# Patient Record
Sex: FEMALE | Race: WHITE | NOT HISPANIC OR LATINO | Employment: UNEMPLOYED | URBAN - NONMETROPOLITAN AREA
[De-identification: names, ages, dates, MRNs, and addresses within clinical notes are randomized per-mention and may not be internally consistent; named-entity substitution may affect disease eponyms.]

---

## 2017-04-11 ENCOUNTER — OFFICE VISIT (OUTPATIENT)
Dept: ONCOLOGY | Facility: CLINIC | Age: 62
End: 2017-04-11

## 2017-04-11 VITALS
RESPIRATION RATE: 16 BRPM | WEIGHT: 213 LBS | DIASTOLIC BLOOD PRESSURE: 79 MMHG | SYSTOLIC BLOOD PRESSURE: 181 MMHG | HEART RATE: 70 BPM | TEMPERATURE: 98.1 F

## 2017-04-11 DIAGNOSIS — C50.912 MALIGNANT NEOPLASM OF LEFT FEMALE BREAST, UNSPECIFIED SITE OF BREAST: Primary | ICD-10-CM

## 2017-04-11 PROCEDURE — 99213 OFFICE O/P EST LOW 20 MIN: CPT | Performed by: INTERNAL MEDICINE

## 2017-04-11 RX ORDER — TAMOXIFEN CITRATE 20 MG/1
20 TABLET ORAL DAILY
Qty: 90 TABLET | Refills: 1 | Status: SHIPPED | OUTPATIENT
Start: 2017-04-11 | End: 2017-11-15 | Stop reason: SDUPTHER

## 2017-04-11 NOTE — PROGRESS NOTES
PROBLEM LIST:  1. Left breast cancer:   a) Left mastectomy 07/22/2008.   b) T3N1M0, Stage IIIA.   c) HER-2 positive, weakly estrogen receptor positive, and progesterone  receptor negative.   d) Adjuvant chemotherapy with Taxotere, carboplatin, and Herceptin.   e) Adjuvant tamoxifen started on 12/12/2008.   f) Postmastectomy radiation done in [place].   2. Abnormal CT scan of the chest 01/14/2016 with negative CT on follow-up  3. Diabetes.    Subjective     HISTORY OF PRESENT ILLNESS:   Chief complaint: Here about breast cancer management.  Mrs. Horton has not had any bone pain, headache, cough, wheezing or unexplained weight loss to suggest recurrent breast cancer.  She continues on her tamoxifen without difficulty.  Doesn't have any bone pain or any leg pain or swelling to suggest DVT or any vaginal bleeding or spotting.  She is maintaining her usual activities.  She has been started on metformin since her last visit.    Past Medical History, Past Surgical History, Social History, Family History have been reviewed and are without significant changes except as mentioned.    Review of Systems   A comprehensive 14 point review of systems was performed and was negative except as mentioned.    Medications:  The current medication list was reviewed in the EMR    ALLERGIES:  Allergies not on file    Objective      BP (!) 181/79  Pulse 70  Temp 98.1 °F (36.7 °C) (Temporal Artery )   Resp 16  Wt 213 lb (96.6 kg)     Performance Status: ECOG 0    General: well appearing, in no acute distress  HEENT: sclera anicteric, oropharynx clear  Lymphatics: no cervical, supraclavicular, or axillary adenopathy  Cardiovascular: regular rate and rhythm, no murmurs  Lungs: clear to auscultation bilaterally  Abdomen: soft, nontender, nondistended.  No palpable organomegaly  Extremeties: no lower extremity edema  Breasts: Mastectomy incision is healed well with no nodules or other evidence recurrence in right breast has no mass or  discharge  Skin: no rashes, lesions, bruising, or petechiae    RECENT LABS:   WBC   Date Value Ref Range Status   04/12/2016 10.14 3.50 - 10.80 K/mcL Final     Hemoglobin   Date Value Ref Range Status   04/12/2016 13.5 11.5 - 15.5 g/dL Final     Hematocrit   Date Value Ref Range Status   04/12/2016 40.1 34.5 - 44.0 % Final     MCV   Date Value Ref Range Status   04/12/2016 90.7 80.0 - 99.0 fL Final     RDW   Date Value Ref Range Status   12/16/2015 12.8 11.5 - 14.5 % Final     Platelets   Date Value Ref Range Status   04/12/2016 210 150 - 450 K/mcL Final     Neutrophils Absolute   Date Value Ref Range Status   04/12/2016 6.58 1.50 - 8.30 K/mcL Final     Lymphocytes Absolute   Date Value Ref Range Status   04/12/2016 2.59 0.60 - 4.80 K/mcL Final     Monocytes Absolute   Date Value Ref Range Status   04/12/2016 0.70 0.00 - 1.00 K/mcL Final     Eosinophils Absolute   Date Value Ref Range Status   04/12/2016 0.21 0.10 - 0.30 K/mcL Final     Basophils Absolute   Date Value Ref Range Status   04/12/2016 0.04 0.00 - 0.20 K/mcL Final       Glucose   Date Value Ref Range Status   04/12/2016 128 (H) 70 - 100 mg/dL Final     Sodium   Date Value Ref Range Status   04/12/2016 135 132 - 146 mmol/L Final     Potassium   Date Value Ref Range Status   04/12/2016 3.7 3.5 - 5.5 mmol/L Final     CO2   Date Value Ref Range Status   04/12/2016 30 20 - 31 mmol/L Final     Chloride   Date Value Ref Range Status   04/12/2016 100 99 - 109 mmol/L Final     Anion Gap   Date Value Ref Range Status   04/12/2016 5 3 - 11 mmol/L Final     Creatinine   Date Value Ref Range Status   10/18/2016 1.0 0.6 - 1.3 mg/dL Final     BUN   Date Value Ref Range Status   10/18/2016 14 7 - 20 mg/dL Final     Calcium   Date Value Ref Range Status   04/12/2016 9.5 8.7 - 10.4 mg/dL Final     Alkaline Phosphatase   Date Value Ref Range Status   04/12/2016 149 (H) 25 - 100 Units/L Final     Total Protein   Date Value Ref Range Status   04/12/2016 8.0 5.7 - 8.2 g/dL  Final     ALT (SGPT)   Date Value Ref Range Status   04/12/2016 96 (H) 7 - 40 Units/L Final     AST (SGOT)   Date Value Ref Range Status   04/12/2016 67 (H) 0 - 33 Units/L Final     Total Bilirubin   Date Value Ref Range Status   04/12/2016 0.5 0.3 - 1.2 mg/dL Final     Albumin   Date Value Ref Range Status   04/12/2016 4.2 3.2 - 4.8 g/dL Final       No results found for: LDH, URICACID    No results found for: MSPIKE, KAPPALAMB, IGLFLC, FREEKAPPAL          Assessment/Plan   Impression: 1.  Breast cancer.  She's doing well on extended adjuvant tamoxifen after her other treatments as outlined above.  Plan: 1.  We will continue her adjuvant tamoxifen 20 mg daily and see her back in 6 months.            Jac Rader MD  [facility]    04/11/17           CC:

## 2017-05-31 DIAGNOSIS — C50.912 MALIGNANT NEOPLASM OF LEFT FEMALE BREAST, UNSPECIFIED SITE OF BREAST: Primary | ICD-10-CM

## 2017-08-13 ENCOUNTER — HOSPITAL ENCOUNTER (EMERGENCY)
Facility: HOSPITAL | Age: 62
Discharge: HOME | End: 2017-08-13
Attending: EMERGENCY MEDICINE | Admitting: EMERGENCY MEDICINE

## 2017-08-13 ENCOUNTER — APPOINTMENT (OUTPATIENT)
Dept: GENERAL RADIOLOGY | Facility: HOSPITAL | Age: 62
End: 2017-08-13

## 2017-08-13 ENCOUNTER — APPOINTMENT (OUTPATIENT)
Dept: CT IMAGING | Facility: HOSPITAL | Age: 62
End: 2017-08-13

## 2017-08-13 VITALS
OXYGEN SATURATION: 96 % | TEMPERATURE: 98.7 F | DIASTOLIC BLOOD PRESSURE: 89 MMHG | RESPIRATION RATE: 18 BRPM | BODY MASS INDEX: 38.09 KG/M2 | WEIGHT: 207 LBS | HEART RATE: 56 BPM | SYSTOLIC BLOOD PRESSURE: 164 MMHG | HEIGHT: 62 IN

## 2017-08-13 DIAGNOSIS — R59.0 MEDIASTINAL LYMPHADENOPATHY: ICD-10-CM

## 2017-08-13 DIAGNOSIS — J20.9 ACUTE BRONCHITIS, UNSPECIFIED ORGANISM: Primary | ICD-10-CM

## 2017-08-13 LAB
ALBUMIN SERPL-MCNC: 4.5 G/DL (ref 3.5–5)
ALBUMIN/GLOB SERPL: 1.2 G/DL (ref 1–2)
ALP SERPL-CCNC: 117 U/L (ref 38–126)
ALT SERPL W P-5'-P-CCNC: 84 U/L (ref 13–69)
ANION GAP SERPL CALCULATED.3IONS-SCNC: 16.1 MMOL/L
AST SERPL-CCNC: 62 U/L (ref 15–46)
BASOPHILS # BLD AUTO: 0.03 10*3/MM3 (ref 0–0.2)
BASOPHILS NFR BLD AUTO: 0.4 % (ref 0–2.5)
BILIRUB SERPL-MCNC: 0.6 MG/DL (ref 0.2–1.3)
BUN BLD-MCNC: 11 MG/DL (ref 7–20)
BUN/CREAT SERPL: 12.2 (ref 7.1–23.5)
CALCIUM SPEC-SCNC: 10.8 MG/DL (ref 8.4–10.2)
CHLORIDE SERPL-SCNC: 101 MMOL/L (ref 98–107)
CO2 SERPL-SCNC: 27 MMOL/L (ref 26–30)
CREAT BLD-MCNC: 0.9 MG/DL (ref 0.6–1.3)
D-DIMER, QUANTITATIVE (MAD,POW, STR): 528 NG/ML (FEU) (ref 0–500)
DEPRECATED RDW RBC AUTO: 38.9 FL (ref 37–54)
EOSINOPHIL # BLD AUTO: 0.27 10*3/MM3 (ref 0–0.7)
EOSINOPHIL NFR BLD AUTO: 3.5 % (ref 0–7)
ERYTHROCYTE [DISTWIDTH] IN BLOOD BY AUTOMATED COUNT: 12.1 % (ref 11.5–14.5)
GFR SERPL CREATININE-BSD FRML MDRD: 64 ML/MIN/1.73
GLOBULIN UR ELPH-MCNC: 3.8 GM/DL
GLUCOSE BLD-MCNC: 135 MG/DL (ref 74–98)
HCT VFR BLD AUTO: 38.9 % (ref 37–47)
HGB BLD-MCNC: 13.7 G/DL (ref 12–16)
IMM GRANULOCYTES # BLD: 0.04 10*3/MM3 (ref 0–0.06)
IMM GRANULOCYTES NFR BLD: 0.5 % (ref 0–0.6)
LYMPHOCYTES # BLD AUTO: 3.1 10*3/MM3 (ref 0.6–3.4)
LYMPHOCYTES NFR BLD AUTO: 39.9 % (ref 10–50)
MCH RBC QN AUTO: 31.1 PG (ref 27–31)
MCHC RBC AUTO-ENTMCNC: 35.2 G/DL (ref 30–37)
MCV RBC AUTO: 88.4 FL (ref 81–99)
MONOCYTES # BLD AUTO: 0.47 10*3/MM3 (ref 0–0.9)
MONOCYTES NFR BLD AUTO: 6.1 % (ref 0–12)
NEUTROPHILS # BLD AUTO: 3.85 10*3/MM3 (ref 2–6.9)
NEUTROPHILS NFR BLD AUTO: 49.6 % (ref 37–80)
NRBC BLD MANUAL-RTO: 0 /100 WBC (ref 0–0)
PLATELET # BLD AUTO: 184 10*3/MM3 (ref 130–400)
PMV BLD AUTO: 10.1 FL (ref 6–12)
POTASSIUM BLD-SCNC: 3.1 MMOL/L (ref 3.5–5.1)
PROT SERPL-MCNC: 8.3 G/DL (ref 6.3–8.2)
RBC # BLD AUTO: 4.4 10*6/MM3 (ref 4.2–5.4)
SODIUM BLD-SCNC: 141 MMOL/L (ref 137–145)
TROPONIN I SERPL-MCNC: 0 NG/ML (ref 0–0.05)
TROPONIN I SERPL-MCNC: <0.012 NG/ML (ref 0–0.03)
TROPONIN I SERPL-MCNC: <0.012 NG/ML (ref 0–0.03)
WBC NRBC COR # BLD: 7.76 10*3/MM3 (ref 4.8–10.8)
WHOLE BLOOD HOLD SPECIMEN: NORMAL
WHOLE BLOOD HOLD SPECIMEN: NORMAL

## 2017-08-13 PROCEDURE — 71275 CT ANGIOGRAPHY CHEST: CPT

## 2017-08-13 PROCEDURE — 85379 FIBRIN DEGRADATION QUANT: CPT | Performed by: EMERGENCY MEDICINE

## 2017-08-13 PROCEDURE — 93005 ELECTROCARDIOGRAM TRACING: CPT | Performed by: EMERGENCY MEDICINE

## 2017-08-13 PROCEDURE — 96361 HYDRATE IV INFUSION ADD-ON: CPT

## 2017-08-13 PROCEDURE — 71020 HC CHEST PA AND LATERAL: CPT

## 2017-08-13 PROCEDURE — 96376 TX/PRO/DX INJ SAME DRUG ADON: CPT

## 2017-08-13 PROCEDURE — 85025 COMPLETE CBC W/AUTO DIFF WBC: CPT | Performed by: EMERGENCY MEDICINE

## 2017-08-13 PROCEDURE — 96375 TX/PRO/DX INJ NEW DRUG ADDON: CPT

## 2017-08-13 PROCEDURE — 96374 THER/PROPH/DIAG INJ IV PUSH: CPT

## 2017-08-13 PROCEDURE — 0 IOPAMIDOL 61 % SOLUTION: Performed by: EMERGENCY MEDICINE

## 2017-08-13 PROCEDURE — 25010000002 HYDROMORPHONE PER 4 MG: Performed by: EMERGENCY MEDICINE

## 2017-08-13 PROCEDURE — 25010000002 ONDANSETRON PER 1 MG: Performed by: EMERGENCY MEDICINE

## 2017-08-13 PROCEDURE — 80053 COMPREHEN METABOLIC PANEL: CPT | Performed by: EMERGENCY MEDICINE

## 2017-08-13 PROCEDURE — 84484 ASSAY OF TROPONIN QUANT: CPT | Performed by: EMERGENCY MEDICINE

## 2017-08-13 PROCEDURE — 99284 EMERGENCY DEPT VISIT MOD MDM: CPT

## 2017-08-13 RX ORDER — METOPROLOL TARTRATE 5 MG/5ML
5 INJECTION INTRAVENOUS ONCE
Status: COMPLETED | OUTPATIENT
Start: 2017-08-13 | End: 2017-08-13

## 2017-08-13 RX ORDER — SODIUM CHLORIDE 0.9 % (FLUSH) 0.9 %
10 SYRINGE (ML) INJECTION AS NEEDED
Status: DISCONTINUED | OUTPATIENT
Start: 2017-08-13 | End: 2017-08-13 | Stop reason: HOSPADM

## 2017-08-13 RX ORDER — POTASSIUM CHLORIDE 750 MG/1
40 CAPSULE, EXTENDED RELEASE ORAL ONCE
Status: COMPLETED | OUTPATIENT
Start: 2017-08-13 | End: 2017-08-13

## 2017-08-13 RX ORDER — LEVOFLOXACIN 500 MG/1
500 TABLET, FILM COATED ORAL DAILY
Qty: 7 TABLET | Refills: 0 | Status: SHIPPED | OUTPATIENT
Start: 2017-08-13 | End: 2017-08-20

## 2017-08-13 RX ORDER — FAMOTIDINE 10 MG/ML
20 INJECTION, SOLUTION INTRAVENOUS ONCE
Status: COMPLETED | OUTPATIENT
Start: 2017-08-13 | End: 2017-08-13

## 2017-08-13 RX ORDER — ASPIRIN 325 MG
325 TABLET ORAL ONCE
Status: COMPLETED | OUTPATIENT
Start: 2017-08-13 | End: 2017-08-13

## 2017-08-13 RX ORDER — ONDANSETRON 2 MG/ML
4 INJECTION INTRAMUSCULAR; INTRAVENOUS ONCE
Status: COMPLETED | OUTPATIENT
Start: 2017-08-13 | End: 2017-08-13

## 2017-08-13 RX ORDER — SODIUM CHLORIDE 9 MG/ML
125 INJECTION, SOLUTION INTRAVENOUS CONTINUOUS
Status: DISCONTINUED | OUTPATIENT
Start: 2017-08-13 | End: 2017-08-13 | Stop reason: HOSPADM

## 2017-08-13 RX ORDER — LEVOFLOXACIN 750 MG/1
750 TABLET ORAL ONCE
Status: COMPLETED | OUTPATIENT
Start: 2017-08-13 | End: 2017-08-13

## 2017-08-13 RX ADMIN — ASPIRIN 325 MG ORAL TABLET 325 MG: 325 PILL ORAL at 02:19

## 2017-08-13 RX ADMIN — LEVOFLOXACIN 750 MG: 750 TABLET, FILM COATED ORAL at 06:33

## 2017-08-13 RX ADMIN — HYDROMORPHONE HYDROCHLORIDE 0.5 MG: 1 INJECTION, SOLUTION INTRAMUSCULAR; INTRAVENOUS; SUBCUTANEOUS at 02:20

## 2017-08-13 RX ADMIN — FAMOTIDINE 20 MG: 10 INJECTION, SOLUTION INTRAVENOUS at 02:20

## 2017-08-13 RX ADMIN — ONDANSETRON 4 MG: 2 INJECTION INTRAMUSCULAR; INTRAVENOUS at 02:20

## 2017-08-13 RX ADMIN — METOPROLOL TARTRATE 5 MG: 5 INJECTION INTRAVENOUS at 02:20

## 2017-08-13 RX ADMIN — SODIUM CHLORIDE 125 ML/HR: 9 INJECTION, SOLUTION INTRAVENOUS at 02:19

## 2017-08-13 RX ADMIN — POTASSIUM CHLORIDE 40 MEQ: 750 CAPSULE, EXTENDED RELEASE ORAL at 03:32

## 2017-08-13 RX ADMIN — ONDANSETRON 4 MG: 2 INJECTION, SOLUTION INTRAMUSCULAR; INTRAVENOUS at 03:41

## 2017-08-13 RX ADMIN — IOPAMIDOL 100 ML: 612 INJECTION, SOLUTION INTRAVENOUS at 03:29

## 2017-08-13 NOTE — ED PROVIDER NOTES
Subjective   History of Present Illness  TRIAGE CHIEF COMPLAINT:   Chief Complaint   Patient presents with   • Chest Pain         HPI: Sanjana Horton   is a 61 y.o. female   who presents to the emergency department complaining of Chest pain.  Patient with history of hypertension, GERD, dyslipidemia, diabetes, breast cancer status post left mastectomy.  She describes onset of left-sided chest pain at 1 AM while she was sleeping.  Pain as sharp and burning, currently 5 out of 10 in intensity.  She describes associated nausea and shortness of breath.  Pain is worsened by taking a deep breath.  Denies any other exacerbating or relieving factors.  Has not taken anything prior to arrival.  Denies any prior history of cardiac disorders.  Positive family history of heart disease with her father having heart attacks in his 60s.  Review of systems positive for mild nonproductive cough over the past 2 weeks which her doctor attributed to seasonal allergies.            Review of Systems   All other systems reviewed and are negative.      Past Medical History:   Diagnosis Date   • Breast cancer 2008   • Cancer    • Head congestion     4/12/2016: For past 2 days with occasional cough, non productive.   • Hot flashes     Tolerating medication well, does have occasional hot flashes but denies night sweats.       Allergies   Allergen Reactions   • Codeine        Past Surgical History:   Procedure Laterality Date   • MASTECTOMY Left 07/22/2008       History reviewed. No pertinent family history.    Social History     Social History   • Marital status:      Spouse name: N/A   • Number of children: N/A   • Years of education: N/A     Social History Main Topics   • Smoking status: Former Smoker     Years: 40.00     Quit date: 01/2016   • Smokeless tobacco: None      Comment: Smoked less than 1 PPD for past 40 years.   • Alcohol use No   • Drug use: None   • Sexual activity: Not Asked     Other Topics Concern   • None     Social  History Narrative   • None           Objective   Physical Exam    CONSTITUTIONAL: Awake, oriented, appears obese, uncomfortable, but non-toxic   HENT: Atraumatic, normocephalic, oral mucosa pink and moist, airway patent. Nares patent without drainage. External ears normal.   EYES: Conjunctiva clear, EOMI, PERRL   NECK: Trachea midline, non-tender, supple   CARDIOVASCULAR: Normal heart rate, Normal rhythm, No murmurs, rubs, gallops   PULMONARY/CHEST: Clear to auscultation, no rhonchi, wheezes, or rales. Symmetrical breath sounds. Non-tender.  Left mastectomy.  ABDOMINAL: Non-distended, soft, non-tender - no rebound or guarding. BS normal.   NEUROLOGIC: Non-focal, moving all four extremities, no gross sensory or motor deficits.   EXTREMITIES: No clubbing, cyanosis, or edema   SKIN: Warm, Dry, No erythema, No rash     XR Chest 2 View    (Results Pending)   CT Chest Pulmonary Embolism With Contrast    (Results Pending)           EKG:  NSR, rate 71, no new/acute ST changes or T-wave inversions when compared with prior.         Procedures         ED Course  ED Course          ED COURSE / MEDICAL DECISION MAKING:   Nursing notes reviewed.    Patient with atypical L chest discomfort with a pleuritic quality. Labs unremarkable. CT scan with L sided mediastinal and hilar adenopathy with ground glass opacities. No fever, leukocytosis, Left shift, or productive cough to support a diagnosis of pneumonia.  It is possible that some of the ground glass opacities are residual from prior radiation from her left-sided breast cancer and that perhaps she has bronchitis with some reactive lymphadenopathy however I feel this requires further workup and I advised the patient to follow-up with her oncologist for further evaluation.  I provided her with a copy of her CT report with a few handwritten notes on it to bring with her to that appointment this week.  In the meanwhile we will initiate oral antibiotics.  Patient encouraged to stay  hydrated and to return to the ER if worse in any way.     DECISION TO DISCHARGE/ADMIT: see ED care timeline       Electronically signed by: Stefano Valdez MD, 8/13/2017 6:32 AM              MDM    Final diagnoses:   Acute bronchitis, unspecified organism   Mediastinal lymphadenopathy            Stefano Valdez MD  08/13/17 [id number]

## 2017-08-16 ENCOUNTER — APPOINTMENT (OUTPATIENT)
Dept: LAB | Facility: HOSPITAL | Age: 62
End: 2017-08-16

## 2017-08-16 ENCOUNTER — OFFICE VISIT (OUTPATIENT)
Dept: ONCOLOGY | Facility: CLINIC | Age: 62
End: 2017-08-16

## 2017-08-16 VITALS
SYSTOLIC BLOOD PRESSURE: 159 MMHG | HEIGHT: 62 IN | HEART RATE: 69 BPM | DIASTOLIC BLOOD PRESSURE: 78 MMHG | TEMPERATURE: 97.5 F | BODY MASS INDEX: 37.73 KG/M2 | RESPIRATION RATE: 18 BRPM | WEIGHT: 205 LBS

## 2017-08-16 DIAGNOSIS — C50.912 MALIGNANT NEOPLASM OF LEFT FEMALE BREAST, UNSPECIFIED SITE OF BREAST: Primary | ICD-10-CM

## 2017-08-16 DIAGNOSIS — R93.89 ABNORMAL CT SCAN, CHEST: ICD-10-CM

## 2017-08-16 LAB
ALBUMIN SERPL-MCNC: 4.4 G/DL (ref 3.2–4.8)
ALBUMIN/GLOB SERPL: 1.3 G/DL (ref 1.5–2.5)
ALP SERPL-CCNC: 150 U/L (ref 25–100)
ALT SERPL W P-5'-P-CCNC: 65 U/L (ref 7–40)
ANION GAP SERPL CALCULATED.3IONS-SCNC: 3 MMOL/L (ref 3–11)
AST SERPL-CCNC: 52 U/L (ref 0–33)
BILIRUB SERPL-MCNC: 0.8 MG/DL (ref 0.3–1.2)
BUN BLD-MCNC: 12 MG/DL (ref 9–23)
BUN/CREAT SERPL: 12 (ref 7–25)
CALCIUM SPEC-SCNC: 10.4 MG/DL (ref 8.7–10.4)
CHLORIDE SERPL-SCNC: 102 MMOL/L (ref 99–109)
CO2 SERPL-SCNC: 33 MMOL/L (ref 20–31)
CREAT BLD-MCNC: 1 MG/DL (ref 0.6–1.3)
CRP SERPL-MCNC: 1.01 MG/DL (ref 0–1)
ERYTHROCYTE [SEDIMENTATION RATE] IN BLOOD: 43 MM/HR (ref 0–30)
GFR SERPL CREATININE-BSD FRML MDRD: 57 ML/MIN/1.73
GLOBULIN UR ELPH-MCNC: 3.4 GM/DL
GLUCOSE BLD-MCNC: 118 MG/DL (ref 70–100)
LDH SERPL-CCNC: 216 U/L (ref 120–246)
POTASSIUM BLD-SCNC: 3.5 MMOL/L (ref 3.5–5.5)
PROT SERPL-MCNC: 7.8 G/DL (ref 5.7–8.2)
SODIUM BLD-SCNC: 138 MMOL/L (ref 132–146)

## 2017-08-16 PROCEDURE — 83615 LACTATE (LD) (LDH) ENZYME: CPT | Performed by: INTERNAL MEDICINE

## 2017-08-16 PROCEDURE — 86300 IMMUNOASSAY TUMOR CA 15-3: CPT | Performed by: INTERNAL MEDICINE

## 2017-08-16 PROCEDURE — 85652 RBC SED RATE AUTOMATED: CPT | Performed by: INTERNAL MEDICINE

## 2017-08-16 PROCEDURE — 99215 OFFICE O/P EST HI 40 MIN: CPT | Performed by: INTERNAL MEDICINE

## 2017-08-16 PROCEDURE — 80053 COMPREHEN METABOLIC PANEL: CPT | Performed by: INTERNAL MEDICINE

## 2017-08-16 PROCEDURE — 86140 C-REACTIVE PROTEIN: CPT | Performed by: INTERNAL MEDICINE

## 2017-08-16 PROCEDURE — 36415 COLL VENOUS BLD VENIPUNCTURE: CPT | Performed by: INTERNAL MEDICINE

## 2017-08-16 RX ORDER — ERGOCALCIFEROL 1.25 MG/1
50000 CAPSULE ORAL
Refills: 0 | COMMUNITY
Start: 2017-07-29 | End: 2019-07-08 | Stop reason: SDUPTHER

## 2017-08-16 NOTE — PROGRESS NOTES
"      PROBLEM LIST:  1.  New mediastinal and hilar lymphadenopathy.  Differential diagnosis includes recurrent breast cancer, occult lung cancer, lymphoma, or benign diseases.  2.  Left breast cancer  A.  Left mastectomy 7/22/2008  B.  T3 N1 M0 Stage IIIA  C.  HER-2 positive, \"weakly\" ER positive, DE negative  D.  Adjuvant chemotherapy with Herceptin, Taxotere, and carboplatin  E.  Postmastectomy radiation  F.  Adjuvant tamoxifen started on 12/12/2008 and continued as extended adjuvant  3.  Diabetes    Subjective     HISTORY OF PRESENT ILLNESS:   Chief complaint here about dyspnea and abnormal x-rays.  Mrs. Horton is seen on an interim visit after an ER visit for dyspnea and atypical chest pain.  Evaluation there showed abnormal findings on chest x-ray and on CT angiogram she had lymphadenopathy in the mediastinum and hilar regions along with some woman a infiltrates that are not nodular.  She hasn't noticed any fever, chills, sweats or any cough, wheezing or pleuritic chest pain and she hasn't noticed any unexplained weight loss or other new symptoms.  She hasn't changed any medicines recently.    Social history is remarkable that she did smoke cigarettes in the past.  She is accompanied today by her  and she lives at home.    Past Medical History, Past Surgical History, Social History, Family History have been reviewed and are without significant changes except as mentioned.    Review of Systems   A comprehensive 14 point review of systems was performed and was negative except as mentioned.    Medications:  The current medication list was reviewed in the EMR    ALLERGIES:  Allergies not on file    Objective      /78 Comment: RUE  Pulse 69  Temp 97.5 °F (36.4 °C) (Temporal Artery )   Resp 18  Ht 62\" (157.5 cm)  Wt 205 lb (93 kg)  BMI 37.49 kg/m2     Performance Status: ECOG 0    General: well appearing, in no acute distress  HEENT: sclera anicteric, oropharynx clear  Lymphatics: no cervical, " supraclavicular, or axillary adenopathy  Cardiovascular: regular rate and rhythm, no murmurs  Lungs: clear to auscultation bilaterally  Abdomen: soft, nontender, nondistended.  No palpable organomegaly  Breasts: The left mastectomy incision has radiation changes but no evidence of recurrent breast cancer in the right breast is without any mass or discharge.  Extremeties: no lower extremity edema  Skin: no rashes, lesions, bruising, or petechiae    RECENT LABS:   WBC   Date Value Ref Range Status   08/13/2017 7.76 4.80 - 10.80 10*3/mm3 Final     Hemoglobin   Date Value Ref Range Status   08/13/2017 13.7 12.0 - 16.0 g/dL Final     Hematocrit   Date Value Ref Range Status   08/13/2017 38.9 37.0 - 47.0 % Final     MCV   Date Value Ref Range Status   08/13/2017 88.4 81.0 - 99.0 fL Final     RDW   Date Value Ref Range Status   08/13/2017 12.1 11.5 - 14.5 % Final     MPV   Date Value Ref Range Status   08/13/2017 10.1 6.0 - 12.0 fL Final     Platelets   Date Value Ref Range Status   08/13/2017 184 130 - 400 10*3/mm3 Final     Immature Grans %   Date Value Ref Range Status   08/13/2017 0.5 0.0 - 0.6 % Final     Neutrophils, Absolute   Date Value Ref Range Status   08/13/2017 3.85 2.00 - 6.90 10*3/mm3 Final     Lymphocytes, Absolute   Date Value Ref Range Status   08/13/2017 3.10 0.60 - 3.40 10*3/mm3 Final     Monocytes, Absolute   Date Value Ref Range Status   08/13/2017 0.47 0.00 - 0.90 10*3/mm3 Final     Eosinophils, Absolute   Date Value Ref Range Status   08/13/2017 0.27 0.00 - 0.70 10*3/mm3 Final     Basophils, Absolute   Date Value Ref Range Status   08/13/2017 0.03 0.00 - 0.20 10*3/mm3 Final     Immature Grans, Absolute   Date Value Ref Range Status   08/13/2017 0.04 0.00 - 0.06 10*3/mm3 Final     nRBC   Date Value Ref Range Status   08/13/2017 0.0 0.0 - 0.0 /100 WBC Final       Glucose   Date Value Ref Range Status   08/13/2017 135 (H) 74 - 98 mg/dL Final     Sodium   Date Value Ref Range Status   08/13/2017 141 137  - 145 mmol/L Final     Potassium   Date Value Ref Range Status   08/13/2017 3.1 (L) 3.5 - 5.1 mmol/L Final     CO2   Date Value Ref Range Status   08/13/2017 27.0 26.0 - 30.0 mmol/L Final     Chloride   Date Value Ref Range Status   08/13/2017 101 98 - 107 mmol/L Final     Anion Gap   Date Value Ref Range Status   08/13/2017 16.1 mmol/L Final     Creatinine   Date Value Ref Range Status   08/13/2017 0.90 0.60 - 1.30 mg/dL Final     BUN   Date Value Ref Range Status   08/13/2017 11 7 - 20 mg/dL Final     BUN/Creatinine Ratio   Date Value Ref Range Status   08/13/2017 12.2 7.1 - 23.5 Final     Calcium   Date Value Ref Range Status   08/13/2017 10.8 (H) 8.4 - 10.2 mg/dL Final     eGFR Non  Amer   Date Value Ref Range Status   08/13/2017 64 >60 mL/min/1.73 Final     Alkaline Phosphatase   Date Value Ref Range Status   08/13/2017 117 38 - 126 U/L Final     Total Protein   Date Value Ref Range Status   08/13/2017 8.3 (H) 6.3 - 8.2 g/dL Final     ALT (SGPT)   Date Value Ref Range Status   08/13/2017 84 (H) 13 - 69 U/L Final     AST (SGOT)   Date Value Ref Range Status   08/13/2017 62 (H) 15 - 46 U/L Final     Total Bilirubin   Date Value Ref Range Status   08/13/2017 0.6 0.2 - 1.3 mg/dL Final     Albumin   Date Value Ref Range Status   08/13/2017 4.50 3.50 - 5.00 g/dL Final     Globulin   Date Value Ref Range Status   08/13/2017 3.8 gm/dL Final     A/G Ratio   Date Value Ref Range Status   08/13/2017 1.2 1.0 - 2.0 g/dL Final       No results found for: LDH, URICACID    No results found for: MSPIKE, KAPPALAMB, IGLFLC, FREEKAPPAL      Her CT chest and chest x-ray images and reports were reviewed.  She has audible enlarged lymph nodes including mediastinal and left hilar especially.  Some of these are calcified.    Assessment/Plan   Impression: 1.  Enlarged lymph nodes.  This would be an unusual pattern of recurrence for this type of breast cancer, which usually recurs early or not at all.  While this could represent  breast cancer, I think other possibilities are at least equally likely including lung cancer which would be especially typical for small cell lung cancer, lymphoma, with other causes like another occult malignancy or a second breast cancer or benign causes such as sarcoidosis or infection possible but felt to be less likely.    Plan: 1.  I will order labs today including a CMP, noting that her calcium was mildly elevated, along with a CA 27.29, sedimentation rate and C-reactive protein.  2.  I'll arrange pulmonary consultation since I think bronchoscopy and EBUS would be the most appropriate diagnostic procedure.  Mediastinal endoscopy or CT-guided biopsy with the alternatives.  3.  We discussed PET scanning but I'll defer this until we have some additional testing.  4.  I'll see her back as soon as these results are available.  I discussed all this in detail with Ms. Horton and her  including differential diagnosis and plans for evaluation.  They had several appropriate questions and I tried to answer these fully.    Visit time was 32 minutes minutes, 18 minutes spent in counseling including discussion of the imaging results, differential diagnosis, and plan for evaluation.    Jac Rader MD  [facility]    08/16/17           CC:

## 2017-08-17 ENCOUNTER — PREP FOR SURGERY (OUTPATIENT)
Dept: OTHER | Facility: HOSPITAL | Age: 62
End: 2017-08-17

## 2017-08-17 ENCOUNTER — OFFICE VISIT (OUTPATIENT)
Dept: PULMONOLOGY | Facility: CLINIC | Age: 62
End: 2017-08-17

## 2017-08-17 VITALS
DIASTOLIC BLOOD PRESSURE: 78 MMHG | WEIGHT: 201 LBS | BODY MASS INDEX: 36.99 KG/M2 | HEART RATE: 91 BPM | SYSTOLIC BLOOD PRESSURE: 130 MMHG | RESPIRATION RATE: 19 BRPM | OXYGEN SATURATION: 94 % | HEIGHT: 62 IN

## 2017-08-17 DIAGNOSIS — R91.8 MASS OF LEFT LUNG: Primary | ICD-10-CM

## 2017-08-17 DIAGNOSIS — E66.01 MORBID OBESITY, UNSPECIFIED OBESITY TYPE (HCC): ICD-10-CM

## 2017-08-17 DIAGNOSIS — R91.8 MASS OF LEFT LUNG: ICD-10-CM

## 2017-08-17 DIAGNOSIS — R59.0 MEDIASTINAL LYMPHADENOPATHY: ICD-10-CM

## 2017-08-17 DIAGNOSIS — J43.9 PULMONARY EMPHYSEMA, UNSPECIFIED EMPHYSEMA TYPE (HCC): Primary | ICD-10-CM

## 2017-08-17 DIAGNOSIS — Z87.891 PERSONAL HISTORY OF TOBACCO USE, PRESENTING HAZARDS TO HEALTH: ICD-10-CM

## 2017-08-17 LAB — CANCER AG27-29 SERPL-ACNC: 47.2 U/ML (ref 0–38.6)

## 2017-08-17 PROCEDURE — 99245 OFF/OP CONSLTJ NEW/EST HI 55: CPT | Performed by: INTERNAL MEDICINE

## 2017-08-17 RX ORDER — SODIUM CHLORIDE 9 MG/ML
42 INJECTION, SOLUTION INTRAVENOUS CONTINUOUS
Status: CANCELLED | OUTPATIENT
Start: 2017-08-17

## 2017-08-17 NOTE — PROGRESS NOTES
"CONSULT NOTE    Chief Complaint   Patient presents with   • Consult     Abnormal CT    • Shortness of Breath   • Cough       Subjective   Sanjana Horton is a 62 y.o. female.     History of Present Illness   62-year-old female with past medical history significant for breast cancer who came for consultation after she was found to have abnormal CT of the chest.    The patient says that about 3 days ago she woke up with cough and chest pain and came to the emergency room where she underwent a CT of the chest with contrast.  Although there was no pulmonary embolism, she was found to have multiple mediastinal lymphadenopathy and was sent to follow-up with Dr. Rader who has been following her status post breast cancer.  She underwent surgery and received chemotherapy and radiation treatment.    The patient says that her symptoms started one month ago and was mostly consistent with cough.  The patient was treated with antibiotics and her cough improved.     She also mentioned weight loss of about 10 pounds over the past 1 month.  The patient actually mentions night sweats, that started 2 nights ago.  She denies any hemoptysis.    She occasionally snores but denies any daytime sleepiness or tiredness.  She also denies any significant shortness of breath although occasionally does feel \"tired with exertion\".    The patient used to smoke three quarters of a pack per day for 40 years, having quit 2 years ago.  There is no known family history of lung cancer.    The following portions of the patient's history were reviewed and updated as appropriate: allergies, current medications, past family history, past medical history, past social history and past surgical history.    Review of Systems   Constitutional: Positive for chills, fatigue and fever.   HENT: Negative for congestion, postnasal drip, rhinorrhea, sinus pressure, sneezing and sore throat.    Respiratory: Positive for cough, chest tightness, shortness of breath " "and wheezing.    Cardiovascular: Positive for chest pain. Negative for palpitations and leg swelling.   Psychiatric/Behavioral: Negative for sleep disturbance.   All other systems reviewed and are negative.      Past Medical History:   Diagnosis Date   • Breast cancer 2008   • Cancer    • Head congestion     4/12/2016: For past 2 days with occasional cough, non productive.   • Hot flashes     Tolerating medication well, does have occasional hot flashes but denies night sweats.       Social History   Substance Use Topics   • Smoking status: Former Smoker     Years: 40.00     Quit date: 01/2016   • Smokeless tobacco: Former User      Comment: Smoked less than 1 PPD for past 40 years.   • Alcohol use No         Objective   Visit Vitals   • /78   • Pulse 91   • Resp 19   • Ht 62\" (157.5 cm)   • Wt 201 lb (91.2 kg)   • SpO2 94%   • BMI 36.76 kg/m2       Physical Exam   Constitutional: She is oriented to person, place, and time. She appears well-developed and well-nourished.   HENT:   Head: Atraumatic.   Crowded Oropharynx.   Dentures noted.   Eyes: EOM are normal. Pupils are equal, round, and reactive to light.   Neck: No JVD present. No tracheal deviation present. No thyromegaly present.   Increased adipose tissue.    Cardiovascular: Normal rate and regular rhythm.    Pulmonary/Chest: Effort normal and breath sounds normal. No respiratory distress. She has no wheezes.   Musculoskeletal: Normal range of motion. She exhibits no edema.   Neurological: She is alert and oriented to person, place, and time.   Skin: Skin is warm and dry.   Psychiatric: She has a normal mood and affect. Her behavior is normal.   Vitals reviewed.        Assessment/Plan   Sanjana was seen today for consult, shortness of breath and cough.    Diagnoses and all orders for this visit:    Pulmonary emphysema, unspecified emphysema type    Mediastinal lymphadenopathy    Personal history of tobacco use, presenting hazards to health    Morbid " obesity, unspecified obesity type    Mass of left lung         Return in about 3 weeks (around 9/7/2017) for Recheck, Overbook.    DISCUSSION(if any):  I have reviewed the patient's imaging studies and shared them with her.  The patient's CT scan confirms mediastinal lymphadenopathy and I do believe there is a left hilar lung mass as well.  The patient has multiple lymph nodes which can be accessed.     I have also reviewed her oncologist's office note that mentions enlarged lymph nodes and discussion with the patient about PET scan.      I reviewed her last EKG that showed NSR.    Laboratory workup was also reviewed which showed CO2 level of 33. Last Hb was 13.7. Platelets were 184.    ===========================  ===========================    The risks of EBUS and bronchoscopy including but not limited to damage to the overlying structures, pneumothorax, bleeding, worsening of respiratory failure, requirement for chest tube placement among others were explained.    Patient understood the risks and benefits and agreed to proceed with the procedure    The alternatives were also discussed with the patient.    While we are awaiting the EBUS and Bronchoscopy, a PET scan can be considered.    I told the patient that the differential diagnosis includes lung cancer along with lymphoma.  I highly doubt that this represents metastatic breast cancer.    I also shared the fact that some of the lymph nodes appear to be at least partially calcified.    The patient's CT scan also shows evidence of emphysema although it seems to be mild and scattered.  There are however, no symptoms to suggest active COPD at this time.          Dictated utilizing Dragon dictation.    This document was electronically signed by Holly Granados MD August 17, 2017  11:01 AM

## 2017-08-18 PROBLEM — R59.0 MEDIASTINAL LYMPHADENOPATHY: Status: ACTIVE | Noted: 2017-08-18

## 2017-08-18 PROBLEM — R91.8 MASS OF LEFT LUNG: Status: ACTIVE | Noted: 2017-08-18

## 2017-08-22 RX ORDER — ASPIRIN 81 MG/1
81 TABLET ORAL DAILY
COMMUNITY

## 2017-08-31 ENCOUNTER — ANESTHESIA EVENT (OUTPATIENT)
Dept: PERIOP | Facility: HOSPITAL | Age: 62
End: 2017-08-31

## 2017-08-31 ENCOUNTER — ANESTHESIA (OUTPATIENT)
Dept: PERIOP | Facility: HOSPITAL | Age: 62
End: 2017-08-31

## 2017-08-31 ENCOUNTER — HOSPITAL ENCOUNTER (OUTPATIENT)
Facility: HOSPITAL | Age: 62
Setting detail: HOSPITAL OUTPATIENT SURGERY
Discharge: HOME | End: 2017-08-31
Attending: INTERNAL MEDICINE | Admitting: INTERNAL MEDICINE

## 2017-08-31 ENCOUNTER — APPOINTMENT (OUTPATIENT)
Dept: GENERAL RADIOLOGY | Facility: HOSPITAL | Age: 62
End: 2017-08-31

## 2017-08-31 VITALS
SYSTOLIC BLOOD PRESSURE: 143 MMHG | DIASTOLIC BLOOD PRESSURE: 73 MMHG | TEMPERATURE: 97.2 F | BODY MASS INDEX: 38.09 KG/M2 | HEIGHT: 62 IN | OXYGEN SATURATION: 92 % | RESPIRATION RATE: 18 BRPM | HEART RATE: 78 BPM | WEIGHT: 207 LBS

## 2017-08-31 DIAGNOSIS — R59.0 MEDIASTINAL LYMPHADENOPATHY: ICD-10-CM

## 2017-08-31 DIAGNOSIS — R91.8 MASS OF LEFT LUNG: ICD-10-CM

## 2017-08-31 LAB
DEPRECATED RDW RBC AUTO: 38 FL (ref 37–54)
ERYTHROCYTE [DISTWIDTH] IN BLOOD BY AUTOMATED COUNT: 11.9 % (ref 11.5–14.5)
GLUCOSE BLDC GLUCOMTR-MCNC: 105 MG/DL (ref 70–130)
HCT VFR BLD AUTO: 38.4 % (ref 37–47)
HGB BLD-MCNC: 13.1 G/DL (ref 12–16)
INR PPP: 1.14 (ref 0.9–1.1)
MCH RBC QN AUTO: 30.3 PG (ref 27–31)
MCHC RBC AUTO-ENTMCNC: 34.1 G/DL (ref 30–37)
MCV RBC AUTO: 88.9 FL (ref 81–99)
PLATELET # BLD AUTO: 211 10*3/MM3 (ref 130–400)
PMV BLD AUTO: 9.5 FL (ref 6–12)
PROTHROMBIN TIME: 12.5 SECONDS (ref 9.3–12.1)
RBC # BLD AUTO: 4.32 10*6/MM3 (ref 4.2–5.4)
WBC NRBC COR # BLD: 7.69 10*3/MM3 (ref 4.8–10.8)

## 2017-08-31 PROCEDURE — 85610 PROTHROMBIN TIME: CPT | Performed by: INTERNAL MEDICINE

## 2017-08-31 PROCEDURE — 25010000002 ONDANSETRON PER 1 MG

## 2017-08-31 PROCEDURE — 25010000002 SUCCINYLCHOLINE PER 20 MG: Performed by: NURSE ANESTHETIST, CERTIFIED REGISTERED

## 2017-08-31 PROCEDURE — 31625 BRONCHOSCOPY W/BIOPSY(S): CPT | Performed by: INTERNAL MEDICINE

## 2017-08-31 PROCEDURE — 31624 DX BRONCHOSCOPE/LAVAGE: CPT | Performed by: INTERNAL MEDICINE

## 2017-08-31 PROCEDURE — 85027 COMPLETE CBC AUTOMATED: CPT | Performed by: INTERNAL MEDICINE

## 2017-08-31 PROCEDURE — 87102 FUNGUS ISOLATION CULTURE: CPT | Performed by: INTERNAL MEDICINE

## 2017-08-31 PROCEDURE — 25010000002 DEXAMETHASONE PER 1 MG: Performed by: NURSE ANESTHETIST, CERTIFIED REGISTERED

## 2017-08-31 PROCEDURE — 25010000002 FENTANYL CITRATE (PF) 100 MCG/2ML SOLUTION: Performed by: NURSE ANESTHETIST, CERTIFIED REGISTERED

## 2017-08-31 PROCEDURE — 25010000002 PROPOFOL 200 MG/20ML EMULSION: Performed by: NURSE ANESTHETIST, CERTIFIED REGISTERED

## 2017-08-31 PROCEDURE — 25010000002 ONDANSETRON PER 1 MG: Performed by: NURSE ANESTHETIST, CERTIFIED REGISTERED

## 2017-08-31 PROCEDURE — 82962 GLUCOSE BLOOD TEST: CPT

## 2017-08-31 PROCEDURE — 31653 BRONCH EBUS SAMPLNG 3/> NODE: CPT | Performed by: INTERNAL MEDICINE

## 2017-08-31 PROCEDURE — 31623 DX BRONCHOSCOPE/BRUSH: CPT | Performed by: INTERNAL MEDICINE

## 2017-08-31 PROCEDURE — 71010 HC CHEST PA OR AP: CPT

## 2017-08-31 PROCEDURE — 87070 CULTURE OTHR SPECIMN AEROBIC: CPT | Performed by: INTERNAL MEDICINE

## 2017-08-31 PROCEDURE — C1726 CATH, BAL DIL, NON-VASCULAR: HCPCS | Performed by: INTERNAL MEDICINE

## 2017-08-31 PROCEDURE — 87205 SMEAR GRAM STAIN: CPT | Performed by: INTERNAL MEDICINE

## 2017-08-31 PROCEDURE — 87106 FUNGI IDENTIFICATION YEAST: CPT | Performed by: INTERNAL MEDICINE

## 2017-08-31 RX ORDER — ONDANSETRON 2 MG/ML
INJECTION INTRAMUSCULAR; INTRAVENOUS AS NEEDED
Status: DISCONTINUED | OUTPATIENT
Start: 2017-08-31 | End: 2017-08-31 | Stop reason: SURG

## 2017-08-31 RX ORDER — PROPOFOL 10 MG/ML
INJECTION, EMULSION INTRAVENOUS AS NEEDED
Status: DISCONTINUED | OUTPATIENT
Start: 2017-08-31 | End: 2017-08-31 | Stop reason: SURG

## 2017-08-31 RX ORDER — ONDANSETRON 2 MG/ML
INJECTION INTRAMUSCULAR; INTRAVENOUS
Status: COMPLETED
Start: 2017-08-31 | End: 2017-08-31

## 2017-08-31 RX ORDER — LIDOCAINE HYDROCHLORIDE 20 MG/ML
INJECTION, SOLUTION INFILTRATION; PERINEURAL
Status: DISCONTINUED
Start: 2017-08-31 | End: 2017-08-31 | Stop reason: HOSPADM

## 2017-08-31 RX ORDER — ONDANSETRON 2 MG/ML
4 INJECTION INTRAMUSCULAR; INTRAVENOUS ONCE
Status: CANCELLED | OUTPATIENT
Start: 2017-08-31

## 2017-08-31 RX ORDER — SUCCINYLCHOLINE CHLORIDE 20 MG/ML
INJECTION INTRAMUSCULAR; INTRAVENOUS AS NEEDED
Status: DISCONTINUED | OUTPATIENT
Start: 2017-08-31 | End: 2017-08-31 | Stop reason: SURG

## 2017-08-31 RX ORDER — SODIUM CHLORIDE 9 MG/ML
42 INJECTION, SOLUTION INTRAVENOUS CONTINUOUS
Status: DISCONTINUED | OUTPATIENT
Start: 2017-08-31 | End: 2017-08-31 | Stop reason: HOSPADM

## 2017-08-31 RX ORDER — ONDANSETRON 2 MG/ML
4 INJECTION INTRAMUSCULAR; INTRAVENOUS ONCE
Status: CANCELLED | OUTPATIENT
Start: 2017-08-31 | End: 2017-08-31

## 2017-08-31 RX ORDER — FENTANYL CITRATE 50 UG/ML
INJECTION, SOLUTION INTRAMUSCULAR; INTRAVENOUS AS NEEDED
Status: DISCONTINUED | OUTPATIENT
Start: 2017-08-31 | End: 2017-08-31 | Stop reason: SURG

## 2017-08-31 RX ORDER — LIDOCAINE HYDROCHLORIDE 40 MG/ML
SOLUTION TOPICAL
Status: DISCONTINUED
Start: 2017-08-31 | End: 2017-08-31 | Stop reason: WASHOUT

## 2017-08-31 RX ORDER — DEXAMETHASONE SODIUM PHOSPHATE 4 MG/ML
INJECTION, SOLUTION INTRA-ARTICULAR; INTRALESIONAL; INTRAMUSCULAR; INTRAVENOUS; SOFT TISSUE AS NEEDED
Status: DISCONTINUED | OUTPATIENT
Start: 2017-08-31 | End: 2017-08-31 | Stop reason: SURG

## 2017-08-31 RX ADMIN — SUCCINYLCHOLINE CHLORIDE 100 MG: 20 INJECTION, SOLUTION INTRAMUSCULAR; INTRAVENOUS at 12:38

## 2017-08-31 RX ADMIN — ONDANSETRON 4 MG: 2 INJECTION INTRAMUSCULAR; INTRAVENOUS at 15:44

## 2017-08-31 RX ADMIN — DEXAMETHASONE SODIUM PHOSPHATE 4 MG: 4 INJECTION, SOLUTION INTRAMUSCULAR; INTRAVENOUS at 12:38

## 2017-08-31 RX ADMIN — SODIUM CHLORIDE: 9 INJECTION, SOLUTION INTRAVENOUS at 13:21

## 2017-08-31 RX ADMIN — ONDANSETRON 4 MG: 2 INJECTION INTRAMUSCULAR; INTRAVENOUS at 12:38

## 2017-08-31 RX ADMIN — FENTANYL CITRATE 100 MCG: 50 INJECTION, SOLUTION INTRAMUSCULAR; INTRAVENOUS at 12:38

## 2017-08-31 RX ADMIN — LIDOCAINE HYDROCHLORIDE 60 MG: 20 INJECTION, SOLUTION INTRAVENOUS at 12:38

## 2017-08-31 RX ADMIN — SODIUM CHLORIDE 42 ML/HR: 9 INJECTION, SOLUTION INTRAVENOUS at 11:00

## 2017-08-31 RX ADMIN — PROPOFOL 200 MG: 10 INJECTION, EMULSION INTRAVENOUS at 12:38

## 2017-09-04 LAB
BACTERIA SPEC RESP CULT: NO GROWTH
GRAM STN SPEC: NORMAL

## 2017-09-06 ENCOUNTER — TELEPHONE (OUTPATIENT)
Dept: ONCOLOGY | Facility: CLINIC | Age: 62
End: 2017-09-06

## 2017-09-06 ENCOUNTER — OFFICE VISIT (OUTPATIENT)
Dept: PULMONOLOGY | Facility: CLINIC | Age: 62
End: 2017-09-06

## 2017-09-06 VITALS
SYSTOLIC BLOOD PRESSURE: 160 MMHG | WEIGHT: 207 LBS | DIASTOLIC BLOOD PRESSURE: 82 MMHG | HEIGHT: 62 IN | OXYGEN SATURATION: 95 % | RESPIRATION RATE: 16 BRPM | BODY MASS INDEX: 38.09 KG/M2 | HEART RATE: 67 BPM

## 2017-09-06 DIAGNOSIS — R59.0 MEDIASTINAL LYMPHADENOPATHY: ICD-10-CM

## 2017-09-06 DIAGNOSIS — J43.9 PULMONARY EMPHYSEMA, UNSPECIFIED EMPHYSEMA TYPE (HCC): ICD-10-CM

## 2017-09-06 DIAGNOSIS — G47.19 EXCESSIVE DAYTIME SLEEPINESS: ICD-10-CM

## 2017-09-06 DIAGNOSIS — R06.83 SNORING: ICD-10-CM

## 2017-09-06 DIAGNOSIS — G47.33 OBSTRUCTIVE SLEEP APNEA: ICD-10-CM

## 2017-09-06 DIAGNOSIS — C34.92 SMALL CELL LUNG CANCER, LEFT (HCC): Primary | ICD-10-CM

## 2017-09-06 DIAGNOSIS — E66.01 MORBID OBESITY, UNSPECIFIED OBESITY TYPE (HCC): ICD-10-CM

## 2017-09-06 PROCEDURE — 99214 OFFICE O/P EST MOD 30 MIN: CPT | Performed by: INTERNAL MEDICINE

## 2017-09-06 RX ORDER — ALBUTEROL SULFATE 90 UG/1
2 AEROSOL, METERED RESPIRATORY (INHALATION) EVERY 4 HOURS PRN
Qty: 1 INHALER | Refills: 5 | Status: SHIPPED | OUTPATIENT
Start: 2017-09-06 | End: 2018-09-05 | Stop reason: SDUPTHER

## 2017-09-06 NOTE — PROGRESS NOTES
"Chief Complaint   Patient presents with   • Follow-up     Mediastinal lymphadenopathy         Subjective   Sanjana Horton is a 62 y.o. female.     History of Present Illness   Patient comes in today to discuss the results of recent biopsy.  She is complaining of daytime sleepiness, snoring and tiredness.    She denies any significant shortness of breath although occasionally does feel some tightness when she walks long distance.  She denies any cough or phlegm production.    The results of biopsy were discussed with the patient and her  in great detail.    The following portions of the patient's history were reviewed and updated as appropriate: allergies, current medications, past family history, past medical history, past social history and past surgical history.    Review of Systems   Constitutional: Positive for fatigue.   Respiratory: Negative for cough and wheezing.    Cardiovascular: Negative for chest pain.       Objective   Visit Vitals   • /82   • Pulse 67   • Resp 16   • Ht 62\" (157.5 cm)   • Wt 207 lb (93.9 kg)   • SpO2 95%   • BMI 37.86 kg/m2       Physical Exam   Constitutional: She is oriented to person, place, and time. She appears well-developed and well-nourished.   HENT:   Head: Atraumatic.   Crowded oropharynx.    Eyes: EOM are normal.   Neck: Neck supple.   Increased adipose tissue.    Cardiovascular: Normal rate and regular rhythm.    Pulmonary/Chest: Effort normal. No respiratory distress. She has no wheezes. She has no rales.   Musculoskeletal: She exhibits no edema.   Neurological: She is alert and oriented to person, place, and time.   Skin: Skin is warm and dry.   Psychiatric: She has a normal mood and affect.   Vitals reviewed.      Assessment/Plan   Sanjana was seen today for follow-up.    Diagnoses and all orders for this visit:    Small cell lung cancer, left    Pulmonary emphysema, unspecified emphysema type    Morbid obesity, unspecified obesity type    Mediastinal " lymphadenopathy    Snoring    Obstructive sleep apnea  -     Home Sleep Study; Future    Excessive daytime sleepiness  -     Home Sleep Study; Future    Other orders  -     albuterol (VENTOLIN HFA) 108 (90 Base) MCG/ACT inhaler; Inhale 2 puffs Every 4 (Four) Hours As Needed for Wheezing or Shortness of Air.         Return in about 20 weeks (around 1/24/2018) for Recheck.    DISCUSSION (if any):  I spent 25-30 minutes detailing the findings on the bronchoscopy.  Her pathology is consistent with small cell lung cancer.  I've discussed the case with Dr. Rader over the phone who has been kind enough to reschedule the patient's follow-up appointment for next week.    I also provided them with reading materials about lung cancer and obstructive sleep apnea.    She clearly has symptoms suggestive of obstructive sleep apnea.  I have decided to order a home sleep study.    She is agreeable to using CPAP/AutoPap etc., if necessary.    Patient was advised to use albuterol based rescue inhaler for when necessary purposes    Patient was also advised to keep a log of the use of rescue inhaler.      Dictated utilizing Dragon dictation.    This document was electronically signed by Holly Granados MD September 6, 2017  11:03 AM

## 2017-09-07 LAB
LAB AP CASE REPORT: NORMAL
LAB AP CASE REPORT: NORMAL
LAB AP CLINICAL INFORMATION: NORMAL
Lab: NORMAL
Lab: NORMAL
PATH REPORT.FINAL DX SPEC: NORMAL

## 2017-09-08 LAB — YEAST SPEC QL CULT: NORMAL

## 2017-09-12 ENCOUNTER — OFFICE VISIT (OUTPATIENT)
Dept: ONCOLOGY | Facility: CLINIC | Age: 62
End: 2017-09-12

## 2017-09-12 VITALS
HEART RATE: 73 BPM | DIASTOLIC BLOOD PRESSURE: 77 MMHG | BODY MASS INDEX: 36.4 KG/M2 | RESPIRATION RATE: 19 BRPM | WEIGHT: 199 LBS | TEMPERATURE: 97.6 F | SYSTOLIC BLOOD PRESSURE: 169 MMHG

## 2017-09-12 DIAGNOSIS — C34.92 MALIGNANT NEOPLASM OF LEFT LUNG, UNSPECIFIED PART OF LUNG (HCC): Primary | ICD-10-CM

## 2017-09-12 DIAGNOSIS — C50.912 MALIGNANT NEOPLASM OF LEFT FEMALE BREAST, UNSPECIFIED SITE OF BREAST: ICD-10-CM

## 2017-09-12 PROBLEM — C34.12 PRIMARY CANCER OF LEFT UPPER LOBE OF LUNG (HCC): Status: ACTIVE | Noted: 2017-09-06

## 2017-09-12 PROCEDURE — 99215 OFFICE O/P EST HI 40 MIN: CPT | Performed by: INTERNAL MEDICINE

## 2017-09-12 RX ORDER — SODIUM CHLORIDE 9 MG/ML
250 INJECTION, SOLUTION INTRAVENOUS ONCE
Status: CANCELLED | OUTPATIENT
Start: 2017-09-13

## 2017-09-12 RX ORDER — SODIUM CHLORIDE 9 MG/ML
250 INJECTION, SOLUTION INTRAVENOUS ONCE
Status: CANCELLED | OUTPATIENT
Start: 2017-09-15

## 2017-09-12 RX ORDER — PALONOSETRON 0.05 MG/ML
0.25 INJECTION, SOLUTION INTRAVENOUS ONCE
Status: CANCELLED | OUTPATIENT
Start: 2017-09-13

## 2017-09-12 RX ORDER — SODIUM CHLORIDE 9 MG/ML
250 INJECTION, SOLUTION INTRAVENOUS ONCE
Status: CANCELLED | OUTPATIENT
Start: 2017-09-14

## 2017-09-13 ENCOUNTER — INFUSION (OUTPATIENT)
Dept: ONCOLOGY | Facility: HOSPITAL | Age: 62
End: 2017-09-13

## 2017-09-13 ENCOUNTER — TELEPHONE (OUTPATIENT)
Dept: ONCOLOGY | Facility: CLINIC | Age: 62
End: 2017-09-13

## 2017-09-13 VITALS
HEART RATE: 81 BPM | BODY MASS INDEX: 36.58 KG/M2 | DIASTOLIC BLOOD PRESSURE: 78 MMHG | SYSTOLIC BLOOD PRESSURE: 183 MMHG | TEMPERATURE: 98.2 F | WEIGHT: 200 LBS | RESPIRATION RATE: 16 BRPM

## 2017-09-13 DIAGNOSIS — C34.92 MALIGNANT NEOPLASM OF LEFT LUNG, UNSPECIFIED PART OF LUNG (HCC): Primary | ICD-10-CM

## 2017-09-13 DIAGNOSIS — C34.12 PRIMARY CANCER OF LEFT UPPER LOBE OF LUNG (HCC): ICD-10-CM

## 2017-09-13 LAB
ALBUMIN SERPL-MCNC: 4.1 G/DL (ref 3.5–5)
ALBUMIN/GLOB SERPL: 1.2 G/DL (ref 1–2)
ALP SERPL-CCNC: 109 U/L (ref 38–126)
ALT SERPL W P-5'-P-CCNC: 58 U/L (ref 13–69)
ANION GAP SERPL CALCULATED.3IONS-SCNC: 14 MMOL/L
AST SERPL-CCNC: 43 U/L (ref 15–46)
BASOPHILS # BLD AUTO: 0.02 10*3/MM3 (ref 0–0.2)
BASOPHILS NFR BLD AUTO: 0.3 % (ref 0–2.5)
BILIRUB SERPL-MCNC: 0.5 MG/DL (ref 0.2–1.3)
BUN BLD-MCNC: 13 MG/DL (ref 7–20)
BUN/CREAT SERPL: 14.4 (ref 7.1–23.5)
CALCIUM SPEC-SCNC: 10.1 MG/DL (ref 8.4–10.2)
CHLORIDE SERPL-SCNC: 104 MMOL/L (ref 98–107)
CO2 SERPL-SCNC: 25 MMOL/L (ref 26–30)
CREAT BLD-MCNC: 0.9 MG/DL (ref 0.6–1.3)
DEPRECATED RDW RBC AUTO: 39.8 FL (ref 37–54)
EOSINOPHIL # BLD AUTO: 0.17 10*3/MM3 (ref 0–0.7)
EOSINOPHIL NFR BLD AUTO: 2.8 % (ref 0–7)
ERYTHROCYTE [DISTWIDTH] IN BLOOD BY AUTOMATED COUNT: 12.3 % (ref 11.5–14.5)
GFR SERPL CREATININE-BSD FRML MDRD: 64 ML/MIN/1.73
GLOBULIN UR ELPH-MCNC: 3.5 GM/DL
GLUCOSE BLD-MCNC: 198 MG/DL (ref 74–98)
HCT VFR BLD AUTO: 36.6 % (ref 37–47)
HGB BLD-MCNC: 12.5 G/DL (ref 12–16)
IMM GRANULOCYTES # BLD: 0.02 10*3/MM3 (ref 0–0.06)
IMM GRANULOCYTES NFR BLD: 0.3 % (ref 0–0.6)
LYMPHOCYTES # BLD AUTO: 2.19 10*3/MM3 (ref 0.6–3.4)
LYMPHOCYTES NFR BLD AUTO: 35.8 % (ref 10–50)
MCH RBC QN AUTO: 30.3 PG (ref 27–31)
MCHC RBC AUTO-ENTMCNC: 34.2 G/DL (ref 30–37)
MCV RBC AUTO: 88.8 FL (ref 81–99)
MONOCYTES # BLD AUTO: 0.39 10*3/MM3 (ref 0–0.9)
MONOCYTES NFR BLD AUTO: 6.4 % (ref 0–12)
NEUTROPHILS # BLD AUTO: 3.32 10*3/MM3 (ref 2–6.9)
NEUTROPHILS NFR BLD AUTO: 54.4 % (ref 37–80)
NRBC BLD MANUAL-RTO: 0 /100 WBC (ref 0–0)
PLATELET # BLD AUTO: 179 10*3/MM3 (ref 130–400)
PMV BLD AUTO: 10.4 FL (ref 6–12)
POTASSIUM BLD-SCNC: 3 MMOL/L (ref 3.5–5.1)
PROT SERPL-MCNC: 7.6 G/DL (ref 6.3–8.2)
RBC # BLD AUTO: 4.12 10*6/MM3 (ref 4.2–5.4)
SODIUM BLD-SCNC: 140 MMOL/L (ref 137–145)
WBC NRBC COR # BLD: 6.11 10*3/MM3 (ref 4.8–10.8)

## 2017-09-13 PROCEDURE — 25010000002 CARBOPLATIN PER 50 MG: Performed by: INTERNAL MEDICINE

## 2017-09-13 PROCEDURE — 25010000002 FOSAPREPITANT PER 1 MG: Performed by: INTERNAL MEDICINE

## 2017-09-13 PROCEDURE — 96413 CHEMO IV INFUSION 1 HR: CPT

## 2017-09-13 PROCEDURE — 25010000002 DEXAMETHASONE PER 1 MG: Performed by: INTERNAL MEDICINE

## 2017-09-13 PROCEDURE — 96375 TX/PRO/DX INJ NEW DRUG ADDON: CPT

## 2017-09-13 PROCEDURE — 85025 COMPLETE CBC W/AUTO DIFF WBC: CPT

## 2017-09-13 PROCEDURE — 25010000002 ETOPOSIDE 1 GM/50ML SOLUTION 50 ML VIAL: Performed by: INTERNAL MEDICINE

## 2017-09-13 PROCEDURE — 36415 COLL VENOUS BLD VENIPUNCTURE: CPT

## 2017-09-13 PROCEDURE — 96365 THER/PROPH/DIAG IV INF INIT: CPT

## 2017-09-13 PROCEDURE — 96374 THER/PROPH/DIAG INJ IV PUSH: CPT

## 2017-09-13 PROCEDURE — 80053 COMPREHEN METABOLIC PANEL: CPT

## 2017-09-13 PROCEDURE — 96367 TX/PROPH/DG ADDL SEQ IV INF: CPT

## 2017-09-13 PROCEDURE — 25010000002 PALONOSETRON PER 25 MCG: Performed by: INTERNAL MEDICINE

## 2017-09-13 PROCEDURE — 96417 CHEMO IV INFUS EACH ADDL SEQ: CPT

## 2017-09-13 RX ORDER — POTASSIUM CHLORIDE 1500 MG/1
20 TABLET, FILM COATED, EXTENDED RELEASE ORAL DAILY
Qty: 30 TABLET | Refills: 1 | Status: SHIPPED | OUTPATIENT
Start: 2017-09-13 | End: 2017-09-13

## 2017-09-13 RX ORDER — PALONOSETRON 0.05 MG/ML
0.25 INJECTION, SOLUTION INTRAVENOUS ONCE
Status: COMPLETED | OUTPATIENT
Start: 2017-09-13 | End: 2017-09-13

## 2017-09-13 RX ORDER — SODIUM CHLORIDE 9 MG/ML
250 INJECTION, SOLUTION INTRAVENOUS ONCE
Status: DISCONTINUED | OUTPATIENT
Start: 2017-09-13 | End: 2017-09-13 | Stop reason: HOSPADM

## 2017-09-13 RX ORDER — POTASSIUM CHLORIDE 1500 MG/1
20 TABLET, FILM COATED, EXTENDED RELEASE ORAL DAILY
Qty: 30 TABLET | Refills: 1 | Status: SHIPPED | OUTPATIENT
Start: 2017-09-13 | End: 2017-12-05

## 2017-09-13 RX ADMIN — PALONOSETRON HYDROCHLORIDE 0.25 MG: 0.25 INJECTION INTRAVENOUS at 09:22

## 2017-09-13 RX ADMIN — SODIUM CHLORIDE 150 MG: 9 INJECTION, SOLUTION INTRAVENOUS at 09:41

## 2017-09-13 RX ADMIN — ETOPOSIDE 190 MG: 20 INJECTION, SOLUTION, CONCENTRATE INTRAVENOUS at 10:01

## 2017-09-13 RX ADMIN — CARBOPLATIN 600 MG: 10 INJECTION, SOLUTION INTRAVENOUS at 11:04

## 2017-09-13 RX ADMIN — DEXAMETHASONE SODIUM PHOSPHATE 12 MG: 4 INJECTION, SOLUTION INTRA-ARTICULAR; INTRALESIONAL; INTRAMUSCULAR; INTRAVENOUS; SOFT TISSUE at 09:20

## 2017-09-13 NOTE — TELEPHONE ENCOUNTER
Per AQUILES Lewis, sent Potassium to pharmacy.  Called JESSICA Brown at [facility].  She will let patient know.

## 2017-09-13 NOTE — TELEPHONE ENCOUNTER
----- Message from AQUILES Mckay sent at 9/13/2017 10:37 AM EDT -----  Regarding: RE: ABDIEL--Pot of 3.0  Contact: [phone]  Start her on potassium 20 meq po daily   Thanks  ----- Message -----     From: Nena Chow MA     Sent: 9/13/2017  10:00 AM       To: AQUILES Mckay  Subject: FW: ABDIEL--Pot of 3.0                             ----- Message -----     From: Sandra Cavazos RN     Sent: 9/13/2017   9:13 AM       To: [facility]  Subject: ABDIEL--Pot of 3.0                             Just an FYI , pt has a potassium of 3.0 today. Thanks.  Stephanie

## 2017-09-14 ENCOUNTER — TELEPHONE (OUTPATIENT)
Dept: ONCOLOGY | Facility: CLINIC | Age: 62
End: 2017-09-14

## 2017-09-14 ENCOUNTER — INFUSION (OUTPATIENT)
Dept: ONCOLOGY | Facility: HOSPITAL | Age: 62
End: 2017-09-14

## 2017-09-14 VITALS
WEIGHT: 201 LBS | TEMPERATURE: 98.2 F | DIASTOLIC BLOOD PRESSURE: 66 MMHG | HEART RATE: 80 BPM | BODY MASS INDEX: 36.76 KG/M2 | RESPIRATION RATE: 16 BRPM | SYSTOLIC BLOOD PRESSURE: 157 MMHG

## 2017-09-14 DIAGNOSIS — C34.12 PRIMARY CANCER OF LEFT UPPER LOBE OF LUNG (HCC): Primary | ICD-10-CM

## 2017-09-14 RX ORDER — ONDANSETRON 4 MG/1
4 TABLET, FILM COATED ORAL EVERY 8 HOURS PRN
Qty: 30 TABLET | Refills: 3 | Status: SHIPPED | OUTPATIENT
Start: 2017-09-14 | End: 2018-03-27 | Stop reason: HOSPADM

## 2017-09-14 RX ORDER — SODIUM CHLORIDE 9 MG/ML
250 INJECTION, SOLUTION INTRAVENOUS ONCE
Status: DISCONTINUED | OUTPATIENT
Start: 2017-09-14 | End: 2017-09-14 | Stop reason: HOSPADM

## 2017-09-14 RX ADMIN — DEXAMETHASONE SODIUM PHOSPHATE 12 MG: 4 INJECTION, SOLUTION INTRA-ARTICULAR; INTRALESIONAL; INTRAMUSCULAR; INTRAVENOUS; SOFT TISSUE at 10:20

## 2017-09-14 RX ADMIN — ETOPOSIDE 190 MG: 20 INJECTION, SOLUTION, CONCENTRATE INTRAVENOUS at 10:38

## 2017-09-14 NOTE — TELEPHONE ENCOUNTER
----- Message from Antwan Barnes RN sent at 9/14/2017 10:15 AM EDT -----  Regarding: ABDIEL- script/question  Contact: [phone]  Pt needs script for Zofran. Would like to know when is a good time to get flu vaccine.

## 2017-09-14 NOTE — TELEPHONE ENCOUNTER
Zofran sent to pharmacy.  Per AQUILES Lewis, patient can get flu shot at anytime.  Message given Ngoc in [facility].

## 2017-09-15 ENCOUNTER — INFUSION (OUTPATIENT)
Dept: ONCOLOGY | Facility: HOSPITAL | Age: 62
End: 2017-09-15

## 2017-09-15 VITALS
RESPIRATION RATE: 18 BRPM | WEIGHT: 204 LBS | TEMPERATURE: 98.2 F | DIASTOLIC BLOOD PRESSURE: 64 MMHG | SYSTOLIC BLOOD PRESSURE: 146 MMHG | BODY MASS INDEX: 37.31 KG/M2 | HEART RATE: 67 BPM

## 2017-09-15 DIAGNOSIS — C34.12 PRIMARY CANCER OF LEFT UPPER LOBE OF LUNG (HCC): Primary | ICD-10-CM

## 2017-09-15 PROCEDURE — 96413 CHEMO IV INFUSION 1 HR: CPT

## 2017-09-15 PROCEDURE — 96375 TX/PRO/DX INJ NEW DRUG ADDON: CPT

## 2017-09-15 PROCEDURE — 25010000002 ETOPOSIDE 1 GM/50ML SOLUTION 50 ML VIAL: Performed by: INTERNAL MEDICINE

## 2017-09-15 PROCEDURE — 25010000002 DEXAMETHASONE PER 1 MG: Performed by: INTERNAL MEDICINE

## 2017-09-15 PROCEDURE — 96374 THER/PROPH/DIAG INJ IV PUSH: CPT

## 2017-09-15 RX ORDER — SODIUM CHLORIDE 9 MG/ML
250 INJECTION, SOLUTION INTRAVENOUS ONCE
Status: DISCONTINUED | OUTPATIENT
Start: 2017-09-15 | End: 2017-09-15 | Stop reason: HOSPADM

## 2017-09-15 RX ADMIN — DEXAMETHASONE SODIUM PHOSPHATE 12 MG: 4 INJECTION, SOLUTION INTRA-ARTICULAR; INTRALESIONAL; INTRAMUSCULAR; INTRAVENOUS; SOFT TISSUE at 09:15

## 2017-09-15 RX ADMIN — ETOPOSIDE 190 MG: 20 INJECTION, SOLUTION, CONCENTRATE INTRAVENOUS at 09:30

## 2017-09-19 ENCOUNTER — DOCUMENTATION (OUTPATIENT)
Dept: NUTRITION | Facility: HOSPITAL | Age: 62
End: 2017-09-19

## 2017-09-19 NOTE — PROGRESS NOTES
"Oncology Nutrition Screening    Patient Name:  Sanjana Horton  YOB: 1955  MRN: [id number]  Date:  09/19/17  Physician:  Jac Rader MD    Type of Cancer Treatment:   Chemotherapy: herceptin, Taxotere, Carboplatin    Patient Active Problem List   Diagnosis   • Breast cancer, left   • Mediastinal lymphadenopathy   • Mass of left lung   • Primary cancer of left upper lobe of lung       Current Outpatient Prescriptions   Medication Sig Dispense Refill   • albuterol (VENTOLIN HFA) 108 (90 Base) MCG/ACT inhaler Inhale 2 puffs Every 4 (Four) Hours As Needed for Wheezing or Shortness of Air. 1 inhaler 5   • amLODIPine (NORVASC) 5 MG tablet Take 5 mg by mouth Daily.  0   • aspirin 81 MG EC tablet Take 81 mg by mouth Daily.     • hydrochlorothiazide (MICROZIDE) 12.5 MG capsule Take 12.5 mg by mouth Every Morning.  0   • losartan-hydrochlorothiazide (HYZAAR) 100-12.5 MG per tablet Take 1 tablet by mouth Daily.  0   • metFORMIN (GLUCOPHAGE) 500 MG tablet Take 500 mg by mouth 2 (Two) Times a Day With Meals.  0   • ondansetron (ZOFRAN) 4 MG tablet Take 1 tablet by mouth Every 8 (Eight) Hours As Needed for Nausea or Vomiting. 30 tablet 3   • pantoprazole (PROTONIX) 40 MG EC tablet Take 40 mg by mouth Daily.  0   • potassium chloride ER (K-TAB) 20 MEQ tablet controlled-release ER tablet Take 1 tablet by mouth Daily. 30 tablet 1   • tamoxifen (NOLVADEX) 20 MG chemo tablet Take 1 tablet by mouth Daily. 90 tablet 1   • vitamin D (ERGOCALCIFEROL) 44782 units capsule capsule Take 50,000 Units by mouth Every 7 (Seven) Days.  0     No current facility-administered medications for this visit.        Glycemic Risk:   NIDDM    Weight:   Height: 62\"  Weight: 204 lbs.    BMI: 37.3  Weight Change: gained 5 lbs over 1 week    Oral Food Intake:  Special Diet Restrictions: Diabetic Diet    Hydration Status:   How many 8 ounce glass of water of fluid do you drink per day?  4    Enteral Feeding:   N/A    Nutrition Symptoms: "   Altered Taste Perception  Pt. States she can not taste most foods unless they are spicy.  Activity:   Not my normal self, but able to be up and about with fairly normal activities     reports that she quit smoking about 20 months ago. Her smoking use included Cigarettes. She has a 40.00 pack-year smoking history. She has never used smokeless tobacco. She reports that she does not drink alcohol or use illicit drugs.    Evaluation of Nutritional Risk:   Patient identified to be at nutritional risk and/or for nutritional consultation; will follow patient through course of treatment.   Nutrition Impact Symptoms    RD phoned pt. To complete oncology screening tool.  Pt. to Receive handouts from the [employer] [place] and [employer], altered taste nutrition recs. and Dietetics regarding: Quick and Easy Snacks, Oncology: Nutrition Tips for Well-Being and Oncology: Cooking Tips by mail. RD encouraged drinking adequate amounts of water daily 8 glasses or 64 ounces. RD also discussed the importance of 5-6 small meals daily and trying to snack throughout the day. Pt. Has program contact information and has been encouraged to call with questions.        Electronically signed by:  Elham Avelar RD  11:48 AM

## 2017-09-20 ENCOUNTER — HOSPITAL ENCOUNTER (OUTPATIENT)
Dept: SLEEP MEDICINE | Facility: HOSPITAL | Age: 62
Setting detail: THERAPIES SERIES
End: 2017-09-20
Attending: INTERNAL MEDICINE

## 2017-09-20 DIAGNOSIS — G47.33 OBSTRUCTIVE SLEEP APNEA: ICD-10-CM

## 2017-09-20 DIAGNOSIS — G47.19 EXCESSIVE DAYTIME SLEEPINESS: ICD-10-CM

## 2017-09-20 PROCEDURE — 95806 SLEEP STUDY UNATT&RESP EFFT: CPT | Performed by: INTERNAL MEDICINE

## 2017-09-20 PROCEDURE — 95806 SLEEP STUDY UNATT&RESP EFFT: CPT

## 2017-09-21 DIAGNOSIS — G47.33 OSA (OBSTRUCTIVE SLEEP APNEA): Primary | ICD-10-CM

## 2017-09-27 ENCOUNTER — TELEPHONE (OUTPATIENT)
Dept: ONCOLOGY | Facility: CLINIC | Age: 62
End: 2017-09-27

## 2017-09-27 RX ORDER — DIPHENHYDRAMINE, LIDOCAINE, NYSTATIN
KIT ORAL
Qty: 240 ML | Refills: 3 | Status: SHIPPED | OUTPATIENT
Start: 2017-09-27 | End: 2018-01-22

## 2017-09-27 NOTE — TELEPHONE ENCOUNTER
[facility] in [place] does not mix MMW.  [facility] in [place] will mix.  Patient notified.

## 2017-09-27 NOTE — TELEPHONE ENCOUNTER
----- Message from Kandice Anguiano sent at 9/27/2017  1:11 PM EDT -----  Regarding: ABDIEL - RX   Contact: [phone]  PATIENTS MOUTH IS REALLY SORE AND HAS BLISTERS.     [facility] , [place], [place]

## 2017-09-28 ENCOUNTER — HOSPITAL ENCOUNTER (OUTPATIENT)
Dept: NUCLEAR MEDICINE | Facility: HOSPITAL | Age: 62
Discharge: HOME | End: 2017-09-28

## 2017-09-28 ENCOUNTER — HOSPITAL ENCOUNTER (OUTPATIENT)
Dept: MRI IMAGING | Facility: HOSPITAL | Age: 62
Discharge: HOME | End: 2017-09-28
Admitting: INTERNAL MEDICINE

## 2017-09-28 DIAGNOSIS — C34.92 MALIGNANT NEOPLASM OF LEFT LUNG, UNSPECIFIED PART OF LUNG (HCC): ICD-10-CM

## 2017-09-28 PROCEDURE — A9577 INJ MULTIHANCE: HCPCS | Performed by: INTERNAL MEDICINE

## 2017-09-28 PROCEDURE — 0 TECHNETIUM MEDRONATE KIT: Performed by: INTERNAL MEDICINE

## 2017-09-28 PROCEDURE — 25510000001 GADOBENATE DIMEGLUMINE 529 MG/ML SOLUTION: Performed by: INTERNAL MEDICINE

## 2017-09-28 PROCEDURE — 78306 BONE IMAGING WHOLE BODY: CPT

## 2017-09-28 PROCEDURE — A9503 TC99M MEDRONATE: HCPCS | Performed by: INTERNAL MEDICINE

## 2017-09-28 PROCEDURE — 70553 MRI BRAIN STEM W/O & W/DYE: CPT

## 2017-09-28 RX ORDER — TC 99M MEDRONATE 20 MG/10ML
26.7 INJECTION, POWDER, LYOPHILIZED, FOR SOLUTION INTRAVENOUS
Status: COMPLETED | OUTPATIENT
Start: 2017-09-28 | End: 2017-09-28

## 2017-09-28 RX ADMIN — GADOBENATE DIMEGLUMINE 3 ML: 529 INJECTION, SOLUTION INTRAVENOUS at 10:45

## 2017-09-28 RX ADMIN — GADOBENATE DIMEGLUMINE 15 ML: 529 INJECTION, SOLUTION INTRAVENOUS at 10:45

## 2017-09-28 RX ADMIN — TC 99M MEDRONATE 26.7 MILLICURIE: 20 INJECTION, POWDER, LYOPHILIZED, FOR SOLUTION INTRAVENOUS at 09:50

## 2017-09-29 LAB
BACTERIA SPEC AEROBE CULT: NORMAL
FUNGUS SPEC CULT: NORMAL
FUNGUS SPEC CULT: NORMAL
FUNGUS SPEC FUNGUS STN: NORMAL

## 2017-10-03 ENCOUNTER — INFUSION (OUTPATIENT)
Dept: ONCOLOGY | Facility: HOSPITAL | Age: 62
End: 2017-10-03

## 2017-10-03 ENCOUNTER — OFFICE VISIT (OUTPATIENT)
Dept: ONCOLOGY | Facility: CLINIC | Age: 62
End: 2017-10-03

## 2017-10-03 ENCOUNTER — HOSPITAL ENCOUNTER (OUTPATIENT)
Dept: GENERAL RADIOLOGY | Facility: HOSPITAL | Age: 62
Discharge: HOME | End: 2017-10-03
Admitting: INTERNAL MEDICINE

## 2017-10-03 VITALS
HEART RATE: 74 BPM | SYSTOLIC BLOOD PRESSURE: 159 MMHG | BODY MASS INDEX: 35.85 KG/M2 | DIASTOLIC BLOOD PRESSURE: 72 MMHG | WEIGHT: 196 LBS | TEMPERATURE: 97.1 F | RESPIRATION RATE: 16 BRPM

## 2017-10-03 DIAGNOSIS — C34.12 PRIMARY CANCER OF LEFT UPPER LOBE OF LUNG (HCC): Primary | ICD-10-CM

## 2017-10-03 DIAGNOSIS — R59.0 MEDIASTINAL LYMPHADENOPATHY: ICD-10-CM

## 2017-10-03 DIAGNOSIS — C34.92 MALIGNANT NEOPLASM OF LEFT LUNG, UNSPECIFIED PART OF LUNG (HCC): ICD-10-CM

## 2017-10-03 LAB
ALBUMIN SERPL-MCNC: 4.1 G/DL (ref 3.5–5)
ALBUMIN/GLOB SERPL: 1.1 G/DL (ref 1–2)
ALP SERPL-CCNC: 133 U/L (ref 38–126)
ALT SERPL W P-5'-P-CCNC: 47 U/L (ref 13–69)
ANION GAP SERPL CALCULATED.3IONS-SCNC: 16.2 MMOL/L
AST SERPL-CCNC: 36 U/L (ref 15–46)
BASOPHILS # BLD AUTO: 0.03 10*3/MM3 (ref 0–0.2)
BASOPHILS NFR BLD AUTO: 0.4 % (ref 0–2.5)
BILIRUB SERPL-MCNC: 0.5 MG/DL (ref 0.2–1.3)
BUN BLD-MCNC: 16 MG/DL (ref 7–20)
BUN/CREAT SERPL: 16 (ref 7.1–23.5)
CALCIUM SPEC-SCNC: 10.7 MG/DL (ref 8.4–10.2)
CHLORIDE SERPL-SCNC: 102 MMOL/L (ref 98–107)
CO2 SERPL-SCNC: 28 MMOL/L (ref 26–30)
CREAT BLD-MCNC: 1 MG/DL (ref 0.6–1.3)
DEPRECATED RDW RBC AUTO: 40.3 FL (ref 37–54)
EOSINOPHIL # BLD AUTO: 0.03 10*3/MM3 (ref 0–0.7)
EOSINOPHIL NFR BLD AUTO: 0.4 % (ref 0–7)
ERYTHROCYTE [DISTWIDTH] IN BLOOD BY AUTOMATED COUNT: 13 % (ref 11.5–14.5)
GFR SERPL CREATININE-BSD FRML MDRD: 57 ML/MIN/1.73
GLOBULIN UR ELPH-MCNC: 3.8 GM/DL
GLUCOSE BLD-MCNC: 122 MG/DL (ref 74–98)
HCT VFR BLD AUTO: 31.3 % (ref 37–47)
HGB BLD-MCNC: 10.5 G/DL (ref 12–16)
IMM GRANULOCYTES # BLD: 0.36 10*3/MM3 (ref 0–0.06)
IMM GRANULOCYTES NFR BLD: 5 % (ref 0–0.6)
LYMPHOCYTES # BLD AUTO: 2.04 10*3/MM3 (ref 0.6–3.4)
LYMPHOCYTES NFR BLD AUTO: 28.2 % (ref 10–50)
MCH RBC QN AUTO: 30.1 PG (ref 27–31)
MCHC RBC AUTO-ENTMCNC: 33.5 G/DL (ref 30–37)
MCV RBC AUTO: 89.7 FL (ref 81–99)
MONOCYTES # BLD AUTO: 0.97 10*3/MM3 (ref 0–0.9)
MONOCYTES NFR BLD AUTO: 13.4 % (ref 0–12)
NEUTROPHILS # BLD AUTO: 3.8 10*3/MM3 (ref 2–6.9)
NEUTROPHILS NFR BLD AUTO: 52.6 % (ref 37–80)
NRBC BLD MANUAL-RTO: 0 /100 WBC (ref 0–0)
PLATELET # BLD AUTO: 357 10*3/MM3 (ref 130–400)
PMV BLD AUTO: 9.1 FL (ref 6–12)
POTASSIUM BLD-SCNC: 4.2 MMOL/L (ref 3.5–5.1)
PROT SERPL-MCNC: 7.9 G/DL (ref 6.3–8.2)
RBC # BLD AUTO: 3.49 10*6/MM3 (ref 4.2–5.4)
SODIUM BLD-SCNC: 142 MMOL/L (ref 137–145)
WBC NRBC COR # BLD: 7.23 10*3/MM3 (ref 4.8–10.8)

## 2017-10-03 PROCEDURE — 96374 THER/PROPH/DIAG INJ IV PUSH: CPT

## 2017-10-03 PROCEDURE — 96413 CHEMO IV INFUSION 1 HR: CPT

## 2017-10-03 PROCEDURE — 36415 COLL VENOUS BLD VENIPUNCTURE: CPT

## 2017-10-03 PROCEDURE — 25010000002 FOSAPREPITANT PER 1 MG: Performed by: NURSE PRACTITIONER

## 2017-10-03 PROCEDURE — 96367 TX/PROPH/DG ADDL SEQ IV INF: CPT

## 2017-10-03 PROCEDURE — 80053 COMPREHEN METABOLIC PANEL: CPT

## 2017-10-03 PROCEDURE — 96365 THER/PROPH/DIAG IV INF INIT: CPT

## 2017-10-03 PROCEDURE — 25010000002 PALONOSETRON PER 25 MCG: Performed by: NURSE PRACTITIONER

## 2017-10-03 PROCEDURE — 96375 TX/PRO/DX INJ NEW DRUG ADDON: CPT

## 2017-10-03 PROCEDURE — 25010000002 DEXAMETHASONE PER 1 MG: Performed by: NURSE PRACTITIONER

## 2017-10-03 PROCEDURE — 25010000002 ETOPOSIDE 1 GM/50ML SOLUTION 50 ML VIAL: Performed by: NURSE PRACTITIONER

## 2017-10-03 PROCEDURE — 25010000002 CARBOPLATIN PER 50 MG: Performed by: NURSE PRACTITIONER

## 2017-10-03 PROCEDURE — 99213 OFFICE O/P EST LOW 20 MIN: CPT | Performed by: NURSE PRACTITIONER

## 2017-10-03 PROCEDURE — 85025 COMPLETE CBC W/AUTO DIFF WBC: CPT

## 2017-10-03 PROCEDURE — 71020 HC CHEST PA AND LATERAL: CPT

## 2017-10-03 PROCEDURE — 96417 CHEMO IV INFUS EACH ADDL SEQ: CPT

## 2017-10-03 RX ORDER — SODIUM CHLORIDE 9 MG/ML
250 INJECTION, SOLUTION INTRAVENOUS ONCE
Status: CANCELLED | OUTPATIENT
Start: 2017-10-05

## 2017-10-03 RX ORDER — SODIUM CHLORIDE 9 MG/ML
250 INJECTION, SOLUTION INTRAVENOUS ONCE
Status: CANCELLED | OUTPATIENT
Start: 2017-10-06

## 2017-10-03 RX ORDER — PALONOSETRON 0.05 MG/ML
0.25 INJECTION, SOLUTION INTRAVENOUS ONCE
Status: CANCELLED | OUTPATIENT
Start: 2017-10-04

## 2017-10-03 RX ORDER — PALONOSETRON 0.05 MG/ML
0.25 INJECTION, SOLUTION INTRAVENOUS ONCE
Status: COMPLETED | OUTPATIENT
Start: 2017-10-03 | End: 2017-10-03

## 2017-10-03 RX ORDER — SODIUM CHLORIDE 9 MG/ML
250 INJECTION, SOLUTION INTRAVENOUS ONCE
Status: DISCONTINUED | OUTPATIENT
Start: 2017-10-03 | End: 2017-10-03 | Stop reason: HOSPADM

## 2017-10-03 RX ORDER — SODIUM CHLORIDE 9 MG/ML
250 INJECTION, SOLUTION INTRAVENOUS ONCE
Status: CANCELLED | OUTPATIENT
Start: 2017-10-04

## 2017-10-03 RX ADMIN — DEXAMETHASONE SODIUM PHOSPHATE 12 MG: 4 INJECTION, SOLUTION INTRA-ARTICULAR; INTRALESIONAL; INTRAMUSCULAR; INTRAVENOUS; SOFT TISSUE at 09:53

## 2017-10-03 RX ADMIN — ETOPOSIDE 190 MG: 20 INJECTION, SOLUTION, CONCENTRATE INTRAVENOUS at 10:29

## 2017-10-03 RX ADMIN — SODIUM CHLORIDE 150 MG: 9 INJECTION, SOLUTION INTRAVENOUS at 10:14

## 2017-10-03 RX ADMIN — PALONOSETRON HYDROCHLORIDE 0.25 MG: 0.25 INJECTION INTRAVENOUS at 09:54

## 2017-10-03 RX ADMIN — CARBOPLATIN 550 MG: 10 INJECTION, SOLUTION INTRAVENOUS at 11:32

## 2017-10-04 ENCOUNTER — INFUSION (OUTPATIENT)
Dept: ONCOLOGY | Facility: HOSPITAL | Age: 62
End: 2017-10-04

## 2017-10-04 VITALS — WEIGHT: 195 LBS | BODY MASS INDEX: 35.67 KG/M2

## 2017-10-04 DIAGNOSIS — C34.12 PRIMARY CANCER OF LEFT UPPER LOBE OF LUNG (HCC): Primary | ICD-10-CM

## 2017-10-04 PROCEDURE — 25010000002 DEXAMETHASONE PER 1 MG: Performed by: NURSE PRACTITIONER

## 2017-10-04 PROCEDURE — 96375 TX/PRO/DX INJ NEW DRUG ADDON: CPT

## 2017-10-04 PROCEDURE — 96413 CHEMO IV INFUSION 1 HR: CPT

## 2017-10-04 PROCEDURE — 25010000002 ETOPOSIDE 1 GM/50ML SOLUTION 50 ML VIAL: Performed by: NURSE PRACTITIONER

## 2017-10-04 RX ORDER — SODIUM CHLORIDE 9 MG/ML
250 INJECTION, SOLUTION INTRAVENOUS ONCE
Status: DISCONTINUED | OUTPATIENT
Start: 2017-10-04 | End: 2017-10-04 | Stop reason: HOSPADM

## 2017-10-04 RX ADMIN — ETOPOSIDE 190 MG: 20 INJECTION, SOLUTION, CONCENTRATE INTRAVENOUS at 13:38

## 2017-10-04 RX ADMIN — DEXAMETHASONE SODIUM PHOSPHATE 12 MG: 4 INJECTION, SOLUTION INTRA-ARTICULAR; INTRALESIONAL; INTRAMUSCULAR; INTRAVENOUS; SOFT TISSUE at 13:20

## 2017-10-05 ENCOUNTER — INFUSION (OUTPATIENT)
Dept: ONCOLOGY | Facility: HOSPITAL | Age: 62
End: 2017-10-05

## 2017-10-05 VITALS
WEIGHT: 194.8 LBS | BODY MASS INDEX: 35.63 KG/M2 | TEMPERATURE: 98.7 F | HEART RATE: 63 BPM | DIASTOLIC BLOOD PRESSURE: 57 MMHG | SYSTOLIC BLOOD PRESSURE: 117 MMHG

## 2017-10-05 DIAGNOSIS — C34.12 PRIMARY CANCER OF LEFT UPPER LOBE OF LUNG (HCC): Primary | ICD-10-CM

## 2017-10-05 PROCEDURE — 96413 CHEMO IV INFUSION 1 HR: CPT

## 2017-10-05 PROCEDURE — 25010000002 DEXAMETHASONE PER 1 MG: Performed by: NURSE PRACTITIONER

## 2017-10-05 PROCEDURE — 96374 THER/PROPH/DIAG INJ IV PUSH: CPT

## 2017-10-05 PROCEDURE — 96375 TX/PRO/DX INJ NEW DRUG ADDON: CPT

## 2017-10-05 PROCEDURE — 25010000002 ETOPOSIDE 1 GM/50ML SOLUTION 50 ML VIAL: Performed by: NURSE PRACTITIONER

## 2017-10-05 RX ORDER — SODIUM CHLORIDE 9 MG/ML
250 INJECTION, SOLUTION INTRAVENOUS ONCE
Status: DISCONTINUED | OUTPATIENT
Start: 2017-10-05 | End: 2017-10-05 | Stop reason: HOSPADM

## 2017-10-05 RX ADMIN — ETOPOSIDE 190 MG: 20 INJECTION, SOLUTION, CONCENTRATE INTRAVENOUS at 13:50

## 2017-10-05 RX ADMIN — DEXAMETHASONE SODIUM PHOSPHATE 12 MG: 4 INJECTION, SOLUTION INTRA-ARTICULAR; INTRALESIONAL; INTRAMUSCULAR; INTRAVENOUS; SOFT TISSUE at 13:35

## 2017-10-09 ENCOUNTER — TELEPHONE (OUTPATIENT)
Dept: NUTRITION | Facility: HOSPITAL | Age: 62
End: 2017-10-09

## 2017-10-09 NOTE — PROGRESS NOTES
"Nutrition Services    Patient Name:  Sanjana Horton  YOB: 1955  MRN: [id number]  Admit Date:  (Not on file)    Follow-Up phone call with patient. She reports she is doing well with no weight loss. Pt stated she eats 2 meals a day (lunch and supper) with snacks in between. Pt wakes up too late to eat breakfast. Pt reported she has been drinking ~6 bottles of water a day, which is an improvement from her last reported 4 glasses/day. She did mention her tastes buds are \"gone\" a few days after treatment but then the taste improves. She is using Magic Mouthwash to help with the taste perceptions. Offered pt baking soda and water rinse option as well. Pt was pleased with the suggestion.    Pt denies having nutritional questions or concerns at this time. Encouraged pt to call if questions arise. RD to follow-up with pt as needed. Consult GRETTA PRN.       Electronically signed by:  Nguyen Jacobsen RD  10/09/17 9:58 AM   "

## 2017-10-10 DIAGNOSIS — R91.8 MASS OF LEFT LUNG: Primary | ICD-10-CM

## 2017-10-17 ENCOUNTER — TRANSCRIBE ORDERS (OUTPATIENT)
Dept: MAMMOGRAPHY | Facility: HOSPITAL | Age: 62
End: 2017-10-17

## 2017-10-17 DIAGNOSIS — Z12.39 BREAST CANCER SCREENING: Primary | ICD-10-CM

## 2017-10-24 ENCOUNTER — HOSPITAL ENCOUNTER (OUTPATIENT)
Dept: GENERAL RADIOLOGY | Facility: HOSPITAL | Age: 62
Discharge: HOME | End: 2017-10-24
Admitting: NURSE PRACTITIONER

## 2017-10-24 ENCOUNTER — OFFICE VISIT (OUTPATIENT)
Dept: ONCOLOGY | Facility: CLINIC | Age: 62
End: 2017-10-24

## 2017-10-24 ENCOUNTER — INFUSION (OUTPATIENT)
Dept: ONCOLOGY | Facility: HOSPITAL | Age: 62
End: 2017-10-24

## 2017-10-24 VITALS
BODY MASS INDEX: 36.03 KG/M2 | SYSTOLIC BLOOD PRESSURE: 178 MMHG | RESPIRATION RATE: 14 BRPM | HEART RATE: 74 BPM | WEIGHT: 197 LBS | TEMPERATURE: 97.8 F | DIASTOLIC BLOOD PRESSURE: 80 MMHG

## 2017-10-24 DIAGNOSIS — C34.12 PRIMARY CANCER OF LEFT UPPER LOBE OF LUNG (HCC): Primary | ICD-10-CM

## 2017-10-24 DIAGNOSIS — R59.0 MEDIASTINAL LYMPHADENOPATHY: ICD-10-CM

## 2017-10-24 LAB
ALBUMIN SERPL-MCNC: 4.2 G/DL (ref 3.5–5)
ALBUMIN/GLOB SERPL: 1.1 G/DL (ref 1–2)
ALP SERPL-CCNC: 146 U/L (ref 38–126)
ALT SERPL W P-5'-P-CCNC: 49 U/L (ref 13–69)
ANION GAP SERPL CALCULATED.3IONS-SCNC: 15.4 MMOL/L
AST SERPL-CCNC: 37 U/L (ref 15–46)
BASOPHILS # BLD AUTO: 0.04 10*3/MM3 (ref 0–0.2)
BASOPHILS NFR BLD AUTO: 0.5 % (ref 0–2.5)
BILIRUB SERPL-MCNC: 0.4 MG/DL (ref 0.2–1.3)
BUN BLD-MCNC: 11 MG/DL (ref 7–20)
BUN/CREAT SERPL: 12.2 (ref 7.1–23.5)
CALCIUM SPEC-SCNC: 10.5 MG/DL (ref 8.4–10.2)
CHLORIDE SERPL-SCNC: 108 MMOL/L (ref 98–107)
CO2 SERPL-SCNC: 26 MMOL/L (ref 26–30)
CREAT BLD-MCNC: 0.9 MG/DL (ref 0.6–1.3)
DEPRECATED RDW RBC AUTO: 48.9 FL (ref 37–54)
EOSINOPHIL # BLD AUTO: 0.05 10*3/MM3 (ref 0–0.7)
EOSINOPHIL NFR BLD AUTO: 0.6 % (ref 0–7)
ERYTHROCYTE [DISTWIDTH] IN BLOOD BY AUTOMATED COUNT: 15.7 % (ref 11.5–14.5)
GFR SERPL CREATININE-BSD FRML MDRD: 64 ML/MIN/1.73
GLOBULIN UR ELPH-MCNC: 3.7 GM/DL
GLUCOSE BLD-MCNC: 120 MG/DL (ref 74–98)
HCT VFR BLD AUTO: 29.9 % (ref 37–47)
HGB BLD-MCNC: 9.6 G/DL (ref 12–16)
IMM GRANULOCYTES # BLD: 0.36 10*3/MM3 (ref 0–0.06)
IMM GRANULOCYTES NFR BLD: 4.7 % (ref 0–0.6)
LYMPHOCYTES # BLD AUTO: 2.02 10*3/MM3 (ref 0.6–3.4)
LYMPHOCYTES NFR BLD AUTO: 26.1 % (ref 10–50)
MCH RBC QN AUTO: 29.8 PG (ref 27–31)
MCHC RBC AUTO-ENTMCNC: 32.1 G/DL (ref 30–37)
MCV RBC AUTO: 92.9 FL (ref 81–99)
MONOCYTES # BLD AUTO: 0.88 10*3/MM3 (ref 0–0.9)
MONOCYTES NFR BLD AUTO: 11.4 % (ref 0–12)
NEUTROPHILS # BLD AUTO: 4.38 10*3/MM3 (ref 2–6.9)
NEUTROPHILS NFR BLD AUTO: 56.7 % (ref 37–80)
NRBC BLD MANUAL-RTO: 0.3 /100 WBC (ref 0–0)
PLATELET # BLD AUTO: 268 10*3/MM3 (ref 130–400)
PMV BLD AUTO: 9.7 FL (ref 6–12)
POTASSIUM BLD-SCNC: 4.4 MMOL/L (ref 3.5–5.1)
PROT SERPL-MCNC: 7.9 G/DL (ref 6.3–8.2)
RBC # BLD AUTO: 3.22 10*6/MM3 (ref 4.2–5.4)
SODIUM BLD-SCNC: 145 MMOL/L (ref 137–145)
WBC NRBC COR # BLD: 7.73 10*3/MM3 (ref 4.8–10.8)

## 2017-10-24 PROCEDURE — 25010000002 CARBOPLATIN PER 50 MG: Performed by: NURSE PRACTITIONER

## 2017-10-24 PROCEDURE — 25010000002 PALONOSETRON PER 25 MCG: Performed by: NURSE PRACTITIONER

## 2017-10-24 PROCEDURE — 96417 CHEMO IV INFUS EACH ADDL SEQ: CPT

## 2017-10-24 PROCEDURE — 96367 TX/PROPH/DG ADDL SEQ IV INF: CPT

## 2017-10-24 PROCEDURE — 96376 TX/PRO/DX INJ SAME DRUG ADON: CPT

## 2017-10-24 PROCEDURE — 25010000002 ETOPOSIDE 1 GM/50ML SOLUTION 25 ML VIAL: Performed by: NURSE PRACTITIONER

## 2017-10-24 PROCEDURE — 85025 COMPLETE CBC W/AUTO DIFF WBC: CPT

## 2017-10-24 PROCEDURE — 96413 CHEMO IV INFUSION 1 HR: CPT

## 2017-10-24 PROCEDURE — 99214 OFFICE O/P EST MOD 30 MIN: CPT | Performed by: NURSE PRACTITIONER

## 2017-10-24 PROCEDURE — 25010000002 DEXAMETHASONE PER 1 MG: Performed by: NURSE PRACTITIONER

## 2017-10-24 PROCEDURE — 80053 COMPREHEN METABOLIC PANEL: CPT

## 2017-10-24 PROCEDURE — 25010000002 FOSAPREPITANT PER 1 MG: Performed by: NURSE PRACTITIONER

## 2017-10-24 PROCEDURE — 36415 COLL VENOUS BLD VENIPUNCTURE: CPT

## 2017-10-24 PROCEDURE — 96375 TX/PRO/DX INJ NEW DRUG ADDON: CPT

## 2017-10-24 PROCEDURE — 71020 HC CHEST PA AND LATERAL: CPT

## 2017-10-24 RX ORDER — SODIUM CHLORIDE 9 MG/ML
250 INJECTION, SOLUTION INTRAVENOUS ONCE
Status: DISCONTINUED | OUTPATIENT
Start: 2017-10-24 | End: 2017-10-24 | Stop reason: HOSPADM

## 2017-10-24 RX ORDER — PALONOSETRON 0.05 MG/ML
0.25 INJECTION, SOLUTION INTRAVENOUS ONCE
Status: CANCELLED | OUTPATIENT
Start: 2017-10-25

## 2017-10-24 RX ORDER — SODIUM CHLORIDE 9 MG/ML
250 INJECTION, SOLUTION INTRAVENOUS ONCE
Status: CANCELLED | OUTPATIENT
Start: 2017-10-27

## 2017-10-24 RX ORDER — SODIUM CHLORIDE 9 MG/ML
250 INJECTION, SOLUTION INTRAVENOUS ONCE
Status: CANCELLED | OUTPATIENT
Start: 2017-10-26

## 2017-10-24 RX ORDER — PALONOSETRON 0.05 MG/ML
0.25 INJECTION, SOLUTION INTRAVENOUS ONCE
Status: COMPLETED | OUTPATIENT
Start: 2017-10-24 | End: 2017-10-24

## 2017-10-24 RX ORDER — SODIUM CHLORIDE 9 MG/ML
250 INJECTION, SOLUTION INTRAVENOUS ONCE
Status: CANCELLED | OUTPATIENT
Start: 2017-10-25

## 2017-10-24 RX ADMIN — DEXAMETHASONE SODIUM PHOSPHATE 12 MG: 4 INJECTION, SOLUTION INTRA-ARTICULAR; INTRALESIONAL; INTRAMUSCULAR; INTRAVENOUS; SOFT TISSUE at 11:12

## 2017-10-24 RX ADMIN — PALONOSETRON HYDROCHLORIDE 0.25 MG: 0.25 INJECTION INTRAVENOUS at 11:12

## 2017-10-24 RX ADMIN — CARBOPLATIN 590 MG: 10 INJECTION, SOLUTION INTRAVENOUS at 12:53

## 2017-10-24 RX ADMIN — ETOPOSIDE 190 MG: 20 INJECTION, SOLUTION INTRAVENOUS at 11:50

## 2017-10-24 RX ADMIN — SODIUM CHLORIDE 150 MG: 9 INJECTION, SOLUTION INTRAVENOUS at 11:30

## 2017-10-24 NOTE — PROGRESS NOTES
PROBLEM LIST:  1.  Small cell lung cancer from left upper lobe. Limited stage.   A. Began Etoposide and Carboplatin 9/13/2017.   2.  History of left breast cancer.  A.  Left mastectomy 7/22/2008 for stage IIIA, T3 N1 M0 breast cancer  B.  ER weakly positive, IN negative and HER-2 positive  C.  Adjuvant chemotherapy with Herceptin, Taxotere and carboplatin  D.  Postmastectomy radiation done at [facility]  E.  Adjuvant tamoxifen started 12/12/2008 and continued as extended adjuvant therapy  3.  Diabetes         Chief complaint: Here about treatment of lung cancer.    Subjective     HISTORY OF PRESENT ILLNESS:   Mrs. Horton is here for follow up evaluation of small cell lung cancer management. She tolerated cycle 2 of etoposide/carboplatin well. She has mild weakness and fatigue.  She denies any fevers, chills or infections. She feels her cough and dyspnea are improving on chemotherapy.  She denies any headaches or diplopia, bone pain, or other new symptoms.     Past Medical History, Past Surgical History, Social History, Family History have been reviewed and are without significant changes except as mentioned.    Review of Systems   A comprehensive 14 point review of systems was performed and was negative except as mentioned.    Medications:  The current medication list was reviewed in the EMR    ALLERGIES:    Allergies   Allergen Reactions   • Codeine Irritability       Objective      /80  Pulse 74  Temp 97.8 °F (36.6 °C) (Temporal Artery )   Resp 14  Wt 197 lb (89.4 kg)  BMI 36.03 kg/m2         General: well appearing, in no acute distress   HEENT: sclera anicteric, oropharynx clear  Lymphatics: no cervical, supraclavicular, or axillary adenopathy  Cardiovascular: regular rate and rhythm, no murmurs  Lungs: clear to auscultation bilaterally  Abdomen: soft, nontender, nondistended.  No palpable masses or organomegaly  Extremeties: no lower extremity edema, cords or calf  tenderness  Skin: no rashes, lesions, bruising, or petechiae    RECENT LABS:  Hematology WBC   Date Value Ref Range Status   10/03/2017 7.23 4.80 - 10.80 10*3/mm3 Final     Hemoglobin   Date Value Ref Range Status   10/03/2017 10.5 (L) 12.0 - 16.0 g/dL Final     Hematocrit   Date Value Ref Range Status   10/03/2017 31.3 (L) 37.0 - 47.0 % Final     MCV   Date Value Ref Range Status   10/03/2017 89.7 81.0 - 99.0 fL Final     RDW   Date Value Ref Range Status   10/03/2017 13.0 11.5 - 14.5 % Final     MPV   Date Value Ref Range Status   10/03/2017 9.1 6.0 - 12.0 fL Final     Platelets   Date Value Ref Range Status   10/03/2017 357 130 - 400 10*3/mm3 Final     Immature Grans %   Date Value Ref Range Status   10/03/2017 5.0 (H) 0.0 - 0.6 % Final     Neutrophils, Absolute   Date Value Ref Range Status   10/03/2017 3.80 2.00 - 6.90 10*3/mm3 Final     Lymphocytes, Absolute   Date Value Ref Range Status   10/03/2017 2.04 0.60 - 3.40 10*3/mm3 Final     Monocytes, Absolute   Date Value Ref Range Status   10/03/2017 0.97 (H) 0.00 - 0.90 10*3/mm3 Final     Eosinophils, Absolute   Date Value Ref Range Status   10/03/2017 0.03 0.00 - 0.70 10*3/mm3 Final     Basophils, Absolute   Date Value Ref Range Status   10/03/2017 0.03 0.00 - 0.20 10*3/mm3 Final     Immature Grans, Absolute   Date Value Ref Range Status   10/03/2017 0.36 (H) 0.00 - 0.06 10*3/mm3 Final     nRBC   Date Value Ref Range Status   10/03/2017 0.0 0.0 - 0.0 /100 WBC Final       Glucose   Date Value Ref Range Status   10/03/2017 122 (H) 74 - 98 mg/dL Final     Sodium   Date Value Ref Range Status   10/03/2017 142 137 - 145 mmol/L Final     Potassium   Date Value Ref Range Status   10/03/2017 4.2 3.5 - 5.1 mmol/L Final     CO2   Date Value Ref Range Status   10/03/2017 28.0 26.0 - 30.0 mmol/L Final     Chloride   Date Value Ref Range Status   10/03/2017 102 98 - 107 mmol/L Final     Anion Gap   Date Value Ref Range Status   10/03/2017 16.2 mmol/L Final     Creatinine    Date Value Ref Range Status   10/03/2017 1.00 0.60 - 1.30 mg/dL Final     BUN   Date Value Ref Range Status   10/03/2017 16 7 - 20 mg/dL Final     BUN/Creatinine Ratio   Date Value Ref Range Status   10/03/2017 16.0 7.1 - 23.5 Final     Calcium   Date Value Ref Range Status   10/03/2017 10.7 (H) 8.4 - 10.2 mg/dL Final     eGFR Non  Amer   Date Value Ref Range Status   10/03/2017 57 (L) >60 mL/min/1.73 Final     Alkaline Phosphatase   Date Value Ref Range Status   10/03/2017 133 (H) 38 - 126 U/L Final     Total Protein   Date Value Ref Range Status   10/03/2017 7.9 6.3 - 8.2 g/dL Final     ALT (SGPT)   Date Value Ref Range Status   10/03/2017 47 13 - 69 U/L Final     AST (SGOT)   Date Value Ref Range Status   10/03/2017 36 15 - 46 U/L Final     Total Bilirubin   Date Value Ref Range Status   10/03/2017 0.5 0.2 - 1.3 mg/dL Final     Albumin   Date Value Ref Range Status   10/03/2017 4.10 3.50 - 5.00 g/dL Final     Globulin   Date Value Ref Range Status   10/03/2017 3.8 gm/dL Final     A/G Ratio   Date Value Ref Range Status   10/03/2017 1.1 1.0 - 2.0 g/dL Final       LDH   Date Value Ref Range Status   08/16/2017 216 120 - 246 U/L Final          Assessment/Plan   Impression:   1.  Small cell lung cancer.  She appears to have chest limited disease noting that virtually all of the liver was included on the CT angiogram. She has no evidence of metastatic disease per the MRI of the brain or the bone scan. We will repeat chest xray today to see response to cycle 2 of etoposide/carboplatin. She is scheduled to see Dr. Sanchez to discuss radiation treatments 10/26/2017.   2.  History of breast cancer.  She is still on adjuvant tamoxifen after adjuvant chemotherapy and postmastectomy radiation.  Her previous radiation may prevent us from giving any radiation for her lung cancer although this will need to be considered by the radiation oncologists.      Plan:   1. We will check a chest x-ray today.   2.  Etoposide/carboplatin cycle 3 today.   3. CBC and CMP today. We will make adjustments as needed to her treatment plan.   4. CT of chest prior to return to evaluate effectiveness of treatment.   5. She'll return in 3 weeks to repeat her chemotherapy.  We can adjust doses depending on her tolerance and response.               AQUILES Gant  [facility]    10/24/2017          CC:

## 2017-10-25 ENCOUNTER — INFUSION (OUTPATIENT)
Dept: ONCOLOGY | Facility: HOSPITAL | Age: 62
End: 2017-10-25

## 2017-10-25 ENCOUNTER — TELEPHONE (OUTPATIENT)
Dept: ONCOLOGY | Facility: CLINIC | Age: 62
End: 2017-10-25

## 2017-10-25 VITALS
WEIGHT: 200 LBS | TEMPERATURE: 99 F | HEART RATE: 87 BPM | DIASTOLIC BLOOD PRESSURE: 74 MMHG | BODY MASS INDEX: 36.58 KG/M2 | RESPIRATION RATE: 16 BRPM | SYSTOLIC BLOOD PRESSURE: 175 MMHG

## 2017-10-25 DIAGNOSIS — C34.12 PRIMARY CANCER OF LEFT UPPER LOBE OF LUNG (HCC): Primary | ICD-10-CM

## 2017-10-25 RX ORDER — SODIUM CHLORIDE 9 MG/ML
250 INJECTION, SOLUTION INTRAVENOUS ONCE
Status: DISCONTINUED | OUTPATIENT
Start: 2017-10-25 | End: 2017-10-25 | Stop reason: HOSPADM

## 2017-10-25 RX ADMIN — ETOPOSIDE 190 MG: 20 INJECTION, SOLUTION INTRAVENOUS at 13:47

## 2017-10-25 RX ADMIN — DEXAMETHASONE SODIUM PHOSPHATE 12 MG: 4 INJECTION, SOLUTION INTRA-ARTICULAR; INTRALESIONAL; INTRAMUSCULAR; INTRAVENOUS; SOFT TISSUE at 13:31

## 2017-10-25 NOTE — TELEPHONE ENCOUNTER
Per Stephanie, they are having a difficulty with IV access.  Patient agreeable to port or PICC.  Per Dr. Rader, patient will likely only receive a few more cycles of chemotherapy.  Ok to place PICC.  Stephanie notified.  She will discuss with patient tomorrow when she comes in for treatment.  Gumaro aware.  He will get her scheduled for placement in [place].

## 2017-10-25 NOTE — TELEPHONE ENCOUNTER
----- Message from Sandra Cavazos RN sent at 10/25/2017  1:22 PM EDT -----  Regarding: ABDIEL--pt needs port placement  Contact: [phone]  Pt needs port placement. Thanks.

## 2017-10-26 ENCOUNTER — INFUSION (OUTPATIENT)
Dept: ONCOLOGY | Facility: HOSPITAL | Age: 62
End: 2017-10-26

## 2017-10-26 ENCOUNTER — OFFICE VISIT (OUTPATIENT)
Dept: RADIATION ONCOLOGY | Facility: HOSPITAL | Age: 62
End: 2017-10-26

## 2017-10-26 ENCOUNTER — HOSPITAL ENCOUNTER (OUTPATIENT)
Dept: RADIATION ONCOLOGY | Facility: HOSPITAL | Age: 62
Setting detail: RADIATION/ONCOLOGY SERIES
Discharge: HOME | End: 2017-10-26

## 2017-10-26 VITALS
WEIGHT: 203 LBS | BODY MASS INDEX: 37.13 KG/M2 | DIASTOLIC BLOOD PRESSURE: 78 MMHG | HEART RATE: 69 BPM | SYSTOLIC BLOOD PRESSURE: 188 MMHG | TEMPERATURE: 98.2 F | RESPIRATION RATE: 16 BRPM

## 2017-10-26 VITALS
RESPIRATION RATE: 16 BRPM | HEIGHT: 62 IN | BODY MASS INDEX: 37.37 KG/M2 | WEIGHT: 203.1 LBS | HEART RATE: 65 BPM | TEMPERATURE: 98.1 F | SYSTOLIC BLOOD PRESSURE: 147 MMHG | DIASTOLIC BLOOD PRESSURE: 68 MMHG

## 2017-10-26 DIAGNOSIS — C34.82 MALIGNANT NEOPLASM OF OVERLAPPING SITES OF LEFT LUNG (HCC): Primary | ICD-10-CM

## 2017-10-26 DIAGNOSIS — C34.12 PRIMARY CANCER OF LEFT UPPER LOBE OF LUNG (HCC): Primary | ICD-10-CM

## 2017-10-26 PROCEDURE — G0463 HOSPITAL OUTPT CLINIC VISIT: HCPCS

## 2017-10-26 RX ORDER — SODIUM CHLORIDE 9 MG/ML
250 INJECTION, SOLUTION INTRAVENOUS ONCE
Status: DISCONTINUED | OUTPATIENT
Start: 2017-10-26 | End: 2017-10-26 | Stop reason: HOSPADM

## 2017-10-26 RX ADMIN — ETOPOSIDE 190 MG: 20 INJECTION, SOLUTION INTRAVENOUS at 14:08

## 2017-10-26 RX ADMIN — DEXAMETHASONE SODIUM PHOSPHATE 12 MG: 4 INJECTION, SOLUTION INTRA-ARTICULAR; INTRALESIONAL; INTRAMUSCULAR; INTRAVENOUS; SOFT TISSUE at 13:53

## 2017-10-31 ENCOUNTER — HOSPITAL ENCOUNTER (OUTPATIENT)
Dept: RADIATION ONCOLOGY | Facility: HOSPITAL | Age: 62
Discharge: HOME | End: 2017-10-31

## 2017-10-31 PROCEDURE — 77280 THER RAD SIMULAJ FIELD SMPL: CPT | Performed by: RADIOLOGY

## 2017-10-31 PROCEDURE — 77470 SPECIAL RADIATION TREATMENT: CPT | Performed by: RADIOLOGY

## 2017-10-31 PROCEDURE — 77290 THER RAD SIMULAJ FIELD CPLX: CPT | Performed by: RADIOLOGY

## 2017-11-07 ENCOUNTER — HOSPITAL ENCOUNTER (OUTPATIENT)
Dept: CT IMAGING | Facility: HOSPITAL | Age: 62
Discharge: HOME | End: 2017-11-07
Admitting: NURSE PRACTITIONER

## 2017-11-07 DIAGNOSIS — C34.12 PRIMARY CANCER OF LEFT UPPER LOBE OF LUNG (HCC): ICD-10-CM

## 2017-11-07 DIAGNOSIS — R59.0 MEDIASTINAL LYMPHADENOPATHY: ICD-10-CM

## 2017-11-07 PROCEDURE — 77295 3-D RADIOTHERAPY PLAN: CPT | Performed by: RADIOLOGY

## 2017-11-07 PROCEDURE — 71260 CT THORAX DX C+: CPT

## 2017-11-07 PROCEDURE — 77300 RADIATION THERAPY DOSE PLAN: CPT | Performed by: RADIOLOGY

## 2017-11-07 PROCEDURE — 0 IOPAMIDOL 61 % SOLUTION: Performed by: INTERNAL MEDICINE

## 2017-11-07 PROCEDURE — 77334 RADIATION TREATMENT AID(S): CPT | Performed by: RADIOLOGY

## 2017-11-07 RX ADMIN — IOPAMIDOL 100 ML: 612 INJECTION, SOLUTION INTRAVENOUS at 11:15

## 2017-11-08 ENCOUNTER — HOSPITAL ENCOUNTER (OUTPATIENT)
Dept: INFUSION THERAPY | Facility: HOSPITAL | Age: 62
Setting detail: INFUSION SERIES
Discharge: HOME | End: 2017-11-08

## 2017-11-08 ENCOUNTER — HOSPITAL ENCOUNTER (OUTPATIENT)
Dept: RADIATION ONCOLOGY | Facility: HOSPITAL | Age: 62
Setting detail: RADIATION/ONCOLOGY SERIES
Discharge: HOME | End: 2017-11-08

## 2017-11-08 ENCOUNTER — APPOINTMENT (OUTPATIENT)
Dept: INFUSION THERAPY | Facility: HOSPITAL | Age: 62
End: 2017-11-08

## 2017-11-08 ENCOUNTER — HOSPITAL ENCOUNTER (OUTPATIENT)
Dept: MAMMOGRAPHY | Facility: HOSPITAL | Age: 62
Discharge: HOME | End: 2017-11-08
Admitting: INTERNAL MEDICINE

## 2017-11-08 ENCOUNTER — HOSPITAL ENCOUNTER (OUTPATIENT)
Dept: RADIATION ONCOLOGY | Facility: HOSPITAL | Age: 62
Discharge: HOME | End: 2017-11-08

## 2017-11-08 VITALS
OXYGEN SATURATION: 92 % | HEART RATE: 80 BPM | TEMPERATURE: 98.2 F | DIASTOLIC BLOOD PRESSURE: 71 MMHG | RESPIRATION RATE: 18 BRPM | SYSTOLIC BLOOD PRESSURE: 139 MMHG

## 2017-11-08 DIAGNOSIS — C34.12 PRIMARY CANCER OF LEFT UPPER LOBE OF LUNG (HCC): ICD-10-CM

## 2017-11-08 DIAGNOSIS — Z12.39 BREAST CANCER SCREENING: ICD-10-CM

## 2017-11-08 PROCEDURE — 77280 THER RAD SIMULAJ FIELD SMPL: CPT | Performed by: RADIOLOGY

## 2017-11-08 PROCEDURE — C1894 INTRO/SHEATH, NON-LASER: HCPCS

## 2017-11-08 PROCEDURE — G0202 SCR MAMMO BI INCL CAD: HCPCS

## 2017-11-08 PROCEDURE — C1751 CATH, INF, PER/CENT/MIDLINE: HCPCS

## 2017-11-08 RX ORDER — SODIUM CHLORIDE 0.9 % (FLUSH) 0.9 %
10 SYRINGE (ML) INJECTION AS NEEDED
Status: DISCONTINUED | OUTPATIENT
Start: 2017-11-08 | End: 2017-11-10 | Stop reason: HOSPADM

## 2017-11-08 NOTE — PROGRESS NOTES
PICC PLACEMENT - Placed by Lucila Hagen RN [facility] - confirmed with 3cg technology.  RUE single lumen PICC with 41cm total and 0cm exposed.

## 2017-11-08 NOTE — PATIENT INSTRUCTIONS
PICC Insertion, Care After  Refer to this sheet in the next few weeks. These instructions provide you with information on caring for yourself after your procedure. Your health care provider may also give you more specific instructions. Your treatment has been planned according to current medical practices, but problems sometimes occur. Call your health care provider if you have any problems or questions after your procedure.  WHAT TO EXPECT AFTER THE PROCEDURE  After your procedure, it is typical to have the following:  · Mild discomfort at the insertion site. This should not last more than a day.  HOME CARE INSTRUCTIONS  · Rest at home for the remainder of the day after the procedure.  · You may bend your arm and move it freely. If your PICC is near or at the bend of your elbow, avoid activity with repeated motion at the elbow.  · Avoid lifting heavy objects as instructed by your health care provider.  · Avoid using a crutch with the arm on the same side as your PICC. You may need to use a walker.  Bandage Care  · Keep your PICC bandage (dressing) clean and dry to prevent infection.  · Ask your health care provider when you may shower. To keep the dressing dry, cover the PICC with plastic wrap and tape before showering. If the dressing does become wet, replace it right after the shower.  · Do not soak in the bath, swim, or use hot tubs when you have a PICC.  · Change the PICC dressing as instructed by your health care provider.  · Change your PICC dressing if it becomes loose or wet.  General PICC Care  · Check the PICC insertion site daily for leakage, redness, swelling, or pain.  · Flush the PICC as directed by your health care provider. Let your health care provider know right away if the PICC is difficult to flush or does not flush. Do not use force to flush the PICC.  · Do not use a syringe that is less than 10 mL to flush the PICC.  · Never pull or tug on the PICC.  · Avoid blood pressure checks on the arm  "with the PICC.  · Keep your PICC identification card with you at all times.  · Do not take the PICC out yourself. Only a trained health care professional should remove the PICC.  SEEK MEDICAL CARE IF:  · You have pain in your arm, ear, face, or teeth.  · You have fever or chills.  · You have drainage from the PICC insertion site.  · You have redness or palpate a \"cord\" around the PICC insertion site.  · You cannot flush the catheter.  SEEK IMMEDIATE MEDICAL CARE IF:  · You have swelling in the arm in which the PICC is inserted.     This information is not intended to replace advice given to you by your health care provider. Make sure you discuss any questions you have with your health care provider.     Document Released: 10/08/2014 Document Revised: 12/23/2014 Document Reviewed: 10/08/2014  Elsevier Interactive Patient Education ©2017 [employer]    "

## 2017-11-10 ENCOUNTER — HOSPITAL ENCOUNTER (OUTPATIENT)
Dept: RADIATION ONCOLOGY | Facility: HOSPITAL | Age: 62
Discharge: HOME | End: 2017-11-10

## 2017-11-10 PROCEDURE — 77417 THER RADIOLOGY PORT IMAGE(S): CPT | Performed by: RADIOLOGY

## 2017-11-10 PROCEDURE — 77280 THER RAD SIMULAJ FIELD SMPL: CPT | Performed by: RADIOLOGY

## 2017-11-13 ENCOUNTER — HOSPITAL ENCOUNTER (OUTPATIENT)
Dept: RADIATION ONCOLOGY | Facility: HOSPITAL | Age: 62
Discharge: HOME | End: 2017-11-13

## 2017-11-13 PROCEDURE — 77412 RADIATION TX DELIVERY LVL 3: CPT | Performed by: RADIOLOGY

## 2017-11-13 PROCEDURE — 77336 RADIATION PHYSICS CONSULT: CPT | Performed by: RADIOLOGY

## 2017-11-14 ENCOUNTER — DOCUMENTATION (OUTPATIENT)
Dept: NUTRITION | Facility: HOSPITAL | Age: 62
End: 2017-11-14

## 2017-11-14 ENCOUNTER — OFFICE VISIT (OUTPATIENT)
Dept: ONCOLOGY | Facility: CLINIC | Age: 62
End: 2017-11-14

## 2017-11-14 ENCOUNTER — HOSPITAL ENCOUNTER (OUTPATIENT)
Dept: RADIATION ONCOLOGY | Facility: HOSPITAL | Age: 62
Discharge: HOME | End: 2017-11-14

## 2017-11-14 ENCOUNTER — INFUSION (OUTPATIENT)
Dept: ONCOLOGY | Facility: HOSPITAL | Age: 62
End: 2017-11-14

## 2017-11-14 VITALS
WEIGHT: 200 LBS | TEMPERATURE: 97.9 F | DIASTOLIC BLOOD PRESSURE: 83 MMHG | BODY MASS INDEX: 36.58 KG/M2 | SYSTOLIC BLOOD PRESSURE: 210 MMHG | RESPIRATION RATE: 15 BRPM | HEART RATE: 80 BPM

## 2017-11-14 VITALS — BODY MASS INDEX: 36.73 KG/M2 | WEIGHT: 200.8 LBS

## 2017-11-14 DIAGNOSIS — C34.82 MALIGNANT NEOPLASM OF OVERLAPPING SITES OF LEFT LUNG (HCC): Primary | ICD-10-CM

## 2017-11-14 DIAGNOSIS — C34.12 PRIMARY CANCER OF LEFT UPPER LOBE OF LUNG (HCC): Primary | ICD-10-CM

## 2017-11-14 DIAGNOSIS — C50.912 MALIGNANT NEOPLASM OF LEFT BREAST IN FEMALE, ESTROGEN RECEPTOR POSITIVE, UNSPECIFIED SITE OF BREAST (HCC): ICD-10-CM

## 2017-11-14 DIAGNOSIS — C34.12 PRIMARY CANCER OF LEFT UPPER LOBE OF LUNG (HCC): ICD-10-CM

## 2017-11-14 DIAGNOSIS — Z17.0 MALIGNANT NEOPLASM OF LEFT BREAST IN FEMALE, ESTROGEN RECEPTOR POSITIVE, UNSPECIFIED SITE OF BREAST (HCC): ICD-10-CM

## 2017-11-14 LAB
ALBUMIN SERPL-MCNC: 3.8 G/DL (ref 3.5–5)
ALBUMIN/GLOB SERPL: 1 G/DL (ref 1–2)
ALP SERPL-CCNC: 134 U/L (ref 38–126)
ALT SERPL W P-5'-P-CCNC: 48 U/L (ref 13–69)
ANION GAP SERPL CALCULATED.3IONS-SCNC: 12.8 MMOL/L
AST SERPL-CCNC: 33 U/L (ref 15–46)
BASOPHILS # BLD AUTO: 0.02 10*3/MM3 (ref 0–0.2)
BASOPHILS NFR BLD AUTO: 0.4 % (ref 0–2.5)
BILIRUB SERPL-MCNC: 0.4 MG/DL (ref 0.2–1.3)
BUN BLD-MCNC: 9 MG/DL (ref 7–20)
BUN/CREAT SERPL: 11.3 (ref 7.1–23.5)
CALCIUM SPEC-SCNC: 10.1 MG/DL (ref 8.4–10.2)
CHLORIDE SERPL-SCNC: 106 MMOL/L (ref 98–107)
CO2 SERPL-SCNC: 29 MMOL/L (ref 26–30)
CREAT BLD-MCNC: 0.8 MG/DL (ref 0.6–1.3)
DEPRECATED RDW RBC AUTO: 55.2 FL (ref 37–54)
EOSINOPHIL # BLD AUTO: 0.08 10*3/MM3 (ref 0–0.7)
EOSINOPHIL NFR BLD AUTO: 1.4 % (ref 0–7)
ERYTHROCYTE [DISTWIDTH] IN BLOOD BY AUTOMATED COUNT: 17 % (ref 11.5–14.5)
GFR SERPL CREATININE-BSD FRML MDRD: 73 ML/MIN/1.73
GLOBULIN UR ELPH-MCNC: 3.8 GM/DL
GLUCOSE BLD-MCNC: 109 MG/DL (ref 74–98)
HCT VFR BLD AUTO: 26.7 % (ref 37–47)
HGB BLD-MCNC: 8.6 G/DL (ref 12–16)
IMM GRANULOCYTES # BLD: 0.13 10*3/MM3 (ref 0–0.06)
IMM GRANULOCYTES NFR BLD: 2.3 % (ref 0–0.6)
LYMPHOCYTES # BLD AUTO: 1.61 10*3/MM3 (ref 0.6–3.4)
LYMPHOCYTES NFR BLD AUTO: 28.4 % (ref 10–50)
MCH RBC QN AUTO: 30.4 PG (ref 27–31)
MCHC RBC AUTO-ENTMCNC: 32.2 G/DL (ref 30–37)
MCV RBC AUTO: 94.3 FL (ref 81–99)
MONOCYTES # BLD AUTO: 0.57 10*3/MM3 (ref 0–0.9)
MONOCYTES NFR BLD AUTO: 10.1 % (ref 0–12)
NEUTROPHILS # BLD AUTO: 3.26 10*3/MM3 (ref 2–6.9)
NEUTROPHILS NFR BLD AUTO: 57.4 % (ref 37–80)
NRBC BLD MANUAL-RTO: 0.4 /100 WBC (ref 0–0)
PLATELET # BLD AUTO: 212 10*3/MM3 (ref 130–400)
PMV BLD AUTO: 9 FL (ref 6–12)
POTASSIUM BLD-SCNC: 3.8 MMOL/L (ref 3.5–5.1)
PROT SERPL-MCNC: 7.6 G/DL (ref 6.3–8.2)
RBC # BLD AUTO: 2.83 10*6/MM3 (ref 4.2–5.4)
SODIUM BLD-SCNC: 144 MMOL/L (ref 137–145)
WBC NRBC COR # BLD: 5.67 10*3/MM3 (ref 4.8–10.8)

## 2017-11-14 PROCEDURE — 77412 RADIATION TX DELIVERY LVL 3: CPT | Performed by: RADIOLOGY

## 2017-11-14 PROCEDURE — 96367 TX/PROPH/DG ADDL SEQ IV INF: CPT

## 2017-11-14 PROCEDURE — 25010000002 PALONOSETRON PER 25 MCG: Performed by: INTERNAL MEDICINE

## 2017-11-14 PROCEDURE — 96417 CHEMO IV INFUS EACH ADDL SEQ: CPT

## 2017-11-14 PROCEDURE — 96365 THER/PROPH/DIAG IV INF INIT: CPT

## 2017-11-14 PROCEDURE — 96375 TX/PRO/DX INJ NEW DRUG ADDON: CPT

## 2017-11-14 PROCEDURE — 80053 COMPREHEN METABOLIC PANEL: CPT

## 2017-11-14 PROCEDURE — 36415 COLL VENOUS BLD VENIPUNCTURE: CPT

## 2017-11-14 PROCEDURE — 25010000002: Performed by: INTERNAL MEDICINE

## 2017-11-14 PROCEDURE — 96374 THER/PROPH/DIAG INJ IV PUSH: CPT

## 2017-11-14 PROCEDURE — 25010000002 CARBOPLATIN PER 50 MG: Performed by: INTERNAL MEDICINE

## 2017-11-14 PROCEDURE — 99214 OFFICE O/P EST MOD 30 MIN: CPT | Performed by: INTERNAL MEDICINE

## 2017-11-14 PROCEDURE — 85025 COMPLETE CBC W/AUTO DIFF WBC: CPT

## 2017-11-14 PROCEDURE — 25010000002 DEXAMETHASONE PER 1 MG: Performed by: INTERNAL MEDICINE

## 2017-11-14 PROCEDURE — 25010000002 FOSAPREPITANT PER 1 MG: Performed by: INTERNAL MEDICINE

## 2017-11-14 PROCEDURE — 96413 CHEMO IV INFUSION 1 HR: CPT

## 2017-11-14 RX ORDER — SODIUM CHLORIDE 9 MG/ML
250 INJECTION, SOLUTION INTRAVENOUS ONCE
Status: CANCELLED | OUTPATIENT
Start: 2017-11-15

## 2017-11-14 RX ORDER — SODIUM CHLORIDE 9 MG/ML
250 INJECTION, SOLUTION INTRAVENOUS ONCE
Status: CANCELLED | OUTPATIENT
Start: 2017-11-16

## 2017-11-14 RX ORDER — SODIUM CHLORIDE 9 MG/ML
250 INJECTION, SOLUTION INTRAVENOUS ONCE
Status: CANCELLED | OUTPATIENT
Start: 2017-11-14

## 2017-11-14 RX ORDER — SODIUM CHLORIDE 9 MG/ML
250 INJECTION, SOLUTION INTRAVENOUS ONCE
Status: DISCONTINUED | OUTPATIENT
Start: 2017-11-14 | End: 2017-11-14 | Stop reason: HOSPADM

## 2017-11-14 RX ORDER — PALONOSETRON 0.05 MG/ML
0.25 INJECTION, SOLUTION INTRAVENOUS ONCE
Status: COMPLETED | OUTPATIENT
Start: 2017-11-14 | End: 2017-11-14

## 2017-11-14 RX ORDER — PALONOSETRON 0.05 MG/ML
0.25 INJECTION, SOLUTION INTRAVENOUS ONCE
Status: CANCELLED | OUTPATIENT
Start: 2017-11-14

## 2017-11-14 RX ADMIN — SODIUM CHLORIDE 150 MG: 9 INJECTION, SOLUTION INTRAVENOUS at 10:34

## 2017-11-14 RX ADMIN — CARBOPLATIN 660 MG: 10 INJECTION, SOLUTION INTRAVENOUS at 12:41

## 2017-11-14 RX ADMIN — DEXAMETHASONE SODIUM PHOSPHATE 12 MG: 4 INJECTION, SOLUTION INTRA-ARTICULAR; INTRALESIONAL; INTRAMUSCULAR; INTRAVENOUS; SOFT TISSUE at 10:22

## 2017-11-14 RX ADMIN — PALONOSETRON HYDROCHLORIDE 0.25 MG: 0.25 INJECTION INTRAVENOUS at 10:20

## 2017-11-14 RX ADMIN — ETOPOSIDE 190 MG: 20 INJECTION, SOLUTION, CONCENTRATE INTRAVENOUS at 10:57

## 2017-11-14 NOTE — PROGRESS NOTES
PROBLEM LIST:  1.  Small cell lung cancer from left upper lobe. Limited stage.   A. responding to Etoposide and Carboplatin that was started 9/13/2017.   2.  History of left breast cancer.  A.  Left mastectomy 7/22/2008 for stage IIIA, T3 N1 M0 breast cancer  B.  ER weakly positive, ME negative and HER-2 positive  C.  Adjuvant chemotherapy with Herceptin, Taxotere and carboplatin  D.  Postmastectomy radiation done at [facility]  E.  Adjuvant tamoxifen started 12/12/2008 and continued as extended adjuvant therapy  3.  Diabetes    Subjective     HISTORY OF PRESENT ILLNESS:   Chief complaint: Here about lung cancer management.  Mrs. Horton has continued fatigue with her chemotherapy but no vomiting or severe nausea and no fevers, chills or sweats.  Her dyspnea remains improved.  No cough, wheezing or hemoptysis.  She did see Dr. Sanchez and is starting radiation that will be given concurrently with her chemotherapy.    Past Medical History, Past Surgical History, Social History, Family History have been reviewed and are without significant changes except as mentioned.    Review of Systems   A comprehensive 14 point review of systems was performed and was negative except as mentioned.    Medications:  The current medication list was reviewed in the EMR    ALLERGIES:  Allergies not on file    Objective      BP (!) 210/83  Pulse 80  Temp 97.9 °F (36.6 °C) (Temporal Artery )   Resp 15  Wt 200 lb (90.7 kg)  BMI 36.58 kg/m2     Performance Status: ECOG 1    General: well appearing, in no acute distress  HEENT: sclera anicteric, oropharynx clear  Lymphatics: no cervical, supraclavicular, or axillary adenopathy  Cardiovascular: regular rate and rhythm, no murmurs  Lungs: clear to auscultation bilaterally  Abdomen: soft, nontender, nondistended.  No palpable organomegaly  Extremeties: no lower extremity edema  Skin: no rashes, lesions, bruising, or petechiae    RECENT LABS:   WBC   Date Value Ref Range  Status   10/24/2017 7.73 4.80 - 10.80 10*3/mm3 Final     Hemoglobin   Date Value Ref Range Status   10/24/2017 9.6 (L) 12.0 - 16.0 g/dL Final     Hematocrit   Date Value Ref Range Status   10/24/2017 29.9 (L) 37.0 - 47.0 % Final     MCV   Date Value Ref Range Status   10/24/2017 92.9 81.0 - 99.0 fL Final     RDW   Date Value Ref Range Status   10/24/2017 15.7 (H) 11.5 - 14.5 % Final     MPV   Date Value Ref Range Status   10/24/2017 9.7 6.0 - 12.0 fL Final     Platelets   Date Value Ref Range Status   10/24/2017 268 130 - 400 10*3/mm3 Final     Immature Grans %   Date Value Ref Range Status   10/24/2017 4.7 (H) 0.0 - 0.6 % Final     Neutrophils, Absolute   Date Value Ref Range Status   10/24/2017 4.38 2.00 - 6.90 10*3/mm3 Final     Lymphocytes, Absolute   Date Value Ref Range Status   10/24/2017 2.02 0.60 - 3.40 10*3/mm3 Final     Monocytes, Absolute   Date Value Ref Range Status   10/24/2017 0.88 0.00 - 0.90 10*3/mm3 Final     Eosinophils, Absolute   Date Value Ref Range Status   10/24/2017 0.05 0.00 - 0.70 10*3/mm3 Final     Basophils, Absolute   Date Value Ref Range Status   10/24/2017 0.04 0.00 - 0.20 10*3/mm3 Final     Immature Grans, Absolute   Date Value Ref Range Status   10/24/2017 0.36 (H) 0.00 - 0.06 10*3/mm3 Final     nRBC   Date Value Ref Range Status   10/24/2017 0.3 (H) 0.0 - 0.0 /100 WBC Final       Glucose   Date Value Ref Range Status   10/24/2017 120 (H) 74 - 98 mg/dL Final     Sodium   Date Value Ref Range Status   10/24/2017 145 137 - 145 mmol/L Final     Potassium   Date Value Ref Range Status   10/24/2017 4.4 3.5 - 5.1 mmol/L Final     CO2   Date Value Ref Range Status   10/24/2017 26.0 26.0 - 30.0 mmol/L Final     Chloride   Date Value Ref Range Status   10/24/2017 108 (H) 98 - 107 mmol/L Final     Anion Gap   Date Value Ref Range Status   10/24/2017 15.4 mmol/L Final     Creatinine   Date Value Ref Range Status   10/24/2017 0.90 0.60 - 1.30 mg/dL Final     BUN   Date Value Ref Range Status    10/24/2017 11 7 - 20 mg/dL Final     BUN/Creatinine Ratio   Date Value Ref Range Status   10/24/2017 12.2 7.1 - 23.5 Final     Calcium   Date Value Ref Range Status   10/24/2017 10.5 (H) 8.4 - 10.2 mg/dL Final     eGFR Non  Amer   Date Value Ref Range Status   10/24/2017 64 >60 mL/min/1.73 Final     Alkaline Phosphatase   Date Value Ref Range Status   10/24/2017 146 (H) 38 - 126 U/L Final     Total Protein   Date Value Ref Range Status   10/24/2017 7.9 6.3 - 8.2 g/dL Final     ALT (SGPT)   Date Value Ref Range Status   10/24/2017 49 13 - 69 U/L Final     AST (SGOT)   Date Value Ref Range Status   10/24/2017 37 15 - 46 U/L Final     Total Bilirubin   Date Value Ref Range Status   10/24/2017 0.4 0.2 - 1.3 mg/dL Final     Albumin   Date Value Ref Range Status   10/24/2017 4.20 3.50 - 5.00 g/dL Final     Globulin   Date Value Ref Range Status   10/24/2017 3.7 gm/dL Final     A/G Ratio   Date Value Ref Range Status   10/24/2017 1.1 1.0 - 2.0 g/dL Final       LDH   Date Value Ref Range Status   08/16/2017 216 120 - 246 U/L Final       No results found for: MSPIKE, KAPPALAMB, IGLFLC, FREEKAPPAL      Mammogram on the right was read as benign, BI-RADS 2  CT shows marked resolution of the mass in the left hilar and mediastinal area.  She does have some filtration changes in the right apex noted.    Assessment/Plan   Impression: 1.  Small cell lung cancer.  She is responding to chemotherapy and her symptoms are improved.  2.  Left breast cancer.  She's on extended adjuvant tamoxifen with plans to treat her for 10 years given her relatively high stage.  3.  Nodular lung infiltrate of uncertain significance.  This may be inflammatory and will be followed without additional treatment at present.    Plan: 1.  We will repeat her etoposide and carboplatin now.  This will be cycle #3.  We will plan to complete at least 4 cycles, and we will decide about the exact number depending on her response after cycle 4, tolerance, and  I'll discuss this with Dr. Sanchez.  2.  I'll see her back in 3 weeks.    Jac Rader MD  [facility] Hematology and Oncology    11/14/17           CC:

## 2017-11-14 NOTE — PROGRESS NOTES
ONC Nutrition    Diagnosis:  Small lung cancer; history of breast cancer  Treatment:  Chemotherapy (etoposide and carboplatin) and radiation    Patient is completing her 4th cycle of chemotherapy and has completed 2/30 radiation treatments.     Weight 200.8 lbs / patient reports an 18 lbs weight loss since mid August (8% weight change reflecting increased nutritional risk)    Nutritional Issues:  Taste changes, dry mouth, mouth irritation, altered appetite with weight loss past 3 months, fatigue    Consulted with patient and her  regarding nutritional issues noted.  Provided tips for stimulating appetite; suggestions for foods that patient may find more appealing; tips for managing nausea and altered tastes that she is experiencing with chemotherapy; encouragement to use the baking soda, salt and water mouth rinses.  Patient does not like the flavor of nutritional supplements such as Ensure or Boost but is willing to try instant breakfast; samples provided with recipes and tips for flavor enhancement.

## 2017-11-15 ENCOUNTER — HOSPITAL ENCOUNTER (OUTPATIENT)
Dept: RADIATION ONCOLOGY | Facility: HOSPITAL | Age: 62
Discharge: HOME | End: 2017-11-15

## 2017-11-15 ENCOUNTER — INFUSION (OUTPATIENT)
Dept: ONCOLOGY | Facility: HOSPITAL | Age: 62
End: 2017-11-15

## 2017-11-15 ENCOUNTER — TELEPHONE (OUTPATIENT)
Dept: ONCOLOGY | Facility: CLINIC | Age: 62
End: 2017-11-15

## 2017-11-15 VITALS
SYSTOLIC BLOOD PRESSURE: 199 MMHG | WEIGHT: 201 LBS | TEMPERATURE: 97.5 F | DIASTOLIC BLOOD PRESSURE: 66 MMHG | RESPIRATION RATE: 14 BRPM | HEART RATE: 104 BPM | BODY MASS INDEX: 36.76 KG/M2

## 2017-11-15 DIAGNOSIS — C34.12 PRIMARY CANCER OF LEFT UPPER LOBE OF LUNG (HCC): Primary | ICD-10-CM

## 2017-11-15 PROCEDURE — 77412 RADIATION TX DELIVERY LVL 3: CPT | Performed by: RADIOLOGY

## 2017-11-15 RX ORDER — SODIUM CHLORIDE 9 MG/ML
250 INJECTION, SOLUTION INTRAVENOUS ONCE
Status: DISCONTINUED | OUTPATIENT
Start: 2017-11-15 | End: 2017-11-15 | Stop reason: HOSPADM

## 2017-11-15 RX ORDER — TAMOXIFEN CITRATE 20 MG/1
20 TABLET ORAL DAILY
Qty: 90 TABLET | Refills: 1 | Status: SHIPPED | OUTPATIENT
Start: 2017-11-15 | End: 2018-03-27 | Stop reason: SDUPTHER

## 2017-11-15 RX ADMIN — DEXAMETHASONE SODIUM PHOSPHATE 12 MG: 4 INJECTION, SOLUTION INTRA-ARTICULAR; INTRALESIONAL; INTRAMUSCULAR; INTRAVENOUS; SOFT TISSUE at 13:29

## 2017-11-15 RX ADMIN — ETOPOSIDE 190 MG: 20 INJECTION, SOLUTION, CONCENTRATE INTRAVENOUS at 13:46

## 2017-11-15 NOTE — TELEPHONE ENCOUNTER
"Called patient to ask her if she had been taking her Tamoxifen, she stated \"I ran out yesterday and I need a refill\" I informed her that I would call her refill in.  "

## 2017-11-16 ENCOUNTER — INFUSION (OUTPATIENT)
Dept: ONCOLOGY | Facility: HOSPITAL | Age: 62
End: 2017-11-16

## 2017-11-16 ENCOUNTER — HOSPITAL ENCOUNTER (OUTPATIENT)
Dept: RADIATION ONCOLOGY | Facility: HOSPITAL | Age: 62
Discharge: HOME | End: 2017-11-16

## 2017-11-16 VITALS
SYSTOLIC BLOOD PRESSURE: 176 MMHG | TEMPERATURE: 97.1 F | RESPIRATION RATE: 16 BRPM | WEIGHT: 200 LBS | BODY MASS INDEX: 36.58 KG/M2 | HEART RATE: 68 BPM | DIASTOLIC BLOOD PRESSURE: 80 MMHG

## 2017-11-16 DIAGNOSIS — C34.12 PRIMARY CANCER OF LEFT UPPER LOBE OF LUNG (HCC): Primary | ICD-10-CM

## 2017-11-16 PROCEDURE — 77412 RADIATION TX DELIVERY LVL 3: CPT | Performed by: RADIOLOGY

## 2017-11-16 PROCEDURE — 96375 TX/PRO/DX INJ NEW DRUG ADDON: CPT

## 2017-11-16 PROCEDURE — 25010000002 DEXAMETHASONE PER 1 MG: Performed by: INTERNAL MEDICINE

## 2017-11-16 PROCEDURE — 25010000002: Performed by: INTERNAL MEDICINE

## 2017-11-16 PROCEDURE — 96413 CHEMO IV INFUSION 1 HR: CPT

## 2017-11-16 RX ORDER — SODIUM CHLORIDE 9 MG/ML
250 INJECTION, SOLUTION INTRAVENOUS ONCE
Status: DISCONTINUED | OUTPATIENT
Start: 2017-11-16 | End: 2017-11-16 | Stop reason: HOSPADM

## 2017-11-16 RX ADMIN — DEXAMETHASONE SODIUM PHOSPHATE 12 MG: 4 INJECTION, SOLUTION INTRA-ARTICULAR; INTRALESIONAL; INTRAMUSCULAR; INTRAVENOUS; SOFT TISSUE at 12:11

## 2017-11-16 RX ADMIN — ETOPOSIDE 190 MG: 20 INJECTION, SOLUTION, CONCENTRATE INTRAVENOUS at 12:25

## 2017-11-17 ENCOUNTER — HOSPITAL ENCOUNTER (OUTPATIENT)
Dept: RADIATION ONCOLOGY | Facility: HOSPITAL | Age: 62
Discharge: HOME | End: 2017-11-17

## 2017-11-17 LAB
LAB AP CASE REPORT: NORMAL
LAB AP CLINICAL INFORMATION: NORMAL
LAB AP DIAGNOSIS COMMENT: NORMAL
Lab: NORMAL

## 2017-11-17 PROCEDURE — 77417 THER RADIOLOGY PORT IMAGE(S): CPT | Performed by: RADIOLOGY

## 2017-11-17 PROCEDURE — 77412 RADIATION TX DELIVERY LVL 3: CPT | Performed by: RADIOLOGY

## 2017-11-20 ENCOUNTER — HOSPITAL ENCOUNTER (OUTPATIENT)
Dept: RADIATION ONCOLOGY | Facility: HOSPITAL | Age: 62
Discharge: HOME | End: 2017-11-20

## 2017-11-20 ENCOUNTER — INFUSION (OUTPATIENT)
Dept: ONCOLOGY | Facility: HOSPITAL | Age: 62
End: 2017-11-20

## 2017-11-20 VITALS
HEART RATE: 96 BPM | SYSTOLIC BLOOD PRESSURE: 208 MMHG | RESPIRATION RATE: 16 BRPM | DIASTOLIC BLOOD PRESSURE: 86 MMHG | TEMPERATURE: 97.9 F

## 2017-11-20 DIAGNOSIS — Z17.0 MALIGNANT NEOPLASM OF LEFT BREAST IN FEMALE, ESTROGEN RECEPTOR POSITIVE, UNSPECIFIED SITE OF BREAST (HCC): ICD-10-CM

## 2017-11-20 DIAGNOSIS — C50.912 MALIGNANT NEOPLASM OF LEFT BREAST IN FEMALE, ESTROGEN RECEPTOR POSITIVE, UNSPECIFIED SITE OF BREAST (HCC): ICD-10-CM

## 2017-11-20 PROCEDURE — 77336 RADIATION PHYSICS CONSULT: CPT | Performed by: RADIOLOGY

## 2017-11-20 PROCEDURE — 77412 RADIATION TX DELIVERY LVL 3: CPT | Performed by: RADIOLOGY

## 2017-11-20 PROCEDURE — G0463 HOSPITAL OUTPT CLINIC VISIT: HCPCS

## 2017-11-20 PROCEDURE — 96523 IRRIG DRUG DELIVERY DEVICE: CPT

## 2017-11-21 ENCOUNTER — HOSPITAL ENCOUNTER (OUTPATIENT)
Dept: RADIATION ONCOLOGY | Facility: HOSPITAL | Age: 62
Discharge: HOME | End: 2017-11-21

## 2017-11-21 ENCOUNTER — DOCUMENTATION (OUTPATIENT)
Dept: NUTRITION | Facility: HOSPITAL | Age: 62
End: 2017-11-21

## 2017-11-21 VITALS — WEIGHT: 193.5 LBS | BODY MASS INDEX: 35.39 KG/M2

## 2017-11-21 PROCEDURE — 77412 RADIATION TX DELIVERY LVL 3: CPT | Performed by: RADIOLOGY

## 2017-11-21 PROCEDURE — 77417 THER RADIOLOGY PORT IMAGE(S): CPT | Performed by: RADIOLOGY

## 2017-11-21 NOTE — PROGRESS NOTES
ONC Nutrition    Weight 193.5 lbs / 8 lbs weight loss from last week /   accuracy of weight today is questioned    Patient has completed 7/30 radiation treatments and completed 4 cycles chemotherapy     Patient states that she is not having any issues this week with eating; has incorporated many of the suggestions discussed last week with improvement in appetite, oral food intake and mouth health.  She denies any problems with swallowing today.   confirms patient's improvement in management of symptoms.  Will continue to follow.

## 2017-11-22 ENCOUNTER — HOSPITAL ENCOUNTER (OUTPATIENT)
Dept: RADIATION ONCOLOGY | Facility: HOSPITAL | Age: 62
Discharge: HOME | End: 2017-11-22

## 2017-11-22 PROCEDURE — 77412 RADIATION TX DELIVERY LVL 3: CPT | Performed by: RADIOLOGY

## 2017-11-24 ENCOUNTER — HOSPITAL ENCOUNTER (OUTPATIENT)
Dept: RADIATION ONCOLOGY | Facility: HOSPITAL | Age: 62
Discharge: HOME | End: 2017-11-24

## 2017-11-24 PROCEDURE — 77412 RADIATION TX DELIVERY LVL 3: CPT | Performed by: RADIOLOGY

## 2017-11-27 ENCOUNTER — HOSPITAL ENCOUNTER (OUTPATIENT)
Dept: RADIATION ONCOLOGY | Facility: HOSPITAL | Age: 62
Discharge: HOME | End: 2017-11-27

## 2017-11-27 PROCEDURE — 77412 RADIATION TX DELIVERY LVL 3: CPT | Performed by: RADIOLOGY

## 2017-11-28 ENCOUNTER — HOSPITAL ENCOUNTER (OUTPATIENT)
Dept: RADIATION ONCOLOGY | Facility: HOSPITAL | Age: 62
Discharge: HOME | End: 2017-11-28

## 2017-11-28 ENCOUNTER — INFUSION (OUTPATIENT)
Dept: ONCOLOGY | Facility: HOSPITAL | Age: 62
End: 2017-11-28

## 2017-11-28 VITALS
HEART RATE: 110 BPM | DIASTOLIC BLOOD PRESSURE: 63 MMHG | SYSTOLIC BLOOD PRESSURE: 142 MMHG | TEMPERATURE: 98 F | RESPIRATION RATE: 16 BRPM

## 2017-11-28 VITALS — WEIGHT: 196.1 LBS | BODY MASS INDEX: 35.87 KG/M2

## 2017-11-28 DIAGNOSIS — Z17.0 MALIGNANT NEOPLASM OF LEFT BREAST IN FEMALE, ESTROGEN RECEPTOR POSITIVE, UNSPECIFIED SITE OF BREAST (HCC): ICD-10-CM

## 2017-11-28 DIAGNOSIS — C50.912 MALIGNANT NEOPLASM OF LEFT BREAST IN FEMALE, ESTROGEN RECEPTOR POSITIVE, UNSPECIFIED SITE OF BREAST (HCC): ICD-10-CM

## 2017-11-28 PROCEDURE — 77412 RADIATION TX DELIVERY LVL 3: CPT | Performed by: RADIOLOGY

## 2017-11-28 PROCEDURE — 77336 RADIATION PHYSICS CONSULT: CPT | Performed by: RADIOLOGY

## 2017-11-28 PROCEDURE — G0463 HOSPITAL OUTPT CLINIC VISIT: HCPCS

## 2017-11-28 PROCEDURE — 96523 IRRIG DRUG DELIVERY DEVICE: CPT

## 2017-11-28 NOTE — PROGRESS NOTES
ONC Nutrition    Weight 196.1 lbs / 2 lbs weight gain over the past week    Patient has completed 11/30 radiation treatments and completed 4/4 cycles chemotherapy.    Patient reports continuation of good appetite and eating well.  She denies any nutrition impact symptoms associated with treatment, other than fatigue.  Encouraged patient to maintain high protein intake, adequate hydration and to be as physically active as possible to combat fatigue symptoms.    Will continue to follow.

## 2017-11-29 ENCOUNTER — HOSPITAL ENCOUNTER (OUTPATIENT)
Dept: RADIATION ONCOLOGY | Facility: HOSPITAL | Age: 62
Discharge: HOME | End: 2017-11-29

## 2017-11-29 PROCEDURE — 77417 THER RADIOLOGY PORT IMAGE(S): CPT | Performed by: RADIOLOGY

## 2017-11-29 PROCEDURE — 77412 RADIATION TX DELIVERY LVL 3: CPT | Performed by: RADIOLOGY

## 2017-11-30 ENCOUNTER — HOSPITAL ENCOUNTER (OUTPATIENT)
Dept: RADIATION ONCOLOGY | Facility: HOSPITAL | Age: 62
Discharge: HOME | End: 2017-11-30

## 2017-11-30 PROCEDURE — 77412 RADIATION TX DELIVERY LVL 3: CPT | Performed by: RADIOLOGY

## 2017-12-01 ENCOUNTER — HOSPITAL ENCOUNTER (OUTPATIENT)
Dept: RADIATION ONCOLOGY | Facility: HOSPITAL | Age: 62
Discharge: HOME | End: 2017-12-01

## 2017-12-01 ENCOUNTER — HOSPITAL ENCOUNTER (OUTPATIENT)
Dept: RADIATION ONCOLOGY | Facility: HOSPITAL | Age: 62
Setting detail: RADIATION/ONCOLOGY SERIES
Discharge: HOME | End: 2017-12-01

## 2017-12-01 PROCEDURE — 77412 RADIATION TX DELIVERY LVL 3: CPT | Performed by: RADIOLOGY

## 2017-12-04 ENCOUNTER — HOSPITAL ENCOUNTER (OUTPATIENT)
Dept: RADIATION ONCOLOGY | Facility: HOSPITAL | Age: 62
Discharge: HOME | End: 2017-12-04

## 2017-12-04 PROCEDURE — 77412 RADIATION TX DELIVERY LVL 3: CPT | Performed by: RADIOLOGY

## 2017-12-05 ENCOUNTER — HOSPITAL ENCOUNTER (OUTPATIENT)
Dept: RADIATION ONCOLOGY | Facility: HOSPITAL | Age: 62
Discharge: HOME | End: 2017-12-05

## 2017-12-05 ENCOUNTER — OFFICE VISIT (OUTPATIENT)
Dept: ONCOLOGY | Facility: CLINIC | Age: 62
End: 2017-12-05

## 2017-12-05 ENCOUNTER — INFUSION (OUTPATIENT)
Dept: ONCOLOGY | Facility: HOSPITAL | Age: 62
End: 2017-12-05

## 2017-12-05 VITALS
TEMPERATURE: 97.3 F | BODY MASS INDEX: 36.03 KG/M2 | RESPIRATION RATE: 22 BRPM | WEIGHT: 197 LBS | DIASTOLIC BLOOD PRESSURE: 72 MMHG | SYSTOLIC BLOOD PRESSURE: 167 MMHG | HEART RATE: 108 BPM

## 2017-12-05 VITALS — WEIGHT: 198.8 LBS | BODY MASS INDEX: 36.36 KG/M2

## 2017-12-05 DIAGNOSIS — C77.1 SECONDARY MALIGNANT NEOPLASM OF MEDIASTINAL LYMPH NODE (HCC): ICD-10-CM

## 2017-12-05 DIAGNOSIS — C34.82 MALIGNANT NEOPLASM OF OVERLAPPING SITES OF LEFT LUNG (HCC): Primary | ICD-10-CM

## 2017-12-05 DIAGNOSIS — Z17.0 MALIGNANT NEOPLASM OF LEFT BREAST IN FEMALE, ESTROGEN RECEPTOR POSITIVE, UNSPECIFIED SITE OF BREAST (HCC): ICD-10-CM

## 2017-12-05 DIAGNOSIS — C50.912 MALIGNANT NEOPLASM OF LEFT BREAST IN FEMALE, ESTROGEN RECEPTOR POSITIVE, UNSPECIFIED SITE OF BREAST (HCC): ICD-10-CM

## 2017-12-05 PROBLEM — C34.12 PRIMARY CANCER OF LEFT UPPER LOBE OF LUNG (HCC): Status: RESOLVED | Noted: 2017-09-06 | Resolved: 2017-12-05

## 2017-12-05 PROBLEM — D49.89: Status: ACTIVE | Noted: 2017-08-18

## 2017-12-05 LAB
ALBUMIN SERPL-MCNC: 4.2 G/DL (ref 3.5–5)
ALBUMIN/GLOB SERPL: 1.1 G/DL (ref 1–2)
ALP SERPL-CCNC: 132 U/L (ref 38–126)
ALT SERPL W P-5'-P-CCNC: 43 U/L (ref 13–69)
ANION GAP SERPL CALCULATED.3IONS-SCNC: 16 MMOL/L
AST SERPL-CCNC: 32 U/L (ref 15–46)
BASOPHILS # BLD AUTO: 0.01 10*3/MM3 (ref 0–0.2)
BASOPHILS NFR BLD AUTO: 0.3 % (ref 0–2.5)
BILIRUB SERPL-MCNC: 0.5 MG/DL (ref 0.2–1.3)
BUN BLD-MCNC: 19 MG/DL (ref 7–20)
BUN/CREAT SERPL: 17.3 (ref 7.1–23.5)
CALCIUM SPEC-SCNC: 10.8 MG/DL (ref 8.4–10.2)
CHLORIDE SERPL-SCNC: 101 MMOL/L (ref 98–107)
CO2 SERPL-SCNC: 30 MMOL/L (ref 26–30)
CREAT BLD-MCNC: 1.1 MG/DL (ref 0.6–1.3)
DEPRECATED RDW RBC AUTO: 62.1 FL (ref 37–54)
EOSINOPHIL # BLD AUTO: 0.02 10*3/MM3 (ref 0–0.7)
EOSINOPHIL NFR BLD AUTO: 0.5 % (ref 0–7)
ERYTHROCYTE [DISTWIDTH] IN BLOOD BY AUTOMATED COUNT: 19.5 % (ref 11.5–14.5)
GFR SERPL CREATININE-BSD FRML MDRD: 51 ML/MIN/1.73
GLOBULIN UR ELPH-MCNC: 3.7 GM/DL
GLUCOSE BLD-MCNC: 119 MG/DL (ref 74–98)
HCT VFR BLD AUTO: 23.7 % (ref 37–47)
HGB BLD-MCNC: 7.6 G/DL (ref 12–16)
IMM GRANULOCYTES # BLD: 0.05 10*3/MM3 (ref 0–0.06)
IMM GRANULOCYTES NFR BLD: 1.3 % (ref 0–0.6)
LYMPHOCYTES # BLD AUTO: 0.82 10*3/MM3 (ref 0.6–3.4)
LYMPHOCYTES NFR BLD AUTO: 21.9 % (ref 10–50)
MCH RBC QN AUTO: 30.6 PG (ref 27–31)
MCHC RBC AUTO-ENTMCNC: 32.1 G/DL (ref 30–37)
MCV RBC AUTO: 95.6 FL (ref 81–99)
MONOCYTES # BLD AUTO: 0.66 10*3/MM3 (ref 0–0.9)
MONOCYTES NFR BLD AUTO: 17.6 % (ref 0–12)
NEUTROPHILS # BLD AUTO: 2.19 10*3/MM3 (ref 2–6.9)
NEUTROPHILS NFR BLD AUTO: 58.4 % (ref 37–80)
NRBC BLD MANUAL-RTO: 0 /100 WBC (ref 0–0)
PLATELET # BLD AUTO: 227 10*3/MM3 (ref 130–400)
PMV BLD AUTO: 9.7 FL (ref 6–12)
POTASSIUM BLD-SCNC: 3 MMOL/L (ref 3.5–5.1)
PROT SERPL-MCNC: 7.9 G/DL (ref 6.3–8.2)
RBC # BLD AUTO: 2.48 10*6/MM3 (ref 4.2–5.4)
SODIUM BLD-SCNC: 144 MMOL/L (ref 137–145)
WBC NRBC COR # BLD: 3.75 10*3/MM3 (ref 4.8–10.8)

## 2017-12-05 PROCEDURE — 96413 CHEMO IV INFUSION 1 HR: CPT

## 2017-12-05 PROCEDURE — 25010000002 FOSAPREPITANT PER 1 MG: Performed by: INTERNAL MEDICINE

## 2017-12-05 PROCEDURE — 25010000002 ETOPOSIDE 1 GM/50ML SOLUTION 50 ML VIAL: Performed by: INTERNAL MEDICINE

## 2017-12-05 PROCEDURE — 99214 OFFICE O/P EST MOD 30 MIN: CPT | Performed by: INTERNAL MEDICINE

## 2017-12-05 PROCEDURE — 77412 RADIATION TX DELIVERY LVL 3: CPT | Performed by: RADIOLOGY

## 2017-12-05 PROCEDURE — 80053 COMPREHEN METABOLIC PANEL: CPT

## 2017-12-05 PROCEDURE — 36415 COLL VENOUS BLD VENIPUNCTURE: CPT

## 2017-12-05 PROCEDURE — 25010000002 PALONOSETRON PER 25 MCG: Performed by: INTERNAL MEDICINE

## 2017-12-05 PROCEDURE — 96366 THER/PROPH/DIAG IV INF ADDON: CPT

## 2017-12-05 PROCEDURE — 85025 COMPLETE CBC W/AUTO DIFF WBC: CPT

## 2017-12-05 PROCEDURE — 77336 RADIATION PHYSICS CONSULT: CPT | Performed by: RADIOLOGY

## 2017-12-05 PROCEDURE — 96375 TX/PRO/DX INJ NEW DRUG ADDON: CPT

## 2017-12-05 PROCEDURE — 25010000002 CARBOPLATIN PER 50 MG: Performed by: INTERNAL MEDICINE

## 2017-12-05 PROCEDURE — 25010000002 DEXAMETHASONE PER 1 MG: Performed by: INTERNAL MEDICINE

## 2017-12-05 PROCEDURE — 96417 CHEMO IV INFUS EACH ADDL SEQ: CPT

## 2017-12-05 PROCEDURE — 96367 TX/PROPH/DG ADDL SEQ IV INF: CPT

## 2017-12-05 RX ORDER — PALONOSETRON 0.05 MG/ML
0.25 INJECTION, SOLUTION INTRAVENOUS ONCE
Status: CANCELLED | OUTPATIENT
Start: 2017-12-05

## 2017-12-05 RX ORDER — SODIUM CHLORIDE 9 MG/ML
250 INJECTION, SOLUTION INTRAVENOUS ONCE
Status: CANCELLED | OUTPATIENT
Start: 2017-12-06

## 2017-12-05 RX ORDER — SODIUM CHLORIDE 9 MG/ML
250 INJECTION, SOLUTION INTRAVENOUS ONCE
Status: CANCELLED | OUTPATIENT
Start: 2017-12-05

## 2017-12-05 RX ORDER — PALONOSETRON 0.05 MG/ML
0.25 INJECTION, SOLUTION INTRAVENOUS ONCE
Status: COMPLETED | OUTPATIENT
Start: 2017-12-05 | End: 2017-12-05

## 2017-12-05 RX ORDER — POTASSIUM CHLORIDE 1500 MG/1
20 TABLET, FILM COATED, EXTENDED RELEASE ORAL 2 TIMES DAILY
Qty: 30 TABLET | Refills: 1 | Status: SHIPPED | OUTPATIENT
Start: 2017-12-05 | End: 2019-01-01 | Stop reason: DRUGHIGH

## 2017-12-05 RX ORDER — SODIUM CHLORIDE 9 MG/ML
250 INJECTION, SOLUTION INTRAVENOUS ONCE
Status: CANCELLED | OUTPATIENT
Start: 2017-12-07

## 2017-12-05 RX ORDER — SODIUM CHLORIDE 9 MG/ML
250 INJECTION, SOLUTION INTRAVENOUS ONCE
Status: DISCONTINUED | OUTPATIENT
Start: 2017-12-05 | End: 2017-12-05 | Stop reason: HOSPADM

## 2017-12-05 RX ADMIN — SODIUM CHLORIDE 150 MG: 9 INJECTION, SOLUTION INTRAVENOUS at 11:58

## 2017-12-05 RX ADMIN — CARBOPLATIN 510 MG: 10 INJECTION, SOLUTION INTRAVENOUS at 13:17

## 2017-12-05 RX ADMIN — DEXAMETHASONE SODIUM PHOSPHATE 12 MG: 4 INJECTION, SOLUTION INTRA-ARTICULAR; INTRALESIONAL; INTRAMUSCULAR; INTRAVENOUS; SOFT TISSUE at 11:41

## 2017-12-05 RX ADMIN — PALONOSETRON HYDROCHLORIDE 0.25 MG: 0.25 INJECTION INTRAVENOUS at 11:41

## 2017-12-05 RX ADMIN — ETOPOSIDE 190 MG: 20 INJECTION, SOLUTION, CONCENTRATE INTRAVENOUS at 12:17

## 2017-12-05 NOTE — PROGRESS NOTES
PROBLEM LIST:  1.  Small cell lung cancer from the left upper lobe.  Limited stage disease.  A.  Treated  initially with etoposide and carboplatin starting 9/13/27, single modality started because of symptoms and airway compromise, responding to therapy  B.  Now on combined modality chemoradiation.  2.  History of left breast cancer.  A.  Left mastectomy 7/22/2008 for stage IIIA, T3 N1 M0 breast cancer  B.  ER weakly positive, MI negative and HER-2 positive  C.  Adjuvant chemotherapy with Herceptin, Taxotere and carboplatin  D.  [facility]maste[facility] radiation done [facility]  E.  Adjuvant tamoxifen started 12/12/2008 and continued as extended adjuvant therapy  3.  Diabetes      Subjective     HISTORY OF PRESENT ILLNESS:   Chief complaint: Here about management of lung cancer.  Mrs. Horton has been tolerating her combined therapy well.  She's had some fatigue with no other new symptoms and in particular, no fevers, chills, sweats, nose bleeding, gum bleeding or unusual bruising.  She is coughing less and doesn't have any dyspnea at rest now she hasn't developed more alopecia.  She hasn't developed any skin irritation from her radiation.  She hasn't changed any medicines recently.  She denies any headache or bone pain.    Past Medical History, Past Surgical History, Social History, Family History have been reviewed and are without significant changes except as mentioned.    Review of Systems   A comprehensive 14 point review of systems was performed and was negative except as mentioned.    Medications:  The current medication list was reviewed in the EMR    ALLERGIES:  Allergies not on file    Objective      /72  Pulse 108  Temp 97.3 °F (36.3 °C) (Temporal Artery )   Resp 22  Wt 89.4 kg (197 lb)  BMI 36.03 kg/m2     Performance Status: ECOG 1    General: well appearing, in no acute distress  HEENT: sclera anicteric, oropharynx clear  Lymphatics: no cervical, supraclavicular, or axillary  adenopathy  Cardiovascular: regular rate and rhythm, no murmurs  Lungs: clear to auscultation bilaterally  Abdomen: soft, nontender, nondistended.  No palpable organomegaly  Extremeties: no lower extremity edema  Skin: no rashes, lesions, bruising, or petechiae    RECENT LABS:   WBC   Date Value Ref Range Status   11/14/2017 5.67 4.80 - 10.80 10*3/mm3 Final     Hemoglobin   Date Value Ref Range Status   11/14/2017 8.6 (L) 12.0 - 16.0 g/dL Final     Hematocrit   Date Value Ref Range Status   11/14/2017 26.7 (L) 37.0 - 47.0 % Final     MCV   Date Value Ref Range Status   11/14/2017 94.3 81.0 - 99.0 fL Final     RDW   Date Value Ref Range Status   11/14/2017 17.0 (H) 11.5 - 14.5 % Final     MPV   Date Value Ref Range Status   11/14/2017 9.0 6.0 - 12.0 fL Final     Platelets   Date Value Ref Range Status   11/14/2017 212 130 - 400 10*3/mm3 Final     Immature Grans %   Date Value Ref Range Status   11/14/2017 2.3 (H) 0.0 - 0.6 % Final     Neutrophils, Absolute   Date Value Ref Range Status   11/14/2017 3.26 2.00 - 6.90 10*3/mm3 Final     Lymphocytes, Absolute   Date Value Ref Range Status   11/14/2017 1.61 0.60 - 3.40 10*3/mm3 Final     Monocytes, Absolute   Date Value Ref Range Status   11/14/2017 0.57 0.00 - 0.90 10*3/mm3 Final     Eosinophils, Absolute   Date Value Ref Range Status   11/14/2017 0.08 0.00 - 0.70 10*3/mm3 Final     Basophils, Absolute   Date Value Ref Range Status   11/14/2017 0.02 0.00 - 0.20 10*3/mm3 Final     Immature Grans, Absolute   Date Value Ref Range Status   11/14/2017 0.13 (H) 0.00 - 0.06 10*3/mm3 Final     nRBC   Date Value Ref Range Status   11/14/2017 0.4 (H) 0.0 - 0.0 /100 WBC Final       Glucose   Date Value Ref Range Status   11/14/2017 109 (H) 74 - 98 mg/dL Final     Sodium   Date Value Ref Range Status   11/14/2017 144 137 - 145 mmol/L Final     Potassium   Date Value Ref Range Status   11/14/2017 3.8 3.5 - 5.1 mmol/L Final     CO2   Date Value Ref Range Status   11/14/2017 29.0 26.0  - 30.0 mmol/L Final     Chloride   Date Value Ref Range Status   11/14/2017 106 98 - 107 mmol/L Final     Anion Gap   Date Value Ref Range Status   11/14/2017 12.8 mmol/L Final     Creatinine   Date Value Ref Range Status   11/14/2017 0.80 0.60 - 1.30 mg/dL Final     BUN   Date Value Ref Range Status   11/14/2017 9 7 - 20 mg/dL Final     BUN/Creatinine Ratio   Date Value Ref Range Status   11/14/2017 11.3 7.1 - 23.5 Final     Calcium   Date Value Ref Range Status   11/14/2017 10.1 8.4 - 10.2 mg/dL Final     eGFR Non  Amer   Date Value Ref Range Status   11/14/2017 73 >60 mL/min/1.73 Final     Alkaline Phosphatase   Date Value Ref Range Status   11/14/2017 134 (H) 38 - 126 U/L Final     Total Protein   Date Value Ref Range Status   11/14/2017 7.6 6.3 - 8.2 g/dL Final     ALT (SGPT)   Date Value Ref Range Status   11/14/2017 48 13 - 69 U/L Final     AST (SGOT)   Date Value Ref Range Status   11/14/2017 33 15 - 46 U/L Final     Total Bilirubin   Date Value Ref Range Status   11/14/2017 0.4 0.2 - 1.3 mg/dL Final     Albumin   Date Value Ref Range Status   11/14/2017 3.80 3.50 - 5.00 g/dL Final     Globulin   Date Value Ref Range Status   11/14/2017 3.8 gm/dL Final     A/G Ratio   Date Value Ref Range Status   11/14/2017 1.0 1.0 - 2.0 g/dL Final       LDH   Date Value Ref Range Status   08/16/2017 216 120 - 246 U/L Final       No results found for: MSPIKE, KAPPALAMB, IGLFLC, FREEKAPPAL          Assessment/Plan   Impression: 1.  Small cell lung cancer.  She's doing well on combined modality therapy.  She was responding to chemotherapy as a single modality initially.  2.  Breast cancer, without recurrence and on adjuvant tamoxifen.    Plan: 1.  We will continue her etoposide and carboplatin for one more cycle today.  2.  I'll see her back in about a month with repeat imaging studies.  If her CT doesn't show any residual disease, we will plan to chemotherapy.  3.  I will check her MRI when she returns.  We can  discuss PCI depending on her additional studies and clinical status.    Jac Rader MD  [facility] Hematology and Oncology    12/05/17           CC:

## 2017-12-06 ENCOUNTER — INFUSION (OUTPATIENT)
Dept: ONCOLOGY | Facility: HOSPITAL | Age: 62
End: 2017-12-06
Attending: INTERNAL MEDICINE

## 2017-12-06 ENCOUNTER — HOSPITAL ENCOUNTER (OUTPATIENT)
Dept: RADIATION ONCOLOGY | Facility: HOSPITAL | Age: 62
Discharge: HOME | End: 2017-12-06

## 2017-12-06 VITALS
BODY MASS INDEX: 36.95 KG/M2 | DIASTOLIC BLOOD PRESSURE: 69 MMHG | HEART RATE: 79 BPM | SYSTOLIC BLOOD PRESSURE: 152 MMHG | TEMPERATURE: 98.2 F | WEIGHT: 202 LBS | RESPIRATION RATE: 18 BRPM

## 2017-12-06 DIAGNOSIS — C34.82 MALIGNANT NEOPLASM OF OVERLAPPING SITES OF LEFT LUNG (HCC): Primary | ICD-10-CM

## 2017-12-06 PROCEDURE — 77412 RADIATION TX DELIVERY LVL 3: CPT | Performed by: RADIOLOGY

## 2017-12-06 PROCEDURE — 96375 TX/PRO/DX INJ NEW DRUG ADDON: CPT

## 2017-12-06 PROCEDURE — 25010000002 ETOPOSIDE 1 GM/50ML SOLUTION 50 ML VIAL: Performed by: INTERNAL MEDICINE

## 2017-12-06 PROCEDURE — 25010000002 DEXAMETHASONE PER 1 MG: Performed by: INTERNAL MEDICINE

## 2017-12-06 PROCEDURE — 77417 THER RADIOLOGY PORT IMAGE(S): CPT | Performed by: RADIOLOGY

## 2017-12-06 PROCEDURE — 96413 CHEMO IV INFUSION 1 HR: CPT

## 2017-12-06 RX ORDER — SODIUM CHLORIDE 9 MG/ML
250 INJECTION, SOLUTION INTRAVENOUS ONCE
Status: DISCONTINUED | OUTPATIENT
Start: 2017-12-06 | End: 2017-12-06 | Stop reason: HOSPADM

## 2017-12-06 RX ADMIN — ETOPOSIDE 190 MG: 20 INJECTION, SOLUTION, CONCENTRATE INTRAVENOUS at 13:51

## 2017-12-06 RX ADMIN — DEXAMETHASONE SODIUM PHOSPHATE 12 MG: 4 INJECTION, SOLUTION INTRA-ARTICULAR; INTRALESIONAL; INTRAMUSCULAR; INTRAVENOUS; SOFT TISSUE at 13:34

## 2017-12-07 ENCOUNTER — HOSPITAL ENCOUNTER (OUTPATIENT)
Dept: RADIATION ONCOLOGY | Facility: HOSPITAL | Age: 62
Discharge: HOME | End: 2017-12-07

## 2017-12-07 ENCOUNTER — INFUSION (OUTPATIENT)
Dept: ONCOLOGY | Facility: HOSPITAL | Age: 62
End: 2017-12-07

## 2017-12-07 VITALS
BODY MASS INDEX: 37.13 KG/M2 | WEIGHT: 203 LBS | SYSTOLIC BLOOD PRESSURE: 145 MMHG | DIASTOLIC BLOOD PRESSURE: 65 MMHG | HEART RATE: 93 BPM | RESPIRATION RATE: 16 BRPM | TEMPERATURE: 97 F

## 2017-12-07 DIAGNOSIS — C34.82 MALIGNANT NEOPLASM OF OVERLAPPING SITES OF LEFT LUNG (HCC): Primary | ICD-10-CM

## 2017-12-07 PROCEDURE — 25010000002 DEXAMETHASONE PER 1 MG: Performed by: INTERNAL MEDICINE

## 2017-12-07 PROCEDURE — 25010000002 ETOPOSIDE 1 GM/50ML SOLUTION 50 ML VIAL: Performed by: INTERNAL MEDICINE

## 2017-12-07 PROCEDURE — 96375 TX/PRO/DX INJ NEW DRUG ADDON: CPT

## 2017-12-07 PROCEDURE — 96367 TX/PROPH/DG ADDL SEQ IV INF: CPT

## 2017-12-07 PROCEDURE — 96413 CHEMO IV INFUSION 1 HR: CPT

## 2017-12-07 PROCEDURE — 77412 RADIATION TX DELIVERY LVL 3: CPT | Performed by: RADIOLOGY

## 2017-12-07 RX ORDER — SODIUM CHLORIDE 9 MG/ML
250 INJECTION, SOLUTION INTRAVENOUS ONCE
Status: DISCONTINUED | OUTPATIENT
Start: 2017-12-07 | End: 2017-12-07 | Stop reason: HOSPADM

## 2017-12-07 RX ADMIN — DEXAMETHASONE SODIUM PHOSPHATE 12 MG: 4 INJECTION, SOLUTION INTRA-ARTICULAR; INTRALESIONAL; INTRAMUSCULAR; INTRAVENOUS; SOFT TISSUE at 13:42

## 2017-12-07 RX ADMIN — ETOPOSIDE 190 MG: 20 INJECTION, SOLUTION, CONCENTRATE INTRAVENOUS at 14:01

## 2017-12-08 ENCOUNTER — HOSPITAL ENCOUNTER (OUTPATIENT)
Dept: RADIATION ONCOLOGY | Facility: HOSPITAL | Age: 62
Discharge: HOME | End: 2017-12-08

## 2017-12-08 PROCEDURE — 77412 RADIATION TX DELIVERY LVL 3: CPT | Performed by: RADIOLOGY

## 2017-12-11 ENCOUNTER — HOSPITAL ENCOUNTER (OUTPATIENT)
Dept: RADIATION ONCOLOGY | Facility: HOSPITAL | Age: 62
Discharge: HOME | End: 2017-12-11

## 2017-12-11 PROCEDURE — 77412 RADIATION TX DELIVERY LVL 3: CPT | Performed by: RADIOLOGY

## 2017-12-12 ENCOUNTER — TELEPHONE (OUTPATIENT)
Dept: ONCOLOGY | Facility: CLINIC | Age: 62
End: 2017-12-12

## 2017-12-12 ENCOUNTER — INFUSION (OUTPATIENT)
Dept: ONCOLOGY | Facility: HOSPITAL | Age: 62
End: 2017-12-12

## 2017-12-12 ENCOUNTER — HOSPITAL ENCOUNTER (OUTPATIENT)
Dept: RADIATION ONCOLOGY | Facility: HOSPITAL | Age: 62
Discharge: HOME | End: 2017-12-12

## 2017-12-12 VITALS
TEMPERATURE: 98.1 F | DIASTOLIC BLOOD PRESSURE: 59 MMHG | RESPIRATION RATE: 16 BRPM | SYSTOLIC BLOOD PRESSURE: 122 MMHG | HEART RATE: 102 BPM

## 2017-12-12 VITALS — WEIGHT: 196.8 LBS | BODY MASS INDEX: 36 KG/M2

## 2017-12-12 DIAGNOSIS — C34.82 MALIGNANT NEOPLASM OF OVERLAPPING SITES OF LEFT LUNG (HCC): ICD-10-CM

## 2017-12-12 DIAGNOSIS — C34.90 SMALL CELL CARCINOMA OF LUNG, UNSPECIFIED LATERALITY: Primary | ICD-10-CM

## 2017-12-12 LAB
ANION GAP SERPL CALCULATED.3IONS-SCNC: 12.1 MMOL/L
ANISOCYTOSIS BLD QL: NORMAL
BASOPHILS # BLD AUTO: 0.02 10*3/MM3 (ref 0–0.2)
BASOPHILS NFR BLD AUTO: 0.9 % (ref 0–2.5)
BUN BLD-MCNC: 25 MG/DL (ref 7–20)
BUN/CREAT SERPL: 31.3 (ref 7.1–23.5)
CALCIUM SPEC-SCNC: 10.7 MG/DL (ref 8.4–10.2)
CHLORIDE SERPL-SCNC: 105 MMOL/L (ref 98–107)
CO2 SERPL-SCNC: 27 MMOL/L (ref 26–30)
CREAT BLD-MCNC: 0.8 MG/DL (ref 0.6–1.3)
DEPRECATED RDW RBC AUTO: 61.4 FL (ref 37–54)
EOSINOPHIL # BLD AUTO: 0 10*3/MM3 (ref 0–0.7)
EOSINOPHIL NFR BLD AUTO: 0 % (ref 0–7)
ERYTHROCYTE [DISTWIDTH] IN BLOOD BY AUTOMATED COUNT: 17.3 % (ref 11.5–14.5)
GFR SERPL CREATININE-BSD FRML MDRD: 73 ML/MIN/1.73
GLUCOSE BLD-MCNC: 120 MG/DL (ref 74–98)
HCT VFR BLD AUTO: 21.7 % (ref 37–47)
HGB BLD-MCNC: 7 G/DL (ref 12–16)
IMM GRANULOCYTES # BLD: 0.07 10*3/MM3 (ref 0–0.06)
IMM GRANULOCYTES NFR BLD: 3.2 % (ref 0–0.6)
LYMPHOCYTES # BLD AUTO: 0.39 10*3/MM3 (ref 0.6–3.4)
LYMPHOCYTES NFR BLD AUTO: 17.6 % (ref 10–50)
MCH RBC QN AUTO: 31.1 PG (ref 27–31)
MCHC RBC AUTO-ENTMCNC: 32.3 G/DL (ref 30–37)
MCV RBC AUTO: 96.4 FL (ref 81–99)
MONOCYTES # BLD AUTO: 0.02 10*3/MM3 (ref 0–0.9)
MONOCYTES NFR BLD AUTO: 0.9 % (ref 0–12)
NEUTROPHILS # BLD AUTO: 1.72 10*3/MM3 (ref 2–6.9)
NEUTROPHILS NFR BLD AUTO: 77.4 % (ref 37–80)
NRBC BLD MANUAL-RTO: 0 /100 WBC (ref 0–0)
PLATELET # BLD AUTO: 164 10*3/MM3 (ref 130–400)
PMV BLD AUTO: 9.6 FL (ref 6–12)
POTASSIUM BLD-SCNC: 4.1 MMOL/L (ref 3.5–5.1)
RBC # BLD AUTO: 2.25 10*6/MM3 (ref 4.2–5.4)
SMALL PLATELETS BLD QL SMEAR: ADEQUATE
SODIUM BLD-SCNC: 140 MMOL/L (ref 137–145)
WBC MORPH BLD: NORMAL
WBC NRBC COR # BLD: 2.22 10*3/MM3 (ref 4.8–10.8)

## 2017-12-12 PROCEDURE — 36592 COLLECT BLOOD FROM PICC: CPT

## 2017-12-12 PROCEDURE — 77412 RADIATION TX DELIVERY LVL 3: CPT | Performed by: RADIOLOGY

## 2017-12-12 PROCEDURE — G0463 HOSPITAL OUTPT CLINIC VISIT: HCPCS

## 2017-12-12 PROCEDURE — 85007 BL SMEAR W/DIFF WBC COUNT: CPT

## 2017-12-12 PROCEDURE — 80048 BASIC METABOLIC PNL TOTAL CA: CPT

## 2017-12-12 PROCEDURE — 36415 COLL VENOUS BLD VENIPUNCTURE: CPT

## 2017-12-12 PROCEDURE — 85025 COMPLETE CBC W/AUTO DIFF WBC: CPT

## 2017-12-12 PROCEDURE — 77336 RADIATION PHYSICS CONSULT: CPT | Performed by: RADIOLOGY

## 2017-12-12 RX ORDER — SODIUM CHLORIDE 9 MG/ML
250 INJECTION, SOLUTION INTRAVENOUS AS NEEDED
Status: CANCELLED | OUTPATIENT
Start: 2017-12-12

## 2017-12-12 NOTE — TELEPHONE ENCOUNTER
Called patient informed her, that Dr. Rader wanted her to receive 2 units blood on Dec 14, 2017. She will get T&C at 900 am and go to Radiation at 10:00 and get her transfusion after treatment. Patient stated she understands. No further questions asked.

## 2017-12-13 ENCOUNTER — HOSPITAL ENCOUNTER (OUTPATIENT)
Dept: RADIATION ONCOLOGY | Facility: HOSPITAL | Age: 62
Discharge: HOME | End: 2017-12-13

## 2017-12-13 ENCOUNTER — APPOINTMENT (OUTPATIENT)
Dept: INFUSION THERAPY | Facility: HOSPITAL | Age: 62
End: 2017-12-13

## 2017-12-13 PROCEDURE — 77412 RADIATION TX DELIVERY LVL 3: CPT | Performed by: RADIOLOGY

## 2017-12-13 PROCEDURE — 77417 THER RADIOLOGY PORT IMAGE(S): CPT | Performed by: RADIOLOGY

## 2017-12-14 ENCOUNTER — INFUSION (OUTPATIENT)
Dept: ONCOLOGY | Facility: HOSPITAL | Age: 62
End: 2017-12-14

## 2017-12-14 ENCOUNTER — HOSPITAL ENCOUNTER (OUTPATIENT)
Dept: RADIATION ONCOLOGY | Facility: HOSPITAL | Age: 62
Discharge: HOME | End: 2017-12-14

## 2017-12-14 ENCOUNTER — APPOINTMENT (OUTPATIENT)
Dept: ONCOLOGY | Facility: HOSPITAL | Age: 62
End: 2017-12-14

## 2017-12-14 VITALS
WEIGHT: 195 LBS | SYSTOLIC BLOOD PRESSURE: 164 MMHG | TEMPERATURE: 98.9 F | HEIGHT: 62 IN | BODY MASS INDEX: 35.88 KG/M2 | DIASTOLIC BLOOD PRESSURE: 70 MMHG | RESPIRATION RATE: 16 BRPM | HEART RATE: 88 BPM

## 2017-12-14 DIAGNOSIS — C34.90 SMALL CELL CARCINOMA OF LUNG, UNSPECIFIED LATERALITY: ICD-10-CM

## 2017-12-14 LAB
ABO GROUP BLD: NORMAL
BLD GP AB SCN SERPL QL: NEGATIVE
RH BLD: POSITIVE

## 2017-12-14 PROCEDURE — 86901 BLOOD TYPING SEROLOGIC RH(D): CPT

## 2017-12-14 PROCEDURE — 86920 COMPATIBILITY TEST SPIN: CPT

## 2017-12-14 PROCEDURE — 86850 RBC ANTIBODY SCREEN: CPT

## 2017-12-14 PROCEDURE — 36415 COLL VENOUS BLD VENIPUNCTURE: CPT

## 2017-12-14 PROCEDURE — 77412 RADIATION TX DELIVERY LVL 3: CPT | Performed by: RADIOLOGY

## 2017-12-14 PROCEDURE — 36430 TRANSFUSION BLD/BLD COMPNT: CPT

## 2017-12-14 PROCEDURE — 86900 BLOOD TYPING SEROLOGIC ABO: CPT

## 2017-12-14 PROCEDURE — P9016 RBC LEUKOCYTES REDUCED: HCPCS

## 2017-12-14 RX ORDER — SODIUM CHLORIDE 9 MG/ML
250 INJECTION, SOLUTION INTRAVENOUS AS NEEDED
Status: DISCONTINUED | OUTPATIENT
Start: 2017-12-14 | End: 2017-12-14 | Stop reason: HOSPADM

## 2017-12-15 ENCOUNTER — HOSPITAL ENCOUNTER (OUTPATIENT)
Dept: RADIATION ONCOLOGY | Facility: HOSPITAL | Age: 62
Discharge: HOME | End: 2017-12-15

## 2017-12-15 ENCOUNTER — TELEPHONE (OUTPATIENT)
Dept: ONCOLOGY | Facility: CLINIC | Age: 62
End: 2017-12-15

## 2017-12-15 LAB
ABO + RH BLD: NORMAL
ABO + RH BLD: NORMAL
BH BB BLOOD EXPIRATION DATE: NORMAL
BH BB BLOOD EXPIRATION DATE: NORMAL
BH BB BLOOD TYPE BARCODE: 9500
BH BB BLOOD TYPE BARCODE: 9500
BH BB DISPENSE STATUS: NORMAL
BH BB DISPENSE STATUS: NORMAL
BH BB PRODUCT CODE: NORMAL
BH BB PRODUCT CODE: NORMAL
BH BB UNIT NUMBER: NORMAL
BH BB UNIT NUMBER: NORMAL
CROSSMATCH INTERPRETATION: NORMAL
CROSSMATCH INTERPRETATION: NORMAL
UNIT  ABO: NORMAL
UNIT  ABO: NORMAL
UNIT  RH: NORMAL
UNIT  RH: NORMAL

## 2017-12-15 PROCEDURE — 77412 RADIATION TX DELIVERY LVL 3: CPT | Performed by: RADIOLOGY

## 2017-12-15 RX ORDER — PANTOPRAZOLE SODIUM 40 MG/1
40 TABLET, DELAYED RELEASE ORAL EVERY 12 HOURS
Qty: 60 TABLET | Refills: 0 | Status: SHIPPED | OUTPATIENT
Start: 2017-12-15

## 2017-12-15 NOTE — TELEPHONE ENCOUNTER
----- Message from Ruben Valles sent at 12/15/2017  1:08 PM EST -----  Regarding: Prasanth, patient is having burning in chest while she eats  Contact: [phone]  Patient called to state that when she eats anything, it hurts her chest like a burning. Patient is wanting to know if it is from the radiation or if she can do anything to help this.

## 2017-12-15 NOTE — TELEPHONE ENCOUNTER
Called patient informed her to take her Nerium BID for a month and to let us know if the increase in medication doesn't help.

## 2017-12-16 ENCOUNTER — HOSPITAL ENCOUNTER (EMERGENCY)
Facility: HOSPITAL | Age: 62
Discharge: HOME | End: 2017-12-16
Attending: STUDENT IN AN ORGANIZED HEALTH CARE EDUCATION/TRAINING PROGRAM | Admitting: STUDENT IN AN ORGANIZED HEALTH CARE EDUCATION/TRAINING PROGRAM

## 2017-12-16 ENCOUNTER — APPOINTMENT (OUTPATIENT)
Dept: CT IMAGING | Facility: HOSPITAL | Age: 62
End: 2017-12-16

## 2017-12-16 VITALS
TEMPERATURE: 97.9 F | RESPIRATION RATE: 18 BRPM | DIASTOLIC BLOOD PRESSURE: 68 MMHG | BODY MASS INDEX: 34.78 KG/M2 | WEIGHT: 189 LBS | SYSTOLIC BLOOD PRESSURE: 141 MMHG | HEART RATE: 94 BPM | HEIGHT: 62 IN | OXYGEN SATURATION: 97 %

## 2017-12-16 DIAGNOSIS — E86.0 DEHYDRATION: Primary | ICD-10-CM

## 2017-12-16 DIAGNOSIS — N30.00 ACUTE CYSTITIS WITHOUT HEMATURIA: ICD-10-CM

## 2017-12-16 DIAGNOSIS — R53.1 WEAKNESS: ICD-10-CM

## 2017-12-16 DIAGNOSIS — N28.9 RENAL DYSFUNCTION: ICD-10-CM

## 2017-12-16 LAB
ALBUMIN SERPL-MCNC: 4.6 G/DL (ref 3.5–5)
ALBUMIN/GLOB SERPL: 1.2 G/DL (ref 1–2)
ALP SERPL-CCNC: 136 U/L (ref 38–126)
ALT SERPL W P-5'-P-CCNC: 45 U/L (ref 13–69)
ANION GAP SERPL CALCULATED.3IONS-SCNC: 16.2 MMOL/L
AST SERPL-CCNC: 31 U/L (ref 15–46)
BACTERIA UR QL AUTO: ABNORMAL /HPF
BILIRUB SERPL-MCNC: 1.2 MG/DL (ref 0.2–1.3)
BILIRUB UR QL STRIP: ABNORMAL
BUN BLD-MCNC: 29 MG/DL (ref 7–20)
BUN/CREAT SERPL: 19.3 (ref 7.1–23.5)
CALCIUM SPEC-SCNC: 11.2 MG/DL (ref 8.4–10.2)
CHLORIDE SERPL-SCNC: 99 MMOL/L (ref 98–107)
CLARITY UR: ABNORMAL
CO2 SERPL-SCNC: 27 MMOL/L (ref 26–30)
COLOR UR: ABNORMAL
CREAT BLD-MCNC: 1.5 MG/DL (ref 0.6–1.3)
DEPRECATED RDW RBC AUTO: 53.6 FL (ref 37–54)
ERYTHROCYTE [DISTWIDTH] IN BLOOD BY AUTOMATED COUNT: 16.7 % (ref 11.5–14.5)
GFR SERPL CREATININE-BSD FRML MDRD: 35 ML/MIN/1.73
GLOBULIN UR ELPH-MCNC: 3.7 GM/DL
GLUCOSE BLD-MCNC: 141 MG/DL (ref 74–98)
GLUCOSE UR STRIP-MCNC: NEGATIVE MG/DL
HCT VFR BLD AUTO: 27.9 % (ref 37–47)
HGB BLD-MCNC: 9.5 G/DL (ref 12–16)
HGB UR QL STRIP.AUTO: ABNORMAL
HOLD SPECIMEN: NORMAL
HOLD SPECIMEN: NORMAL
HYALINE CASTS UR QL AUTO: ABNORMAL /LPF
KETONES UR QL STRIP: ABNORMAL
LEUKOCYTE ESTERASE UR QL STRIP.AUTO: NEGATIVE
MCH RBC QN AUTO: 30.2 PG (ref 27–31)
MCHC RBC AUTO-ENTMCNC: 34.1 G/DL (ref 30–37)
MCV RBC AUTO: 88.6 FL (ref 81–99)
MUCOUS THREADS URNS QL MICRO: ABNORMAL /HPF
NITRITE UR QL STRIP: POSITIVE
PH UR STRIP.AUTO: 5.5 [PH] (ref 5–8)
PLATELET # BLD AUTO: 53 10*3/MM3 (ref 130–400)
PMV BLD AUTO: 10.9 FL (ref 6–12)
POTASSIUM BLD-SCNC: 3.2 MMOL/L (ref 3.5–5.1)
PROT SERPL-MCNC: 8.3 G/DL (ref 6.3–8.2)
PROT UR QL STRIP: ABNORMAL
RBC # BLD AUTO: 3.15 10*6/MM3 (ref 4.2–5.4)
RBC # UR: ABNORMAL /HPF
REF LAB TEST METHOD: ABNORMAL
SCAN SLIDE: NORMAL
SODIUM BLD-SCNC: 139 MMOL/L (ref 137–145)
SP GR UR STRIP: 1.02 (ref 1–1.03)
SQUAMOUS #/AREA URNS HPF: ABNORMAL /HPF
UROBILINOGEN UR QL STRIP: ABNORMAL
WBC NRBC COR # BLD: 1.01 10*3/MM3 (ref 4.8–10.8)
WBC UR QL AUTO: ABNORMAL /HPF
WHOLE BLOOD HOLD SPECIMEN: NORMAL
WHOLE BLOOD HOLD SPECIMEN: NORMAL

## 2017-12-16 PROCEDURE — 99284 EMERGENCY DEPT VISIT MOD MDM: CPT

## 2017-12-16 PROCEDURE — 87086 URINE CULTURE/COLONY COUNT: CPT | Performed by: PHYSICIAN ASSISTANT

## 2017-12-16 PROCEDURE — 85025 COMPLETE CBC W/AUTO DIFF WBC: CPT | Performed by: PHYSICIAN ASSISTANT

## 2017-12-16 PROCEDURE — 81001 URINALYSIS AUTO W/SCOPE: CPT | Performed by: PHYSICIAN ASSISTANT

## 2017-12-16 PROCEDURE — 96374 THER/PROPH/DIAG INJ IV PUSH: CPT

## 2017-12-16 PROCEDURE — 71250 CT THORAX DX C-: CPT

## 2017-12-16 PROCEDURE — 96361 HYDRATE IV INFUSION ADD-ON: CPT

## 2017-12-16 PROCEDURE — 85007 BL SMEAR W/DIFF WBC COUNT: CPT | Performed by: PHYSICIAN ASSISTANT

## 2017-12-16 PROCEDURE — 80053 COMPREHEN METABOLIC PANEL: CPT | Performed by: PHYSICIAN ASSISTANT

## 2017-12-16 PROCEDURE — 85060 BLOOD SMEAR INTERPRETATION: CPT | Performed by: PHYSICIAN ASSISTANT

## 2017-12-16 PROCEDURE — 25010000002 CEFTRIAXONE IN SWFI 1 GRAM/10ML IV PUSH SYRINGE (SIMPLE): Performed by: PHYSICIAN ASSISTANT

## 2017-12-16 RX ORDER — SODIUM CHLORIDE 0.9 % (FLUSH) 0.9 %
10 SYRINGE (ML) INJECTION AS NEEDED
Status: DISCONTINUED | OUTPATIENT
Start: 2017-12-16 | End: 2017-12-16 | Stop reason: HOSPADM

## 2017-12-16 RX ORDER — CEFUROXIME AXETIL 500 MG/1
500 TABLET ORAL 2 TIMES DAILY
Qty: 10 TABLET | Refills: 0 | Status: SHIPPED | OUTPATIENT
Start: 2017-12-16 | End: 2017-12-21

## 2017-12-16 RX ORDER — CEFUROXIME AXETIL 500 MG/1
500 TABLET ORAL 2 TIMES DAILY
Qty: 10 TABLET | Refills: 0 | Status: SHIPPED | OUTPATIENT
Start: 2017-12-16 | End: 2017-12-16

## 2017-12-16 RX ADMIN — SODIUM CHLORIDE 1000 ML: 9 INJECTION, SOLUTION INTRAVENOUS at 14:23

## 2017-12-16 RX ADMIN — CEFTRIAXONE SODIUM 1 G: 1 INJECTION, POWDER, FOR SOLUTION INTRAMUSCULAR; INTRAVENOUS at 15:49

## 2017-12-16 RX ADMIN — LIDOCAINE HYDROCHLORIDE: 20 SOLUTION ORAL; TOPICAL at 15:50

## 2017-12-16 RX ADMIN — SODIUM CHLORIDE 1000 ML: 9 INJECTION, SOLUTION INTRAVENOUS at 16:47

## 2017-12-16 NOTE — ED NOTES
At this time, Dr. Rader was paged and waiting for a return call.      Evelin Stubbs  12/16/17 [id number]

## 2017-12-16 NOTE — ED PROVIDER NOTES
Subjective   HPI Comments: 62-year-old female presents to the emergency department with complaints of burning in her esophagus and chest and difficulty eating solids and liquids.  She also states that she had one episode of bright red blood in her stool today.  She is a small cell lung cancer patient of Dr. Rader who is currently getting radiation treatment for her cancer.  She recently completed several rounds of chemotherapy.  She states that she's been having these symptoms for several weeks but they've been worse over the last few days, she is currently on Protonix for GERD.  She states the symptoms are worse with attempting to swallow and she describes them as a sharp burning pain.  She is also complaining of weakness.  He is status post 2 units of packed red blood cells 2 days ago.      History provided by:  Patient   used: No        Review of Systems   HENT:        Burning pain  with swallowing   Neurological: Positive for weakness.   All other systems reviewed and are negative.      Past Medical History:   Diagnosis Date   • Anemia    • Arthritis    • Breast cancer 2008    LEFT BREAST - (NO STICKS/BP'S LEFT ARM PLEASE)   • Cancer    • Diabetes mellitus    • Head congestion     4/12/2016: For past 2 days with occasional cough, non productive.   • History of bronchitis     REPORTS HAD BRONCHITIS AROUND JUNE 2017   • Hot flashes     Tolerating medication well, does have occasional hot flashes but denies night sweats.   • Hypertension    • Wears partial dentures     UPPER       Allergies   Allergen Reactions   • Codeine Irritability       Past Surgical History:   Procedure Laterality Date   • BRONCHOSCOPY N/A 8/31/2017    Procedure: BRONCHOSCOPY  WITH BRUSHINGS AND BXS, ENDOBRONCHIAL ULTRASOUND , FINE NEEDLE ASPIRATION, FLUOROSCOPY;  Surgeon: Holly Granados MD;  Location: [facility];  Service:    • COLONOSCOPY     • DILATATION AND CURETTAGE     • ENDOSCOPY     • MASTECTOMY Left  07/22/2008   • TUBAL ABDOMINAL LIGATION         Family History   Problem Relation Age of Onset   • Heart disease Father    • Breast cancer Sister        Social History     Social History   • Marital status:      Spouse name: N/A   • Number of children: N/A   • Years of education: N/A     Social History Main Topics   • Smoking status: Former Smoker     Packs/day: 1.00     Years: 40.00     Types: Cigarettes     Quit date: 01/2016   • Smokeless tobacco: Never Used      Comment: Smoked less than 1 PPD for past 40 years.   • Alcohol use No   • Drug use: No   • Sexual activity: Defer     Other Topics Concern   • None     Social History Narrative   • None           Objective   Physical Exam   Constitutional: She is oriented to person, place, and time. She appears well-developed.   HENT:   Head: Normocephalic.   Eyes: EOM are normal.   Neck: Normal range of motion. Neck supple.   Cardiovascular: Normal rate and regular rhythm.    Pulmonary/Chest: Effort normal and breath sounds normal. She has no wheezes. She has no rales. She exhibits no tenderness.   Abdominal: Soft. Bowel sounds are normal.   Musculoskeletal: Normal range of motion.   Neurological: She is alert and oriented to person, place, and time.   Skin: Skin is warm and dry.   Psychiatric: She has a normal mood and affect. Her behavior is normal.   Nursing note and vitals reviewed.      Procedures         ED Course  ED Course   Comment By Time   Discussed with Dr. Barry who is covering for Dr. Rader, he recommended Maalox and lidocaine for the esophageal symptoms.  He also recommended that if the patient feels well enough and can received fluids and feels better she can go home, versus inpatient if she does not feel better after the fluids. Nigel Echevarria Jr., PA-C 12/16 5434   I discussed the plan with the patient and family and she prefers to go home if possible, I discussed that we would give her 2 L of fluid and recheck, as well as giving Maalox and  lidocaine and she wanted to try to treat at home. Nigel Echevarria Jr., PA-C 12/16 1503                  [facility]    Final diagnoses:   Dehydration   Weakness   Renal dysfunction   Acute cystitis without hematuria            Nigel Echevarria Jr., PA-C  12/16/17 3898

## 2017-12-18 ENCOUNTER — TELEPHONE (OUTPATIENT)
Dept: ONCOLOGY | Facility: CLINIC | Age: 62
End: 2017-12-18

## 2017-12-18 ENCOUNTER — HOSPITAL ENCOUNTER (OUTPATIENT)
Dept: RADIATION ONCOLOGY | Facility: HOSPITAL | Age: 62
Discharge: HOME | End: 2017-12-18

## 2017-12-18 LAB — BACTERIA SPEC AEROBE CULT: NORMAL

## 2017-12-18 PROCEDURE — 77412 RADIATION TX DELIVERY LVL 3: CPT | Performed by: RADIOLOGY

## 2017-12-18 NOTE — TELEPHONE ENCOUNTER
----- Message from Cristina Hackett sent at 12/18/2017  9:33 AM EST -----  Regarding: FW: ABDIEL      ER F/U ?  AMMEND SUMMARY IN ABDIEL RN MAILBOX      ----- Message -----     From: Cristina Hackett     Sent: 12/18/2017   9:06 AM       To: [facility]  Subject: ABDIEL      ER F/U ?                           PT STOPPED BY WITH AFTER VISIT SUMMARY FROM ER. WANTED TO KNOW IF SHE NEEDS TO MAKE AN APPOINTMENT SOONER DUE ER VISIT. PLEASE CALL [phone] ADVISE.

## 2017-12-18 NOTE — TELEPHONE ENCOUNTER
Returned call to patient to see if she would to come in and see Karen KWAN in am or Wednesday at 900. Patient stated she would come in am office in [place] at 845.

## 2017-12-19 ENCOUNTER — HOSPITAL ENCOUNTER (OUTPATIENT)
Dept: RADIATION ONCOLOGY | Facility: HOSPITAL | Age: 62
Discharge: HOME | End: 2017-12-19

## 2017-12-19 ENCOUNTER — INFUSION (OUTPATIENT)
Dept: ONCOLOGY | Facility: HOSPITAL | Age: 62
End: 2017-12-19

## 2017-12-19 ENCOUNTER — OFFICE VISIT (OUTPATIENT)
Dept: ONCOLOGY | Facility: CLINIC | Age: 62
End: 2017-12-19

## 2017-12-19 VITALS
WEIGHT: 190 LBS | DIASTOLIC BLOOD PRESSURE: 61 MMHG | HEART RATE: 106 BPM | SYSTOLIC BLOOD PRESSURE: 134 MMHG | RESPIRATION RATE: 13 BRPM | BODY MASS INDEX: 34.75 KG/M2 | TEMPERATURE: 98.2 F

## 2017-12-19 VITALS — BODY MASS INDEX: 34.73 KG/M2 | WEIGHT: 189.9 LBS

## 2017-12-19 DIAGNOSIS — C34.82 MALIGNANT NEOPLASM OF OVERLAPPING SITES OF LEFT LUNG (HCC): Primary | ICD-10-CM

## 2017-12-19 DIAGNOSIS — C50.912 MALIGNANT NEOPLASM OF LEFT BREAST IN FEMALE, ESTROGEN RECEPTOR POSITIVE, UNSPECIFIED SITE OF BREAST (HCC): ICD-10-CM

## 2017-12-19 DIAGNOSIS — Z17.0 MALIGNANT NEOPLASM OF LEFT BREAST IN FEMALE, ESTROGEN RECEPTOR POSITIVE, UNSPECIFIED SITE OF BREAST (HCC): ICD-10-CM

## 2017-12-19 DIAGNOSIS — C77.1 SECONDARY MALIGNANT NEOPLASM OF MEDIASTINAL LYMPH NODE (HCC): ICD-10-CM

## 2017-12-19 LAB
ANISOCYTOSIS BLD QL: ABNORMAL
BASOPHILS # BLD MANUAL: 0.01 10*3/MM3 (ref 0–0.2)
BASOPHILS NFR BLD AUTO: 1 % (ref 0–2.5)
CYTOLOGIST CVX/VAG CYTO: NORMAL
LYMPHOCYTES # BLD MANUAL: 0.35 10*3/MM3 (ref 0.6–3.4)
LYMPHOCYTES NFR BLD MANUAL: 28 % (ref 0–12)
LYMPHOCYTES NFR BLD MANUAL: 35 % (ref 10–50)
MONOCYTES # BLD AUTO: 0.28 10*3/MM3 (ref 0–0.9)
NEUTROPHILS # BLD AUTO: 0.36 10*3/MM3 (ref 2–6.9)
NEUTROPHILS NFR BLD MANUAL: 36 % (ref 37–80)
PATH INTERP BLD-IMP: NORMAL
POIKILOCYTOSIS BLD QL SMEAR: ABNORMAL
SMALL PLATELETS BLD QL SMEAR: ABNORMAL
WBC MORPH BLD: NORMAL

## 2017-12-19 PROCEDURE — 77412 RADIATION TX DELIVERY LVL 3: CPT | Performed by: RADIOLOGY

## 2017-12-19 PROCEDURE — G0463 HOSPITAL OUTPT CLINIC VISIT: HCPCS

## 2017-12-19 PROCEDURE — 77336 RADIATION PHYSICS CONSULT: CPT | Performed by: RADIOLOGY

## 2017-12-19 PROCEDURE — 99213 OFFICE O/P EST LOW 20 MIN: CPT | Performed by: NURSE PRACTITIONER

## 2017-12-19 NOTE — PROGRESS NOTES
PROBLEM LIST:  1.  Small cell lung cancer from the left upper lobe.  Limited stage disease.  A.  Treated  initially with etoposide and carboplatin starting 9/13/27, single modality started because of symptoms and airway compromise, responding to therapy  B.  Now on combined modality chemoradiation.  2.  History of left breast cancer.  A.  Left mastectomy 7/22/2008 for stage IIIA, T3 N1 M0 breast cancer  B.  ER weakly positive, SC negative and HER-2 positive  C.  Adjuvant chemotherapy with Herceptin, Taxotere and carboplatin  D.  Postmastectomy radiation done at [facility]  E.  Adjuvant tamoxifen started 12/12/2008 and continued as extended adjuvant therapy  3.  Diabetes          Chief complaint: interm visit about trouble swallowing.     Subjective     HISTORY OF PRESENT ILLNESS:   Mrs. Horton is here for interm visit due to difficulty swallowing. She continues with radiation for small cell lung cancer. She went to ED 12/16/2017 for evaluation. She was given Maalox and lidocaine for the esophageal symptoms.  She reports it is less painful to swallow and eat if she takes this medication approximately 30 minutes before eating or drinking. She has 5 radiation treatments left.     Past Medical History, Past Surgical History, Social History, Family History have been reviewed and are without significant changes except as mentioned.    Review of Systems   A comprehensive 14 point review of systems was performed and was negative except as mentioned.    Medications:  The current medication list was reviewed in the EMR    ALLERGIES:    Allergies   Allergen Reactions   • Codeine Irritability       Objective      /61  Pulse 106  Temp 98.2 °F (36.8 °C) (Temporal Artery )   Resp 13  Wt 86.2 kg (190 lb)  BMI 34.75 kg/m2         General: well appearing, in no acute distress   HEENT: sclera anicteric, oropharynx clear   Lymphatics: no cervical, supraclavicular, or axillary adenopathy  Cardiovascular:  regular rate and rhythm, no murmurs  Lungs: clear to auscultation bilaterally  Abdomen: soft, nontender, nondistended.  No palpable masses or organomegaly  Extremeties: no lower extremity edema, cords or calf tenderness  Skin: no rashes, lesions, bruising, or petechiae    RECENT LABS:  Hematology WBC   Date Value Ref Range Status   12/16/2017 1.01 (L) 4.80 - 10.80 10*3/mm3 Final     Hemoglobin   Date Value Ref Range Status   12/16/2017 9.5 (L) 12.0 - 16.0 g/dL Final     Hematocrit   Date Value Ref Range Status   12/16/2017 27.9 (L) 37.0 - 47.0 % Final     MCV   Date Value Ref Range Status   12/16/2017 88.6 81.0 - 99.0 fL Final     RDW   Date Value Ref Range Status   12/16/2017 16.7 (H) 11.5 - 14.5 % Final     MPV   Date Value Ref Range Status   12/16/2017 10.9 6.0 - 12.0 fL Final     Platelets   Date Value Ref Range Status   12/16/2017 53 (L) 130 - 400 10*3/mm3 Final     Immature Grans %   Date Value Ref Range Status   12/12/2017 3.2 (H) 0.0 - 0.6 % Final     Neutrophils, Absolute   Date Value Ref Range Status   12/12/2017 1.72 (L) 2.00 - 6.90 10*3/mm3 Final     Neutrophils Absolute   Date Value Ref Range Status   12/16/2017 0.36 (L) 2.00 - 6.90 10*3/mm3 Final     Lymphocytes, Absolute   Date Value Ref Range Status   12/12/2017 0.39 (L) 0.60 - 3.40 10*3/mm3 Final     Monocytes, Absolute   Date Value Ref Range Status   12/12/2017 0.02 0.00 - 0.90 10*3/mm3 Final     Eosinophils, Absolute   Date Value Ref Range Status   12/12/2017 0.00 0.00 - 0.70 10*3/mm3 Final     Basophils, Absolute   Date Value Ref Range Status   12/12/2017 0.02 0.00 - 0.20 10*3/mm3 Final     Basophils Absolute   Date Value Ref Range Status   12/16/2017 0.01 0.00 - 0.20 10*3/mm3 Final     Immature Grans, Absolute   Date Value Ref Range Status   12/12/2017 0.07 (H) 0.00 - 0.06 10*3/mm3 Final     nRBC   Date Value Ref Range Status   12/12/2017 0.0 0.0 - 0.0 /100 WBC Final       Glucose   Date Value Ref Range Status   12/16/2017 141 (H) 74 - 98 mg/dL  Final     Sodium   Date Value Ref Range Status   12/16/2017 139 137 - 145 mmol/L Final     Potassium   Date Value Ref Range Status   12/16/2017 3.2 (L) 3.5 - 5.1 mmol/L Final     CO2   Date Value Ref Range Status   12/16/2017 27.0 26.0 - 30.0 mmol/L Final     Chloride   Date Value Ref Range Status   12/16/2017 99 98 - 107 mmol/L Final     Anion Gap   Date Value Ref Range Status   12/16/2017 16.2 mmol/L Final     Creatinine   Date Value Ref Range Status   12/16/2017 1.50 (H) 0.60 - 1.30 mg/dL Final     BUN   Date Value Ref Range Status   12/16/2017 29 (H) 7 - 20 mg/dL Final     BUN/Creatinine Ratio   Date Value Ref Range Status   12/16/2017 19.3 7.1 - 23.5 Final     Calcium   Date Value Ref Range Status   12/16/2017 11.2 (H) 8.4 - 10.2 mg/dL Final     eGFR Non  Amer   Date Value Ref Range Status   12/16/2017 35 (L) >60 mL/min/1.73 Final     Alkaline Phosphatase   Date Value Ref Range Status   12/16/2017 136 (H) 38 - 126 U/L Final     Total Protein   Date Value Ref Range Status   12/16/2017 8.3 (H) 6.3 - 8.2 g/dL Final     ALT (SGPT)   Date Value Ref Range Status   12/16/2017 45 13 - 69 U/L Final     AST (SGOT)   Date Value Ref Range Status   12/16/2017 31 15 - 46 U/L Final     Total Bilirubin   Date Value Ref Range Status   12/16/2017 1.2 0.2 - 1.3 mg/dL Final     Albumin   Date Value Ref Range Status   12/16/2017 4.60 3.50 - 5.00 g/dL Final     Globulin   Date Value Ref Range Status   12/16/2017 3.7 gm/dL Final     A/G Ratio   Date Value Ref Range Status   12/16/2017 1.2 1.0 - 2.0 g/dL Final       LDH   Date Value Ref Range Status   08/16/2017 216 120 - 246 U/L Final          Assessment/Plan   Impression:  1. Esophagitis. Related to radiation. We will refill Maalox and lidocaine for the esophageal symptoms.   2. Small cell lung cancer. She completed etoposide and carboplatin and has 5 radiation treatments left.   3. Breast cancer, without recurrence and on adjuvant tamoxifen.     Plan:  1. Refill Maalox and  lidocaine swish and swallow q 4 hours prn.   2. We will see her back at her scheduled appointment 1/2/2018 with repeat scans.               Елена Haile [facility]    12/19/2017          CC:

## 2017-12-20 ENCOUNTER — DOCUMENTATION (OUTPATIENT)
Dept: NUTRITION | Facility: HOSPITAL | Age: 62
End: 2017-12-20

## 2017-12-20 ENCOUNTER — HOSPITAL ENCOUNTER (OUTPATIENT)
Dept: RADIATION ONCOLOGY | Facility: HOSPITAL | Age: 62
Discharge: HOME | End: 2017-12-20

## 2017-12-20 DIAGNOSIS — C34.82 MALIGNANT NEOPLASM OF OVERLAPPING SITES OF LEFT LUNG (HCC): ICD-10-CM

## 2017-12-20 DIAGNOSIS — C77.1 SECONDARY MALIGNANT NEOPLASM OF MEDIASTINAL LYMPH NODE (HCC): ICD-10-CM

## 2017-12-20 PROCEDURE — 77412 RADIATION TX DELIVERY LVL 3: CPT | Performed by: RADIOLOGY

## 2017-12-20 PROCEDURE — 77417 THER RADIOLOGY PORT IMAGE(S): CPT | Performed by: RADIOLOGY

## 2017-12-20 NOTE — PROGRESS NOTES
ONC Nutrition    Weight 189.9 lbs / weight decrease of 5 lbs over the past 5 days    Patient states that swallowing became very painful over the weekend, prompting a visit to the ER.  It was recommended that the patient take Maalox and lidocaine for esophagitis, along with 2L IV fluids.  Patient states that she is feeling much better this week and received relief from the Maalox and lidocaine, allowing her to eat and hydrate with less discomfort.  Advised patient about foods to avoid that may irritate the esophageal lining and avoiding beverages that are at one temperature extreme or another.    Will continue to follow as needed.

## 2017-12-21 ENCOUNTER — HOSPITAL ENCOUNTER (OUTPATIENT)
Dept: RADIATION ONCOLOGY | Facility: HOSPITAL | Age: 62
Discharge: HOME | End: 2017-12-21

## 2017-12-21 ENCOUNTER — TELEPHONE (OUTPATIENT)
Dept: ONCOLOGY | Facility: CLINIC | Age: 62
End: 2017-12-21

## 2017-12-21 PROCEDURE — 77412 RADIATION TX DELIVERY LVL 3: CPT | Performed by: RADIOLOGY

## 2017-12-21 NOTE — TELEPHONE ENCOUNTER
Called patient, informed her that we had her scheduled for IV fluids for 800 am in the [place] office.

## 2017-12-21 NOTE — TELEPHONE ENCOUNTER
----- Message from Kandice Anguiano sent at 12/21/2017 10:19 AM EST -----  Regarding: [facility] - PATIENT NEEDS FLUIDS   Contact: [phone]  PATIENT CALLED AND SAID SHE NEEDS FLUIDS. SHE WOULD LIKE TO DO IT IN [place] BEFORE WEEKEND.

## 2017-12-22 ENCOUNTER — INFUSION (OUTPATIENT)
Dept: ONCOLOGY | Facility: HOSPITAL | Age: 62
End: 2017-12-22

## 2017-12-22 ENCOUNTER — HOSPITAL ENCOUNTER (OUTPATIENT)
Dept: RADIATION ONCOLOGY | Facility: HOSPITAL | Age: 62
Discharge: HOME | End: 2017-12-22

## 2017-12-22 VITALS
RESPIRATION RATE: 14 BRPM | DIASTOLIC BLOOD PRESSURE: 59 MMHG | TEMPERATURE: 98.4 F | SYSTOLIC BLOOD PRESSURE: 120 MMHG | HEART RATE: 96 BPM

## 2017-12-22 DIAGNOSIS — C34.82 MALIGNANT NEOPLASM OF OVERLAPPING SITES OF LEFT LUNG (HCC): Primary | ICD-10-CM

## 2017-12-22 DIAGNOSIS — C77.1 SECONDARY MALIGNANT NEOPLASM OF MEDIASTINAL LYMPH NODE (HCC): ICD-10-CM

## 2017-12-22 PROCEDURE — 96360 HYDRATION IV INFUSION INIT: CPT

## 2017-12-22 PROCEDURE — 77412 RADIATION TX DELIVERY LVL 3: CPT | Performed by: RADIOLOGY

## 2017-12-22 RX ADMIN — SODIUM CHLORIDE 1000 ML: 9 INJECTION, SOLUTION INTRAVENOUS at 08:09

## 2017-12-26 ENCOUNTER — HOSPITAL ENCOUNTER (OUTPATIENT)
Dept: RADIATION ONCOLOGY | Facility: HOSPITAL | Age: 62
Discharge: HOME | End: 2017-12-26

## 2017-12-26 PROCEDURE — 77412 RADIATION TX DELIVERY LVL 3: CPT | Performed by: RADIOLOGY

## 2017-12-28 ENCOUNTER — DOCUMENTATION (OUTPATIENT)
Dept: RADIATION ONCOLOGY | Facility: HOSPITAL | Age: 62
End: 2017-12-28

## 2017-12-28 ENCOUNTER — HOSPITAL ENCOUNTER (OUTPATIENT)
Dept: CT IMAGING | Facility: HOSPITAL | Age: 62
Discharge: HOME | End: 2017-12-28
Admitting: INTERNAL MEDICINE

## 2017-12-28 ENCOUNTER — HOSPITAL ENCOUNTER (OUTPATIENT)
Dept: MRI IMAGING | Facility: HOSPITAL | Age: 62
Discharge: HOME | End: 2017-12-28

## 2017-12-28 DIAGNOSIS — C34.82 MALIGNANT NEOPLASM OF OVERLAPPING SITES OF LEFT LUNG (HCC): ICD-10-CM

## 2017-12-28 PROCEDURE — A9577 INJ MULTIHANCE: HCPCS | Performed by: INTERNAL MEDICINE

## 2017-12-28 PROCEDURE — 0 GADOBENATE DIMEGLUMINE 529 MG/ML SOLUTION: Performed by: INTERNAL MEDICINE

## 2017-12-28 PROCEDURE — 0 IOPAMIDOL PER 1 ML: Performed by: INTERNAL MEDICINE

## 2017-12-28 PROCEDURE — 71260 CT THORAX DX C+: CPT

## 2017-12-28 PROCEDURE — 70553 MRI BRAIN STEM W/O & W/DYE: CPT

## 2017-12-28 RX ADMIN — IOPAMIDOL 100 ML: 510 INJECTION, SOLUTION INTRAVASCULAR at 10:00

## 2017-12-28 RX ADMIN — GADOBENATE DIMEGLUMINE 15 ML: 529 INJECTION, SOLUTION INTRAVENOUS at 09:52

## 2018-01-02 ENCOUNTER — OFFICE VISIT (OUTPATIENT)
Dept: ONCOLOGY | Facility: CLINIC | Age: 63
End: 2018-01-02

## 2018-01-02 ENCOUNTER — INFUSION (OUTPATIENT)
Dept: ONCOLOGY | Facility: HOSPITAL | Age: 63
End: 2018-01-02

## 2018-01-02 ENCOUNTER — HOSPITAL ENCOUNTER (OUTPATIENT)
Dept: GENERAL RADIOLOGY | Facility: HOSPITAL | Age: 63
Discharge: HOME | End: 2018-01-02
Admitting: NURSE PRACTITIONER

## 2018-01-02 VITALS
RESPIRATION RATE: 13 BRPM | WEIGHT: 191 LBS | SYSTOLIC BLOOD PRESSURE: 156 MMHG | HEART RATE: 82 BPM | TEMPERATURE: 98.3 F | BODY MASS INDEX: 34.93 KG/M2 | DIASTOLIC BLOOD PRESSURE: 69 MMHG

## 2018-01-02 DIAGNOSIS — Z17.0 MALIGNANT NEOPLASM OF LEFT BREAST IN FEMALE, ESTROGEN RECEPTOR POSITIVE, UNSPECIFIED SITE OF BREAST (HCC): ICD-10-CM

## 2018-01-02 DIAGNOSIS — C77.1 SECONDARY MALIGNANT NEOPLASM OF MEDIASTINAL LYMPH NODE (HCC): ICD-10-CM

## 2018-01-02 DIAGNOSIS — C34.82 MALIGNANT NEOPLASM OF OVERLAPPING SITES OF LEFT LUNG (HCC): Primary | ICD-10-CM

## 2018-01-02 DIAGNOSIS — C50.912 MALIGNANT NEOPLASM OF LEFT BREAST IN FEMALE, ESTROGEN RECEPTOR POSITIVE, UNSPECIFIED SITE OF BREAST (HCC): ICD-10-CM

## 2018-01-02 LAB
ALBUMIN SERPL-MCNC: 4.2 G/DL (ref 3.5–5)
ALBUMIN/GLOB SERPL: 1.2 G/DL (ref 1–2)
ALP SERPL-CCNC: 121 U/L (ref 38–126)
ALT SERPL W P-5'-P-CCNC: 36 U/L (ref 13–69)
ANION GAP SERPL CALCULATED.3IONS-SCNC: 11.2 MMOL/L
AST SERPL-CCNC: 42 U/L (ref 15–46)
BASOPHILS # BLD AUTO: 0.02 10*3/MM3 (ref 0–0.2)
BASOPHILS NFR BLD AUTO: 0.4 % (ref 0–2.5)
BILIRUB SERPL-MCNC: 0.5 MG/DL (ref 0.2–1.3)
BUN BLD-MCNC: 15 MG/DL (ref 7–20)
BUN/CREAT SERPL: 16.7 (ref 7.1–23.5)
CALCIUM SPEC-SCNC: 10.1 MG/DL (ref 8.4–10.2)
CHLORIDE SERPL-SCNC: 102 MMOL/L (ref 98–107)
CO2 SERPL-SCNC: 31 MMOL/L (ref 26–30)
CREAT BLD-MCNC: 0.9 MG/DL (ref 0.6–1.3)
DEPRECATED RDW RBC AUTO: 61.7 FL (ref 37–54)
EOSINOPHIL # BLD AUTO: 0.02 10*3/MM3 (ref 0–0.7)
EOSINOPHIL NFR BLD AUTO: 0.4 % (ref 0–7)
ERYTHROCYTE [DISTWIDTH] IN BLOOD BY AUTOMATED COUNT: 18.5 % (ref 11.5–14.5)
GFR SERPL CREATININE-BSD FRML MDRD: 64 ML/MIN/1.73
GLOBULIN UR ELPH-MCNC: 3.5 GM/DL
GLUCOSE BLD-MCNC: 108 MG/DL (ref 74–98)
HCT VFR BLD AUTO: 25.8 % (ref 37–47)
HGB BLD-MCNC: 8.3 G/DL (ref 12–16)
IMM GRANULOCYTES # BLD: 0.04 10*3/MM3 (ref 0–0.06)
IMM GRANULOCYTES NFR BLD: 0.9 % (ref 0–0.6)
LYMPHOCYTES # BLD AUTO: 0.96 10*3/MM3 (ref 0.6–3.4)
LYMPHOCYTES NFR BLD AUTO: 20.6 % (ref 10–50)
MCH RBC QN AUTO: 30.4 PG (ref 27–31)
MCHC RBC AUTO-ENTMCNC: 32.2 G/DL (ref 30–37)
MCV RBC AUTO: 94.5 FL (ref 81–99)
MONOCYTES # BLD AUTO: 0.59 10*3/MM3 (ref 0–0.9)
MONOCYTES NFR BLD AUTO: 12.7 % (ref 0–12)
NEUTROPHILS # BLD AUTO: 3.03 10*3/MM3 (ref 2–6.9)
NEUTROPHILS NFR BLD AUTO: 65 % (ref 37–80)
NRBC BLD MANUAL-RTO: 0 /100 WBC (ref 0–0)
PLATELET # BLD AUTO: 160 10*3/MM3 (ref 130–400)
PMV BLD AUTO: 9.6 FL (ref 6–12)
POTASSIUM BLD-SCNC: 3.2 MMOL/L (ref 3.5–5.1)
PROT SERPL-MCNC: 7.7 G/DL (ref 6.3–8.2)
RBC # BLD AUTO: 2.73 10*6/MM3 (ref 4.2–5.4)
SODIUM BLD-SCNC: 141 MMOL/L (ref 137–145)
WBC NRBC COR # BLD: 4.66 10*3/MM3 (ref 4.8–10.8)

## 2018-01-02 PROCEDURE — 72110 X-RAY EXAM L-2 SPINE 4/>VWS: CPT

## 2018-01-02 PROCEDURE — 36592 COLLECT BLOOD FROM PICC: CPT

## 2018-01-02 PROCEDURE — 80053 COMPREHEN METABOLIC PANEL: CPT | Performed by: NURSE PRACTITIONER

## 2018-01-02 PROCEDURE — 36415 COLL VENOUS BLD VENIPUNCTURE: CPT | Performed by: NURSE PRACTITIONER

## 2018-01-02 PROCEDURE — 85025 COMPLETE CBC W/AUTO DIFF WBC: CPT | Performed by: NURSE PRACTITIONER

## 2018-01-02 PROCEDURE — 99213 OFFICE O/P EST LOW 20 MIN: CPT | Performed by: NURSE PRACTITIONER

## 2018-01-02 PROCEDURE — G0463 HOSPITAL OUTPT CLINIC VISIT: HCPCS

## 2018-01-02 NOTE — PATIENT INSTRUCTIONS
PICC Removal, Care After  Refer to this sheet in the next few weeks. These instructions provide you with information on caring for yourself after your procedure. Your health care provider may also give you more specific instructions. Your treatment has been planned according to current medical practices, but problems sometimes occur. Call your health care provider if you have any problems or questions after your procedure.  WHAT TO EXPECT AFTER THE PROCEDURE  After your procedure, it is typical to have mild discomfort at the insertion site. This should not last for more than a day.  HOME CARE INSTRUCTIONS  You may remove the bandage after 24 hours. The PICC insertion site is very small. A small scab may develop over the insertion site.   It is okay to wash the site gently with soap and water. Be careful not to remove or pick off the scab. Gently pat the site dry after washing it. You do not need to put another bandage over the insertion site.  Do not lift anything heavy or do strenuous physical activity for 24 hours after the PICC is removed. This includes:  · Weight lifting.  · Strenuous yard work.  · Any physical activity with repetitive arm movement.    SEEK MEDICAL CARE IF:   · You have swelling or puffiness in your arm at the PICC insertion site.  · You have increasing tenderness at the PICC insertion site.  SEEK IMMEDIATE MEDICAL CARE IF:   · You have numbness or tingling in your fingers, hand, or arm.  · Your arm looks blue and feels cold.  · You have redness around the insertion site or a red streak goes up your arm.  · You have any type of drainage from the PICC insertion site. This includes drainage such as:    Bleeding from the insertion site. If this happens, apply firm, direct pressure to the PICC insertion site with a clean towel.    Drainage that is yellow or tan.  · You have a fever.     This information is not intended to replace advice given to you by your health care provider. Make sure you  discuss any questions you have with your health care provider.     Document Released: 12/23/2014 Document Revised: 01/08/2016 Document Reviewed: 12/23/2014  Elsevier Interactive Patient Education ©2017 [employer]

## 2018-01-02 NOTE — PROGRESS NOTES
PROBLEM LIST:  1.  Small cell lung cancer from the left upper lobe.  Limited stage disease.  A.  Treated  initially with etoposide and carboplatin starting 9/13/27, single modality started because of symptoms and airway compromise, responding to therapy  B.  Now on combined modality chemoradiation.  2.  History of left breast cancer.  A.  Left mastectomy 7/22/2008 for stage IIIA, T3 N1 M0 breast cancer  B.  ER weakly positive, CO negative and HER-2 positive  C.  Adjuvant chemotherapy with Herceptin, Taxotere and carboplatin  D.  Postmastectomy radiation done at [facility]  E.  Adjuvant tamoxifen started 12/12/2008 and continued as extended adjuvant therapy  3.  Diabetes             Chief complaint: Follow up lung cancer management     Subjective     HISTORY OF PRESENT ILLNESS:   Mrs. Horton is here for follow up evaluation of lung cancer management. She completed radiation 12/26/2017 and is feeling better. She is eating and drinking with less pain now. Her fatigue is slowly improving. She denies any new symptoms,  no fevers, chills, sweats, nose bleeding, gum bleeding or unusual bruising.  She is coughing less and denies any dyspnea or hemoptysis.  She denies any headache or bone pain. She denies any new areas of pain specifically no back pain.     Past Medical History, Past Surgical History, Social History, Family History have been reviewed and are without significant changes except as mentioned.    Review of Systems   A comprehensive 14 point review of systems was performed and was negative except as mentioned.    Medications:  The current medication list was reviewed in the EMR    ALLERGIES:    Allergies   Allergen Reactions   • Codeine Irritability       Objective      /69  Pulse 82  Temp 98.3 °F (36.8 °C) (Temporal Artery )   Resp 13  Wt 86.6 kg (191 lb)  BMI 34.93 kg/m2         General: well appearing, in no acute distress   HEENT: sclera anicteric, oropharynx clear, alopecia  noted   Lymphatics: no cervical, supraclavicular, or axillary adenopathy  Cardiovascular: regular rate and rhythm, no murmurs  Lungs: clear to auscultation bilaterally  Abdomen: soft, nontender, nondistended.  No palpable masses or organomegaly  Extremeties: no lower extremity edema, cords or calf tenderness  Skin: no rashes, lesions, bruising, or petechiae    RECENT LABS:  Hematology WBC   Date Value Ref Range Status   12/16/2017 1.01 (L) 4.80 - 10.80 10*3/mm3 Final     Hemoglobin   Date Value Ref Range Status   12/16/2017 9.5 (L) 12.0 - 16.0 g/dL Final     Hematocrit   Date Value Ref Range Status   12/16/2017 27.9 (L) 37.0 - 47.0 % Final     MCV   Date Value Ref Range Status   12/16/2017 88.6 81.0 - 99.0 fL Final     RDW   Date Value Ref Range Status   12/16/2017 16.7 (H) 11.5 - 14.5 % Final     MPV   Date Value Ref Range Status   12/16/2017 10.9 6.0 - 12.0 fL Final     Platelets   Date Value Ref Range Status   12/16/2017 53 (L) 130 - 400 10*3/mm3 Final     Immature Grans %   Date Value Ref Range Status   12/12/2017 3.2 (H) 0.0 - 0.6 % Final     Neutrophils, Absolute   Date Value Ref Range Status   12/12/2017 1.72 (L) 2.00 - 6.90 10*3/mm3 Final     Neutrophils Absolute   Date Value Ref Range Status   12/16/2017 0.36 (L) 2.00 - 6.90 10*3/mm3 Final     Lymphocytes, Absolute   Date Value Ref Range Status   12/12/2017 0.39 (L) 0.60 - 3.40 10*3/mm3 Final     Monocytes, Absolute   Date Value Ref Range Status   12/12/2017 0.02 0.00 - 0.90 10*3/mm3 Final     Eosinophils, Absolute   Date Value Ref Range Status   12/12/2017 0.00 0.00 - 0.70 10*3/mm3 Final     Basophils, Absolute   Date Value Ref Range Status   12/12/2017 0.02 0.00 - 0.20 10*3/mm3 Final     Basophils Absolute   Date Value Ref Range Status   12/16/2017 0.01 0.00 - 0.20 10*3/mm3 Final     Immature Grans, Absolute   Date Value Ref Range Status   12/12/2017 0.07 (H) 0.00 - 0.06 10*3/mm3 Final     nRBC   Date Value Ref Range Status   12/12/2017 0.0 0.0 - 0.0 /100  WBC Final       Glucose   Date Value Ref Range Status   12/16/2017 141 (H) 74 - 98 mg/dL Final     Sodium   Date Value Ref Range Status   12/16/2017 139 137 - 145 mmol/L Final     Potassium   Date Value Ref Range Status   12/16/2017 3.2 (L) 3.5 - 5.1 mmol/L Final     CO2   Date Value Ref Range Status   12/16/2017 27.0 26.0 - 30.0 mmol/L Final     Chloride   Date Value Ref Range Status   12/16/2017 99 98 - 107 mmol/L Final     Anion Gap   Date Value Ref Range Status   12/16/2017 16.2 mmol/L Final     Creatinine   Date Value Ref Range Status   12/16/2017 1.50 (H) 0.60 - 1.30 mg/dL Final     BUN   Date Value Ref Range Status   12/16/2017 29 (H) 7 - 20 mg/dL Final     BUN/Creatinine Ratio   Date Value Ref Range Status   12/16/2017 19.3 7.1 - 23.5 Final     Calcium   Date Value Ref Range Status   12/16/2017 11.2 (H) 8.4 - 10.2 mg/dL Final     eGFR Non  Amer   Date Value Ref Range Status   12/16/2017 35 (L) >60 mL/min/1.73 Final     Alkaline Phosphatase   Date Value Ref Range Status   12/16/2017 136 (H) 38 - 126 U/L Final     Total Protein   Date Value Ref Range Status   12/16/2017 8.3 (H) 6.3 - 8.2 g/dL Final     ALT (SGPT)   Date Value Ref Range Status   12/16/2017 45 13 - 69 U/L Final     AST (SGOT)   Date Value Ref Range Status   12/16/2017 31 15 - 46 U/L Final     Total Bilirubin   Date Value Ref Range Status   12/16/2017 1.2 0.2 - 1.3 mg/dL Final     Albumin   Date Value Ref Range Status   12/16/2017 4.60 3.50 - 5.00 g/dL Final     Globulin   Date Value Ref Range Status   12/16/2017 3.7 gm/dL Final     A/G Ratio   Date Value Ref Range Status   12/16/2017 1.2 1.0 - 2.0 g/dL Final       LDH   Date Value Ref Range Status   08/16/2017 216 120 - 246 U/L Final      Study Result   PROCEDURE: MRI BRAIN W WO CONTRAST-      HISTORY: Follow-up lung cancer; C34.82-Malignant neoplasm of overlapping  sites of left bronchus and lung      PROCEDURE: Multiplanar multisequence imaging of the brain was performed  both before  and following the administration of 15 mL MultiHance  intravenous contrast.      COMPARISON: None.      FINDINGS: There are no significant white matter abnormalities. There is  no mass, mass effect or midline shift. There is no hydrocephalus. There  are no areas of restricted diffusion. There is no pathologic contrast  enhancement.      The midbrain, milagro, cerebellum and craniocervical junction are  unremarkable. The sella and pituitary gland are within normal limits.  The major intracranial vasculature demonstrates the expected flow  related signal. The paranasal sinuses are clear.      IMPRESSION:  No evidence of metastatic disease within the brain.              This report was finalized on 12/28/2017 9:58 AM by Jyoti Jensen M.D..     Study Result   PROCEDURE: CT CHEST W CONTRAST-      HISTORY: Evaluate response and lung cancer; C34.82-Malignant neoplasm of  overlapping sites of left bronchus and lung      COMPARISON: November 7, 2017 and August 13, 2017.      PROCEDURE: Multiple axial CT images were obtained from the thoracic  inlet through the upper abdomen following the administration of  intravenous contrast.       FINDINGS:   Soft tissue windows reveals an enlarged low right paratracheal lymph  node measuring 13 mm which is unchanged. No other enlarged mediastinal  lymph nodes are identified. There are calcified mediastinal lymph nodes  from prior granulomatous exposure. The heart is normal in size. The  aorta is normal in caliber.There is no pericardial or pleural effusion.  Lung windows demonstrate an approximately 4 mm nodule in the left upper  lobe on image 21 which is unchanged. No new pulmonary nodules are  identified a calcified granuloma is present in the left lower lobe.  The  visualized upper abdomen is unremarkable. Bone windows redemonstrate a  sclerotic lesion in the L2 vertebral body which is partially visualized.      IMPRESSION:  Enlarged low right paratracheal lymph node and sclerotic  L2  vertebral body lesion concerning for metastatic disease. There is a 4 mm  nodule in the left upper lobe which is unchanged compared to prior  exams.          This study was performed with techniques to keep radiation doses as low  as reasonably achievable (ALARA). Individualized dose reduction  techniques using automated exposure control or adjustment of mA and/or  kV according to the patient size were employed.       This report was finalized on 12/28/2017 9:57 AM by Jyoti Jensen M.D..         Assessment/Plan   Impression:   1.  Small cell lung cancer.  She's completed combined modality therapy and clinically is doing well. Recent CT scan of chest showed a sclerotic L2 vertebral lesion concerning for metastatic disease. She reports in November 2015 she had an xray of the lumbar spine that showed marginal osteophyte formation is most evident about the L2-3 disc space.  We will evaluate further by repeating an xray of the lumbar spine and a bone scan.   2.  Breast cancer, without recurrence and on adjuvant tamoxifen.    Plan:   1. Xray of the lumbar spine today.   2. Bone scan within the next few days. I will contact her with findings of the scan.   3. CBC and CMP today.   4. Remove PICC line today.   5. If the Bone scan findings do not show metastatic disease then we will continue to monitor her closely. We will see her back in 1 month for follow up evaluation. She has been instructed to contact us in the interm if any new symptoms arise.               Елена Haile, APRMAGGY  [facility]    1/2/2018          CC:

## 2018-01-22 ENCOUNTER — OFFICE VISIT (OUTPATIENT)
Dept: RADIATION ONCOLOGY | Facility: HOSPITAL | Age: 63
End: 2018-01-22

## 2018-01-22 ENCOUNTER — HOSPITAL ENCOUNTER (OUTPATIENT)
Dept: RADIATION ONCOLOGY | Facility: HOSPITAL | Age: 63
Setting detail: RADIATION/ONCOLOGY SERIES
Discharge: HOME | End: 2018-01-22

## 2018-01-22 VITALS
HEIGHT: 62 IN | RESPIRATION RATE: 18 BRPM | BODY MASS INDEX: 36.44 KG/M2 | SYSTOLIC BLOOD PRESSURE: 178 MMHG | DIASTOLIC BLOOD PRESSURE: 83 MMHG | HEART RATE: 82 BPM | WEIGHT: 198 LBS | TEMPERATURE: 98.2 F

## 2018-01-22 DIAGNOSIS — C34.82 MALIGNANT NEOPLASM OF OVERLAPPING SITES OF LEFT LUNG (HCC): Primary | ICD-10-CM

## 2018-01-22 PROCEDURE — G0463 HOSPITAL OUTPT CLINIC VISIT: HCPCS

## 2018-01-22 NOTE — PROGRESS NOTES
FOLLOW UP NOTE    PATIENT:                                                      Sanjana Horton  MEDICAL RECORD #:                        [id number]  :                                                          1955  COMPLETION DATE:   2017  DIAGNOSIS:     Breast cancer, left    Staging form: Breast, AJCC V7    - Pathologic stage from 2008: Stage IIIA (T3, N1a, cM0)    Malignant neoplasm of overlapping sites of left lung    Staging form: Lung, AJCC V7    - Clinical stage from 10/26/2017: Stage IIIB (T2b, N3, M0)       BRIEF HISTORY:    Sanjana Horton  is a very pleasant 62 y.o. female  who developed what she felt was bronchitis.  It did not respond antibiotics.  Her pulmonologist, Dr. Granados, ordered scans that revealed a lung mass.  Biopsy was positive for small cell carcinoma.  She had a chest CT on 2017 that showed fairly extensive areas of mediastinal adenopathy.  It involved the subcarinal region and along left greater than the right hilum.  These findings were new compared to 2016.  She had a confluent area measuring 6.5 x 4.2 cm along the left-sided mediastinum just lateral to the proximal descending thoracic aorta.  She had another somewhat confluent area within the left hilum measuring approximate 5.1 x 4.1 cm.  Posterior upper mediastinal adenopathy with displacement of the trachea and esophagus towards the right measured 2.5 x 2.1 cm in the upper left mediastinum.  The left hilar adenopathy encased and severely narrowed the left central pulmonary arteries especially the left lower pulmonary artery.  There is also narrowing of the left upper lobe and left lower lobe bronchi approximately.  She had had 3 rounds of Etoposide and carboplatin.  MRI of the brain and bone scan were negative for metastatic disease.  She has had previous radiation for breast cancer to the left chest wall following mastectomy in .  She received radiotherapy in our department as follows:     TREATMENT  "COURSE:   11/13-12/26/2017 The lung mass and mediastinum received 60 Gy in 30 fractions with 6 MV photons     TOLERANCE:   Mrs. Horton had much esophagitis.  She had Magic mouthwash, met with our dietitian frequently and received additional IV fluids.  We also gave her a prescription for Lortab elixir for pain.    MEDICATIONS: Medication reconciliation for the patient was reviewed and confirmed in the electronic medical record.    Review of Systems   All other systems reviewed and are negative.      KPS 90%    Physical Exam   Constitutional: She appears well-developed and well-nourished.   Neck: Neck supple.   Cardiovascular: Normal rate, regular rhythm and normal heart sounds.    Pulmonary/Chest: Effort normal and breath sounds normal.   Nursing note and vitals reviewed.      VITAL SIGNS:   Vitals:    01/22/18 1026   BP: 178/83   Pulse: 82   Resp: 18   Temp: 98.2 °F (36.8 °C)   Weight: 89.8 kg (198 lb)   Height: 157.5 cm (62\")   PainSc: 0-No pain       The following portions of the patient's history were reviewed and updated as appropriate: allergies, current medications, past family history, past medical history, past social history, past surgical history and problem list.            IMPRESSION:  Acute side effects of radiotherapy of resolved     RECOMMENDATIONS:  Mrs. Horton is doing very well.  She has no esophagitis and this driving.  She follows with Dr. Rader every 3 months so we will see her back in our department as needed.  Thank you very much for letting me participate in her care.           Jenny Sanchez MD    Errors in dictation may reflect use of voice recognition software and not all errors in transcription may have been detected prior to signing.  "

## 2018-01-23 ENCOUNTER — HOSPITAL ENCOUNTER (OUTPATIENT)
Dept: NUCLEAR MEDICINE | Facility: HOSPITAL | Age: 63
Discharge: HOME | End: 2018-01-23

## 2018-01-23 DIAGNOSIS — C77.1 SECONDARY MALIGNANT NEOPLASM OF MEDIASTINAL LYMPH NODE (HCC): ICD-10-CM

## 2018-01-23 DIAGNOSIS — C34.82 MALIGNANT NEOPLASM OF OVERLAPPING SITES OF LEFT LUNG (HCC): ICD-10-CM

## 2018-01-23 PROCEDURE — A9503 TC99M MEDRONATE: HCPCS | Performed by: NURSE PRACTITIONER

## 2018-01-23 PROCEDURE — 78306 BONE IMAGING WHOLE BODY: CPT

## 2018-01-23 PROCEDURE — 0 TECHNETIUM MEDRONATE KIT: Performed by: NURSE PRACTITIONER

## 2018-01-23 RX ORDER — TC 99M MEDRONATE 20 MG/10ML
26 INJECTION, POWDER, LYOPHILIZED, FOR SOLUTION INTRAVENOUS
Status: COMPLETED | OUTPATIENT
Start: 2018-01-23 | End: 2018-01-23

## 2018-01-23 RX ADMIN — TC 99M MEDRONATE 26 MILLICURIE: 20 INJECTION, POWDER, LYOPHILIZED, FOR SOLUTION INTRAVENOUS at 10:17

## 2018-01-24 ENCOUNTER — OFFICE VISIT (OUTPATIENT)
Dept: PULMONOLOGY | Facility: CLINIC | Age: 63
End: 2018-01-24

## 2018-01-24 VITALS
BODY MASS INDEX: 36.62 KG/M2 | HEIGHT: 62 IN | SYSTOLIC BLOOD PRESSURE: 128 MMHG | DIASTOLIC BLOOD PRESSURE: 72 MMHG | OXYGEN SATURATION: 98 % | RESPIRATION RATE: 16 BRPM | HEART RATE: 63 BPM | WEIGHT: 199 LBS

## 2018-01-24 DIAGNOSIS — Z87.891 PERSONAL HISTORY OF TOBACCO USE, PRESENTING HAZARDS TO HEALTH: ICD-10-CM

## 2018-01-24 DIAGNOSIS — R06.02 SHORTNESS OF BREATH: ICD-10-CM

## 2018-01-24 DIAGNOSIS — C34.92 SMALL CELL LUNG CANCER, LEFT (HCC): Primary | ICD-10-CM

## 2018-01-24 DIAGNOSIS — R06.02 SOB (SHORTNESS OF BREATH): Primary | ICD-10-CM

## 2018-01-24 DIAGNOSIS — G47.33 OSA (OBSTRUCTIVE SLEEP APNEA): ICD-10-CM

## 2018-01-24 DIAGNOSIS — J43.9 PULMONARY EMPHYSEMA, UNSPECIFIED EMPHYSEMA TYPE (HCC): ICD-10-CM

## 2018-01-24 PROCEDURE — 94060 EVALUATION OF WHEEZING: CPT | Performed by: INTERNAL MEDICINE

## 2018-01-24 PROCEDURE — 94726 PLETHYSMOGRAPHY LUNG VOLUMES: CPT | Performed by: INTERNAL MEDICINE

## 2018-01-24 PROCEDURE — 94729 DIFFUSING CAPACITY: CPT | Performed by: INTERNAL MEDICINE

## 2018-01-24 PROCEDURE — 99214 OFFICE O/P EST MOD 30 MIN: CPT | Performed by: INTERNAL MEDICINE

## 2018-01-30 ENCOUNTER — OFFICE VISIT (OUTPATIENT)
Dept: ONCOLOGY | Facility: CLINIC | Age: 63
End: 2018-01-30

## 2018-01-30 ENCOUNTER — APPOINTMENT (OUTPATIENT)
Dept: LAB | Facility: HOSPITAL | Age: 63
End: 2018-01-30

## 2018-01-30 VITALS
RESPIRATION RATE: 20 BRPM | SYSTOLIC BLOOD PRESSURE: 153 MMHG | HEART RATE: 87 BPM | DIASTOLIC BLOOD PRESSURE: 70 MMHG | TEMPERATURE: 97.1 F | BODY MASS INDEX: 35.84 KG/M2 | WEIGHT: 196 LBS

## 2018-01-30 DIAGNOSIS — C77.1 SECONDARY MALIGNANT NEOPLASM OF MEDIASTINAL LYMPH NODE (HCC): ICD-10-CM

## 2018-01-30 DIAGNOSIS — C34.82 MALIGNANT NEOPLASM OF OVERLAPPING SITES OF LEFT LUNG (HCC): Primary | ICD-10-CM

## 2018-01-30 DIAGNOSIS — D64.9 ANEMIA, UNSPECIFIED TYPE: ICD-10-CM

## 2018-01-30 LAB
ALBUMIN SERPL-MCNC: 4.5 G/DL (ref 3.5–5)
ALBUMIN/GLOB SERPL: 1.2 G/DL (ref 1–2)
ALP SERPL-CCNC: 126 U/L (ref 38–126)
ALT SERPL W P-5'-P-CCNC: 54 U/L (ref 13–69)
ANION GAP SERPL CALCULATED.3IONS-SCNC: 16.4 MMOL/L
AST SERPL-CCNC: 53 U/L (ref 15–46)
BASOPHILS # BLD AUTO: 0.03 10*3/MM3 (ref 0–0.2)
BASOPHILS NFR BLD AUTO: 0.5 % (ref 0–2.5)
BILIRUB SERPL-MCNC: 0.6 MG/DL (ref 0.2–1.3)
BUN BLD-MCNC: 14 MG/DL (ref 7–20)
BUN/CREAT SERPL: 14 (ref 7.1–23.5)
CALCIUM SPEC-SCNC: 11.2 MG/DL (ref 8.4–10.2)
CHLORIDE SERPL-SCNC: 101 MMOL/L (ref 98–107)
CO2 SERPL-SCNC: 31 MMOL/L (ref 26–30)
CREAT BLD-MCNC: 1 MG/DL (ref 0.6–1.3)
DEPRECATED RDW RBC AUTO: 55.4 FL (ref 37–54)
EOSINOPHIL # BLD AUTO: 0.36 10*3/MM3 (ref 0–0.7)
EOSINOPHIL NFR BLD AUTO: 5.9 % (ref 0–7)
ERYTHROCYTE [DISTWIDTH] IN BLOOD BY AUTOMATED COUNT: 15.2 % (ref 11.5–14.5)
GFR SERPL CREATININE-BSD FRML MDRD: 57 ML/MIN/1.73
GLOBULIN UR ELPH-MCNC: 3.7 GM/DL
GLUCOSE BLD-MCNC: 87 MG/DL (ref 74–98)
HCT VFR BLD AUTO: 33.9 % (ref 37–47)
HGB BLD-MCNC: 11.2 G/DL (ref 12–16)
IMM GRANULOCYTES # BLD: 0.03 10*3/MM3 (ref 0–0.06)
IMM GRANULOCYTES NFR BLD: 0.5 % (ref 0–0.6)
IRON 24H UR-MRATE: 89 MCG/DL (ref 37–181)
IRON SATN MFR SERPL: 28 % (ref 11–46)
LYMPHOCYTES # BLD AUTO: 1.56 10*3/MM3 (ref 0.6–3.4)
LYMPHOCYTES NFR BLD AUTO: 25.5 % (ref 10–50)
MCH RBC QN AUTO: 32.8 PG (ref 27–31)
MCHC RBC AUTO-ENTMCNC: 33 G/DL (ref 30–37)
MCV RBC AUTO: 99.4 FL (ref 81–99)
MONOCYTES # BLD AUTO: 0.43 10*3/MM3 (ref 0–0.9)
MONOCYTES NFR BLD AUTO: 7 % (ref 0–12)
NEUTROPHILS # BLD AUTO: 3.71 10*3/MM3 (ref 2–6.9)
NEUTROPHILS NFR BLD AUTO: 60.6 % (ref 37–80)
NRBC BLD MANUAL-RTO: 0 /100 WBC (ref 0–0)
PLATELET # BLD AUTO: 140 10*3/MM3 (ref 130–400)
PMV BLD AUTO: 9.9 FL (ref 6–12)
POTASSIUM BLD-SCNC: 3.4 MMOL/L (ref 3.5–5.1)
PROT SERPL-MCNC: 8.2 G/DL (ref 6.3–8.2)
RBC # BLD AUTO: 3.41 10*6/MM3 (ref 4.2–5.4)
RETICS #: 0.14 10*6/MM3 (ref 0.02–0.13)
RETICS/RBC NFR AUTO: 4.19 % (ref 0.5–1.5)
SODIUM BLD-SCNC: 145 MMOL/L (ref 137–145)
TIBC SERPL-MCNC: 319 MCG/DL (ref 261–497)
WBC NRBC COR # BLD: 6.12 10*3/MM3 (ref 4.8–10.8)

## 2018-01-30 PROCEDURE — 80053 COMPREHEN METABOLIC PANEL: CPT | Performed by: INTERNAL MEDICINE

## 2018-01-30 PROCEDURE — 83550 IRON BINDING TEST: CPT | Performed by: INTERNAL MEDICINE

## 2018-01-30 PROCEDURE — 85025 COMPLETE CBC W/AUTO DIFF WBC: CPT | Performed by: INTERNAL MEDICINE

## 2018-01-30 PROCEDURE — 36415 COLL VENOUS BLD VENIPUNCTURE: CPT | Performed by: INTERNAL MEDICINE

## 2018-01-30 PROCEDURE — 85045 AUTOMATED RETICULOCYTE COUNT: CPT | Performed by: INTERNAL MEDICINE

## 2018-01-30 PROCEDURE — 99213 OFFICE O/P EST LOW 20 MIN: CPT | Performed by: INTERNAL MEDICINE

## 2018-01-30 PROCEDURE — 83540 ASSAY OF IRON: CPT | Performed by: INTERNAL MEDICINE

## 2018-01-30 NOTE — PROGRESS NOTES
PROBLEM LIST:  1.  Small cell lung cancer from the left upper lobe.  Limited stage disease.  A.  Treated  initially with etoposide and carboplatin starting 9/13/27, responded to single modality started because of symptoms and airway compromise  B.  Completed combined modality chemoradiation.  2.  Anemia.  This appears to be predominantly from chemoradiation.  3.  History of left breast cancer.  A.  Left mastectomy 7/22/2008 for stage IIIA, T3 N1 M0 breast cancer  B.  ER weakly positive, [place] negative and HER-2 positive  C.  Adjuvant chemotherapy with Herceptin, Taxotere and carboplatin  D.  Postmastectomy radiation done at [facility]  E.  Adjuvant tamoxifen started 12/12/2008 and continued as extended adjuvant therapy  4.  Diabetes        Subjective     HISTORY OF PRESENT ILLNESS:   Chief complaint: Management of lung cancer and follow-up about breast cancer.  Mrs. Horton doesn't describe any bone pain, dyspnea, hemoptysis, wheezing, or other new symptoms.  Her fatigue is gradually improving.  She hasn't noted any fever, chills, or sweats.  She saw Dr. Granados and he is doing further oxygen testing.  She hasn't changed any medicines recently.    Past Medical History, Past Surgical History, Social History, Family History have been reviewed and are without significant changes except as mentioned.    Review of Systems   A comprehensive 14 point review of systems was performed and was negative except as mentioned.    Medications:  The current medication list was reviewed in the EMR    ALLERGIES:  Allergies not on file    Objective      /70  Pulse 87  Temp 97.1 °F (36.2 °C) (Temporal Artery )   Resp 20  Wt 88.9 kg (196 lb)  BMI 35.84 kg/m2     Performance Status: ECOG 1, improving    General: well appearing, in no acute distress  HEENT: sclera anicteric, oropharynx clear, hair growing back gradually  Lymphatics: no cervical, supraclavicular, or axillary adenopathy  Cardiovascular: regular  rate and rhythm, no murmurs  Lungs: clear to auscultation bilaterally  Abdomen: soft, nontender, nondistended.  No palpable organomegaly  Extremeties: no lower extremity edema  Skin: no rashes, lesions, bruising, or petechiae    RECENT LABS:   WBC   Date Value Ref Range Status   01/02/2018 4.66 (L) 4.80 - 10.80 10*3/mm3 Final     Hemoglobin   Date Value Ref Range Status   01/02/2018 8.3 (L) 12.0 - 16.0 g/dL Final     Hematocrit   Date Value Ref Range Status   01/02/2018 25.8 (L) 37.0 - 47.0 % Final     MCV   Date Value Ref Range Status   01/02/2018 94.5 81.0 - 99.0 fL Final     RDW   Date Value Ref Range Status   01/02/2018 18.5 (H) 11.5 - 14.5 % Final     MPV   Date Value Ref Range Status   01/02/2018 9.6 6.0 - 12.0 fL Final     Platelets   Date Value Ref Range Status   01/02/2018 160 130 - 400 10*3/mm3 Final     Immature Grans %   Date Value Ref Range Status   01/02/2018 0.9 (H) 0.0 - 0.6 % Final     Neutrophils, Absolute   Date Value Ref Range Status   01/02/2018 3.03 2.00 - 6.90 10*3/mm3 Final     Lymphocytes, Absolute   Date Value Ref Range Status   01/02/2018 0.96 0.60 - 3.40 10*3/mm3 Final     Monocytes, Absolute   Date Value Ref Range Status   01/02/2018 0.59 0.00 - 0.90 10*3/mm3 Final     Eosinophils, Absolute   Date Value Ref Range Status   01/02/2018 0.02 0.00 - 0.70 10*3/mm3 Final     Basophils, Absolute   Date Value Ref Range Status   01/02/2018 0.02 0.00 - 0.20 10*3/mm3 Final     Immature Grans, Absolute   Date Value Ref Range Status   01/02/2018 0.04 0.00 - 0.06 10*3/mm3 Final     nRBC   Date Value Ref Range Status   01/02/2018 0.0 0.0 - 0.0 /100 WBC Final       Glucose   Date Value Ref Range Status   01/02/2018 108 (H) 74 - 98 mg/dL Final     Sodium   Date Value Ref Range Status   01/02/2018 141 137 - 145 mmol/L Final     Potassium   Date Value Ref Range Status   01/02/2018 3.2 (L) 3.5 - 5.1 mmol/L Final     CO2   Date Value Ref Range Status   01/02/2018 31.0 (H) 26.0 - 30.0 mmol/L Final     Chloride    Date Value Ref Range Status   01/02/2018 102 98 - 107 mmol/L Final     Anion Gap   Date Value Ref Range Status   01/02/2018 11.2 mmol/L Final     Creatinine   Date Value Ref Range Status   01/02/2018 0.90 0.60 - 1.30 mg/dL Final     BUN   Date Value Ref Range Status   01/02/2018 15 7 - 20 mg/dL Final     BUN/Creatinine Ratio   Date Value Ref Range Status   01/02/2018 16.7 7.1 - 23.5 Final     Calcium   Date Value Ref Range Status   01/02/2018 10.1 8.4 - 10.2 mg/dL Final     eGFR Non  Amer   Date Value Ref Range Status   01/02/2018 64 >60 mL/min/1.73 Final     Alkaline Phosphatase   Date Value Ref Range Status   01/02/2018 121 38 - 126 U/L Final     Total Protein   Date Value Ref Range Status   01/02/2018 7.7 6.3 - 8.2 g/dL Final     ALT (SGPT)   Date Value Ref Range Status   01/02/2018 36 13 - 69 U/L Final     AST (SGOT)   Date Value Ref Range Status   01/02/2018 42 15 - 46 U/L Final     Total Bilirubin   Date Value Ref Range Status   01/02/2018 0.5 0.2 - 1.3 mg/dL Final     Albumin   Date Value Ref Range Status   01/02/2018 4.20 3.50 - 5.00 g/dL Final     Globulin   Date Value Ref Range Status   01/02/2018 3.5 gm/dL Final     A/G Ratio   Date Value Ref Range Status   01/02/2018 1.2 1.0 - 2.0 g/dL Final       LDH   Date Value Ref Range Status   08/16/2017 216 120 - 246 U/L Final       No results found for: MSPIKE, KAPPALAMB, IGLFLC, FREEKAPPAL          Assessment/Plan   Impression: 1.  Small cell lung cancer.  Her 7 complete response to her chemoradiation.  2.  Anemia.  This is still presumed to be suppression by chemoradiation.  It should start gradually improving.  3.  History of breast cancer.  She doesn't have any evidence of late recurrence.    Plan: 1.  I'll check her CBC, reticulocyte, iron studies, and CMP today and call if these require any immediate attention.  2.  I'll check her CBC again in one month.  3.  I'll schedule her to return here in 2 months.  If her anemia doesn't start to improve I'll  see her sooner and investigate further.  We can decide when she returns about the timing of further imaging studies.      Jac Rader MD  [facility] Hematology and Oncology    01/30/18           CC:

## 2018-02-13 DIAGNOSIS — G47.33 OSA (OBSTRUCTIVE SLEEP APNEA): ICD-10-CM

## 2018-02-13 DIAGNOSIS — R06.02 SHORTNESS OF BREATH: ICD-10-CM

## 2018-02-13 DIAGNOSIS — J43.9 PULMONARY EMPHYSEMA, UNSPECIFIED EMPHYSEMA TYPE (HCC): ICD-10-CM

## 2018-03-27 ENCOUNTER — OFFICE VISIT (OUTPATIENT)
Dept: ONCOLOGY | Facility: CLINIC | Age: 63
End: 2018-03-27

## 2018-03-27 VITALS
WEIGHT: 206 LBS | HEIGHT: 62 IN | SYSTOLIC BLOOD PRESSURE: 162 MMHG | DIASTOLIC BLOOD PRESSURE: 74 MMHG | HEART RATE: 93 BPM | BODY MASS INDEX: 37.91 KG/M2 | RESPIRATION RATE: 19 BRPM | TEMPERATURE: 97.8 F

## 2018-03-27 DIAGNOSIS — C77.1 SECONDARY MALIGNANT NEOPLASM OF MEDIASTINAL LYMPH NODE (HCC): Primary | ICD-10-CM

## 2018-03-27 DIAGNOSIS — C34.82 MALIGNANT NEOPLASM OF OVERLAPPING SITES OF LEFT LUNG (HCC): ICD-10-CM

## 2018-03-27 PROCEDURE — 99213 OFFICE O/P EST LOW 20 MIN: CPT | Performed by: NURSE PRACTITIONER

## 2018-03-27 RX ORDER — TAMOXIFEN CITRATE 20 MG/1
20 TABLET ORAL DAILY
Qty: 90 TABLET | Refills: 1 | Status: SHIPPED | OUTPATIENT
Start: 2018-03-27 | End: 2018-07-10 | Stop reason: SDUPTHER

## 2018-03-27 NOTE — PROGRESS NOTES
"      PROBLEM LIST:  1.  Small cell lung cancer from the left upper lobe.  Limited stage disease.  A.  Treated  initially with etoposide and carboplatin starting 9/13/27, responded to single modality. started because of symptoms and airway compromise  B.  Completed combined modality chemoradiation. (Chemotherapy completed 12/7/2017).   2.  Anemia.  This appears to be predominantly from chemoradiation.  3.  History of left breast cancer.  A.  Left mastectomy 7/22/2008 for stage IIIA, T3 N1 M0 breast cancer  B.  ER weakly positive, [place] negative and HER-2 positive  C.  Adjuvant chemotherapy with Herceptin, Taxotere and carboplatin  D.  Postmastectomy radiation done at [facility]  E.  Adjuvant tamoxifen started 12/12/2008 and continued as extended adjuvant therapy  4.  Diabetes      Chief complaint: Management of lung cancer and follow-up about breast cancer.    Subjective     HISTORY OF PRESENT ILLNESS:   Mrs. Horton has been well since her last visit except for persistent fatigue. She saw Dr. Keller yesterday and received a B12 injection. She has been maintaining her usual daily activities but she does not have the stamina to extensively clean her house. She denies any fevers, chills, or recurring infections. She has an occasional cough but denies any dyspnea, wheezing or hemoptysis. She denies any new bone pain, headaches, or diplopia.      Past Medical History, Past Surgical History, Social History, Family History have been reviewed and are without significant changes except as mentioned.    Review of Systems   A comprehensive 14 point review of systems was performed and was negative except as mentioned.    Medications:  The current medication list was reviewed in the EMR    ALLERGIES:  Allergies not on file    Objective      /74   Pulse 93   Temp 97.8 °F (36.6 °C) (Temporal Artery )   Resp 19   Ht 157.5 cm (62.01\")   Wt 93.4 kg (206 lb)   BMI 37.67 kg/m²      Performance Status: ECOG " 0    General: well appearing, in no acute distress  HEENT: sclera anicteric, oropharynx clear  Lymphatics: no cervical, supraclavicular, or axillary adenopathy  Cardiovascular: regular rate and rhythm, no murmurs  Lungs: clear to auscultation bilaterally  Abdomen: soft, nontender, nondistended.  No palpable organomegaly  Extremeties: no lower extremity edema  Skin: no rashes, lesions, bruising, or petechiae    RECENT LABS:   WBC   Date Value Ref Range Status   01/30/2018 6.12 4.80 - 10.80 10*3/mm3 Final     Hemoglobin   Date Value Ref Range Status   01/30/2018 11.2 (L) 12.0 - 16.0 g/dL Final     Hematocrit   Date Value Ref Range Status   01/30/2018 33.9 (L) 37.0 - 47.0 % Final     MCV   Date Value Ref Range Status   01/30/2018 99.4 (H) 81.0 - 99.0 fL Final     RDW   Date Value Ref Range Status   01/30/2018 15.2 (H) 11.5 - 14.5 % Final     MPV   Date Value Ref Range Status   01/30/2018 9.9 6.0 - 12.0 fL Final     Platelets   Date Value Ref Range Status   01/30/2018 140 130 - 400 10*3/mm3 Final     Immature Grans %   Date Value Ref Range Status   01/30/2018 0.5 0.0 - 0.6 % Final     Neutrophils, Absolute   Date Value Ref Range Status   01/30/2018 3.71 2.00 - 6.90 10*3/mm3 Final     Lymphocytes, Absolute   Date Value Ref Range Status   01/30/2018 1.56 0.60 - 3.40 10*3/mm3 Final     Monocytes, Absolute   Date Value Ref Range Status   01/30/2018 0.43 0.00 - 0.90 10*3/mm3 Final     Eosinophils, Absolute   Date Value Ref Range Status   01/30/2018 0.36 0.00 - 0.70 10*3/mm3 Final     Basophils, Absolute   Date Value Ref Range Status   01/30/2018 0.03 0.00 - 0.20 10*3/mm3 Final     Immature Grans, Absolute   Date Value Ref Range Status   01/30/2018 0.03 0.00 - 0.06 10*3/mm3 Final     nRBC   Date Value Ref Range Status   01/30/2018 0.0 0.0 - 0.0 /100 WBC Final       Glucose   Date Value Ref Range Status   01/30/2018 87 74 - 98 mg/dL Final     Sodium   Date Value Ref Range Status   01/30/2018 145 137 - 145 mmol/L Final      Potassium   Date Value Ref Range Status   01/30/2018 3.4 (L) 3.5 - 5.1 mmol/L Final     CO2   Date Value Ref Range Status   01/30/2018 31.0 (H) 26.0 - 30.0 mmol/L Final     Chloride   Date Value Ref Range Status   01/30/2018 101 98 - 107 mmol/L Final     Anion Gap   Date Value Ref Range Status   01/30/2018 16.4 mmol/L Final     Creatinine   Date Value Ref Range Status   01/30/2018 1.00 0.60 - 1.30 mg/dL Final     BUN   Date Value Ref Range Status   01/30/2018 14 7 - 20 mg/dL Final     BUN/Creatinine Ratio   Date Value Ref Range Status   01/30/2018 14.0 7.1 - 23.5 Final     Calcium   Date Value Ref Range Status   01/30/2018 11.2 (H) 8.4 - 10.2 mg/dL Final     eGFR Non  Amer   Date Value Ref Range Status   01/30/2018 57 (L) >60 mL/min/1.73 Final     Alkaline Phosphatase   Date Value Ref Range Status   01/30/2018 126 38 - 126 U/L Final     Total Protein   Date Value Ref Range Status   01/30/2018 8.2 6.3 - 8.2 g/dL Final     ALT (SGPT)   Date Value Ref Range Status   01/30/2018 54 13 - 69 U/L Final     AST (SGOT)   Date Value Ref Range Status   01/30/2018 53 (H) 15 - 46 U/L Final     Total Bilirubin   Date Value Ref Range Status   01/30/2018 0.6 0.2 - 1.3 mg/dL Final     Albumin   Date Value Ref Range Status   01/30/2018 4.50 3.50 - 5.00 g/dL Final     Globulin   Date Value Ref Range Status   01/30/2018 3.7 gm/dL Final     A/G Ratio   Date Value Ref Range Status   01/30/2018 1.2 1.0 - 2.0 g/dL Final       LDH   Date Value Ref Range Status   08/16/2017 216 120 - 246 U/L Final       No results found for: MSPIKE, KAPPALAMB, IGLFLC, FREEKAPPAL          Assessment/Plan   Impression:   1.  Small cell lung cancer.  She has had a complete response to her chemoradiation. She completed chemotherapy 12/7/2017. She has been doing well just persistent fatigue. Dr. Keller gave her a B 12 injection yesterday. She has no clinical evidence of disease recurrence. We will repeat CT scan of chest prior to return.   2.  Anemia.  Resolved.   3.  History of breast cancer.  She doesn't have any evidence of late recurrence. Continue tamoxifen unchanged.     Plan:   1. CT scan of chest with IV contrast prior to return.   2. Obtain labs from Dr. Keller's office that were drawn yesterday.   3. Tamoxifen refill sent to pharmacy.   4.  I'll schedule her to return here in 3 months. She has been instructed to contact us in the interm if any new symptoms arise.       Елена Haile [facility] Hematology and Oncology    03/27/18           CC:

## 2018-04-30 ENCOUNTER — OFFICE VISIT (OUTPATIENT)
Dept: PULMONOLOGY | Facility: CLINIC | Age: 63
End: 2018-04-30

## 2018-04-30 VITALS
WEIGHT: 207 LBS | SYSTOLIC BLOOD PRESSURE: 126 MMHG | OXYGEN SATURATION: 97 % | BODY MASS INDEX: 38.09 KG/M2 | HEART RATE: 86 BPM | HEIGHT: 62 IN | RESPIRATION RATE: 20 BRPM | DIASTOLIC BLOOD PRESSURE: 78 MMHG

## 2018-04-30 DIAGNOSIS — R06.02 SHORTNESS OF BREATH: ICD-10-CM

## 2018-04-30 DIAGNOSIS — C34.92 SMALL CELL LUNG CANCER, LEFT (HCC): ICD-10-CM

## 2018-04-30 DIAGNOSIS — J43.9 PULMONARY EMPHYSEMA, UNSPECIFIED EMPHYSEMA TYPE (HCC): Primary | ICD-10-CM

## 2018-04-30 DIAGNOSIS — G47.33 OSA (OBSTRUCTIVE SLEEP APNEA): ICD-10-CM

## 2018-04-30 PROCEDURE — 99214 OFFICE O/P EST MOD 30 MIN: CPT | Performed by: NURSE PRACTITIONER

## 2018-06-08 ENCOUNTER — APPOINTMENT (OUTPATIENT)
Dept: CT IMAGING | Facility: HOSPITAL | Age: 63
End: 2018-06-08

## 2018-06-19 ENCOUNTER — HOSPITAL ENCOUNTER (OUTPATIENT)
Dept: CT IMAGING | Facility: HOSPITAL | Age: 63
Discharge: HOME | End: 2018-06-19
Admitting: NURSE PRACTITIONER

## 2018-06-19 DIAGNOSIS — C34.82 MALIGNANT NEOPLASM OF OVERLAPPING SITES OF LEFT LUNG (HCC): ICD-10-CM

## 2018-06-19 DIAGNOSIS — C77.1 SECONDARY MALIGNANT NEOPLASM OF MEDIASTINAL LYMPH NODE (HCC): ICD-10-CM

## 2018-06-19 LAB
CREAT BLD-MCNC: 1.1 MG/DL (ref 0.6–1.3)
GFR SERPL CREATININE-BSD FRML MDRD: 51 ML/MIN/1.73

## 2018-06-19 PROCEDURE — 82565 ASSAY OF CREATININE: CPT | Performed by: RADIOLOGY

## 2018-06-19 PROCEDURE — 71260 CT THORAX DX C+: CPT

## 2018-06-19 PROCEDURE — 0 IOPAMIDOL PER 1 ML: Performed by: NURSE PRACTITIONER

## 2018-06-19 RX ADMIN — IOPAMIDOL 100 ML: 510 INJECTION, SOLUTION INTRAVASCULAR at 10:45

## 2018-06-26 ENCOUNTER — OFFICE VISIT (OUTPATIENT)
Dept: ONCOLOGY | Facility: CLINIC | Age: 63
End: 2018-06-26

## 2018-06-26 VITALS
RESPIRATION RATE: 16 BRPM | BODY MASS INDEX: 38.09 KG/M2 | DIASTOLIC BLOOD PRESSURE: 65 MMHG | SYSTOLIC BLOOD PRESSURE: 144 MMHG | TEMPERATURE: 97.4 F | HEIGHT: 62 IN | WEIGHT: 207 LBS | HEART RATE: 86 BPM

## 2018-06-26 DIAGNOSIS — C34.82 MALIGNANT NEOPLASM OF OVERLAPPING SITES OF LEFT LUNG (HCC): Primary | ICD-10-CM

## 2018-06-26 DIAGNOSIS — D64.9 ANEMIA, UNSPECIFIED TYPE: ICD-10-CM

## 2018-06-26 PROCEDURE — 99214 OFFICE O/P EST MOD 30 MIN: CPT | Performed by: INTERNAL MEDICINE

## 2018-06-26 NOTE — PROGRESS NOTES
"      PROBLEM LIST:  1.  Small cell lung cancer from the left upper lobe.  Limited stage disease.  A.  Treated  initially with etoposide and carboplatin starting 9/13/27, responded to single modality. started because of symptoms and airway compromise  B.  Completed combined modality chemoradiation. (Chemotherapy completed 12/7/2017).  C.  New left paratracheal mass on CT scan from 6/19/18, evaluation incomplete   2.  Anemia.  This appears to be predominantly from chemoradiation.  3.  History of left breast cancer.  A.  Left mastectomy 7/22/2008 for stage IIIA, T3 N1 M0 breast cancer  B.  ER weakly positive, [place] negative and HER-2 positive  C.  Adjuvant chemotherapy with Herceptin, Taxotere and carboplatin  D.  Postmastectomy radiation done at [facility]  E.  Adjuvant tamoxifen started 12/12/2008 and continued as extended adjuvant therapy  4.  Diabetes          HISTORY OF PRESENT ILLNESS:   Chief complaint: Here about lung cancer and breast cancer management  Mrs. Horton continues to have some fatigue as her main problem.  She has fatigue as her main problem.  She hasn't noticed any bone pain, dyspnea, wheezing, or unexplained weight loss.  She hasn't noticed any headache or other symptoms to suggest metastatic disease.    Past Medical History, Past Surgical History, Social History, Family History have been reviewed and are without significant changes except as mentioned.    Review of Systems   A comprehensive 14 point review of systems was performed and was negative except as mentioned.    Medications:  The current medication list was reviewed in the EMR    ALLERGIES:  Allergies not on file           /65   Pulse 86   Temp 97.4 °F (36.3 °C) (Temporal Artery )   Resp 16   Ht 157.5 cm (62.01\")   Wt 93.9 kg (207 lb)   BMI 37.85 kg/m²      Performance Status: ECOG 1    General: well appearing, in no acute distress  HEENT: sclera anicteric, oropharynx clear, hair is growing back " nicely  Lymphatics: no cervical, supraclavicular, or axillary adenopathy  Cardiovascular: regular rate and rhythm, no murmurs  Lungs: clear to auscultation bilaterally  Abdomen: soft, nontender, nondistended.  No palpable organomegaly  Extremeties: no lower extremity edema  Skin: no rashes, lesions, bruising, or petechiae    RECENT LABS:   WBC   Date Value Ref Range Status   01/30/2018 6.12 4.80 - 10.80 10*3/mm3 Final     Hemoglobin   Date Value Ref Range Status   01/30/2018 11.2 (L) 12.0 - 16.0 g/dL Final     Hematocrit   Date Value Ref Range Status   01/30/2018 33.9 (L) 37.0 - 47.0 % Final     MCV   Date Value Ref Range Status   01/30/2018 99.4 (H) 81.0 - 99.0 fL Final     RDW   Date Value Ref Range Status   01/30/2018 15.2 (H) 11.5 - 14.5 % Final     MPV   Date Value Ref Range Status   01/30/2018 9.9 6.0 - 12.0 fL Final     Platelets   Date Value Ref Range Status   01/30/2018 140 130 - 400 10*3/mm3 Final     Immature Grans %   Date Value Ref Range Status   01/30/2018 0.5 0.0 - 0.6 % Final     Neutrophils, Absolute   Date Value Ref Range Status   01/30/2018 3.71 2.00 - 6.90 10*3/mm3 Final     Lymphocytes, Absolute   Date Value Ref Range Status   01/30/2018 1.56 0.60 - 3.40 10*3/mm3 Final     Monocytes, Absolute   Date Value Ref Range Status   01/30/2018 0.43 0.00 - 0.90 10*3/mm3 Final     Eosinophils, Absolute   Date Value Ref Range Status   01/30/2018 0.36 0.00 - 0.70 10*3/mm3 Final     Basophils, Absolute   Date Value Ref Range Status   01/30/2018 0.03 0.00 - 0.20 10*3/mm3 Final     Immature Grans, Absolute   Date Value Ref Range Status   01/30/2018 0.03 0.00 - 0.06 10*3/mm3 Final     nRBC   Date Value Ref Range Status   01/30/2018 0.0 0.0 - 0.0 /100 WBC Final       Glucose   Date Value Ref Range Status   01/30/2018 87 74 - 98 mg/dL Final     Sodium   Date Value Ref Range Status   01/30/2018 145 137 - 145 mmol/L Final     Potassium   Date Value Ref Range Status   01/30/2018 3.4 (L) 3.5 - 5.1 mmol/L Final     CO2    Date Value Ref Range Status   01/30/2018 31.0 (H) 26.0 - 30.0 mmol/L Final     Chloride   Date Value Ref Range Status   01/30/2018 101 98 - 107 mmol/L Final     Anion Gap   Date Value Ref Range Status   01/30/2018 16.4 mmol/L Final     Creatinine   Date Value Ref Range Status   06/19/2018 1.10 0.60 - 1.30 mg/dL Final     BUN   Date Value Ref Range Status   01/30/2018 14 7 - 20 mg/dL Final     BUN/Creatinine Ratio   Date Value Ref Range Status   01/30/2018 14.0 7.1 - 23.5 Final     Calcium   Date Value Ref Range Status   01/30/2018 11.2 (H) 8.4 - 10.2 mg/dL Final     eGFR Non  Amer   Date Value Ref Range Status   06/19/2018 51 (L) >60 mL/min/1.73 Final     Alkaline Phosphatase   Date Value Ref Range Status   01/30/2018 126 38 - 126 U/L Final     Total Protein   Date Value Ref Range Status   01/30/2018 8.2 6.3 - 8.2 g/dL Final     ALT (SGPT)   Date Value Ref Range Status   01/30/2018 54 13 - 69 U/L Final     AST (SGOT)   Date Value Ref Range Status   01/30/2018 53 (H) 15 - 46 U/L Final     Total Bilirubin   Date Value Ref Range Status   01/30/2018 0.6 0.2 - 1.3 mg/dL Final     Albumin   Date Value Ref Range Status   01/30/2018 4.50 3.50 - 5.00 g/dL Final     Globulin   Date Value Ref Range Status   01/30/2018 3.7 gm/dL Final     A/G Ratio   Date Value Ref Range Status   01/30/2018 1.2 1.0 - 2.0 g/dL Final       LDH   Date Value Ref Range Status   08/16/2017 216 120 - 246 U/L Final       No results found for: MSPIKE, KAPPALAMB, IGLFLC, FREEKAPPAL      CT images and reports were reviewed and the images were compared to older scans.  She has a new left paratracheal mass.        Impression: 1.  Small cell lung cancer.  She has a new left paratracheal mass that's very worrisome for recurrence even though this is in an area that received radiation.  2.  Breast cancer.  She has received adjuvant chemotherapy and adjuvant tamoxifen for her breast cancer.  She has no documented recurrence unless this new paratracheal  mass proved to be a very unusual version of recurrence.    Plan: 1.  I've ordered a PET scan to see if he paratracheal mass is per metabolic.  This would also help us look for any other sites of metastatic disease.  If the mass is hypermetabolic we will need to decide about either treatment as a recurrent small cell or biopsy.  I'll see her back after the scan to reevaluate.  We will check her CBC and CMP today.               Jac Rader MD  [facility] Hematology and Oncology    06/26/18           CC:

## 2018-07-05 ENCOUNTER — TELEPHONE (OUTPATIENT)
Dept: ONCOLOGY | Facility: CLINIC | Age: 63
End: 2018-07-05

## 2018-07-05 NOTE — TELEPHONE ENCOUNTER
Returned call to patient and informed her that Karen KWAN had done a peer to peer and the test was authorized.

## 2018-07-05 NOTE — TELEPHONE ENCOUNTER
----- Message from Jazmine Macedo MA sent at 7/5/2018  8:38 AM EDT -----  Regarding: ABDIEL- PET DENIED BY INS  Contact: [phone]  Pt rec'd letter in the mail from her [employer] company that her PET scan scheduled for tomorrow 07/06 will not be covered. Pt would like to know what she should do?

## 2018-07-06 ENCOUNTER — HOSPITAL ENCOUNTER (OUTPATIENT)
Dept: PET IMAGING | Facility: HOSPITAL | Age: 63
Discharge: HOME | End: 2018-07-06
Attending: INTERNAL MEDICINE

## 2018-07-06 ENCOUNTER — HOSPITAL ENCOUNTER (OUTPATIENT)
Dept: PET IMAGING | Facility: HOSPITAL | Age: 63
Discharge: HOME | End: 2018-07-06
Attending: INTERNAL MEDICINE | Admitting: INTERNAL MEDICINE

## 2018-07-06 DIAGNOSIS — C34.82 MALIGNANT NEOPLASM OF OVERLAPPING SITES OF LEFT LUNG (HCC): ICD-10-CM

## 2018-07-06 LAB — GLUCOSE BLDC GLUCOMTR-MCNC: 107 MG/DL (ref 70–130)

## 2018-07-06 PROCEDURE — 82962 GLUCOSE BLOOD TEST: CPT

## 2018-07-06 PROCEDURE — 78815 PET IMAGE W/CT SKULL-THIGH: CPT

## 2018-07-06 PROCEDURE — A9552 F18 FDG: HCPCS | Performed by: INTERNAL MEDICINE

## 2018-07-06 PROCEDURE — 0 FLUDEOXYGLUCOSE F18 SOLUTION: Performed by: INTERNAL MEDICINE

## 2018-07-06 RX ADMIN — FLUDEOXYGLUCOSE F18 1 DOSE: 300 INJECTION INTRAVENOUS at 08:55

## 2018-07-10 ENCOUNTER — OFFICE VISIT (OUTPATIENT)
Dept: ONCOLOGY | Facility: CLINIC | Age: 63
End: 2018-07-10

## 2018-07-10 ENCOUNTER — TELEPHONE (OUTPATIENT)
Dept: RADIATION ONCOLOGY | Facility: HOSPITAL | Age: 63
End: 2018-07-10

## 2018-07-10 VITALS
TEMPERATURE: 97.8 F | WEIGHT: 206 LBS | HEART RATE: 88 BPM | BODY MASS INDEX: 37.91 KG/M2 | SYSTOLIC BLOOD PRESSURE: 183 MMHG | RESPIRATION RATE: 22 BRPM | HEIGHT: 62 IN | DIASTOLIC BLOOD PRESSURE: 79 MMHG

## 2018-07-10 DIAGNOSIS — C77.1 SECONDARY MALIGNANT NEOPLASM OF MEDIASTINAL LYMPH NODE (HCC): ICD-10-CM

## 2018-07-10 DIAGNOSIS — C34.82 MALIGNANT NEOPLASM OF OVERLAPPING SITES OF LEFT LUNG (HCC): Primary | ICD-10-CM

## 2018-07-10 PROCEDURE — 99215 OFFICE O/P EST HI 40 MIN: CPT | Performed by: INTERNAL MEDICINE

## 2018-07-10 RX ORDER — TAMOXIFEN CITRATE 20 MG/1
20 TABLET ORAL DAILY
Qty: 90 TABLET | Refills: 3 | Status: SHIPPED | OUTPATIENT
Start: 2018-07-10 | End: 2018-12-18 | Stop reason: SDUPTHER

## 2018-07-10 RX ORDER — ONDANSETRON HYDROCHLORIDE 8 MG/1
8 TABLET, FILM COATED ORAL 3 TIMES DAILY PRN
Qty: 30 TABLET | Refills: 5 | Status: SHIPPED | OUTPATIENT
Start: 2018-07-10 | End: 2018-07-16 | Stop reason: SDUPTHER

## 2018-07-10 RX ORDER — DEXAMETHASONE 4 MG/1
TABLET ORAL
Qty: 4 TABLET | Refills: 5 | Status: SHIPPED | OUTPATIENT
Start: 2018-07-10 | End: 2018-07-12 | Stop reason: CLARIF

## 2018-07-10 NOTE — PROGRESS NOTES
"      PROBLEM LIST:  1.  Small cell lung cancer from the left upper lobe.  Limited stage disease, recurrent in a mediastinal node  A.  Treated  initially with etoposide and carboplatin starting 9/13/27, responded to single modality. started because of symptoms and airway compromise  B.  Completed combined modality chemoradiation. (Chemotherapy completed 12/7/2017).  C.  Recurrence in left paratracheal node on CT scan from 6/19/18 and hypermetabolic on PET scan    2.  Anemia.  This appears to be predominantly from chemoradiation.  3.  History of left breast cancer.  A.  Left mastectomy 7/22/2008 for stage IIIA, T3 N1 M0 breast cancer  B.  ER weakly positive, CO negative and HER-2 positive  C.  Adjuvant chemotherapy with Herceptin, Taxotere and carboplatin  D.  Postmastectomy radiation done at [facility] [place]  E.  Adjuvant tamoxifen started 12/12/2008 and continued as extended adjuvant therapy  4.  Diabetes          HISTORY OF PRESENT ILLNESS:   Chief complaint: Here about lung cancer  Mrs. Horton doesn't have any new symptoms.  She is anxious about her test results.    Social history: She is accompanied today by her     Past Medical History, Past Surgical History, Social History, Family History have been reviewed and are without significant changes except as mentioned.    Review of Systems   A comprehensive 14 point review of systems was performed and was negative except as mentioned.    Medications:  The current medication list was reviewed in the EMR    ALLERGIES:  Allergies not on file           BP (!) 183/79   Pulse 88   Temp 97.8 °F (36.6 °C) (Temporal Artery )   Resp 22   Ht 157.5 cm (62.01\")   Wt 93.4 kg (206 lb)   BMI 37.67 kg/m²      Performance Status: ECOG 0    General: well appearing, in no acute distress  HEENT: sclera anicteric, oropharynx clear  Lymphatics: no cervical, supraclavicular, or axillary adenopathy  Cardiovascular: regular rate and rhythm, no murmurs  Lungs: clear to " auscultation bilaterally  Abdomen: soft, nontender, nondistended.  No palpable organomegaly  Extremeties: no lower extremity edema  Skin: no rashes, lesions, bruising, or petechiae    RECENT LABS:   WBC   Date Value Ref Range Status   01/30/2018 6.12 4.80 - 10.80 10*3/mm3 Final     Hemoglobin   Date Value Ref Range Status   01/30/2018 11.2 (L) 12.0 - 16.0 g/dL Final     Hematocrit   Date Value Ref Range Status   01/30/2018 33.9 (L) 37.0 - 47.0 % Final     MCV   Date Value Ref Range Status   01/30/2018 99.4 (H) 81.0 - 99.0 fL Final     RDW   Date Value Ref Range Status   01/30/2018 15.2 (H) 11.5 - 14.5 % Final     MPV   Date Value Ref Range Status   01/30/2018 9.9 6.0 - 12.0 fL Final     Platelets   Date Value Ref Range Status   01/30/2018 140 130 - 400 10*3/mm3 Final     Immature Grans %   Date Value Ref Range Status   01/30/2018 0.5 0.0 - 0.6 % Final     Neutrophils, Absolute   Date Value Ref Range Status   01/30/2018 3.71 2.00 - 6.90 10*3/mm3 Final     Lymphocytes, Absolute   Date Value Ref Range Status   01/30/2018 1.56 0.60 - 3.40 10*3/mm3 Final     Monocytes, Absolute   Date Value Ref Range Status   01/30/2018 0.43 0.00 - 0.90 10*3/mm3 Final     Eosinophils, Absolute   Date Value Ref Range Status   01/30/2018 0.36 0.00 - 0.70 10*3/mm3 Final     Basophils, Absolute   Date Value Ref Range Status   01/30/2018 0.03 0.00 - 0.20 10*3/mm3 Final     Immature Grans, Absolute   Date Value Ref Range Status   01/30/2018 0.03 0.00 - 0.06 10*3/mm3 Final     nRBC   Date Value Ref Range Status   01/30/2018 0.0 0.0 - 0.0 /100 WBC Final       Glucose   Date Value Ref Range Status   01/30/2018 87 74 - 98 mg/dL Final     Sodium   Date Value Ref Range Status   01/30/2018 145 137 - 145 mmol/L Final     Potassium   Date Value Ref Range Status   01/30/2018 3.4 (L) 3.5 - 5.1 mmol/L Final     CO2   Date Value Ref Range Status   01/30/2018 31.0 (H) 26.0 - 30.0 mmol/L Final     Chloride   Date Value Ref Range Status   01/30/2018 101 98 -  107 mmol/L Final     Anion Gap   Date Value Ref Range Status   01/30/2018 16.4 mmol/L Final     Creatinine   Date Value Ref Range Status   06/19/2018 1.10 0.60 - 1.30 mg/dL Final     BUN   Date Value Ref Range Status   01/30/2018 14 7 - 20 mg/dL Final     BUN/Creatinine Ratio   Date Value Ref Range Status   01/30/2018 14.0 7.1 - 23.5 Final     Calcium   Date Value Ref Range Status   01/30/2018 11.2 (H) 8.4 - 10.2 mg/dL Final     eGFR Non  Amer   Date Value Ref Range Status   06/19/2018 51 (L) >60 mL/min/1.73 Final     Alkaline Phosphatase   Date Value Ref Range Status   01/30/2018 126 38 - 126 U/L Final     Total Protein   Date Value Ref Range Status   01/30/2018 8.2 6.3 - 8.2 g/dL Final     ALT (SGPT)   Date Value Ref Range Status   01/30/2018 54 13 - 69 U/L Final     AST (SGOT)   Date Value Ref Range Status   01/30/2018 53 (H) 15 - 46 U/L Final     Total Bilirubin   Date Value Ref Range Status   01/30/2018 0.6 0.2 - 1.3 mg/dL Final     Albumin   Date Value Ref Range Status   01/30/2018 4.50 3.50 - 5.00 g/dL Final     Globulin   Date Value Ref Range Status   01/30/2018 3.7 gm/dL Final     A/G Ratio   Date Value Ref Range Status   01/30/2018 1.2 1.0 - 2.0 g/dL Final       LDH   Date Value Ref Range Status   08/16/2017 216 120 - 246 U/L Final       No results found for: MSPIKE, KAPPALAMB, IGLFLC, FREEKAPPAL      PET scan was reviewed and discussed by telephone with Dr. Jenny Sanchez, noting the markedly hypermetabolic upper mediastinal node with mildly increased activity in some right hilar nodes that had been stable in size and the left lung        Impression: 1.  Recurrent small cell lung cancer.  This appears to be in the upper mediastinum just above the radiation field.  This lymph node area is increased in size on CT and markedly hypermetabolic on PET.  She has some other mild abnormalities on PET that have been stable on CT and I suspect do not represent recurrent luis cancer.  2.  History of breast cancer.   She is still on adjuvant tamoxifen is extended adjuvant therapy.    Plan: 1.  I had a long and detailed discussion with Mrs. Horton and her  about the PET findings my diagnosis of recurrent disease in the mediastinum.  I would note that this is almost exactly 6 months from completion of her chemotherapy.  I also reviewed the other abnormalities on PET that I don't think are diagnostic.  2.  I discussed the case by telephone with Dr. Jenny Sanchez and she feels she could radiate the upper mediastinal node.  We then discussed the role of systemic therapy and I have suggested treatment with paclitaxel and carboplatin along with her radiation as we would do for a stage III non-small cell lung cancer.  Other options would include radiation as a single modality, additional systemic therapy either with her original regimen or a second line regimen such as paclitaxel as a single agent or immunotherapy.  3.  I then reviewed this plan in detail with Mrs. Horton and her  and they are agreeable.  I reviewed the potential risks and benefits including the side effects of the chemotherapy agents and the risk of infection as well as the likely development of esophagitis again since this area is adjacent to the esophagus.  They seem to understand all this well and answered their questions to their satisfaction.  4.  We will plan to start chemotherapy in the next week or 2, depending on the timing of her radiation.  I have ordered a referral to Dr. Sanchez again after discussing this with her.                 Visit time was 42 minutes, 28 minutes spent in counseling including review of the imaging study results, treatment options, and coordination with radiation      Jac Rader MD  [facility]    07/10/18           CC:

## 2018-07-10 NOTE — TELEPHONE ENCOUNTER
I called to make patient aware of her appointment for tomorrow @ 0915.  There was no answer.  VM left with appointment information.

## 2018-07-11 ENCOUNTER — HOSPITAL ENCOUNTER (OUTPATIENT)
Dept: RADIATION ONCOLOGY | Facility: HOSPITAL | Age: 63
Setting detail: RADIATION/ONCOLOGY SERIES
Discharge: HOME | End: 2018-07-11

## 2018-07-11 ENCOUNTER — OFFICE VISIT (OUTPATIENT)
Dept: RADIATION ONCOLOGY | Facility: HOSPITAL | Age: 63
End: 2018-07-11

## 2018-07-11 VITALS
DIASTOLIC BLOOD PRESSURE: 61 MMHG | TEMPERATURE: 98.2 F | SYSTOLIC BLOOD PRESSURE: 137 MMHG | WEIGHT: 203.8 LBS | RESPIRATION RATE: 16 BRPM | BODY MASS INDEX: 37.5 KG/M2 | HEIGHT: 62 IN | HEART RATE: 74 BPM

## 2018-07-11 DIAGNOSIS — C34.82 MALIGNANT NEOPLASM OF OVERLAPPING SITES OF LEFT LUNG (HCC): ICD-10-CM

## 2018-07-11 DIAGNOSIS — C34.82 MALIGNANT NEOPLASM OF OVERLAPPING SITES OF LEFT LUNG (HCC): Primary | ICD-10-CM

## 2018-07-11 PROCEDURE — 77470 SPECIAL RADIATION TREATMENT: CPT | Performed by: RADIOLOGY

## 2018-07-11 NOTE — PROGRESS NOTES
CONSULTATION NOTE      :                                                          1955  DATE OF CONSULTATION:                       2018   REQUESTING PHYSICIAN:                   Jac Rader MD  REASON FOR CONSULTATION:          Recurrent small cell carcinoma of the lung       BRIEF HISTORY:  The patient is a very pleasant 62 y.o. female  who completed 60 Gray of external beam radiation in 30 fractions with chemotherapy on 2017 for limited stage small cell carcinoma lung.  She had a PET scan on 2018 was found to have a large left paratracheal lymph node with maximum SUV of 8.3.  There was focal activity in the right hilum in a normal size lymph node 4.8 which is greater than typically seen in benign hilar lymph nodes.  A second somewhat more cephalic right hilar node, normal in size, had an SUV of 3.7 and patchy left upper lobe disease with maximal SUV of 4.0.  There was patchy disease in the superior segment of left lower lobe maximum SUV of 3.4.  There was no metastatic disease.  I've been asked to see her regarding further radiotherapy with concurrent chemotherapy per Dr. Rader.  She understands the treatments are palliative in nature.  She has complaints today of cough, shortness of breath and fatigue.      Allergies   Allergen Reactions   • Codeine Irritability       Social History     Social History   • Marital status:      Social History Main Topics   • Smoking status: Former Smoker     Packs/day: 1.00     Years: 40.00     Types: Cigarettes     Quit date: 2016   • Smokeless tobacco: Never Used      Comment: Smoked less than 1 PPD for past 40 years.   • Alcohol use No   • Drug use: No   • Sexual activity: Defer     Other Topics Concern   • Not on file       Past Medical History:   Diagnosis Date   • Anemia    • Arthritis    • Breast cancer (CMS/HCC)     LEFT BREAST - (NO STICKS/BP'S LEFT ARM PLEASE)   • Cancer (CMS/HCC)    • Diabetes mellitus (CMS/HCC)    • Head  "congestion     4/12/2016: For past 2 days with occasional cough, non productive.   • History of bronchitis     REPORTS HAD BRONCHITIS AROUND JUNE 2017   • Hot flashes     Tolerating medication well, does have occasional hot flashes but denies night sweats.   • Hypertension    • Wears partial dentures     UPPER       family history includes Breast cancer in her sister; Heart disease in her father.     Past Surgical History:   Procedure Laterality Date   • BRONCHOSCOPY N/A 8/31/2017    Procedure: BRONCHOSCOPY  WITH BRUSHINGS AND BXS, ENDOBRONCHIAL ULTRASOUND , FINE NEEDLE ASPIRATION, FLUOROSCOPY;  Surgeon: Holly Granados MD;  Location: [facility];  Service:    • COLONOSCOPY     • DILATATION AND CURETTAGE     • ENDOSCOPY     • MASTECTOMY Left 07/22/2008   • TUBAL ABDOMINAL LIGATION          Review of Systems   Constitutional: Positive for fatigue.   Respiratory: Positive for cough and shortness of breath.    All other systems reviewed and are negative.          Objective   VITAL SIGNS:   Vitals:    07/11/18 0913   BP: 137/61   Pulse: 74   Resp: 16   Temp: 98.2 °F (36.8 °C)   Weight: 92.4 kg (203 lb 12.8 oz)   Height: 157.5 cm (62.01\")   PainSc: 0-No pain        Karnofsky score: 90      Physical Exam   Constitutional: She appears well-developed and well-nourished.   Neck: Neck supple.   Cardiovascular: Normal rate, regular rhythm and normal heart sounds.    Pulmonary/Chest: Effort normal and breath sounds normal.   Abdominal: Soft.   Nursing note and vitals reviewed.           The following portions of the patient's history were reviewed and updated as appropriate: allergies, current medications, past family history, past medical history, past social history, past surgical history and problem list.    Assessment        Recurrent and progression of small cell lung cancer.      RECOMMENDATIONS:  Mrs. Horton has tumor outside of our previously treated area that extends into the treated area.  We discussed reirradiation " of the region.  It has been over 6 months since she completed treatment.  The pros and cons, risks and benefits of treatment were discussed with Mr. Mrs. Horton and informed consent obtained.  She understands there can be increased esophagitis, esophageal narrowing or a fistula.  She will get concurrent chemotherapy per Dr. Rader.  I anticipate approximately 40.50 Gray in 18 fractions.  We will simulate the fields today and start treatment as soon as possible.  Dr. Rader plans to start chemotherapy early next week.  Thank you very much for asking me to see Mrs. Horton.               Snajana was seen today for lung cancer.    Diagnoses and all orders for this visit:    Malignant neoplasm of overlapping sites of left lung (CMS/HCC)         Jenny Sanchez MD

## 2018-07-12 ENCOUNTER — HOSPITAL ENCOUNTER (OUTPATIENT)
Dept: INFUSION THERAPY | Facility: HOSPITAL | Age: 63
Setting detail: INFUSION SERIES
Discharge: HOME | End: 2018-07-12

## 2018-07-12 VITALS
DIASTOLIC BLOOD PRESSURE: 82 MMHG | SYSTOLIC BLOOD PRESSURE: 147 MMHG | WEIGHT: 206 LBS | OXYGEN SATURATION: 98 % | RESPIRATION RATE: 18 BRPM | BODY MASS INDEX: 37.91 KG/M2 | TEMPERATURE: 98 F | HEART RATE: 67 BPM | HEIGHT: 62 IN

## 2018-07-12 DIAGNOSIS — Z17.0 MALIGNANT NEOPLASM OF LEFT BREAST IN FEMALE, ESTROGEN RECEPTOR POSITIVE, UNSPECIFIED SITE OF BREAST (HCC): ICD-10-CM

## 2018-07-12 DIAGNOSIS — C50.912 MALIGNANT NEOPLASM OF LEFT BREAST IN FEMALE, ESTROGEN RECEPTOR POSITIVE, UNSPECIFIED SITE OF BREAST (HCC): ICD-10-CM

## 2018-07-12 DIAGNOSIS — D64.9 ANEMIA, UNSPECIFIED TYPE: Primary | ICD-10-CM

## 2018-07-12 PROCEDURE — C1751 CATH, INF, PER/CENT/MIDLINE: HCPCS

## 2018-07-12 PROCEDURE — C1894 INTRO/SHEATH, NON-LASER: HCPCS

## 2018-07-12 RX ORDER — ONDANSETRON HYDROCHLORIDE 8 MG/1
8 TABLET, FILM COATED ORAL 3 TIMES DAILY PRN
Qty: 30 TABLET | Refills: 5 | Status: CANCELLED | OUTPATIENT
Start: 2018-07-16

## 2018-07-12 RX ORDER — SODIUM CHLORIDE 0.9 % (FLUSH) 0.9 %
1-10 SYRINGE (ML) INJECTION EVERY 12 HOURS SCHEDULED
Status: DISCONTINUED | OUTPATIENT
Start: 2018-07-12 | End: 2018-07-14 | Stop reason: HOSPADM

## 2018-07-12 RX ORDER — SODIUM CHLORIDE 0.9 % (FLUSH) 0.9 %
1-10 SYRINGE (ML) INJECTION AS NEEDED
Status: DISCONTINUED | OUTPATIENT
Start: 2018-07-12 | End: 2018-07-14 | Stop reason: HOSPADM

## 2018-07-12 NOTE — CODE DOCUMENTATION
Discharge teaching performed over Picc line care. Written information also given to pt. Pt verbalized understanding. Pt to started chemo 7/17/18. Advised pt if excessive bleeding is noted around picc line site to hold gentle pressure and go to ER- pt verbalized understanding.

## 2018-07-16 ENCOUNTER — TELEPHONE (OUTPATIENT)
Dept: ONCOLOGY | Facility: CLINIC | Age: 63
End: 2018-07-16

## 2018-07-16 ENCOUNTER — EDUCATION (OUTPATIENT)
Dept: ONCOLOGY | Facility: HOSPITAL | Age: 63
End: 2018-07-16

## 2018-07-16 DIAGNOSIS — C34.82 MALIGNANT NEOPLASM OF OVERLAPPING SITES OF LEFT LUNG (HCC): ICD-10-CM

## 2018-07-16 RX ORDER — ONDANSETRON HYDROCHLORIDE 8 MG/1
8 TABLET, FILM COATED ORAL 3 TIMES DAILY PRN
Qty: 30 TABLET | Refills: 5 | Status: SHIPPED | OUTPATIENT
Start: 2018-07-16 | End: 2019-04-30

## 2018-07-16 NOTE — TELEPHONE ENCOUNTER
----- Message from Vivien Alvarado sent at 7/16/2018  1:53 PM EDT -----  Regarding: ABDIEL-PRESCRIPTION  Contact: [phone]  Patient called and a prescription for zofran was supposed to be called into [facility] in [place], but they don't have it.

## 2018-07-16 NOTE — TELEPHONE ENCOUNTER
Called patient and informed her that her prescription was sent on to [facility] and she had a script for Zofran at [facility].

## 2018-07-16 NOTE — PLAN OF CARE
Outpatient Infusion • [address] • [address] • [place] [id number] • [phone]      CHEMOTHERAPY EDUCATION SHEET    NAME:  Sanjana Horton      : 1955           DATE: 18    Booklets Given: Chemotherapy and You []  Eating Hints []    Sexuality/Fertility Books []     Chemotherapy/Biotherapy Education Sheets: (list all that apply)                                                                                                                                                                Chemotherapy Regimen: paclitaxel weekly X 6 weeks, then 3 weeks off    TOPICS EDUCATION PROVIDED EDUCATION REINFORCED COMMENTS   ANEMIA:  role of RBC, cause, s/s, ways to manage, role of transfusion [x] [] Patient reported being fatigued with previous treatments, but stated she tried to remain active throughout.   THROMBOCYTOPENIA:  role of platelet, cause, s/s, ways to prevent bleeding, things to avoid, when to seek help [x] [] Discussed the role and function of platelets, and that the patient may be at an increased risk of bleeding.   NEUTROPENIA:  role of WBC, cause, infection precautions, s/s of infection, when to call MD [x] [] Discussed the role and function of WBCs, and that the patient may be at an increased risk of infection. Recommended to call MD if temperature greater than 100.4.   NUTRITION & APPETITE CHANGES:  importance of maintaining healthy diet & weight, ways to manage to improve intake, dietary consult, exercise regimen [x] [] Patient reported switching to plastic utensils helped with metallic taste in the past. Encouraged 8-10 glasses of water daily.   DIARRHEA:  causes, s/s of dehydration, ways to manage, dietary changes, when to call MD [x] [] Counseled on the use of loperamide, and recommended to contact MD if diarrhea persists despite use of anti-diarrheals.   CONSTIPATION:  causes, ways to manage, dietary changes, when to call MD [x] [] Discussed that constipation may occur,  recommended docusate and/or Miralax.    NAUSEA & VOMITING:  cause, use of antiemetics, dietary changes, when to call MD [x] [] Counseled on pre-med, home use of ondansetron, and when to contact the MD.   MOUTH SORES:  causes, oral care, ways to manage [x] [] Patient reported mouth sores with previous chemo, advised prophylactic use of salt, baking soda, and water mouthwash.   ALOPECIA:  cause, ways to manage, resources [x] [] Patient reports losing her hair with previous regimens, stated that she received wigs from a friend.    INFERTILITY & SEXUALITY:  causes, fertility preservation options, sexuality changes, ways to manage, importance of birth control [x] [] Recommended the use of proper contraception for 48hrs after chemotherapy.    NERVOUS SYSTEM CHANGES:  causes, s/s, neuropathies, cognitive changes, ways to manage [x] [] Discussed the possibility of peripheral neuropathy, advised to contact MD if it occurs.   PAIN:  causes, ways to manage [] [] ????   SKIN & NAIL CHANGES:  cause, s/s, ways to manage [x] [] Discussed that the patient is at an increased risk of sunburn, counseled on the appropriate use of sunscreen.   ORGAN TOXICITIES:  cause, s/s, need for diagnostic tests, labs, when to notify MD [x] [] Discussed monitoring of kidney and liver function, as well as blood counts during chemotherapy.   SURVIVORSHIP:  distress, distress assessment, secondary malignancies, early/late effects, follow-up, social issues, social support [] []    HOME CARE:  use of spill kits, storing of PO chemo, how to manage bodily fluids [x] [] Discussed proper laundering of soiled linens and clothing, and appropriate cleaning of bodily fluids.   MISCELLANEOUS:  drug interactions, administration, vesicant, et [x] [] Discussed length of infusion, frequency of treatments, and risk of infusion reaction with paclitaxel and function of pre-meds.     Referrals:    Notes: Spoke with patient over the telephone. Patient reported that she  did not have a prescription at home for ondansetron, it appears it was received by the pharmacy on 07/10 at 1252. Advised patient to call her home pharmacy and check, then if the prescription was not there to call back to the clinic. Also advised to call with any other questions or concerns.

## 2018-07-17 ENCOUNTER — INFUSION (OUTPATIENT)
Dept: ONCOLOGY | Facility: HOSPITAL | Age: 63
End: 2018-07-17

## 2018-07-17 ENCOUNTER — DOCUMENTATION (OUTPATIENT)
Dept: NUTRITION | Facility: HOSPITAL | Age: 63
End: 2018-07-17

## 2018-07-17 VITALS
WEIGHT: 208 LBS | RESPIRATION RATE: 16 BRPM | HEART RATE: 78 BPM | BODY MASS INDEX: 38.04 KG/M2 | TEMPERATURE: 97.8 F | DIASTOLIC BLOOD PRESSURE: 74 MMHG | SYSTOLIC BLOOD PRESSURE: 154 MMHG

## 2018-07-17 DIAGNOSIS — C34.82 MALIGNANT NEOPLASM OF OVERLAPPING SITES OF LEFT LUNG (HCC): ICD-10-CM

## 2018-07-17 DIAGNOSIS — C34.82 MALIGNANT NEOPLASM OF OVERLAPPING SITES OF LEFT LUNG (HCC): Primary | ICD-10-CM

## 2018-07-17 LAB
ALBUMIN SERPL-MCNC: 3.8 G/DL (ref 3.5–5)
ALBUMIN/GLOB SERPL: 1.1 G/DL (ref 1–2)
ALP SERPL-CCNC: 122 U/L (ref 38–126)
ALT SERPL W P-5'-P-CCNC: 42 U/L (ref 13–69)
ANION GAP SERPL CALCULATED.3IONS-SCNC: 7.9 MMOL/L (ref 10–20)
AST SERPL-CCNC: 37 U/L (ref 15–46)
BASOPHILS # BLD AUTO: 0.02 10*3/MM3 (ref 0–0.2)
BASOPHILS NFR BLD AUTO: 0.6 % (ref 0–2.5)
BILIRUB SERPL-MCNC: 0.7 MG/DL (ref 0.2–1.3)
BUN BLD-MCNC: 15 MG/DL (ref 7–20)
BUN/CREAT SERPL: 16.7 (ref 7.1–23.5)
CALCIUM SPEC-SCNC: 10.1 MG/DL (ref 8.4–10.2)
CHLORIDE SERPL-SCNC: 106 MMOL/L (ref 98–107)
CO2 SERPL-SCNC: 29 MMOL/L (ref 26–30)
CREAT BLD-MCNC: 0.9 MG/DL (ref 0.6–1.3)
DEPRECATED RDW RBC AUTO: 49.7 FL (ref 37–54)
EOSINOPHIL # BLD AUTO: 0.14 10*3/MM3 (ref 0–0.7)
EOSINOPHIL NFR BLD AUTO: 4.4 % (ref 0–7)
ERYTHROCYTE [DISTWIDTH] IN BLOOD BY AUTOMATED COUNT: 14.6 % (ref 11.5–14.5)
GFR SERPL CREATININE-BSD FRML MDRD: 64 ML/MIN/1.73
GLOBULIN UR ELPH-MCNC: 3.5 GM/DL
GLUCOSE BLD-MCNC: 104 MG/DL (ref 74–98)
HCT VFR BLD AUTO: 31.5 % (ref 37–47)
HGB BLD-MCNC: 10.6 G/DL (ref 12–16)
IMM GRANULOCYTES # BLD: 0.01 10*3/MM3 (ref 0–0.06)
IMM GRANULOCYTES NFR BLD: 0.3 % (ref 0–0.6)
LYMPHOCYTES # BLD AUTO: 0.82 10*3/MM3 (ref 0.6–3.4)
LYMPHOCYTES NFR BLD AUTO: 25.7 % (ref 10–50)
MCH RBC QN AUTO: 31.9 PG (ref 27–31)
MCHC RBC AUTO-ENTMCNC: 33.7 G/DL (ref 30–37)
MCV RBC AUTO: 94.9 FL (ref 81–99)
MONOCYTES # BLD AUTO: 0.25 10*3/MM3 (ref 0–0.9)
MONOCYTES NFR BLD AUTO: 7.8 % (ref 0–12)
NEUTROPHILS # BLD AUTO: 1.95 10*3/MM3 (ref 2–6.9)
NEUTROPHILS NFR BLD AUTO: 61.2 % (ref 37–80)
NRBC BLD MANUAL-RTO: 0 /100 WBC (ref 0–0)
PLATELET # BLD AUTO: 116 10*3/MM3 (ref 130–400)
PMV BLD AUTO: 9.1 FL (ref 6–12)
POTASSIUM BLD-SCNC: 3.9 MMOL/L (ref 3.5–5.1)
PROT SERPL-MCNC: 7.3 G/DL (ref 6.3–8.2)
RBC # BLD AUTO: 3.32 10*6/MM3 (ref 4.2–5.4)
SODIUM BLD-SCNC: 139 MMOL/L (ref 137–145)
WBC NRBC COR # BLD: 3.19 10*3/MM3 (ref 4.8–10.8)

## 2018-07-17 PROCEDURE — 96374 THER/PROPH/DIAG INJ IV PUSH: CPT

## 2018-07-17 PROCEDURE — 85025 COMPLETE CBC W/AUTO DIFF WBC: CPT

## 2018-07-17 PROCEDURE — 96413 CHEMO IV INFUSION 1 HR: CPT

## 2018-07-17 PROCEDURE — 80053 COMPREHEN METABOLIC PANEL: CPT

## 2018-07-17 PROCEDURE — 25010000002 DIPHENHYDRAMINE PER 50 MG: Performed by: NURSE PRACTITIONER

## 2018-07-17 PROCEDURE — 96367 TX/PROPH/DG ADDL SEQ IV INF: CPT

## 2018-07-17 PROCEDURE — 25010000002 PACLITAXEL PER 30 MG: Performed by: NURSE PRACTITIONER

## 2018-07-17 PROCEDURE — 25010000002 DEXAMETHASONE PER 1 MG: Performed by: NURSE PRACTITIONER

## 2018-07-17 PROCEDURE — 96375 TX/PRO/DX INJ NEW DRUG ADDON: CPT

## 2018-07-17 PROCEDURE — 36415 COLL VENOUS BLD VENIPUNCTURE: CPT

## 2018-07-17 PROCEDURE — 96368 THER/DIAG CONCURRENT INF: CPT

## 2018-07-17 RX ORDER — FAMOTIDINE 10 MG/ML
20 INJECTION, SOLUTION INTRAVENOUS ONCE
Status: CANCELLED | OUTPATIENT
Start: 2018-07-23

## 2018-07-17 RX ORDER — FAMOTIDINE 10 MG/ML
20 INJECTION, SOLUTION INTRAVENOUS ONCE
Status: CANCELLED | OUTPATIENT
Start: 2018-08-20

## 2018-07-17 RX ORDER — SODIUM CHLORIDE 9 MG/ML
250 INJECTION, SOLUTION INTRAVENOUS ONCE
Status: CANCELLED | OUTPATIENT
Start: 2018-08-20

## 2018-07-17 RX ORDER — FAMOTIDINE 10 MG/ML
20 INJECTION, SOLUTION INTRAVENOUS ONCE
Status: COMPLETED | OUTPATIENT
Start: 2018-07-17 | End: 2018-07-17

## 2018-07-17 RX ORDER — FAMOTIDINE 10 MG/ML
20 INJECTION, SOLUTION INTRAVENOUS ONCE
Status: CANCELLED | OUTPATIENT
Start: 2018-08-13

## 2018-07-17 RX ORDER — SODIUM CHLORIDE 9 MG/ML
250 INJECTION, SOLUTION INTRAVENOUS ONCE
Status: DISCONTINUED | OUTPATIENT
Start: 2018-07-17 | End: 2018-07-17 | Stop reason: HOSPADM

## 2018-07-17 RX ORDER — FAMOTIDINE 10 MG/ML
20 INJECTION, SOLUTION INTRAVENOUS ONCE
Status: CANCELLED | OUTPATIENT
Start: 2018-07-17

## 2018-07-17 RX ORDER — SODIUM CHLORIDE 9 MG/ML
250 INJECTION, SOLUTION INTRAVENOUS ONCE
Status: CANCELLED | OUTPATIENT
Start: 2018-07-23

## 2018-07-17 RX ORDER — SODIUM CHLORIDE 9 MG/ML
250 INJECTION, SOLUTION INTRAVENOUS ONCE
Status: CANCELLED | OUTPATIENT
Start: 2018-08-13

## 2018-07-17 RX ORDER — SODIUM CHLORIDE 9 MG/ML
250 INJECTION, SOLUTION INTRAVENOUS ONCE
Status: CANCELLED | OUTPATIENT
Start: 2018-08-06

## 2018-07-17 RX ORDER — SODIUM CHLORIDE 9 MG/ML
250 INJECTION, SOLUTION INTRAVENOUS ONCE
Status: CANCELLED | OUTPATIENT
Start: 2018-07-17

## 2018-07-17 RX ORDER — FAMOTIDINE 10 MG/ML
20 INJECTION, SOLUTION INTRAVENOUS ONCE
Status: CANCELLED | OUTPATIENT
Start: 2018-07-30

## 2018-07-17 RX ORDER — SODIUM CHLORIDE 9 MG/ML
250 INJECTION, SOLUTION INTRAVENOUS ONCE
Status: CANCELLED | OUTPATIENT
Start: 2018-07-30

## 2018-07-17 RX ORDER — FAMOTIDINE 10 MG/ML
20 INJECTION, SOLUTION INTRAVENOUS ONCE
Status: CANCELLED | OUTPATIENT
Start: 2018-08-06

## 2018-07-17 RX ADMIN — DIPHENHYDRAMINE HYDROCHLORIDE 50 MG: 50 INJECTION INTRAMUSCULAR; INTRAVENOUS at 09:27

## 2018-07-17 RX ADMIN — FAMOTIDINE 20 MG: 10 INJECTION, SOLUTION INTRAVENOUS at 09:25

## 2018-07-17 RX ADMIN — PACLITAXEL 95 MG: 6 INJECTION, SOLUTION INTRAVENOUS at 09:45

## 2018-07-17 RX ADMIN — DEXAMETHASONE SODIUM PHOSPHATE 12 MG: 4 INJECTION, SOLUTION INTRA-ARTICULAR; INTRALESIONAL; INTRAMUSCULAR; INTRAVENOUS; SOFT TISSUE at 09:26

## 2018-07-17 NOTE — PROGRESS NOTES
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 1 inhaler 5   • amLODIPine (NORVASC) 5 MG tablet Take 5 mg by mouth Daily.  0   • aspirin 81 MG EC tablet Take 81 mg by mouth Daily.     • hydrochlorothiazide (MICROZIDE) 12.5 MG capsule Take 12.5 mg by mouth Every Morning.  0   • losartan-hydrochlorothiazide (HYZAAR) 100-12.5 MG per tablet Take 1 tablet by mouth Daily.  0   • metFORMIN (GLUCOPHAGE) 500 MG tablet Take 500 mg by mouth 2 (Two) Times a Day With Meals.  0   • ondansetron (ZOFRAN) 8 MG tablet Take 1 tablet by mouth 3 (Three) Times a Day As Needed for Nausea or Vomiting. 30 tablet 5   • pantoprazole (PROTONIX) 40 MG EC tablet Take 1 tablet by mouth Every 12 (Twelve) Hours. 60 tablet 0   • potassium chloride ER (K-TAB) 20 MEQ tablet controlled-release ER tablet Take 1 tablet by mouth 2 (Two) Times a Day. 30 tablet 1   • tamoxifen (NOLVADEX) 20 MG chemo tablet Take 1 tablet by mouth Daily. 90 tablet 3   • umeclidinium-vilanterol (ANORO ELLIPTA) 62.5-25 MCG/INH aerosol powder  inhaler Inhale 1 puff Daily. 1 each 5   • vitamin D (ERGOCALCIFEROL) 04903 units capsule capsule Take 50,000 Units by mouth Every 7 (Seven) Days.  0     No current facility-administered medications for this visit.      Facility-Administered Medications Ordered in Other Visits   Medication Dose Route Frequency Provider Last  "Rate Last Dose   • sodium chloride 0.9 % infusion 250 mL  250 mL Intravenous Once Елена ZUNIGA Mic, AQUILES           Glycemic Risk:   None currently    Weight:   Height: 62\"  Weight: 206 lbs.    BMI: 37.7  Weight Change: lost 1 lb. Over 3 weeks    Oral Food Intake:  Regular Diet - No Restrictions    Hydration Status:   How many 8 ounce glass of water of fluid do you drink per day?  6    Enteral Feeding:   N/A    Nutrition Symptoms:   None at this time    Activity:   Not my normal self, but able to be up and about with fairly normal activities     reports that she quit smoking about 2 years ago. Her smoking use included Cigarettes. She has a 40.00 pack-year smoking history. She has never used smokeless tobacco. She reports that she does not drink alcohol or use drugs.    Evaluation of Nutritional Risk:   Patient identified to be at nutritional risk and/or for nutritional consultation; will follow patient through course of treatment.   Diagnosis     Pt. Received handouts from the National [employer] [place] and Academy of Nutrition and Dietetics regarding: Quick and Easy Snacks, Foods and Drinks that are easy on the stomach, Nausea/Vomiting diet recommendations, Foods that are high in protein and high in calories, Weight loss nutrition recommendations with recipes, Oncology: Nutrition Tips for Well-Being and Oncology: Cooking Tips. RD encouraged drinking adequate amounts of water daily 8 glasses or 64 ounces and to focus on hydration hourly. RD also discussed the importance of 5-6 small meals daily and trying to snack throughout the day and the use of nutritional supplements between meals. Pt. Was also provided with a recipe book from [employer]. Pt. Has program contact information and has been encouraged to call with questions.          Electronically signed by:  Elham Avelar RD  11:52 AM  "

## 2018-07-23 ENCOUNTER — HOSPITAL ENCOUNTER (OUTPATIENT)
Dept: MRI IMAGING | Facility: HOSPITAL | Age: 63
Discharge: HOME | End: 2018-07-23
Admitting: INTERNAL MEDICINE

## 2018-07-23 DIAGNOSIS — C34.82 MALIGNANT NEOPLASM OF OVERLAPPING SITES OF LEFT LUNG (HCC): ICD-10-CM

## 2018-07-23 PROCEDURE — 70553 MRI BRAIN STEM W/O & W/DYE: CPT

## 2018-07-23 PROCEDURE — 0 GADOBENATE DIMEGLUMINE 529 MG/ML SOLUTION: Performed by: INTERNAL MEDICINE

## 2018-07-23 PROCEDURE — A9577 INJ MULTIHANCE: HCPCS | Performed by: INTERNAL MEDICINE

## 2018-07-23 RX ADMIN — GADOBENATE DIMEGLUMINE 15 ML: 529 INJECTION, SOLUTION INTRAVENOUS at 10:45

## 2018-07-24 ENCOUNTER — INFUSION (OUTPATIENT)
Dept: ONCOLOGY | Facility: HOSPITAL | Age: 63
End: 2018-07-24

## 2018-07-24 VITALS
DIASTOLIC BLOOD PRESSURE: 70 MMHG | TEMPERATURE: 97.9 F | WEIGHT: 205 LBS | SYSTOLIC BLOOD PRESSURE: 162 MMHG | HEART RATE: 76 BPM | BODY MASS INDEX: 37.49 KG/M2

## 2018-07-24 DIAGNOSIS — C34.82 MALIGNANT NEOPLASM OF OVERLAPPING SITES OF LEFT LUNG (HCC): Primary | ICD-10-CM

## 2018-07-24 LAB
ALBUMIN SERPL-MCNC: 4.3 G/DL (ref 3.5–5)
ALBUMIN/GLOB SERPL: 1.2 G/DL (ref 1–2)
ALP SERPL-CCNC: 121 U/L (ref 38–126)
ALT SERPL W P-5'-P-CCNC: 50 U/L (ref 13–69)
ANION GAP SERPL CALCULATED.3IONS-SCNC: 7.7 MMOL/L (ref 10–20)
AST SERPL-CCNC: 44 U/L (ref 15–46)
BASOPHILS # BLD AUTO: 0.03 10*3/MM3 (ref 0–0.2)
BASOPHILS NFR BLD AUTO: 0.7 % (ref 0–2.5)
BILIRUB SERPL-MCNC: 0.8 MG/DL (ref 0.2–1.3)
BUN BLD-MCNC: 17 MG/DL (ref 7–20)
BUN/CREAT SERPL: 18.9 (ref 7.1–23.5)
CALCIUM SPEC-SCNC: 10.5 MG/DL (ref 8.4–10.2)
CHLORIDE SERPL-SCNC: 103 MMOL/L (ref 98–107)
CO2 SERPL-SCNC: 32 MMOL/L (ref 26–30)
CREAT BLD-MCNC: 0.9 MG/DL (ref 0.6–1.3)
DEPRECATED RDW RBC AUTO: 47.4 FL (ref 37–54)
EOSINOPHIL # BLD AUTO: 0.12 10*3/MM3 (ref 0–0.7)
EOSINOPHIL NFR BLD AUTO: 3 % (ref 0–7)
ERYTHROCYTE [DISTWIDTH] IN BLOOD BY AUTOMATED COUNT: 13.9 % (ref 11.5–14.5)
GFR SERPL CREATININE-BSD FRML MDRD: 64 ML/MIN/1.73
GLOBULIN UR ELPH-MCNC: 3.5 GM/DL
GLUCOSE BLD-MCNC: 118 MG/DL (ref 74–98)
HCT VFR BLD AUTO: 33.5 % (ref 37–47)
HGB BLD-MCNC: 11.5 G/DL (ref 12–16)
IMM GRANULOCYTES # BLD: 0.03 10*3/MM3 (ref 0–0.06)
IMM GRANULOCYTES NFR BLD: 0.7 % (ref 0–0.6)
LYMPHOCYTES # BLD AUTO: 1.07 10*3/MM3 (ref 0.6–3.4)
LYMPHOCYTES NFR BLD AUTO: 26.7 % (ref 10–50)
MCH RBC QN AUTO: 32.5 PG (ref 27–31)
MCHC RBC AUTO-ENTMCNC: 34.3 G/DL (ref 30–37)
MCV RBC AUTO: 94.6 FL (ref 81–99)
MONOCYTES # BLD AUTO: 0.31 10*3/MM3 (ref 0–0.9)
MONOCYTES NFR BLD AUTO: 7.7 % (ref 0–12)
NEUTROPHILS # BLD AUTO: 2.45 10*3/MM3 (ref 2–6.9)
NEUTROPHILS NFR BLD AUTO: 61.2 % (ref 37–80)
NRBC BLD MANUAL-RTO: 0 /100 WBC (ref 0–0)
PLATELET # BLD AUTO: 132 10*3/MM3 (ref 130–400)
PMV BLD AUTO: 9.3 FL (ref 6–12)
POTASSIUM BLD-SCNC: 3.7 MMOL/L (ref 3.5–5.1)
PROT SERPL-MCNC: 7.8 G/DL (ref 6.3–8.2)
RBC # BLD AUTO: 3.54 10*6/MM3 (ref 4.2–5.4)
SODIUM BLD-SCNC: 139 MMOL/L (ref 137–145)
WBC NRBC COR # BLD: 4.01 10*3/MM3 (ref 4.8–10.8)

## 2018-07-24 PROCEDURE — 36415 COLL VENOUS BLD VENIPUNCTURE: CPT

## 2018-07-24 PROCEDURE — 85025 COMPLETE CBC W/AUTO DIFF WBC: CPT

## 2018-07-24 PROCEDURE — 96368 THER/DIAG CONCURRENT INF: CPT

## 2018-07-24 PROCEDURE — 80053 COMPREHEN METABOLIC PANEL: CPT

## 2018-07-24 PROCEDURE — 25010000002 DIPHENHYDRAMINE PER 50 MG: Performed by: NURSE PRACTITIONER

## 2018-07-24 PROCEDURE — 25010000002 DEXAMETHASONE PER 1 MG: Performed by: NURSE PRACTITIONER

## 2018-07-24 PROCEDURE — 96366 THER/PROPH/DIAG IV INF ADDON: CPT

## 2018-07-24 PROCEDURE — 96375 TX/PRO/DX INJ NEW DRUG ADDON: CPT

## 2018-07-24 PROCEDURE — 25010000002 PACLITAXEL PER 30 MG: Performed by: NURSE PRACTITIONER

## 2018-07-24 PROCEDURE — 96413 CHEMO IV INFUSION 1 HR: CPT

## 2018-07-24 PROCEDURE — 96367 TX/PROPH/DG ADDL SEQ IV INF: CPT

## 2018-07-24 RX ORDER — SODIUM CHLORIDE 9 MG/ML
250 INJECTION, SOLUTION INTRAVENOUS ONCE
Status: DISCONTINUED | OUTPATIENT
Start: 2018-07-24 | End: 2018-07-24 | Stop reason: HOSPADM

## 2018-07-24 RX ORDER — FAMOTIDINE 10 MG/ML
20 INJECTION, SOLUTION INTRAVENOUS ONCE
Status: COMPLETED | OUTPATIENT
Start: 2018-07-24 | End: 2018-07-24

## 2018-07-24 RX ADMIN — DEXAMETHASONE SODIUM PHOSPHATE 12 MG: 4 INJECTION, SOLUTION INTRA-ARTICULAR; INTRALESIONAL; INTRAMUSCULAR; INTRAVENOUS; SOFT TISSUE at 09:28

## 2018-07-24 RX ADMIN — FAMOTIDINE 20 MG: 10 INJECTION INTRAVENOUS at 09:28

## 2018-07-24 RX ADMIN — DIPHENHYDRAMINE HYDROCHLORIDE 25 MG: 50 INJECTION INTRAMUSCULAR; INTRAVENOUS at 09:27

## 2018-07-24 RX ADMIN — PACLITAXEL 95 MG: 6 INJECTION, SOLUTION INTRAVENOUS at 09:53

## 2018-07-25 ENCOUNTER — OFFICE VISIT (OUTPATIENT)
Dept: RADIATION ONCOLOGY | Facility: HOSPITAL | Age: 63
End: 2018-07-25

## 2018-07-25 ENCOUNTER — HOSPITAL ENCOUNTER (OUTPATIENT)
Dept: RADIATION ONCOLOGY | Facility: HOSPITAL | Age: 63
Discharge: HOME | End: 2018-07-25

## 2018-07-25 VITALS
HEART RATE: 79 BPM | DIASTOLIC BLOOD PRESSURE: 83 MMHG | OXYGEN SATURATION: 97 % | BODY MASS INDEX: 37.93 KG/M2 | SYSTOLIC BLOOD PRESSURE: 184 MMHG | WEIGHT: 206.1 LBS | TEMPERATURE: 98.2 F | RESPIRATION RATE: 18 BRPM | HEIGHT: 62 IN

## 2018-07-25 DIAGNOSIS — C34.82 MALIGNANT NEOPLASM OF OVERLAPPING SITES OF LEFT LUNG (HCC): Primary | ICD-10-CM

## 2018-07-25 PROCEDURE — 77470 SPECIAL RADIATION TREATMENT: CPT | Performed by: RADIOLOGY

## 2018-07-25 PROCEDURE — 77290 THER RAD SIMULAJ FIELD CPLX: CPT | Performed by: RADIOLOGY

## 2018-07-25 PROCEDURE — G0463 HOSPITAL OUTPT CLINIC VISIT: HCPCS

## 2018-07-25 NOTE — PROGRESS NOTES
RE-EVALUATION NOTE    NAME:      Sanjana Horton  :                                                          1955  DATE OF RE-EVALUATION:                       2018   REQUESTING PHYSICIAN:                   Jac Rader MD  REASON FOR RE-EVALUATION:           recurrent/progression of lung cancer  Staging form: Breast, AJCC V7  - Pathologic stage from 2008: Stage IIIA (T3, N1a, cM0)   Malignant neoplasm of overlapping sites of left lung (CMS/HCC)  Staging form: Lung, AJCC V7  - Clinical stage from 10/26/2017: Stage IIIB (T2b, N3, M0) - Signed by Jenny Sanchez MD on 10/26/2017           BRIEF HISTORY:  Sanjana Horton  returns today to discuss CyberKnife SBRT to the recurrent lung tumor.  There was too much overlap to give more conventional radiotherapy.  Dr. Rader is aware and will give chemotherapy before and after the radius and therapy treatment.  Mrs. Horton and her  saw the CyberKnife video.  All of their questions were answered and we will simulate the fields today.    Allergies   Allergen Reactions   • Codeine Irritability       Social History   Substance Use Topics   • Smoking status: Former Smoker     Packs/day: 1.00     Years: 40.00     Types: Cigarettes     Quit date: 2016   • Smokeless tobacco: Never Used      Comment: Smoked less than 1 PPD for past 40 years.   • Alcohol use No          Past Medical History:   Diagnosis Date   • Anemia    • Arthritis    • Breast cancer (CMS/HCC)     LEFT BREAST - (NO STICKS/BP'S LEFT ARM PLEASE)   • Cancer (CMS/HCC)    • Diabetes mellitus (CMS/HCC)    • Head congestion     2016: For past 2 days with occasional cough, non productive.   • History of bronchitis     REPORTS HAD BRONCHITIS AROUND 2017   • Hot flashes     Tolerating medication well, does have occasional hot flashes but denies night sweats.   • Hypertension    • Wears partial dentures     UPPER       family history includes Breast cancer in her sister;  "Heart disease in her father.     Past Surgical History:   Procedure Laterality Date   • BRONCHOSCOPY N/A 8/31/2017    Procedure: BRONCHOSCOPY  WITH BRUSHINGS AND BXS, ENDOBRONCHIAL ULTRASOUND , FINE NEEDLE ASPIRATION, FLUOROSCOPY;  Surgeon: Holly Granados MD;  Location: [facility];  Service:    • COLONOSCOPY     • DILATATION AND CURETTAGE     • ENDOSCOPY     • MASTECTOMY Left 07/22/2008   • TUBAL ABDOMINAL LIGATION          Review of Systems   Constitutional: Positive for fatigue.   Respiratory: Positive for cough.    All other systems reviewed and are negative.          Objective   VITAL SIGNS:   Vitals:    07/25/18 0807   BP: (!) 184/83   Pulse: 79   Resp: 18   Temp: 98.2 °F (36.8 °C)   SpO2: 97%   Weight: 93.5 kg (206 lb 1.6 oz)   Height: 157.5 cm (62\")   PainSc: 0-No pain        KPS       90%    Physical Exam   Constitutional: She appears well-developed and well-nourished.   Cardiovascular: Normal rate.    Pulmonary/Chest: Effort normal.   Nursing note and vitals reviewed.           The following portions of the patient's history were reviewed and updated as appropriate: allergies, current medications, past family history, past medical history, past social history, past surgical history and problem list.    Assessment      IMPRESSION: Progression of small cell lung cancer    RECOMMENDATIONS:  Sanjana Horton is a 62 y.o. -year-old female has recurrent/progression of small cell lung cancer.  I spent time today with the patient and her  discussing the overall prognosis of the cancer.  Because the fields overlapped from previous radiotherapy we were unable to proceed with conventional radiation and elected for CyberKnife SBRT.  She will be receiving radiation to an overlapping and abutting radiation field.  She will undergo CT simulation for treatment planning today and once planning is completed we will begin in approximately 1 week.  We are coordinating this effort with Dr. Rader who plans to give " chemotherapy before and after SBRT.  This is a palliative course of treatment and  and Mrs. Horton understand.     Jenny Sanchez MD      Errors in dictation may reflect use of voice recognition software and not all errors in transcription may have been detected prior to signing.

## 2018-07-26 ENCOUNTER — INFUSION (OUTPATIENT)
Dept: ONCOLOGY | Facility: HOSPITAL | Age: 63
End: 2018-07-26

## 2018-07-26 DIAGNOSIS — C50.912 MALIGNANT NEOPLASM OF LEFT BREAST IN FEMALE, ESTROGEN RECEPTOR POSITIVE, UNSPECIFIED SITE OF BREAST (HCC): ICD-10-CM

## 2018-07-26 DIAGNOSIS — Z17.0 MALIGNANT NEOPLASM OF LEFT BREAST IN FEMALE, ESTROGEN RECEPTOR POSITIVE, UNSPECIFIED SITE OF BREAST (HCC): ICD-10-CM

## 2018-07-26 PROCEDURE — G0463 HOSPITAL OUTPT CLINIC VISIT: HCPCS

## 2018-07-31 ENCOUNTER — INFUSION (OUTPATIENT)
Dept: ONCOLOGY | Facility: HOSPITAL | Age: 63
End: 2018-07-31

## 2018-07-31 VITALS
WEIGHT: 205 LBS | SYSTOLIC BLOOD PRESSURE: 154 MMHG | BODY MASS INDEX: 37.49 KG/M2 | RESPIRATION RATE: 16 BRPM | HEART RATE: 91 BPM | DIASTOLIC BLOOD PRESSURE: 71 MMHG | TEMPERATURE: 97.3 F

## 2018-07-31 DIAGNOSIS — C34.82 MALIGNANT NEOPLASM OF OVERLAPPING SITES OF LEFT LUNG (HCC): Primary | ICD-10-CM

## 2018-07-31 LAB
ALBUMIN SERPL-MCNC: 3.9 G/DL (ref 3.5–5)
ALBUMIN/GLOB SERPL: 1.1 G/DL (ref 1–2)
ALP SERPL-CCNC: 113 U/L (ref 38–126)
ALT SERPL W P-5'-P-CCNC: 53 U/L (ref 13–69)
ANION GAP SERPL CALCULATED.3IONS-SCNC: 8.8 MMOL/L (ref 10–20)
AST SERPL-CCNC: 40 U/L (ref 15–46)
BASOPHILS # BLD AUTO: 0.03 10*3/MM3 (ref 0–0.2)
BASOPHILS NFR BLD AUTO: 0.9 % (ref 0–2.5)
BILIRUB SERPL-MCNC: 0.6 MG/DL (ref 0.2–1.3)
BUN BLD-MCNC: 17 MG/DL (ref 7–20)
BUN/CREAT SERPL: 18.9 (ref 7.1–23.5)
CALCIUM SPEC-SCNC: 10.8 MG/DL (ref 8.4–10.2)
CHLORIDE SERPL-SCNC: 106 MMOL/L (ref 98–107)
CO2 SERPL-SCNC: 29 MMOL/L (ref 26–30)
CREAT BLD-MCNC: 0.9 MG/DL (ref 0.6–1.3)
DEPRECATED RDW RBC AUTO: 46.5 FL (ref 37–54)
EOSINOPHIL # BLD AUTO: 0.09 10*3/MM3 (ref 0–0.7)
EOSINOPHIL NFR BLD AUTO: 2.7 % (ref 0–7)
ERYTHROCYTE [DISTWIDTH] IN BLOOD BY AUTOMATED COUNT: 13.7 % (ref 11.5–14.5)
GFR SERPL CREATININE-BSD FRML MDRD: 64 ML/MIN/1.73
GLOBULIN UR ELPH-MCNC: 3.4 GM/DL
GLUCOSE BLD-MCNC: 114 MG/DL (ref 74–98)
HCT VFR BLD AUTO: 31.9 % (ref 37–47)
HGB BLD-MCNC: 10.7 G/DL (ref 12–16)
IMM GRANULOCYTES # BLD: 0.02 10*3/MM3 (ref 0–0.06)
IMM GRANULOCYTES NFR BLD: 0.6 % (ref 0–0.6)
LYMPHOCYTES # BLD AUTO: 1.02 10*3/MM3 (ref 0.6–3.4)
LYMPHOCYTES NFR BLD AUTO: 30.8 % (ref 10–50)
MCH RBC QN AUTO: 31.8 PG (ref 27–31)
MCHC RBC AUTO-ENTMCNC: 33.5 G/DL (ref 30–37)
MCV RBC AUTO: 94.9 FL (ref 81–99)
MONOCYTES # BLD AUTO: 0.23 10*3/MM3 (ref 0–0.9)
MONOCYTES NFR BLD AUTO: 6.9 % (ref 0–12)
NEUTROPHILS # BLD AUTO: 1.92 10*3/MM3 (ref 2–6.9)
NEUTROPHILS NFR BLD AUTO: 58.1 % (ref 37–80)
NRBC BLD MANUAL-RTO: 0 /100 WBC (ref 0–0)
PLATELET # BLD AUTO: 153 10*3/MM3 (ref 130–400)
PMV BLD AUTO: 9.3 FL (ref 6–12)
POTASSIUM BLD-SCNC: 3.8 MMOL/L (ref 3.5–5.1)
PROT SERPL-MCNC: 7.3 G/DL (ref 6.3–8.2)
RBC # BLD AUTO: 3.36 10*6/MM3 (ref 4.2–5.4)
SODIUM BLD-SCNC: 140 MMOL/L (ref 137–145)
WBC NRBC COR # BLD: 3.31 10*3/MM3 (ref 4.8–10.8)

## 2018-07-31 PROCEDURE — 80053 COMPREHEN METABOLIC PANEL: CPT

## 2018-07-31 PROCEDURE — 96375 TX/PRO/DX INJ NEW DRUG ADDON: CPT

## 2018-07-31 PROCEDURE — 96368 THER/DIAG CONCURRENT INF: CPT

## 2018-07-31 PROCEDURE — 25010000002 DEXAMETHASONE PER 1 MG: Performed by: NURSE PRACTITIONER

## 2018-07-31 PROCEDURE — 85025 COMPLETE CBC W/AUTO DIFF WBC: CPT

## 2018-07-31 PROCEDURE — 96366 THER/PROPH/DIAG IV INF ADDON: CPT

## 2018-07-31 PROCEDURE — 96413 CHEMO IV INFUSION 1 HR: CPT

## 2018-07-31 PROCEDURE — 96367 TX/PROPH/DG ADDL SEQ IV INF: CPT

## 2018-07-31 PROCEDURE — 25010000002 PACLITAXEL PER 30 MG: Performed by: NURSE PRACTITIONER

## 2018-07-31 PROCEDURE — 36415 COLL VENOUS BLD VENIPUNCTURE: CPT

## 2018-07-31 PROCEDURE — 25010000002 DIPHENHYDRAMINE PER 50 MG: Performed by: NURSE PRACTITIONER

## 2018-07-31 RX ORDER — SODIUM CHLORIDE 9 MG/ML
250 INJECTION, SOLUTION INTRAVENOUS ONCE
Status: DISCONTINUED | OUTPATIENT
Start: 2018-07-31 | End: 2018-07-31 | Stop reason: HOSPADM

## 2018-07-31 RX ORDER — FAMOTIDINE 10 MG/ML
20 INJECTION, SOLUTION INTRAVENOUS ONCE
Status: COMPLETED | OUTPATIENT
Start: 2018-07-31 | End: 2018-07-31

## 2018-07-31 RX ADMIN — DIPHENHYDRAMINE HYDROCHLORIDE 25 MG: 50 INJECTION INTRAMUSCULAR; INTRAVENOUS at 09:39

## 2018-07-31 RX ADMIN — PACLITAXEL 95 MG: 6 INJECTION, SOLUTION INTRAVENOUS at 10:01

## 2018-07-31 RX ADMIN — FAMOTIDINE 20 MG: 10 INJECTION, SOLUTION INTRAVENOUS at 09:39

## 2018-07-31 RX ADMIN — DEXAMETHASONE SODIUM PHOSPHATE 12 MG: 4 INJECTION, SOLUTION INTRA-ARTICULAR; INTRALESIONAL; INTRAMUSCULAR; INTRAVENOUS; SOFT TISSUE at 09:39

## 2018-08-01 ENCOUNTER — HOSPITAL ENCOUNTER (OUTPATIENT)
Dept: RADIATION ONCOLOGY | Facility: HOSPITAL | Age: 63
Setting detail: RADIATION/ONCOLOGY SERIES
Discharge: HOME | End: 2018-08-01

## 2018-08-02 PROCEDURE — 77370 RADIATION PHYSICS CONSULT: CPT | Performed by: RADIOLOGY

## 2018-08-02 PROCEDURE — 77300 RADIATION THERAPY DOSE PLAN: CPT | Performed by: RADIOLOGY

## 2018-08-02 PROCEDURE — 77295 3-D RADIOTHERAPY PLAN: CPT | Performed by: RADIOLOGY

## 2018-08-02 PROCEDURE — 77334 RADIATION TREATMENT AID(S): CPT | Performed by: RADIOLOGY

## 2018-08-07 ENCOUNTER — INFUSION (OUTPATIENT)
Dept: ONCOLOGY | Facility: HOSPITAL | Age: 63
End: 2018-08-07

## 2018-08-07 ENCOUNTER — OFFICE VISIT (OUTPATIENT)
Dept: ONCOLOGY | Facility: CLINIC | Age: 63
End: 2018-08-07

## 2018-08-07 ENCOUNTER — DOCUMENTATION (OUTPATIENT)
Dept: NUTRITION | Facility: HOSPITAL | Age: 63
End: 2018-08-07

## 2018-08-07 VITALS
DIASTOLIC BLOOD PRESSURE: 65 MMHG | WEIGHT: 205 LBS | HEIGHT: 62 IN | BODY MASS INDEX: 37.73 KG/M2 | HEART RATE: 77 BPM | SYSTOLIC BLOOD PRESSURE: 145 MMHG | RESPIRATION RATE: 15 BRPM | TEMPERATURE: 97.2 F

## 2018-08-07 VITALS — SYSTOLIC BLOOD PRESSURE: 143 MMHG | DIASTOLIC BLOOD PRESSURE: 66 MMHG | HEART RATE: 78 BPM

## 2018-08-07 DIAGNOSIS — C34.82 MALIGNANT NEOPLASM OF OVERLAPPING SITES OF LEFT LUNG (HCC): Primary | ICD-10-CM

## 2018-08-07 DIAGNOSIS — Z17.0 MALIGNANT NEOPLASM OF LEFT BREAST IN FEMALE, ESTROGEN RECEPTOR POSITIVE, UNSPECIFIED SITE OF BREAST (HCC): ICD-10-CM

## 2018-08-07 DIAGNOSIS — C77.1 SECONDARY MALIGNANT NEOPLASM OF MEDIASTINAL LYMPH NODE (HCC): Primary | ICD-10-CM

## 2018-08-07 DIAGNOSIS — C34.82 MALIGNANT NEOPLASM OF OVERLAPPING SITES OF LEFT LUNG (HCC): ICD-10-CM

## 2018-08-07 DIAGNOSIS — C50.912 MALIGNANT NEOPLASM OF LEFT BREAST IN FEMALE, ESTROGEN RECEPTOR POSITIVE, UNSPECIFIED SITE OF BREAST (HCC): ICD-10-CM

## 2018-08-07 LAB
ALBUMIN SERPL-MCNC: 4.1 G/DL (ref 3.5–5)
ALBUMIN/GLOB SERPL: 1.2 G/DL (ref 1–2)
ALP SERPL-CCNC: 125 U/L (ref 38–126)
ALT SERPL W P-5'-P-CCNC: 48 U/L (ref 13–69)
ANION GAP SERPL CALCULATED.3IONS-SCNC: 12.5 MMOL/L (ref 10–20)
AST SERPL-CCNC: 36 U/L (ref 15–46)
BASOPHILS # BLD AUTO: 0.03 10*3/MM3 (ref 0–0.2)
BASOPHILS NFR BLD AUTO: 0.7 % (ref 0–2.5)
BILIRUB SERPL-MCNC: 0.8 MG/DL (ref 0.2–1.3)
BUN BLD-MCNC: 17 MG/DL (ref 7–20)
BUN/CREAT SERPL: 17 (ref 7.1–23.5)
CALCIUM SPEC-SCNC: 10.3 MG/DL (ref 8.4–10.2)
CHLORIDE SERPL-SCNC: 105 MMOL/L (ref 98–107)
CO2 SERPL-SCNC: 27 MMOL/L (ref 26–30)
CREAT BLD-MCNC: 1 MG/DL (ref 0.6–1.3)
DEPRECATED RDW RBC AUTO: 48.2 FL (ref 37–54)
EOSINOPHIL # BLD AUTO: 0.11 10*3/MM3 (ref 0–0.7)
EOSINOPHIL NFR BLD AUTO: 2.7 % (ref 0–7)
ERYTHROCYTE [DISTWIDTH] IN BLOOD BY AUTOMATED COUNT: 14.3 % (ref 11.5–14.5)
GFR SERPL CREATININE-BSD FRML MDRD: 57 ML/MIN/1.73
GLOBULIN UR ELPH-MCNC: 3.5 GM/DL
GLUCOSE BLD-MCNC: 115 MG/DL (ref 74–98)
HCT VFR BLD AUTO: 32.8 % (ref 37–47)
HGB BLD-MCNC: 11.1 G/DL (ref 12–16)
IMM GRANULOCYTES # BLD: 0.06 10*3/MM3 (ref 0–0.06)
IMM GRANULOCYTES NFR BLD: 1.4 % (ref 0–0.6)
LYMPHOCYTES # BLD AUTO: 1.13 10*3/MM3 (ref 0.6–3.4)
LYMPHOCYTES NFR BLD AUTO: 27.3 % (ref 10–50)
MCH RBC QN AUTO: 31.9 PG (ref 27–31)
MCHC RBC AUTO-ENTMCNC: 33.8 G/DL (ref 30–37)
MCV RBC AUTO: 94.3 FL (ref 81–99)
MONOCYTES # BLD AUTO: 0.37 10*3/MM3 (ref 0–0.9)
MONOCYTES NFR BLD AUTO: 8.9 % (ref 0–12)
NEUTROPHILS # BLD AUTO: 2.44 10*3/MM3 (ref 2–6.9)
NEUTROPHILS NFR BLD AUTO: 59 % (ref 37–80)
NRBC BLD MANUAL-RTO: 0 /100 WBC (ref 0–0)
PLATELET # BLD AUTO: 152 10*3/MM3 (ref 130–400)
PMV BLD AUTO: 9.1 FL (ref 6–12)
POTASSIUM BLD-SCNC: 3.5 MMOL/L (ref 3.5–5.1)
PROT SERPL-MCNC: 7.6 G/DL (ref 6.3–8.2)
RBC # BLD AUTO: 3.48 10*6/MM3 (ref 4.2–5.4)
SODIUM BLD-SCNC: 141 MMOL/L (ref 137–145)
WBC NRBC COR # BLD: 4.14 10*3/MM3 (ref 4.8–10.8)

## 2018-08-07 PROCEDURE — 96413 CHEMO IV INFUSION 1 HR: CPT

## 2018-08-07 PROCEDURE — 80053 COMPREHEN METABOLIC PANEL: CPT

## 2018-08-07 PROCEDURE — 99215 OFFICE O/P EST HI 40 MIN: CPT | Performed by: NURSE PRACTITIONER

## 2018-08-07 PROCEDURE — 85025 COMPLETE CBC W/AUTO DIFF WBC: CPT

## 2018-08-07 PROCEDURE — 25010000002 DIPHENHYDRAMINE PER 50 MG: Performed by: NURSE PRACTITIONER

## 2018-08-07 PROCEDURE — 25010000002 PACLITAXEL PER 30 MG: Performed by: NURSE PRACTITIONER

## 2018-08-07 PROCEDURE — 96367 TX/PROPH/DG ADDL SEQ IV INF: CPT

## 2018-08-07 PROCEDURE — 36415 COLL VENOUS BLD VENIPUNCTURE: CPT

## 2018-08-07 PROCEDURE — 96368 THER/DIAG CONCURRENT INF: CPT

## 2018-08-07 PROCEDURE — 96375 TX/PRO/DX INJ NEW DRUG ADDON: CPT

## 2018-08-07 PROCEDURE — 25010000002 DEXAMETHASONE PER 1 MG: Performed by: NURSE PRACTITIONER

## 2018-08-07 RX ORDER — FAMOTIDINE 10 MG/ML
20 INJECTION, SOLUTION INTRAVENOUS ONCE
Status: COMPLETED | OUTPATIENT
Start: 2018-08-07 | End: 2018-08-07

## 2018-08-07 RX ORDER — SODIUM CHLORIDE 9 MG/ML
250 INJECTION, SOLUTION INTRAVENOUS ONCE
Status: DISCONTINUED | OUTPATIENT
Start: 2018-08-07 | End: 2018-08-07 | Stop reason: HOSPADM

## 2018-08-07 RX ADMIN — DEXAMETHASONE SODIUM PHOSPHATE 12 MG: 4 INJECTION, SOLUTION INTRA-ARTICULAR; INTRALESIONAL; INTRAMUSCULAR; INTRAVENOUS; SOFT TISSUE at 10:55

## 2018-08-07 RX ADMIN — PACLITAXEL 95 MG: 6 INJECTION, SOLUTION INTRAVENOUS at 11:13

## 2018-08-07 RX ADMIN — FAMOTIDINE 20 MG: 10 INJECTION INTRAVENOUS at 10:55

## 2018-08-07 RX ADMIN — DIPHENHYDRAMINE HYDROCHLORIDE 25 MG: 50 INJECTION INTRAMUSCULAR; INTRAVENOUS at 10:55

## 2018-08-07 NOTE — PROGRESS NOTES
Nutrition Services    Patient Name:  Sanjana Horton  YOB: 1955  MRN: [id number]  Admit Date:  (Not on file)     RD phoned pt. For follow up. RD left voicemail reminding pt. That if they have any nutritional issues to call our office or let the nurse know at their next oncology visit. RD left program contact information and encouraged pt to call with questions.     Electronically signed by:  Elham Avelar RD  08/07/18 11:39 AM

## 2018-08-13 ENCOUNTER — HOSPITAL ENCOUNTER (OUTPATIENT)
Dept: RADIATION ONCOLOGY | Facility: HOSPITAL | Age: 63
Discharge: HOME | End: 2018-08-13

## 2018-08-13 PROCEDURE — 77373 STRTCTC BDY RAD THER TX DLVR: CPT | Performed by: RADIOLOGY

## 2018-08-13 PROCEDURE — 77290 THER RAD SIMULAJ FIELD CPLX: CPT | Performed by: RADIOLOGY

## 2018-08-14 ENCOUNTER — INFUSION (OUTPATIENT)
Dept: ONCOLOGY | Facility: HOSPITAL | Age: 63
End: 2018-08-14

## 2018-08-14 ENCOUNTER — HOSPITAL ENCOUNTER (OUTPATIENT)
Dept: RADIATION ONCOLOGY | Facility: HOSPITAL | Age: 63
Discharge: HOME | End: 2018-08-14

## 2018-08-14 VITALS
SYSTOLIC BLOOD PRESSURE: 194 MMHG | WEIGHT: 205 LBS | TEMPERATURE: 86.9 F | DIASTOLIC BLOOD PRESSURE: 82 MMHG | HEART RATE: 89 BPM | BODY MASS INDEX: 37.49 KG/M2

## 2018-08-14 DIAGNOSIS — C50.912 MALIGNANT NEOPLASM OF LEFT BREAST IN FEMALE, ESTROGEN RECEPTOR POSITIVE, UNSPECIFIED SITE OF BREAST (HCC): ICD-10-CM

## 2018-08-14 DIAGNOSIS — Z17.0 MALIGNANT NEOPLASM OF LEFT BREAST IN FEMALE, ESTROGEN RECEPTOR POSITIVE, UNSPECIFIED SITE OF BREAST (HCC): ICD-10-CM

## 2018-08-14 PROCEDURE — G0463 HOSPITAL OUTPT CLINIC VISIT: HCPCS

## 2018-08-14 PROCEDURE — 77373 STRTCTC BDY RAD THER TX DLVR: CPT | Performed by: RADIOLOGY

## 2018-08-14 PROCEDURE — 77280 THER RAD SIMULAJ FIELD SMPL: CPT | Performed by: RADIOLOGY

## 2018-08-15 ENCOUNTER — HOSPITAL ENCOUNTER (OUTPATIENT)
Dept: RADIATION ONCOLOGY | Facility: HOSPITAL | Age: 63
Discharge: HOME | End: 2018-08-15

## 2018-08-15 PROCEDURE — 77280 THER RAD SIMULAJ FIELD SMPL: CPT | Performed by: RADIOLOGY

## 2018-08-15 PROCEDURE — 77373 STRTCTC BDY RAD THER TX DLVR: CPT | Performed by: RADIOLOGY

## 2018-08-16 ENCOUNTER — HOSPITAL ENCOUNTER (OUTPATIENT)
Dept: RADIATION ONCOLOGY | Facility: HOSPITAL | Age: 63
Discharge: HOME | End: 2018-08-16

## 2018-08-16 PROCEDURE — 77280 THER RAD SIMULAJ FIELD SMPL: CPT | Performed by: RADIOLOGY

## 2018-08-16 PROCEDURE — 77373 STRTCTC BDY RAD THER TX DLVR: CPT | Performed by: RADIOLOGY

## 2018-08-17 ENCOUNTER — HOSPITAL ENCOUNTER (OUTPATIENT)
Dept: RADIATION ONCOLOGY | Facility: HOSPITAL | Age: 63
Discharge: HOME | End: 2018-08-17

## 2018-08-17 PROCEDURE — 77373 STRTCTC BDY RAD THER TX DLVR: CPT | Performed by: RADIOLOGY

## 2018-08-17 PROCEDURE — 77280 THER RAD SIMULAJ FIELD SMPL: CPT | Performed by: RADIOLOGY

## 2018-08-20 ENCOUNTER — HOSPITAL ENCOUNTER (OUTPATIENT)
Dept: RADIATION ONCOLOGY | Facility: HOSPITAL | Age: 63
Discharge: HOME | End: 2018-08-20

## 2018-08-20 PROCEDURE — 77336 RADIATION PHYSICS CONSULT: CPT | Performed by: RADIOLOGY

## 2018-08-20 PROCEDURE — 77280 THER RAD SIMULAJ FIELD SMPL: CPT | Performed by: RADIOLOGY

## 2018-08-20 PROCEDURE — 77373 STRTCTC BDY RAD THER TX DLVR: CPT | Performed by: RADIOLOGY

## 2018-08-21 ENCOUNTER — INFUSION (OUTPATIENT)
Dept: ONCOLOGY | Facility: HOSPITAL | Age: 63
End: 2018-08-21

## 2018-08-21 VITALS
TEMPERATURE: 97.3 F | DIASTOLIC BLOOD PRESSURE: 69 MMHG | WEIGHT: 205 LBS | RESPIRATION RATE: 16 BRPM | SYSTOLIC BLOOD PRESSURE: 159 MMHG | HEART RATE: 94 BPM | BODY MASS INDEX: 37.49 KG/M2

## 2018-08-21 DIAGNOSIS — C34.82 MALIGNANT NEOPLASM OF OVERLAPPING SITES OF LEFT LUNG (HCC): Primary | ICD-10-CM

## 2018-08-21 LAB
ALBUMIN SERPL-MCNC: 3.9 G/DL (ref 3.5–5)
ALBUMIN/GLOB SERPL: 1.2 G/DL (ref 1–2)
ALP SERPL-CCNC: 125 U/L (ref 38–126)
ALT SERPL W P-5'-P-CCNC: 40 U/L (ref 13–69)
ANION GAP SERPL CALCULATED.3IONS-SCNC: 12.5 MMOL/L (ref 10–20)
AST SERPL-CCNC: 38 U/L (ref 15–46)
BASOPHILS # BLD AUTO: 0.03 10*3/MM3 (ref 0–0.2)
BASOPHILS NFR BLD AUTO: 0.7 % (ref 0–2.5)
BILIRUB SERPL-MCNC: 0.9 MG/DL (ref 0.2–1.3)
BUN BLD-MCNC: 13 MG/DL (ref 7–20)
BUN/CREAT SERPL: 16.3 (ref 7.1–23.5)
CALCIUM SPEC-SCNC: 10.5 MG/DL (ref 8.4–10.2)
CHLORIDE SERPL-SCNC: 103 MMOL/L (ref 98–107)
CO2 SERPL-SCNC: 29 MMOL/L (ref 26–30)
CREAT BLD-MCNC: 0.8 MG/DL (ref 0.6–1.3)
DEPRECATED RDW RBC AUTO: 50.7 FL (ref 37–54)
EOSINOPHIL # BLD AUTO: 0.08 10*3/MM3 (ref 0–0.7)
EOSINOPHIL NFR BLD AUTO: 1.9 % (ref 0–7)
ERYTHROCYTE [DISTWIDTH] IN BLOOD BY AUTOMATED COUNT: 14.7 % (ref 11.5–14.5)
GFR SERPL CREATININE-BSD FRML MDRD: 73 ML/MIN/1.73
GLOBULIN UR ELPH-MCNC: 3.2 GM/DL
GLUCOSE BLD-MCNC: 114 MG/DL (ref 74–98)
HCT VFR BLD AUTO: 32.6 % (ref 37–47)
HGB BLD-MCNC: 11 G/DL (ref 12–16)
IMM GRANULOCYTES # BLD: 0.03 10*3/MM3 (ref 0–0.06)
IMM GRANULOCYTES NFR BLD: 0.7 % (ref 0–0.6)
LYMPHOCYTES # BLD AUTO: 0.71 10*3/MM3 (ref 0.6–3.4)
LYMPHOCYTES NFR BLD AUTO: 16.5 % (ref 10–50)
MCH RBC QN AUTO: 32.1 PG (ref 27–31)
MCHC RBC AUTO-ENTMCNC: 33.7 G/DL (ref 30–37)
MCV RBC AUTO: 95 FL (ref 81–99)
MONOCYTES # BLD AUTO: 0.4 10*3/MM3 (ref 0–0.9)
MONOCYTES NFR BLD AUTO: 9.3 % (ref 0–12)
NEUTROPHILS # BLD AUTO: 3.05 10*3/MM3 (ref 2–6.9)
NEUTROPHILS NFR BLD AUTO: 70.9 % (ref 37–80)
NRBC BLD MANUAL-RTO: 0 /100 WBC (ref 0–0)
PLATELET # BLD AUTO: 171 10*3/MM3 (ref 130–400)
PMV BLD AUTO: 9.4 FL (ref 6–12)
POTASSIUM BLD-SCNC: 3.5 MMOL/L (ref 3.5–5.1)
PROT SERPL-MCNC: 7.1 G/DL (ref 6.3–8.2)
RBC # BLD AUTO: 3.43 10*6/MM3 (ref 4.2–5.4)
SODIUM BLD-SCNC: 141 MMOL/L (ref 137–145)
WBC NRBC COR # BLD: 4.3 10*3/MM3 (ref 4.8–10.8)

## 2018-08-21 PROCEDURE — 96374 THER/PROPH/DIAG INJ IV PUSH: CPT

## 2018-08-21 PROCEDURE — 36415 COLL VENOUS BLD VENIPUNCTURE: CPT

## 2018-08-21 PROCEDURE — 96368 THER/DIAG CONCURRENT INF: CPT

## 2018-08-21 PROCEDURE — 25010000002 PACLITAXEL PER 30 MG: Performed by: NURSE PRACTITIONER

## 2018-08-21 PROCEDURE — 25010000002 DEXAMETHASONE PER 1 MG: Performed by: NURSE PRACTITIONER

## 2018-08-21 PROCEDURE — 85025 COMPLETE CBC W/AUTO DIFF WBC: CPT

## 2018-08-21 PROCEDURE — 80053 COMPREHEN METABOLIC PANEL: CPT

## 2018-08-21 PROCEDURE — 96413 CHEMO IV INFUSION 1 HR: CPT

## 2018-08-21 PROCEDURE — 25010000002 DIPHENHYDRAMINE PER 50 MG: Performed by: NURSE PRACTITIONER

## 2018-08-21 PROCEDURE — 96367 TX/PROPH/DG ADDL SEQ IV INF: CPT

## 2018-08-21 PROCEDURE — 96375 TX/PRO/DX INJ NEW DRUG ADDON: CPT

## 2018-08-21 PROCEDURE — 96415 CHEMO IV INFUSION ADDL HR: CPT

## 2018-08-21 RX ORDER — SODIUM CHLORIDE 9 MG/ML
250 INJECTION, SOLUTION INTRAVENOUS ONCE
Status: DISCONTINUED | OUTPATIENT
Start: 2018-08-21 | End: 2018-08-21 | Stop reason: HOSPADM

## 2018-08-21 RX ORDER — FAMOTIDINE 10 MG/ML
20 INJECTION, SOLUTION INTRAVENOUS ONCE
Status: COMPLETED | OUTPATIENT
Start: 2018-08-21 | End: 2018-08-21

## 2018-08-21 RX ADMIN — DEXAMETHASONE SODIUM PHOSPHATE 12 MG: 4 INJECTION, SOLUTION INTRA-ARTICULAR; INTRALESIONAL; INTRAMUSCULAR; INTRAVENOUS; SOFT TISSUE at 11:12

## 2018-08-21 RX ADMIN — PACLITAXEL 95 MG: 6 INJECTION, SOLUTION INTRAVENOUS at 10:26

## 2018-08-21 RX ADMIN — DIPHENHYDRAMINE HYDROCHLORIDE 25 MG: 50 INJECTION INTRAMUSCULAR; INTRAVENOUS at 11:13

## 2018-08-21 RX ADMIN — FAMOTIDINE 20 MG: 10 INJECTION INTRAVENOUS at 11:15

## 2018-08-27 DIAGNOSIS — C34.82 MALIGNANT NEOPLASM OF OVERLAPPING SITES OF LEFT LUNG (HCC): ICD-10-CM

## 2018-08-27 RX ORDER — SODIUM CHLORIDE 9 MG/ML
250 INJECTION, SOLUTION INTRAVENOUS ONCE
Status: CANCELLED | OUTPATIENT
Start: 2018-09-25

## 2018-08-27 RX ORDER — FAMOTIDINE 10 MG/ML
20 INJECTION, SOLUTION INTRAVENOUS ONCE
Status: CANCELLED | OUTPATIENT
Start: 2018-09-25

## 2018-08-27 RX ORDER — FAMOTIDINE 10 MG/ML
20 INJECTION, SOLUTION INTRAVENOUS ONCE
Status: CANCELLED | OUTPATIENT
Start: 2018-09-18

## 2018-08-27 RX ORDER — SODIUM CHLORIDE 9 MG/ML
250 INJECTION, SOLUTION INTRAVENOUS ONCE
Status: CANCELLED | OUTPATIENT
Start: 2018-09-04

## 2018-08-27 RX ORDER — FAMOTIDINE 10 MG/ML
20 INJECTION, SOLUTION INTRAVENOUS ONCE
Status: CANCELLED | OUTPATIENT
Start: 2018-09-04

## 2018-08-27 RX ORDER — SODIUM CHLORIDE 9 MG/ML
250 INJECTION, SOLUTION INTRAVENOUS ONCE
Status: CANCELLED | OUTPATIENT
Start: 2018-09-11

## 2018-08-27 RX ORDER — FAMOTIDINE 10 MG/ML
20 INJECTION, SOLUTION INTRAVENOUS ONCE
Status: CANCELLED | OUTPATIENT
Start: 2018-08-28

## 2018-08-27 RX ORDER — SODIUM CHLORIDE 9 MG/ML
250 INJECTION, SOLUTION INTRAVENOUS ONCE
Status: CANCELLED | OUTPATIENT
Start: 2018-08-28

## 2018-08-27 RX ORDER — FAMOTIDINE 10 MG/ML
20 INJECTION, SOLUTION INTRAVENOUS ONCE
Status: CANCELLED | OUTPATIENT
Start: 2018-09-11

## 2018-08-27 RX ORDER — SODIUM CHLORIDE 9 MG/ML
250 INJECTION, SOLUTION INTRAVENOUS ONCE
Status: CANCELLED | OUTPATIENT
Start: 2018-09-18

## 2018-08-27 RX ORDER — SODIUM CHLORIDE 9 MG/ML
250 INJECTION, SOLUTION INTRAVENOUS ONCE
Status: CANCELLED | OUTPATIENT
Start: 2018-10-02

## 2018-08-27 RX ORDER — FAMOTIDINE 10 MG/ML
20 INJECTION, SOLUTION INTRAVENOUS ONCE
Status: CANCELLED | OUTPATIENT
Start: 2018-10-02

## 2018-08-28 ENCOUNTER — INFUSION (OUTPATIENT)
Dept: ONCOLOGY | Facility: HOSPITAL | Age: 63
End: 2018-08-28

## 2018-08-28 VITALS
HEART RATE: 85 BPM | RESPIRATION RATE: 16 BRPM | BODY MASS INDEX: 37.49 KG/M2 | SYSTOLIC BLOOD PRESSURE: 156 MMHG | WEIGHT: 205 LBS | DIASTOLIC BLOOD PRESSURE: 73 MMHG | TEMPERATURE: 97.4 F

## 2018-08-28 DIAGNOSIS — C34.82 MALIGNANT NEOPLASM OF OVERLAPPING SITES OF LEFT LUNG (HCC): Primary | ICD-10-CM

## 2018-08-28 LAB
ALBUMIN SERPL-MCNC: 4.1 G/DL (ref 3.5–5)
ALBUMIN/GLOB SERPL: 1.4 G/DL (ref 1–2)
ALP SERPL-CCNC: 109 U/L (ref 38–126)
ALT SERPL W P-5'-P-CCNC: 39 U/L (ref 13–69)
ANION GAP SERPL CALCULATED.3IONS-SCNC: 11.5 MMOL/L (ref 10–20)
AST SERPL-CCNC: 40 U/L (ref 15–46)
BASOPHILS # BLD AUTO: 0.03 10*3/MM3 (ref 0–0.2)
BASOPHILS NFR BLD AUTO: 0.6 % (ref 0–2.5)
BILIRUB SERPL-MCNC: 0.6 MG/DL (ref 0.2–1.3)
BUN BLD-MCNC: 18 MG/DL (ref 7–20)
BUN/CREAT SERPL: 20 (ref 7.1–23.5)
CALCIUM SPEC-SCNC: 10.6 MG/DL (ref 8.4–10.2)
CHLORIDE SERPL-SCNC: 104 MMOL/L (ref 98–107)
CO2 SERPL-SCNC: 28 MMOL/L (ref 26–30)
CREAT BLD-MCNC: 0.9 MG/DL (ref 0.6–1.3)
DEPRECATED RDW RBC AUTO: 48.4 FL (ref 37–54)
EOSINOPHIL # BLD AUTO: 0.12 10*3/MM3 (ref 0–0.7)
EOSINOPHIL NFR BLD AUTO: 2.5 % (ref 0–7)
ERYTHROCYTE [DISTWIDTH] IN BLOOD BY AUTOMATED COUNT: 14.2 % (ref 11.5–14.5)
GFR SERPL CREATININE-BSD FRML MDRD: 64 ML/MIN/1.73
GLOBULIN UR ELPH-MCNC: 3 GM/DL
GLUCOSE BLD-MCNC: 117 MG/DL (ref 74–98)
HCT VFR BLD AUTO: 32 % (ref 37–47)
HGB BLD-MCNC: 10.8 G/DL (ref 12–16)
IMM GRANULOCYTES # BLD: 0.09 10*3/MM3 (ref 0–0.06)
IMM GRANULOCYTES NFR BLD: 1.9 % (ref 0–0.6)
LYMPHOCYTES # BLD AUTO: 0.82 10*3/MM3 (ref 0.6–3.4)
LYMPHOCYTES NFR BLD AUTO: 17 % (ref 10–50)
MCH RBC QN AUTO: 32.2 PG (ref 27–31)
MCHC RBC AUTO-ENTMCNC: 33.8 G/DL (ref 30–37)
MCV RBC AUTO: 95.5 FL (ref 81–99)
MONOCYTES # BLD AUTO: 0.38 10*3/MM3 (ref 0–0.9)
MONOCYTES NFR BLD AUTO: 7.9 % (ref 0–12)
NEUTROPHILS # BLD AUTO: 3.39 10*3/MM3 (ref 2–6.9)
NEUTROPHILS NFR BLD AUTO: 70.1 % (ref 37–80)
NRBC BLD MANUAL-RTO: 0 /100 WBC (ref 0–0)
PLATELET # BLD AUTO: 148 10*3/MM3 (ref 130–400)
PMV BLD AUTO: 8.9 FL (ref 6–12)
POTASSIUM BLD-SCNC: 3.5 MMOL/L (ref 3.5–5.1)
PROT SERPL-MCNC: 7.1 G/DL (ref 6.3–8.2)
RBC # BLD AUTO: 3.35 10*6/MM3 (ref 4.2–5.4)
SODIUM BLD-SCNC: 140 MMOL/L (ref 137–145)
WBC NRBC COR # BLD: 4.83 10*3/MM3 (ref 4.8–10.8)

## 2018-08-28 PROCEDURE — 80053 COMPREHEN METABOLIC PANEL: CPT

## 2018-08-28 PROCEDURE — 96375 TX/PRO/DX INJ NEW DRUG ADDON: CPT

## 2018-08-28 PROCEDURE — 25010000002 DIPHENHYDRAMINE PER 50 MG: Performed by: NURSE PRACTITIONER

## 2018-08-28 PROCEDURE — 25010000002 DEXAMETHASONE PER 1 MG: Performed by: NURSE PRACTITIONER

## 2018-08-28 PROCEDURE — 36415 COLL VENOUS BLD VENIPUNCTURE: CPT

## 2018-08-28 PROCEDURE — 96374 THER/PROPH/DIAG INJ IV PUSH: CPT

## 2018-08-28 PROCEDURE — 85025 COMPLETE CBC W/AUTO DIFF WBC: CPT

## 2018-08-28 PROCEDURE — 25010000002 PACLITAXEL PER 30 MG: Performed by: NURSE PRACTITIONER

## 2018-08-28 PROCEDURE — 96413 CHEMO IV INFUSION 1 HR: CPT

## 2018-08-28 RX ORDER — FAMOTIDINE 10 MG/ML
20 INJECTION, SOLUTION INTRAVENOUS ONCE
Status: COMPLETED | OUTPATIENT
Start: 2018-08-28 | End: 2018-08-28

## 2018-08-28 RX ORDER — SODIUM CHLORIDE 9 MG/ML
250 INJECTION, SOLUTION INTRAVENOUS ONCE
Status: DISCONTINUED | OUTPATIENT
Start: 2018-08-28 | End: 2018-08-28 | Stop reason: HOSPADM

## 2018-08-28 RX ADMIN — FAMOTIDINE 20 MG: 10 INJECTION INTRAVENOUS at 09:03

## 2018-08-28 RX ADMIN — DIPHENHYDRAMINE HYDROCHLORIDE 25 MG: 50 INJECTION INTRAMUSCULAR; INTRAVENOUS at 09:05

## 2018-08-28 RX ADMIN — DEXAMETHASONE SODIUM PHOSPHATE 12 MG: 4 INJECTION, SOLUTION INTRA-ARTICULAR; INTRALESIONAL; INTRAMUSCULAR; INTRAVENOUS; SOFT TISSUE at 09:04

## 2018-08-28 RX ADMIN — PACLITAXEL 95 MG: 6 INJECTION, SOLUTION INTRAVENOUS at 09:21

## 2018-09-04 ENCOUNTER — INFUSION (OUTPATIENT)
Dept: ONCOLOGY | Facility: HOSPITAL | Age: 63
End: 2018-09-04

## 2018-09-04 VITALS
SYSTOLIC BLOOD PRESSURE: 134 MMHG | HEART RATE: 89 BPM | RESPIRATION RATE: 18 BRPM | BODY MASS INDEX: 37.31 KG/M2 | DIASTOLIC BLOOD PRESSURE: 63 MMHG | TEMPERATURE: 97.2 F | WEIGHT: 204 LBS

## 2018-09-04 DIAGNOSIS — C34.82 MALIGNANT NEOPLASM OF OVERLAPPING SITES OF LEFT LUNG (HCC): Primary | ICD-10-CM

## 2018-09-04 LAB
ALBUMIN SERPL-MCNC: 4 G/DL (ref 3.5–5)
ALBUMIN/GLOB SERPL: 1.3 G/DL (ref 1–2)
ALP SERPL-CCNC: 108 U/L (ref 38–126)
ALT SERPL W P-5'-P-CCNC: 57 U/L (ref 13–69)
ANION GAP SERPL CALCULATED.3IONS-SCNC: 11.5 MMOL/L (ref 10–20)
AST SERPL-CCNC: 37 U/L (ref 15–46)
BASOPHILS # BLD AUTO: 0.02 10*3/MM3 (ref 0–0.2)
BASOPHILS NFR BLD AUTO: 0.5 % (ref 0–2.5)
BILIRUB SERPL-MCNC: 0.8 MG/DL (ref 0.2–1.3)
BUN BLD-MCNC: 17 MG/DL (ref 7–20)
BUN/CREAT SERPL: 18.9 (ref 7.1–23.5)
CALCIUM SPEC-SCNC: 10.3 MG/DL (ref 8.4–10.2)
CHLORIDE SERPL-SCNC: 105 MMOL/L (ref 98–107)
CO2 SERPL-SCNC: 26 MMOL/L (ref 26–30)
CREAT BLD-MCNC: 0.9 MG/DL (ref 0.6–1.3)
DEPRECATED RDW RBC AUTO: 49.6 FL (ref 37–54)
EOSINOPHIL # BLD AUTO: 0.11 10*3/MM3 (ref 0–0.7)
EOSINOPHIL NFR BLD AUTO: 2.7 % (ref 0–7)
ERYTHROCYTE [DISTWIDTH] IN BLOOD BY AUTOMATED COUNT: 14.5 % (ref 11.5–14.5)
GFR SERPL CREATININE-BSD FRML MDRD: 64 ML/MIN/1.73
GLOBULIN UR ELPH-MCNC: 3 GM/DL
GLUCOSE BLD-MCNC: 121 MG/DL (ref 74–98)
HCT VFR BLD AUTO: 30.9 % (ref 37–47)
HGB BLD-MCNC: 10.6 G/DL (ref 12–16)
IMM GRANULOCYTES # BLD: 0.03 10*3/MM3 (ref 0–0.06)
IMM GRANULOCYTES NFR BLD: 0.7 % (ref 0–0.6)
LYMPHOCYTES # BLD AUTO: 0.78 10*3/MM3 (ref 0.6–3.4)
LYMPHOCYTES NFR BLD AUTO: 19.2 % (ref 10–50)
MCH RBC QN AUTO: 32.8 PG (ref 27–31)
MCHC RBC AUTO-ENTMCNC: 34.3 G/DL (ref 30–37)
MCV RBC AUTO: 95.7 FL (ref 81–99)
MONOCYTES # BLD AUTO: 0.3 10*3/MM3 (ref 0–0.9)
MONOCYTES NFR BLD AUTO: 7.4 % (ref 0–12)
NEUTROPHILS # BLD AUTO: 2.82 10*3/MM3 (ref 2–6.9)
NEUTROPHILS NFR BLD AUTO: 69.5 % (ref 37–80)
NRBC BLD MANUAL-RTO: 0 /100 WBC (ref 0–0)
PLATELET # BLD AUTO: 150 10*3/MM3 (ref 130–400)
PMV BLD AUTO: 9.4 FL (ref 6–12)
POTASSIUM BLD-SCNC: 3.5 MMOL/L (ref 3.5–5.1)
PROT SERPL-MCNC: 7 G/DL (ref 6.3–8.2)
RBC # BLD AUTO: 3.23 10*6/MM3 (ref 4.2–5.4)
SODIUM BLD-SCNC: 139 MMOL/L (ref 137–145)
WBC NRBC COR # BLD: 4.06 10*3/MM3 (ref 4.8–10.8)

## 2018-09-04 PROCEDURE — 80053 COMPREHEN METABOLIC PANEL: CPT

## 2018-09-04 PROCEDURE — 96413 CHEMO IV INFUSION 1 HR: CPT

## 2018-09-04 PROCEDURE — 25010000002 DEXAMETHASONE PER 1 MG: Performed by: NURSE PRACTITIONER

## 2018-09-04 PROCEDURE — 96375 TX/PRO/DX INJ NEW DRUG ADDON: CPT

## 2018-09-04 PROCEDURE — 25010000002 DIPHENHYDRAMINE PER 50 MG: Performed by: NURSE PRACTITIONER

## 2018-09-04 PROCEDURE — 36591 DRAW BLOOD OFF VENOUS DEVICE: CPT

## 2018-09-04 PROCEDURE — 96374 THER/PROPH/DIAG INJ IV PUSH: CPT

## 2018-09-04 PROCEDURE — 36592 COLLECT BLOOD FROM PICC: CPT

## 2018-09-04 PROCEDURE — 96367 TX/PROPH/DG ADDL SEQ IV INF: CPT

## 2018-09-04 PROCEDURE — 25010000002 PACLITAXEL PER 30 MG: Performed by: NURSE PRACTITIONER

## 2018-09-04 PROCEDURE — 36415 COLL VENOUS BLD VENIPUNCTURE: CPT

## 2018-09-04 PROCEDURE — 85025 COMPLETE CBC W/AUTO DIFF WBC: CPT

## 2018-09-04 RX ORDER — FAMOTIDINE 10 MG/ML
20 INJECTION, SOLUTION INTRAVENOUS ONCE
Status: COMPLETED | OUTPATIENT
Start: 2018-09-04 | End: 2018-09-04

## 2018-09-04 RX ORDER — SODIUM CHLORIDE 9 MG/ML
250 INJECTION, SOLUTION INTRAVENOUS ONCE
Status: COMPLETED | OUTPATIENT
Start: 2018-09-04 | End: 2018-09-04

## 2018-09-04 RX ADMIN — DIPHENHYDRAMINE HYDROCHLORIDE 25 MG: 50 INJECTION INTRAMUSCULAR; INTRAVENOUS at 09:44

## 2018-09-04 RX ADMIN — FAMOTIDINE 20 MG: 10 INJECTION INTRAVENOUS at 09:41

## 2018-09-04 RX ADMIN — SODIUM CHLORIDE 250 ML: 9 INJECTION, SOLUTION INTRAVENOUS at 09:16

## 2018-09-04 RX ADMIN — DEXAMETHASONE SODIUM PHOSPHATE 12 MG: 4 INJECTION, SOLUTION INTRA-ARTICULAR; INTRALESIONAL; INTRAMUSCULAR; INTRAVENOUS; SOFT TISSUE at 09:17

## 2018-09-04 RX ADMIN — PACLITAXEL 95 MG: 6 INJECTION, SOLUTION INTRAVENOUS at 10:14

## 2018-09-05 ENCOUNTER — OFFICE VISIT (OUTPATIENT)
Dept: PULMONOLOGY | Facility: CLINIC | Age: 63
End: 2018-09-05

## 2018-09-05 VITALS
DIASTOLIC BLOOD PRESSURE: 62 MMHG | OXYGEN SATURATION: 96 % | WEIGHT: 204 LBS | BODY MASS INDEX: 37.54 KG/M2 | RESPIRATION RATE: 18 BRPM | HEART RATE: 95 BPM | SYSTOLIC BLOOD PRESSURE: 130 MMHG | HEIGHT: 62 IN

## 2018-09-05 DIAGNOSIS — C34.92 SMALL CELL LUNG CANCER, LEFT (HCC): Primary | ICD-10-CM

## 2018-09-05 DIAGNOSIS — G47.33 OSA (OBSTRUCTIVE SLEEP APNEA): Primary | ICD-10-CM

## 2018-09-05 DIAGNOSIS — J30.89 OTHER ALLERGIC RHINITIS: ICD-10-CM

## 2018-09-05 DIAGNOSIS — J44.9 CHRONIC OBSTRUCTIVE PULMONARY DISEASE, UNSPECIFIED COPD TYPE (HCC): ICD-10-CM

## 2018-09-05 DIAGNOSIS — G47.33 OSA (OBSTRUCTIVE SLEEP APNEA): ICD-10-CM

## 2018-09-05 PROCEDURE — 99214 OFFICE O/P EST MOD 30 MIN: CPT | Performed by: INTERNAL MEDICINE

## 2018-09-05 RX ORDER — AZELASTINE 1 MG/ML
1 SPRAY, METERED NASAL 2 TIMES DAILY PRN
Qty: 1 EACH | Refills: 5 | Status: SHIPPED | OUTPATIENT
Start: 2018-09-05 | End: 2020-01-01

## 2018-09-05 RX ORDER — ALBUTEROL SULFATE 90 UG/1
2 AEROSOL, METERED RESPIRATORY (INHALATION) EVERY 4 HOURS PRN
Qty: 1 INHALER | Refills: 5 | Status: SHIPPED | OUTPATIENT
Start: 2018-09-05

## 2018-09-05 NOTE — PROGRESS NOTES
"Chief Complaint   Patient presents with   • Follow-up   • Shortness of Breath         Subjective   Sanjana Horton is a 63 y.o. female.     History of Present Illness   Patient comes in today to follow-up on COPD and sleep apnea.    The patient actually has not used the CPAP device and many weeks as she does not feel that it helps her symptoms and is not entirely sure if she will ever use it again.    The patient was recently diagnosed with possible recurrence of small cell cancer in the form of a positive lymph node and is undergoing chemotherapy.    Her symptoms of COPD are under decent control although she uses anoro 2-3 times a week?.  She denies any exacerbations however.    Patient also complains of runny nose and dribbling in the back of the throat for the past few weeks. This has been sometimes associated with seasonal variation.    The following portions of the patient's history were reviewed and updated as appropriate: allergies, current medications, past family history, past medical history, past social history and past surgical history.    Review of Systems   Constitutional: Negative for chills and fever.   HENT: Negative for sinus pressure and sore throat.    Respiratory: Positive for cough. Negative for shortness of breath and wheezing.    Psychiatric/Behavioral: Negative for sleep disturbance.       Objective   Visit Vitals  /62   Pulse 95   Resp 18   Ht 157.5 cm (62\")   Wt 92.5 kg (204 lb)   SpO2 96%   BMI 37.31 kg/m²       Physical Exam   Constitutional: She is oriented to person, place, and time. She appears well-developed and well-nourished.   HENT:   Head: Atraumatic.   Crowded oropharynx.   Dentures.  Nasal erythema noted.   Eyes: EOM are normal.   Neck: Neck supple.   Cardiovascular: Normal rate and regular rhythm.    Pulmonary/Chest: Effort normal. No respiratory distress.   Somewhat decreased A/E with out wheezing noted.   Musculoskeletal: She exhibits no edema.   Gait was normal. "   Neurological: She is alert and oriented to person, place, and time.   Skin: Skin is warm and dry.   Psychiatric: She has a normal mood and affect.   Vitals reviewed.        Assessment/Plan   Sanjana was seen today for follow-up and shortness of breath.    Diagnoses and all orders for this visit:    Small cell lung cancer, left (CMS/HCC)    Chronic obstructive pulmonary disease, unspecified COPD type (CMS/HCC)  -     Overnight Sleep Oximetry Study; Future    Other allergic rhinitis    VISHAL (obstructive sleep apnea)    Other orders  -     Discontinue: umeclidinium-vilanterol (ANORO ELLIPTA) 62.5-25 MCG/INH aerosol powder  inhaler; Inhale 1 puff Daily.  -     azelastine (ASTELIN) 0.1 % nasal spray; 1 spray into the nostril(s) as directed by provider 2 (Two) Times a Day As Needed for Rhinitis or Allergies. Use in each nostril as directed  -     umeclidinium-vilanterol (ANORO ELLIPTA) 62.5-25 MCG/INH aerosol powder  inhaler; Inhale 1 puff Daily.  -     albuterol (VENTOLIN HFA) 108 (90 Base) MCG/ACT inhaler; Inhale 2 puffs Every 4 (Four) Hours As Needed for Wheezing or Shortness of Air.         Return in about 4 months (around 1/5/2019) for Recheck, Overnight P Ox.    DISCUSSION (if any):  I reviewed the patient's last PET scan images and shared them with her.  The PET scan definitely is concerning for likely recurrence in the lymph nodes.  I also reviewed her last PFTs which confirmed moderate COPD.    As far as sleep apnea is concerned, her sleep study suggested mild sleep apnea and since she has not been using his CPAP device for a few months now, and does not report any worsening symptoms, I will discontinue the CPAP device.    I told the patient that if her symptoms worsen then the only option would be to repeat the sleep study before the CPAP device can be arranged.    In the meanwhile, I have ordered overnight pulse oximetry and the patient has agreed to use oxygen if necessary.    Her ongoing issues with fatigue,  that actually started again after she started chemotherapy, could be related to side effects of the chemotherapy itself and the patient also feels this to be true.    She clearly has COPD and I asked her to be compliant with anoro.    Patient was advised to use rescue inhaler for when necessary purposes    Patient was also advised to keep a log of the use of rescue inhaler.    Patient will be started on nasal spray for symptoms which are definitely consistent with allergic rhinitis.     If symptoms do not improve, then we will consider ordering IgE/RAST panel.    I recommended her to continue close follow-up with oncology        Dictated utilizing Dragon dictation.    This document was electronically signed by Holly Granados MD September 5, 2018  12:20 PM

## 2018-09-11 ENCOUNTER — OFFICE VISIT (OUTPATIENT)
Dept: ONCOLOGY | Facility: CLINIC | Age: 63
End: 2018-09-11

## 2018-09-11 ENCOUNTER — INFUSION (OUTPATIENT)
Dept: ONCOLOGY | Facility: HOSPITAL | Age: 63
End: 2018-09-11

## 2018-09-11 VITALS — DIASTOLIC BLOOD PRESSURE: 74 MMHG | HEART RATE: 84 BPM | SYSTOLIC BLOOD PRESSURE: 156 MMHG

## 2018-09-11 VITALS
HEART RATE: 87 BPM | DIASTOLIC BLOOD PRESSURE: 71 MMHG | BODY MASS INDEX: 37.73 KG/M2 | SYSTOLIC BLOOD PRESSURE: 166 MMHG | TEMPERATURE: 97 F | WEIGHT: 205 LBS | RESPIRATION RATE: 15 BRPM | HEIGHT: 62 IN

## 2018-09-11 DIAGNOSIS — C34.82 MALIGNANT NEOPLASM OF OVERLAPPING SITES OF LEFT LUNG (HCC): ICD-10-CM

## 2018-09-11 DIAGNOSIS — C34.82 MALIGNANT NEOPLASM OF OVERLAPPING SITES OF LEFT LUNG (HCC): Primary | ICD-10-CM

## 2018-09-11 DIAGNOSIS — C77.1 SECONDARY MALIGNANT NEOPLASM OF MEDIASTINAL LYMPH NODE (HCC): Primary | ICD-10-CM

## 2018-09-11 PROBLEM — R91.8 MASS OF LEFT LUNG: Status: RESOLVED | Noted: 2017-08-18 | Resolved: 2018-09-11

## 2018-09-11 LAB
ALBUMIN SERPL-MCNC: 4.1 G/DL (ref 3.5–5)
ALBUMIN/GLOB SERPL: 1.4 G/DL (ref 1–2)
ALP SERPL-CCNC: 118 U/L (ref 38–126)
ALT SERPL W P-5'-P-CCNC: 48 U/L (ref 13–69)
ANION GAP SERPL CALCULATED.3IONS-SCNC: 11.5 MMOL/L (ref 10–20)
AST SERPL-CCNC: 42 U/L (ref 15–46)
BASOPHILS # BLD AUTO: 0.02 10*3/MM3 (ref 0–0.2)
BASOPHILS NFR BLD AUTO: 0.6 % (ref 0–2.5)
BILIRUB SERPL-MCNC: 0.9 MG/DL (ref 0.2–1.3)
BUN BLD-MCNC: 15 MG/DL (ref 7–20)
BUN/CREAT SERPL: 16.7 (ref 7.1–23.5)
CALCIUM SPEC-SCNC: 10.4 MG/DL (ref 8.4–10.2)
CHLORIDE SERPL-SCNC: 104 MMOL/L (ref 98–107)
CO2 SERPL-SCNC: 27 MMOL/L (ref 26–30)
CREAT BLD-MCNC: 0.9 MG/DL (ref 0.6–1.3)
DEPRECATED RDW RBC AUTO: 50.7 FL (ref 37–54)
EOSINOPHIL # BLD AUTO: 0.06 10*3/MM3 (ref 0–0.7)
EOSINOPHIL NFR BLD AUTO: 1.7 % (ref 0–7)
ERYTHROCYTE [DISTWIDTH] IN BLOOD BY AUTOMATED COUNT: 14.8 % (ref 11.5–14.5)
GFR SERPL CREATININE-BSD FRML MDRD: 64 ML/MIN/1.73
GLOBULIN UR ELPH-MCNC: 2.9 GM/DL
GLUCOSE BLD-MCNC: 115 MG/DL (ref 74–98)
HCT VFR BLD AUTO: 30.2 % (ref 37–47)
HGB BLD-MCNC: 10.2 G/DL (ref 12–16)
IMM GRANULOCYTES # BLD: 0.04 10*3/MM3 (ref 0–0.06)
IMM GRANULOCYTES NFR BLD: 1.1 % (ref 0–0.6)
LYMPHOCYTES # BLD AUTO: 0.83 10*3/MM3 (ref 0.6–3.4)
LYMPHOCYTES NFR BLD AUTO: 23.4 % (ref 10–50)
MCH RBC QN AUTO: 32.5 PG (ref 27–31)
MCHC RBC AUTO-ENTMCNC: 33.8 G/DL (ref 30–37)
MCV RBC AUTO: 96.2 FL (ref 81–99)
MONOCYTES # BLD AUTO: 0.29 10*3/MM3 (ref 0–0.9)
MONOCYTES NFR BLD AUTO: 8.2 % (ref 0–12)
NEUTROPHILS # BLD AUTO: 2.31 10*3/MM3 (ref 2–6.9)
NEUTROPHILS NFR BLD AUTO: 65 % (ref 37–80)
NRBC BLD MANUAL-RTO: 0 /100 WBC (ref 0–0)
PLATELET # BLD AUTO: 147 10*3/MM3 (ref 130–400)
PMV BLD AUTO: 9.1 FL (ref 6–12)
POTASSIUM BLD-SCNC: 3.5 MMOL/L (ref 3.5–5.1)
PROT SERPL-MCNC: 7 G/DL (ref 6.3–8.2)
RBC # BLD AUTO: 3.14 10*6/MM3 (ref 4.2–5.4)
SODIUM BLD-SCNC: 139 MMOL/L (ref 137–145)
WBC NRBC COR # BLD: 3.55 10*3/MM3 (ref 4.8–10.8)

## 2018-09-11 PROCEDURE — 36415 COLL VENOUS BLD VENIPUNCTURE: CPT

## 2018-09-11 PROCEDURE — 99214 OFFICE O/P EST MOD 30 MIN: CPT | Performed by: INTERNAL MEDICINE

## 2018-09-11 PROCEDURE — 25010000002 DIPHENHYDRAMINE PER 50 MG: Performed by: NURSE PRACTITIONER

## 2018-09-11 PROCEDURE — 96374 THER/PROPH/DIAG INJ IV PUSH: CPT

## 2018-09-11 PROCEDURE — 96375 TX/PRO/DX INJ NEW DRUG ADDON: CPT

## 2018-09-11 PROCEDURE — 96368 THER/DIAG CONCURRENT INF: CPT

## 2018-09-11 PROCEDURE — 96367 TX/PROPH/DG ADDL SEQ IV INF: CPT

## 2018-09-11 PROCEDURE — 85025 COMPLETE CBC W/AUTO DIFF WBC: CPT

## 2018-09-11 PROCEDURE — 25010000002 PACLITAXEL PER 30 MG: Performed by: NURSE PRACTITIONER

## 2018-09-11 PROCEDURE — 80053 COMPREHEN METABOLIC PANEL: CPT

## 2018-09-11 PROCEDURE — 25010000002 DEXAMETHASONE PER 1 MG: Performed by: NURSE PRACTITIONER

## 2018-09-11 PROCEDURE — 96413 CHEMO IV INFUSION 1 HR: CPT

## 2018-09-11 RX ORDER — SODIUM CHLORIDE 9 MG/ML
250 INJECTION, SOLUTION INTRAVENOUS ONCE
Status: DISCONTINUED | OUTPATIENT
Start: 2018-09-11 | End: 2018-09-11 | Stop reason: HOSPADM

## 2018-09-11 RX ORDER — FAMOTIDINE 10 MG/ML
20 INJECTION, SOLUTION INTRAVENOUS ONCE
Status: COMPLETED | OUTPATIENT
Start: 2018-09-11 | End: 2018-09-11

## 2018-09-11 RX ADMIN — PACLITAXEL 95 MG: 6 INJECTION, SOLUTION INTRAVENOUS at 10:09

## 2018-09-11 RX ADMIN — FAMOTIDINE 20 MG: 10 INJECTION, SOLUTION INTRAVENOUS at 09:49

## 2018-09-11 RX ADMIN — DEXAMETHASONE SODIUM PHOSPHATE 12 MG: 4 INJECTION, SOLUTION INTRA-ARTICULAR; INTRALESIONAL; INTRAMUSCULAR; INTRAVENOUS; SOFT TISSUE at 09:51

## 2018-09-11 RX ADMIN — DIPHENHYDRAMINE HYDROCHLORIDE 25 MG: 50 INJECTION INTRAMUSCULAR; INTRAVENOUS at 09:51

## 2018-09-11 NOTE — PROGRESS NOTES
PROBLEM LIST:  1.  Small cell lung cancer from the left upper lobe.  Limited stage disease, recurrent in a mediastinal node  A.  Treated  initially with etoposide and carboplatin starting 9/13/27, responded to single modality. started because of symptoms and airway compromise  B.  Completed combined modality chemoradiation. (Chemotherapy completed 12/7/2017).  C.  Recurrence in left paratracheal node.  This was seen on CT scan from 6/19/18 and hypermetabolic on PET scan to confirm.  She's been treated with weekly taxol starting 7/17/2018 along with involved field radiation.  2.  Anemia.  This appears to be predominantly from chemoradiation.  3.  History of left breast cancer.  A.  Left mastectomy 7/22/2008 for stage IIIA, T3 N1 M0 breast cancer  B.  ER weakly positive, NV negative and HER-2 positive  C.  Adjuvant chemotherapy with Herceptin, Taxotere and carboplatin  D.  Postmastectomy radiation done at [facility]  E.  Adjuvant tamoxifen started 12/12/2008 and continued as extended adjuvant therapy  4.  Diabetes             HISTORY OF PRESENT ILLNESS:   Chief complaint: Here about recurrent lung cancer management  Ms. Horton significant fatigue with her treatments with no vomiting or significant nausea.  She's had some cough along with some sinus congestion.  She hasn't noted any fevers or chills or any purulent sputum or hemoptysis.  She doesn't have any significant odynophagia or dysphagia.  She hasn't noticed any numbness or tingling in her fingers or toes.    Past Medical History, Past Surgical History, Social History, Family History have been reviewed and are without significant changes except as mentioned.    Review of Systems   A comprehensive 14 point review of systems was performed and was negative except as mentioned.    Medications:  The current medication list was reviewed in the EMR    ALLERGIES:  Allergies not on file           /71   Pulse 87   Temp 97 °F (36.1 °C)  "(Temporal Artery )   Resp 15   Ht 157.5 cm (62\")   Wt 93 kg (205 lb)   BMI 37.49 kg/m²      Performance Status: ECOG 1    General: well appearing, in no acute distress  HEENT: sclera anicteric, oropharynx clear, mild diffuse alopecia  Lymphatics: no cervical, supraclavicular, or axillary adenopathy  Cardiovascular: regular rate and rhythm, no murmurs  Lungs: clear to auscultation bilaterally  Abdomen: soft, nontender, nondistended.  No palpable organomegaly  Extremeties: no lower extremity edema  Skin: no rashes, lesions, bruising, or petechiae    RECENT LABS:   WBC   Date Value Ref Range Status   09/04/2018 4.06 (L) 4.80 - 10.80 10*3/mm3 Final     Hemoglobin   Date Value Ref Range Status   09/04/2018 10.6 (L) 12.0 - 16.0 g/dL Final     Hematocrit   Date Value Ref Range Status   09/04/2018 30.9 (L) 37.0 - 47.0 % Final     MCV   Date Value Ref Range Status   09/04/2018 95.7 81.0 - 99.0 fL Final     RDW   Date Value Ref Range Status   09/04/2018 14.5 11.5 - 14.5 % Final     MPV   Date Value Ref Range Status   09/04/2018 9.4 6.0 - 12.0 fL Final     Platelets   Date Value Ref Range Status   09/04/2018 150 130 - 400 10*3/mm3 Final     Immature Grans %   Date Value Ref Range Status   09/04/2018 0.7 (H) 0.0 - 0.6 % Final     Neutrophils, Absolute   Date Value Ref Range Status   09/04/2018 2.82 2.00 - 6.90 10*3/mm3 Final     Lymphocytes, Absolute   Date Value Ref Range Status   09/04/2018 0.78 0.60 - 3.40 10*3/mm3 Final     Monocytes, Absolute   Date Value Ref Range Status   09/04/2018 0.30 0.00 - 0.90 10*3/mm3 Final     Eosinophils, Absolute   Date Value Ref Range Status   09/04/2018 0.11 0.00 - 0.70 10*3/mm3 Final     Basophils, Absolute   Date Value Ref Range Status   09/04/2018 0.02 0.00 - 0.20 10*3/mm3 Final     Immature Grans, Absolute   Date Value Ref Range Status   09/04/2018 0.03 0.00 - 0.06 10*3/mm3 Final     nRBC   Date Value Ref Range Status   09/04/2018 0.0 0.0 - 0.0 /100 WBC Final       Glucose   Date " Value Ref Range Status   09/04/2018 121 (H) 74 - 98 mg/dL Final     Sodium   Date Value Ref Range Status   09/04/2018 139 137 - 145 mmol/L Final     Potassium   Date Value Ref Range Status   09/04/2018 3.5 3.5 - 5.1 mmol/L Final     CO2   Date Value Ref Range Status   09/04/2018 26.0 26.0 - 30.0 mmol/L Final     Chloride   Date Value Ref Range Status   09/04/2018 105 98 - 107 mmol/L Final     Anion Gap   Date Value Ref Range Status   09/04/2018 11.5 10.0 - 20.0 mmol/L Final     Creatinine   Date Value Ref Range Status   09/04/2018 0.90 0.60 - 1.30 mg/dL Final     BUN   Date Value Ref Range Status   09/04/2018 17 7 - 20 mg/dL Final     BUN/Creatinine Ratio   Date Value Ref Range Status   09/04/2018 18.9 7.1 - 23.5 Final     Calcium   Date Value Ref Range Status   09/04/2018 10.3 (H) 8.4 - 10.2 mg/dL Final     eGFR Non  Amer   Date Value Ref Range Status   09/04/2018 64 >60 mL/min/1.73 Final     Alkaline Phosphatase   Date Value Ref Range Status   09/04/2018 108 38 - 126 U/L Final     Total Protein   Date Value Ref Range Status   09/04/2018 7.0 6.3 - 8.2 g/dL Final     ALT (SGPT)   Date Value Ref Range Status   09/04/2018 57 13 - 69 U/L Final     AST (SGOT)   Date Value Ref Range Status   09/04/2018 37 15 - 46 U/L Final     Total Bilirubin   Date Value Ref Range Status   09/04/2018 0.8 0.2 - 1.3 mg/dL Final     Albumin   Date Value Ref Range Status   09/04/2018 4.00 3.50 - 5.00 g/dL Final     Globulin   Date Value Ref Range Status   09/04/2018 3.0 gm/dL Final       LDH   Date Value Ref Range Status   08/16/2017 216 120 - 246 U/L Final       No results found for: MSPIKE, KAPPALAMB, IGLFLC, FREEKAPPAL              Impression: 1.  Recurrent small cell lung cancer.  She had recurrence in mediastinal node near the original radiation field.  This is been treated with abutting field radiation along with weekly Taxol.  I'll continue her Taxol for another couple of weeks and check her CT scans about response.    Plan.   1.  I'll continue her Taxol weekly.  2.  I'll check a CT scan and see her back in 3 weeks.  We can decide then about monitoring off therapy versus any additional evaluation or treatment.                   Jac Rader MD  [facility] Hematology [facility] Oncology    09/11/18           CC:

## 2018-09-11 NOTE — CODE DOCUMENTATION
"0915 Chairside to change picc dressing, pt states the site around picc has been itching/irritated.  Pt states MD checked out site and stated it looked good.  After changing dressing, pt stated \"it already feels better.\"  "

## 2018-09-17 ENCOUNTER — HOSPITAL ENCOUNTER (OUTPATIENT)
Dept: RADIATION ONCOLOGY | Facility: HOSPITAL | Age: 63
Setting detail: RADIATION/ONCOLOGY SERIES
Discharge: HOME | End: 2018-09-17

## 2018-09-17 ENCOUNTER — OFFICE VISIT (OUTPATIENT)
Dept: RADIATION ONCOLOGY | Facility: HOSPITAL | Age: 63
End: 2018-09-17

## 2018-09-17 DIAGNOSIS — C34.82 MALIGNANT NEOPLASM OF OVERLAPPING SITES OF LEFT LUNG (HCC): Primary | ICD-10-CM

## 2018-09-18 ENCOUNTER — INFUSION (OUTPATIENT)
Dept: ONCOLOGY | Facility: HOSPITAL | Age: 63
End: 2018-09-18

## 2018-09-18 VITALS
SYSTOLIC BLOOD PRESSURE: 148 MMHG | HEART RATE: 103 BPM | TEMPERATURE: 98.2 F | BODY MASS INDEX: 37.68 KG/M2 | DIASTOLIC BLOOD PRESSURE: 67 MMHG | RESPIRATION RATE: 18 BRPM | WEIGHT: 206 LBS

## 2018-09-18 VITALS — HEART RATE: 92 BPM | DIASTOLIC BLOOD PRESSURE: 78 MMHG | SYSTOLIC BLOOD PRESSURE: 150 MMHG

## 2018-09-18 DIAGNOSIS — C34.82 MALIGNANT NEOPLASM OF OVERLAPPING SITES OF LEFT LUNG (HCC): Primary | ICD-10-CM

## 2018-09-18 LAB
ALBUMIN SERPL-MCNC: 3.9 G/DL (ref 3.5–5)
ALBUMIN/GLOB SERPL: 1.3 G/DL (ref 1–2)
ALP SERPL-CCNC: 109 U/L (ref 38–126)
ALT SERPL W P-5'-P-CCNC: 48 U/L (ref 13–69)
ANION GAP SERPL CALCULATED.3IONS-SCNC: 9.4 MMOL/L (ref 10–20)
AST SERPL-CCNC: 38 U/L (ref 15–46)
BASOPHILS # BLD AUTO: 0.02 10*3/MM3 (ref 0–0.2)
BASOPHILS NFR BLD AUTO: 0.6 % (ref 0–2.5)
BILIRUB SERPL-MCNC: 0.6 MG/DL (ref 0.2–1.3)
BUN BLD-MCNC: 13 MG/DL (ref 7–20)
BUN/CREAT SERPL: 13 (ref 7.1–23.5)
CALCIUM SPEC-SCNC: 10.3 MG/DL (ref 8.4–10.2)
CHLORIDE SERPL-SCNC: 106 MMOL/L (ref 98–107)
CO2 SERPL-SCNC: 28 MMOL/L (ref 26–30)
CREAT BLD-MCNC: 1 MG/DL (ref 0.6–1.3)
DEPRECATED RDW RBC AUTO: 52.8 FL (ref 37–54)
EOSINOPHIL # BLD AUTO: 0.06 10*3/MM3 (ref 0–0.7)
EOSINOPHIL NFR BLD AUTO: 1.7 % (ref 0–7)
ERYTHROCYTE [DISTWIDTH] IN BLOOD BY AUTOMATED COUNT: 15.2 % (ref 11.5–14.5)
GFR SERPL CREATININE-BSD FRML MDRD: 56 ML/MIN/1.73
GLOBULIN UR ELPH-MCNC: 2.9 GM/DL
GLUCOSE BLD-MCNC: 130 MG/DL (ref 74–98)
HCT VFR BLD AUTO: 30.4 % (ref 37–47)
HGB BLD-MCNC: 10.2 G/DL (ref 12–16)
IMM GRANULOCYTES # BLD: 0.08 10*3/MM3 (ref 0–0.06)
IMM GRANULOCYTES NFR BLD: 2.3 % (ref 0–0.6)
LYMPHOCYTES # BLD AUTO: 0.79 10*3/MM3 (ref 0.6–3.4)
LYMPHOCYTES NFR BLD AUTO: 22.8 % (ref 10–50)
MCH RBC QN AUTO: 32.7 PG (ref 27–31)
MCHC RBC AUTO-ENTMCNC: 33.6 G/DL (ref 30–37)
MCV RBC AUTO: 97.4 FL (ref 81–99)
MONOCYTES # BLD AUTO: 0.36 10*3/MM3 (ref 0–0.9)
MONOCYTES NFR BLD AUTO: 10.4 % (ref 0–12)
NEUTROPHILS # BLD AUTO: 2.15 10*3/MM3 (ref 2–6.9)
NEUTROPHILS NFR BLD AUTO: 62.2 % (ref 37–80)
NRBC BLD MANUAL-RTO: 0.9 /100 WBC (ref 0–0)
PLATELET # BLD AUTO: 158 10*3/MM3 (ref 130–400)
PMV BLD AUTO: 9.3 FL (ref 6–12)
POTASSIUM BLD-SCNC: 3.4 MMOL/L (ref 3.5–5.1)
PROT SERPL-MCNC: 6.8 G/DL (ref 6.3–8.2)
RBC # BLD AUTO: 3.12 10*6/MM3 (ref 4.2–5.4)
SODIUM BLD-SCNC: 140 MMOL/L (ref 137–145)
WBC NRBC COR # BLD: 3.46 10*3/MM3 (ref 4.8–10.8)

## 2018-09-18 PROCEDURE — 25010000002 DEXAMETHASONE PER 1 MG: Performed by: NURSE PRACTITIONER

## 2018-09-18 PROCEDURE — 85025 COMPLETE CBC W/AUTO DIFF WBC: CPT

## 2018-09-18 PROCEDURE — 25010000002 DIPHENHYDRAMINE PER 50 MG: Performed by: NURSE PRACTITIONER

## 2018-09-18 PROCEDURE — 96413 CHEMO IV INFUSION 1 HR: CPT

## 2018-09-18 PROCEDURE — 96375 TX/PRO/DX INJ NEW DRUG ADDON: CPT

## 2018-09-18 PROCEDURE — 36415 COLL VENOUS BLD VENIPUNCTURE: CPT

## 2018-09-18 PROCEDURE — 80053 COMPREHEN METABOLIC PANEL: CPT

## 2018-09-18 PROCEDURE — 25010000002 PACLITAXEL PER 30 MG: Performed by: NURSE PRACTITIONER

## 2018-09-18 RX ORDER — FAMOTIDINE 10 MG/ML
20 INJECTION, SOLUTION INTRAVENOUS ONCE
Status: COMPLETED | OUTPATIENT
Start: 2018-09-18 | End: 2018-09-18

## 2018-09-18 RX ORDER — SODIUM CHLORIDE 9 MG/ML
250 INJECTION, SOLUTION INTRAVENOUS ONCE
Status: DISCONTINUED | OUTPATIENT
Start: 2018-09-18 | End: 2018-09-18 | Stop reason: HOSPADM

## 2018-09-18 RX ADMIN — DIPHENHYDRAMINE HYDROCHLORIDE 25 MG: 50 INJECTION INTRAMUSCULAR; INTRAVENOUS at 09:10

## 2018-09-18 RX ADMIN — PACLITAXEL 95 MG: 6 INJECTION, SOLUTION INTRAVENOUS at 09:25

## 2018-09-18 RX ADMIN — FAMOTIDINE 20 MG: 10 INJECTION, SOLUTION INTRAVENOUS at 09:09

## 2018-09-18 RX ADMIN — DEXAMETHASONE SODIUM PHOSPHATE 12 MG: 4 INJECTION, SOLUTION INTRA-ARTICULAR; INTRALESIONAL; INTRAMUSCULAR; INTRAVENOUS; SOFT TISSUE at 09:09

## 2018-09-18 NOTE — PROGRESS NOTES
FOLLOW UP NOTE    PATIENT:                                                      Sanjana Horton  MEDICAL RECORD #:                        [id number]  :                                                          1955  COMPLETION DATE:    2018  DIAGNOSIS:     Stage IIIB (T2b, N3, M0) recurrent small cell lung cancer      BRIEF HISTORY:    Mrs. Horton is a very pleasant 63-year-old female who previously received chemoradiotherapy in the management of limited stage small cell carcinoma of the lung.  She developed recurrence in a paratracheal node and  received 25 Gray in 5 fractions completing 2018. She tolerated treatment well and continues Taxol per Dr. Rader.  She has had no difficulty swallowing or fatigue.      MEDICATIONS: Medication reconciliation for the patient was reviewed and confirmed in the electronic medical record.    Review of Systems - Oncology    KPS 80%    Physical Exam   Constitutional: She appears well-developed and well-nourished.   Neck: Neck supple.   Cardiovascular: Normal rate, regular rhythm and normal heart sounds.    Pulmonary/Chest: Effort normal and breath sounds normal.   Nursing note and vitals reviewed.      VITAL SIGNS:  206.7 #, 149/97, pulse 84 temp 98.2    The following portions of the patient's history were reviewed and updated as appropriate: allergies, current medications, past family history, past medical history, past social history, past surgical history and problem list.            IMPRESSION:  Mrs. Horton is recovering from side effects of CyberKnife SBRT to a left paratracheal node  She just has fatigue.      RECOMMENDATIONS:  She continues chemotherapy per Dr. Rader.  He has follow-up scans scheduled for her so we will see her back in our department as needed.  It has been a pleasure to participate in her care.           Jenny Sanchez MD    Errors in dictation may reflect use of voice recognition software and not all errors in transcription may  have been detected prior to signing.

## 2018-09-25 ENCOUNTER — INFUSION (OUTPATIENT)
Dept: ONCOLOGY | Facility: HOSPITAL | Age: 63
End: 2018-09-25

## 2018-09-25 VITALS
TEMPERATURE: 98.2 F | HEART RATE: 104 BPM | DIASTOLIC BLOOD PRESSURE: 69 MMHG | RESPIRATION RATE: 18 BRPM | WEIGHT: 206 LBS | BODY MASS INDEX: 37.68 KG/M2 | SYSTOLIC BLOOD PRESSURE: 145 MMHG

## 2018-09-25 DIAGNOSIS — C34.82 MALIGNANT NEOPLASM OF OVERLAPPING SITES OF LEFT LUNG (HCC): Primary | ICD-10-CM

## 2018-09-25 LAB
ALBUMIN SERPL-MCNC: 3.9 G/DL (ref 3.5–5)
ALBUMIN/GLOB SERPL: 1.2 G/DL (ref 1–2)
ALP SERPL-CCNC: 114 U/L (ref 38–126)
ALT SERPL W P-5'-P-CCNC: 49 U/L (ref 13–69)
ANION GAP SERPL CALCULATED.3IONS-SCNC: 11.3 MMOL/L (ref 10–20)
AST SERPL-CCNC: 36 U/L (ref 15–46)
BASOPHILS # BLD AUTO: 0.02 10*3/MM3 (ref 0–0.2)
BASOPHILS NFR BLD AUTO: 0.4 % (ref 0–2.5)
BILIRUB SERPL-MCNC: 1.3 MG/DL (ref 0.2–1.3)
BUN BLD-MCNC: 17 MG/DL (ref 7–20)
BUN/CREAT SERPL: 17 (ref 7.1–23.5)
CALCIUM SPEC-SCNC: 10.4 MG/DL (ref 8.4–10.2)
CHLORIDE SERPL-SCNC: 103 MMOL/L (ref 98–107)
CO2 SERPL-SCNC: 27 MMOL/L (ref 26–30)
CREAT BLD-MCNC: 1 MG/DL (ref 0.6–1.3)
DEPRECATED RDW RBC AUTO: 53.1 FL (ref 37–54)
EOSINOPHIL # BLD AUTO: 0.06 10*3/MM3 (ref 0–0.7)
EOSINOPHIL NFR BLD AUTO: 1.2 % (ref 0–7)
ERYTHROCYTE [DISTWIDTH] IN BLOOD BY AUTOMATED COUNT: 15.4 % (ref 11.5–14.5)
GFR SERPL CREATININE-BSD FRML MDRD: 56 ML/MIN/1.73
GLOBULIN UR ELPH-MCNC: 3.2 GM/DL
GLUCOSE BLD-MCNC: 129 MG/DL (ref 74–98)
HCT VFR BLD AUTO: 29.4 % (ref 37–47)
HGB BLD-MCNC: 9.9 G/DL (ref 12–16)
IMM GRANULOCYTES # BLD: 0.06 10*3/MM3 (ref 0–0.06)
IMM GRANULOCYTES NFR BLD: 1.2 % (ref 0–0.6)
LYMPHOCYTES # BLD AUTO: 0.92 10*3/MM3 (ref 0.6–3.4)
LYMPHOCYTES NFR BLD AUTO: 17.8 % (ref 10–50)
MCH RBC QN AUTO: 32.7 PG (ref 27–31)
MCHC RBC AUTO-ENTMCNC: 33.7 G/DL (ref 30–37)
MCV RBC AUTO: 97 FL (ref 81–99)
MONOCYTES # BLD AUTO: 0.5 10*3/MM3 (ref 0–0.9)
MONOCYTES NFR BLD AUTO: 9.7 % (ref 0–12)
NEUTROPHILS # BLD AUTO: 3.6 10*3/MM3 (ref 2–6.9)
NEUTROPHILS NFR BLD AUTO: 69.7 % (ref 37–80)
NRBC BLD MANUAL-RTO: 0.8 /100 WBC (ref 0–0)
PLATELET # BLD AUTO: 170 10*3/MM3 (ref 130–400)
PMV BLD AUTO: 9.3 FL (ref 6–12)
POTASSIUM BLD-SCNC: 3.3 MMOL/L (ref 3.5–5.1)
PROT SERPL-MCNC: 7.1 G/DL (ref 6.3–8.2)
RBC # BLD AUTO: 3.03 10*6/MM3 (ref 4.2–5.4)
SODIUM BLD-SCNC: 138 MMOL/L (ref 137–145)
WBC NRBC COR # BLD: 5.16 10*3/MM3 (ref 4.8–10.8)

## 2018-09-25 PROCEDURE — 36415 COLL VENOUS BLD VENIPUNCTURE: CPT

## 2018-09-25 PROCEDURE — 25010000002 PACLITAXEL PER 30 MG: Performed by: NURSE PRACTITIONER

## 2018-09-25 PROCEDURE — 25010000002 DIPHENHYDRAMINE PER 50 MG: Performed by: NURSE PRACTITIONER

## 2018-09-25 PROCEDURE — 96368 THER/DIAG CONCURRENT INF: CPT

## 2018-09-25 PROCEDURE — 96367 TX/PROPH/DG ADDL SEQ IV INF: CPT

## 2018-09-25 PROCEDURE — 80053 COMPREHEN METABOLIC PANEL: CPT

## 2018-09-25 PROCEDURE — 85025 COMPLETE CBC W/AUTO DIFF WBC: CPT

## 2018-09-25 PROCEDURE — 96413 CHEMO IV INFUSION 1 HR: CPT

## 2018-09-25 PROCEDURE — 96366 THER/PROPH/DIAG IV INF ADDON: CPT

## 2018-09-25 PROCEDURE — 96375 TX/PRO/DX INJ NEW DRUG ADDON: CPT

## 2018-09-25 PROCEDURE — 25010000002 DEXAMETHASONE PER 1 MG: Performed by: NURSE PRACTITIONER

## 2018-09-25 RX ORDER — FAMOTIDINE 10 MG/ML
20 INJECTION, SOLUTION INTRAVENOUS ONCE
Status: COMPLETED | OUTPATIENT
Start: 2018-09-25 | End: 2018-09-25

## 2018-09-25 RX ORDER — SODIUM CHLORIDE 9 MG/ML
250 INJECTION, SOLUTION INTRAVENOUS ONCE
Status: DISCONTINUED | OUTPATIENT
Start: 2018-09-25 | End: 2018-09-25 | Stop reason: HOSPADM

## 2018-09-25 RX ADMIN — PACLITAXEL 95 MG: 6 INJECTION, SOLUTION INTRAVENOUS at 10:08

## 2018-09-25 RX ADMIN — FAMOTIDINE 20 MG: 10 INJECTION, SOLUTION INTRAVENOUS at 09:44

## 2018-09-25 RX ADMIN — DIPHENHYDRAMINE HYDROCHLORIDE 25 MG: 50 INJECTION INTRAMUSCULAR; INTRAVENOUS at 09:44

## 2018-09-25 RX ADMIN — DEXAMETHASONE SODIUM PHOSPHATE 12 MG: 4 INJECTION, SOLUTION INTRA-ARTICULAR; INTRALESIONAL; INTRAMUSCULAR; INTRAVENOUS; SOFT TISSUE at 09:44

## 2018-09-28 ENCOUNTER — HOSPITAL ENCOUNTER (OUTPATIENT)
Dept: CT IMAGING | Facility: HOSPITAL | Age: 63
Discharge: HOME | End: 2018-09-28
Admitting: INTERNAL MEDICINE

## 2018-09-28 DIAGNOSIS — C34.82 MALIGNANT NEOPLASM OF OVERLAPPING SITES OF LEFT LUNG (HCC): ICD-10-CM

## 2018-09-28 DIAGNOSIS — C77.1 SECONDARY MALIGNANT NEOPLASM OF MEDIASTINAL LYMPH NODE (HCC): ICD-10-CM

## 2018-09-28 PROCEDURE — 25010000002 IOPAMIDOL 61 % SOLUTION: Performed by: INTERNAL MEDICINE

## 2018-09-28 PROCEDURE — 71260 CT THORAX DX C+: CPT

## 2018-09-28 RX ADMIN — IOPAMIDOL 100 ML: 612 INJECTION, SOLUTION INTRAVENOUS at 09:09

## 2018-10-02 ENCOUNTER — OFFICE VISIT (OUTPATIENT)
Dept: ONCOLOGY | Facility: CLINIC | Age: 63
End: 2018-10-02

## 2018-10-02 ENCOUNTER — INFUSION (OUTPATIENT)
Dept: ONCOLOGY | Facility: HOSPITAL | Age: 63
End: 2018-10-02

## 2018-10-02 VITALS
RESPIRATION RATE: 18 BRPM | SYSTOLIC BLOOD PRESSURE: 156 MMHG | BODY MASS INDEX: 37.91 KG/M2 | WEIGHT: 206 LBS | HEART RATE: 101 BPM | HEIGHT: 62 IN | DIASTOLIC BLOOD PRESSURE: 72 MMHG | OXYGEN SATURATION: 93 % | TEMPERATURE: 98 F

## 2018-10-02 DIAGNOSIS — C34.82 MALIGNANT NEOPLASM OF OVERLAPPING SITES OF LEFT LUNG (HCC): Primary | ICD-10-CM

## 2018-10-02 DIAGNOSIS — C50.912 MALIGNANT NEOPLASM OF LEFT BREAST IN FEMALE, ESTROGEN RECEPTOR POSITIVE, UNSPECIFIED SITE OF BREAST (HCC): ICD-10-CM

## 2018-10-02 DIAGNOSIS — Z17.0 MALIGNANT NEOPLASM OF LEFT BREAST IN FEMALE, ESTROGEN RECEPTOR POSITIVE, UNSPECIFIED SITE OF BREAST (HCC): ICD-10-CM

## 2018-10-02 PROCEDURE — 99214 OFFICE O/P EST MOD 30 MIN: CPT | Performed by: INTERNAL MEDICINE

## 2018-10-02 PROCEDURE — G0463 HOSPITAL OUTPT CLINIC VISIT: HCPCS

## 2018-10-02 NOTE — PROGRESS NOTES
PROBLEM LIST:  1.  Small cell lung cancer from the left upper lobe.  Limited stage disease, recurrent in a mediastinal node  A.  Treated  initially with etoposide and carboplatin starting 9/13/27.  She responded to the single modality that was started because of symptoms including airway compromise.  B.  Completed combined modality chemoradiation with etoposide and carboplatin . (Chemotherapy completed 12/7/2017).  C.  Recurrence in left paratracheal node.  This was seen on CT scan from 6/19/18 and hypermetabolic on PET scan to confirm.  She's been treated with weekly taxol starting 7/17/2018 along with involved field radiation.  2.  Anemia.  This appears to be predominantly from chemoradiation.  3.  History of left breast cancer.  A.  Left mastectomy 7/22/2008 for stage IIIA, T3 N1 M0 breast cancer  B.  ER weakly positive, GA negative and HER-2 positive  C.  Adjuvant chemotherapy with Herceptin, Taxotere and carboplatin  D.  Postmastectomy radiation done at [facility]  E.  Adjuvant tamoxifen started 12/12/2008 and continued as extended adjuvant therapy  4.  Diabetes          HISTORY OF PRESENT ILLNESS:   Chief complaint: Here about lung cancer and history of breast cancer  Ms. Horton has significant fatigue but is coughing less without dyspnea or wheezing.  She hasn't noted any fevers chills or sweats.  She doesn't report any unusual bruising or bleeding.  He hasn't noticed any numbness or tingling in her fingers or toes or any rashes or itching.    Past Medical History, Past Surgical History, Social History, Family History have been reviewed and are without significant changes except as mentioned.    Review of Systems   A comprehensive 14 point review of systems was performed and was negative except as mentioned.    Medications:  The current medication list was reviewed in the EMR    ALLERGIES:  Allergies not on file           /72   Pulse 101   Temp 98 °F (36.7 °C)   Resp 18   Ht  "157.5 cm (62\")   Wt 93.4 kg (206 lb)   SpO2 93%   BMI 37.68 kg/m²      Performance Status: ECOG 1    General: well appearing, in no acute distress  HEENT: sclera anicteric, oropharynx clear, partial alopecia  Lymphatics: no cervical, supraclavicular, or axillary adenopathy  Cardiovascular: regular rate and rhythm, no murmurs  Lungs: clear to auscultation bilaterally  Abdomen: soft, nontender, nondistended.  No palpable organomegaly  Extremeties: no lower extremity edema  Skin: no rashes, lesions, bruising, or petechiae    RECENT LABS:   WBC   Date Value Ref Range Status   09/25/2018 5.16 4.80 - 10.80 10*3/mm3 Final     Hemoglobin   Date Value Ref Range Status   09/25/2018 9.9 (L) 12.0 - 16.0 g/dL Final     Hematocrit   Date Value Ref Range Status   09/25/2018 29.4 (L) 37.0 - 47.0 % Final     MCV   Date Value Ref Range Status   09/25/2018 97.0 81.0 - 99.0 fL Final     RDW   Date Value Ref Range Status   09/25/2018 15.4 (H) 11.5 - 14.5 % Final     MPV   Date Value Ref Range Status   09/25/2018 9.3 6.0 - 12.0 fL Final     Platelets   Date Value Ref Range Status   09/25/2018 170 130 - 400 10*3/mm3 Final     Immature Grans %   Date Value Ref Range Status   09/25/2018 1.2 (H) 0.0 - 0.6 % Final     Neutrophils, Absolute   Date Value Ref Range Status   09/25/2018 3.60 2.00 - 6.90 10*3/mm3 Final     Lymphocytes, Absolute   Date Value Ref Range Status   09/25/2018 0.92 0.60 - 3.40 10*3/mm3 Final     Monocytes, Absolute   Date Value Ref Range Status   09/25/2018 0.50 0.00 - 0.90 10*3/mm3 Final     Eosinophils, Absolute   Date Value Ref Range Status   09/25/2018 0.06 0.00 - 0.70 10*3/mm3 Final     Basophils, Absolute   Date Value Ref Range Status   09/25/2018 0.02 0.00 - 0.20 10*3/mm3 Final     Immature Grans, Absolute   Date Value Ref Range Status   09/25/2018 0.06 0.00 - 0.06 10*3/mm3 Final     nRBC   Date Value Ref Range Status   09/25/2018 0.8 (H) 0.0 - 0.0 /100 WBC Final       Glucose   Date Value Ref Range Status "   09/25/2018 129 (H) 74 - 98 mg/dL Final     Sodium   Date Value Ref Range Status   09/25/2018 138 137 - 145 mmol/L Final     Potassium   Date Value Ref Range Status   09/25/2018 3.3 (L) 3.5 - 5.1 mmol/L Final     CO2   Date Value Ref Range Status   09/25/2018 27.0 26.0 - 30.0 mmol/L Final     Chloride   Date Value Ref Range Status   09/25/2018 103 98 - 107 mmol/L Final     Anion Gap   Date Value Ref Range Status   09/25/2018 11.3 10.0 - 20.0 mmol/L Final     Creatinine   Date Value Ref Range Status   09/25/2018 1.00 0.60 - 1.30 mg/dL Final     BUN   Date Value Ref Range Status   09/25/2018 17 7 - 20 mg/dL Final     BUN/Creatinine Ratio   Date Value Ref Range Status   09/25/2018 17.0 7.1 - 23.5 Final     Calcium   Date Value Ref Range Status   09/25/2018 10.4 (H) 8.4 - 10.2 mg/dL Final     eGFR Non  Amer   Date Value Ref Range Status   09/25/2018 56 (L) >60 mL/min/1.73 Final     Alkaline Phosphatase   Date Value Ref Range Status   09/25/2018 114 38 - 126 U/L Final     Total Protein   Date Value Ref Range Status   09/25/2018 7.1 6.3 - 8.2 g/dL Final     ALT (SGPT)   Date Value Ref Range Status   09/25/2018 49 13 - 69 U/L Final     AST (SGOT)   Date Value Ref Range Status   09/25/2018 36 15 - 46 U/L Final     Total Bilirubin   Date Value Ref Range Status   09/25/2018 1.3 0.2 - 1.3 mg/dL Final     Albumin   Date Value Ref Range Status   09/25/2018 3.90 3.50 - 5.00 g/dL Final     Globulin   Date Value Ref Range Status   09/25/2018 3.2 gm/dL Final       LDH   Date Value Ref Range Status   08/16/2017 216 120 - 246 U/L Final       No results found for: MSPIKE, KAPPALAMB, IGLFLC, FREEKAPPAL      CT images and reports were reviewed showing marked improvement in the mediastinal mass.  She does have increased infiltrate in the lung.  Noting that this is in the radiation field        Impression: 1.  Small cell lung cancer.  We've treated her for a local recurrence with combined Taxol and radiation and this appears to  have resolved.  She has no evidence of disease outside the chest and was limited stage disease at presentation.  2.  Breast cancer.  She is on tamoxifen is extended adjuvant therapy and we plan to complete this in December of this year.    Plan: 1.  I'll stop her therapy and monitor her at this point.  I'll remove her PICC line.  I've asked her to return in about 8 weeks with a repeat CT scan of the chest.  2.  She'll continue her adjuvant tamoxifen with plans to stop it on return in December.                       Jac Rader MD  [facility]    10/02/18           CC:

## 2018-10-03 ENCOUNTER — TRANSCRIBE ORDERS (OUTPATIENT)
Dept: ADMINISTRATIVE | Facility: HOSPITAL | Age: 63
End: 2018-10-03

## 2018-10-03 DIAGNOSIS — Z12.39 SCREENING BREAST EXAMINATION: Primary | ICD-10-CM

## 2018-10-08 DIAGNOSIS — J44.9 CHRONIC OBSTRUCTIVE PULMONARY DISEASE, UNSPECIFIED COPD TYPE (HCC): ICD-10-CM

## 2018-11-12 ENCOUNTER — HOSPITAL ENCOUNTER (OUTPATIENT)
Dept: MAMMOGRAPHY | Facility: HOSPITAL | Age: 63
Discharge: HOME | End: 2018-11-12
Admitting: INTERNAL MEDICINE

## 2018-11-12 DIAGNOSIS — Z12.39 SCREENING BREAST EXAMINATION: ICD-10-CM

## 2018-11-12 PROCEDURE — 77063 BREAST TOMOSYNTHESIS BI: CPT

## 2018-11-12 PROCEDURE — 77067 SCR MAMMO BI INCL CAD: CPT

## 2018-11-23 ENCOUNTER — HOSPITAL ENCOUNTER (OUTPATIENT)
Dept: CT IMAGING | Facility: HOSPITAL | Age: 63
Discharge: HOME | End: 2018-11-23
Admitting: INTERNAL MEDICINE

## 2018-11-23 DIAGNOSIS — C34.82 MALIGNANT NEOPLASM OF OVERLAPPING SITES OF LEFT LUNG (HCC): ICD-10-CM

## 2018-11-23 LAB — CREAT BLDA-MCNC: 1 MG/DL (ref 0.6–1.3)

## 2018-11-23 PROCEDURE — 25010000002 IOPAMIDOL 61 % SOLUTION: Performed by: INTERNAL MEDICINE

## 2018-11-23 PROCEDURE — 71260 CT THORAX DX C+: CPT

## 2018-11-23 PROCEDURE — 82565 ASSAY OF CREATININE: CPT

## 2018-11-23 RX ADMIN — IOPAMIDOL 100 ML: 612 INJECTION, SOLUTION INTRAVENOUS at 11:55

## 2018-11-27 ENCOUNTER — OFFICE VISIT (OUTPATIENT)
Dept: ONCOLOGY | Facility: CLINIC | Age: 63
End: 2018-11-27

## 2018-11-27 VITALS
HEART RATE: 87 BPM | RESPIRATION RATE: 18 BRPM | HEIGHT: 62 IN | WEIGHT: 205 LBS | DIASTOLIC BLOOD PRESSURE: 85 MMHG | TEMPERATURE: 97.9 F | SYSTOLIC BLOOD PRESSURE: 187 MMHG | BODY MASS INDEX: 37.73 KG/M2

## 2018-11-27 DIAGNOSIS — C50.012 MALIGNANT NEOPLASM OF NIPPLE OF LEFT BREAST IN FEMALE, UNSPECIFIED ESTROGEN RECEPTOR STATUS (HCC): ICD-10-CM

## 2018-11-27 DIAGNOSIS — C34.82 MALIGNANT NEOPLASM OF OVERLAPPING SITES OF LEFT LUNG (HCC): Primary | ICD-10-CM

## 2018-11-27 DIAGNOSIS — J90 PLEURAL EFFUSION, LEFT: ICD-10-CM

## 2018-11-27 DIAGNOSIS — C77.1 SECONDARY MALIGNANT NEOPLASM OF MEDIASTINAL LYMPH NODE (HCC): ICD-10-CM

## 2018-11-27 PROCEDURE — 99214 OFFICE O/P EST MOD 30 MIN: CPT | Performed by: INTERNAL MEDICINE

## 2018-11-27 NOTE — PROGRESS NOTES
"      PROBLEM LIST:  1.  Small cell lung cancer from the left upper lobe.  Limited stage disease, recurrent in a mediastinal node  A.  Treated  initially with etoposide and carboplatin starting 9/13/27.  She responded to the single modality that was started because of symptoms including airway compromise.  B.  Completed combined modality chemoradiation with etoposide and carboplatin . (Chemotherapy completed 12/7/2017).  C.  Recurrence in left paratracheal node.  This was seen on CT scan from 6/19/18 and hypermetabolic on PET scan to confirm.  She's been treated with weekly taxol starting 7/17/2018 along with involved field radiation.  2.  Anemia.  This appears to be predominantly from chemoradiation.  3.  History of left breast cancer.  A.  Left mastectomy 7/22/2008 for stage IIIA, T3 N1 M0 breast cancer  B.  ER weakly positive, MI negative and HER-2 positive  C.  Adjuvant chemotherapy with Herceptin, Taxotere and carboplatin  D.  Postmastectomy radiation done at [facility]  E.  Adjuvant tamoxifen started 12/12/2008 and continued as extended adjuvant therapy  4.  Diabetes                HISTORY OF PRESENT ILLNESS:   Chief complaint: Follow-up about  lung cancer and breast cancer  Ms. Horton hasn't noted any dyspnea, wheezing, chest pain, hemoptysis, fever, bone pain, headache or other new symptoms.  She's been gradually increasing her activities.      Past Medical History, Past Surgical History, Social History, Family History have been reviewed and are without significant changes except as mentioned.    Review of Systems   A comprehensive 14 point review of systems was performed and was negative except as mentioned.    Medications:  The current medication list was reviewed in the EMR    ALLERGIES:  Allergies not on file           BP (!) 187/85   Pulse 87   Temp 97.9 °F (36.6 °C) (Temporal)   Resp 18   Ht 157.5 cm (62\")   Wt 93 kg (205 lb)   BMI 37.49 kg/m²      Performance Status: ECOG " 1    General: well appearing, in no acute distress  HEENT: sclera anicteric, oropharynx clear  Lymphatics: no cervical, supraclavicular, or axillary adenopathy  Cardiovascular: regular rate and rhythm, no murmurs  Lungs: clear to auscultation bilaterally  Abdomen: soft, nontender, nondistended.  No palpable organomegaly  Extremeties: no lower extremity edema  Skin: no rashes, lesions, bruising, or petechiae    RECENT LABS:   WBC   Date Value Ref Range Status   09/25/2018 5.16 4.80 - 10.80 10*3/mm3 Final     Hemoglobin   Date Value Ref Range Status   09/25/2018 9.9 (L) 12.0 - 16.0 g/dL Final     Hematocrit   Date Value Ref Range Status   09/25/2018 29.4 (L) 37.0 - 47.0 % Final     MCV   Date Value Ref Range Status   09/25/2018 97.0 81.0 - 99.0 fL Final     RDW   Date Value Ref Range Status   09/25/2018 15.4 (H) 11.5 - 14.5 % Final     MPV   Date Value Ref Range Status   09/25/2018 9.3 6.0 - 12.0 fL Final     Platelets   Date Value Ref Range Status   09/25/2018 170 130 - 400 10*3/mm3 Final     Immature Grans %   Date Value Ref Range Status   09/25/2018 1.2 (H) 0.0 - 0.6 % Final     Neutrophils, Absolute   Date Value Ref Range Status   09/25/2018 3.60 2.00 - 6.90 10*3/mm3 Final     Lymphocytes, Absolute   Date Value Ref Range Status   09/25/2018 0.92 0.60 - 3.40 10*3/mm3 Final     Monocytes, Absolute   Date Value Ref Range Status   09/25/2018 0.50 0.00 - 0.90 10*3/mm3 Final     Eosinophils, Absolute   Date Value Ref Range Status   09/25/2018 0.06 0.00 - 0.70 10*3/mm3 Final     Basophils, Absolute   Date Value Ref Range Status   09/25/2018 0.02 0.00 - 0.20 10*3/mm3 Final     Immature Grans, Absolute   Date Value Ref Range Status   09/25/2018 0.06 0.00 - 0.06 10*3/mm3 Final     nRBC   Date Value Ref Range Status   09/25/2018 0.8 (H) 0.0 - 0.0 /100 WBC Final       Glucose   Date Value Ref Range Status   09/25/2018 129 (H) 74 - 98 mg/dL Final     Sodium   Date Value Ref Range Status   09/25/2018 138 137 - 145 mmol/L Final      Potassium   Date Value Ref Range Status   09/25/2018 3.3 (L) 3.5 - 5.1 mmol/L Final     CO2   Date Value Ref Range Status   09/25/2018 27.0 26.0 - 30.0 mmol/L Final     Chloride   Date Value Ref Range Status   09/25/2018 103 98 - 107 mmol/L Final     Anion Gap   Date Value Ref Range Status   09/25/2018 11.3 10.0 - 20.0 mmol/L Final     Creatinine   Date Value Ref Range Status   11/23/2018 1.00 0.60 - 1.30 mg/dL Final     Comment:     Serial Number: [id number]:  [id number]     BUN   Date Value Ref Range Status   09/25/2018 17 7 - 20 mg/dL Final     BUN/Creatinine Ratio   Date Value Ref Range Status   09/25/2018 17.0 7.1 - 23.5 Final     Calcium   Date Value Ref Range Status   09/25/2018 10.4 (H) 8.4 - 10.2 mg/dL Final     eGFR Non  Amer   Date Value Ref Range Status   09/25/2018 56 (L) >60 mL/min/1.73 Final     Alkaline Phosphatase   Date Value Ref Range Status   09/25/2018 114 38 - 126 U/L Final     Total Protein   Date Value Ref Range Status   09/25/2018 7.1 6.3 - 8.2 g/dL Final     ALT (SGPT)   Date Value Ref Range Status   09/25/2018 49 13 - 69 U/L Final     AST (SGOT)   Date Value Ref Range Status   09/25/2018 36 15 - 46 U/L Final     Total Bilirubin   Date Value Ref Range Status   09/25/2018 1.3 0.2 - 1.3 mg/dL Final     Albumin   Date Value Ref Range Status   09/25/2018 3.90 3.50 - 5.00 g/dL Final     Globulin   Date Value Ref Range Status   09/25/2018 3.2 gm/dL Final       LDH   Date Value Ref Range Status   08/16/2017 216 120 - 246 U/L Final       No results found for: MSPIKE, KAPPALAMB, IGLFLC, FREEKAPPAL      CT images and report were reviewed showing new findings consistent with loculated pleural effusion.  This is changed from 2 months ago.          Impression: 1.  Small cell lung cancer.  She's got findings of new loculated pleural effusion after prior treatment as outlined above.  She doesn't have any new symptoms however.  This is very suspicious for recurrent tumor, although benign causes  for the fluid can't be totally excluded.    Plan: 1.  I reviewed this in detail with Ms. Horton and her .  I've suggested an attempt at imaging-guided thoracentesis.  If this isn't possible, we can decide about serial imaging or PET scanning.  They seem to understand this issue well.  2.  I'll check her labs today including CBC, CMP, pro time, and PTT before her procedure.  3.  I'll see her back in 2 weeks to review all this.  I'll call her if any of her labs or other studies require intervention in the meantime.                   Jac Rader MD  [facility]    11/27/18           CC:

## 2018-12-03 ENCOUNTER — APPOINTMENT (OUTPATIENT)
Dept: LAB | Facility: HOSPITAL | Age: 63
End: 2018-12-03

## 2018-12-03 ENCOUNTER — HOSPITAL ENCOUNTER (OUTPATIENT)
Dept: ULTRASOUND IMAGING | Facility: HOSPITAL | Age: 63
Discharge: HOME | End: 2018-12-03
Admitting: INTERNAL MEDICINE

## 2018-12-03 DIAGNOSIS — C50.012 MALIGNANT NEOPLASM OF NIPPLE OF LEFT BREAST IN FEMALE, UNSPECIFIED ESTROGEN RECEPTOR STATUS (HCC): ICD-10-CM

## 2018-12-03 DIAGNOSIS — C77.1 SECONDARY MALIGNANT NEOPLASM OF MEDIASTINAL LYMPH NODE (HCC): ICD-10-CM

## 2018-12-03 DIAGNOSIS — C34.82 MALIGNANT NEOPLASM OF OVERLAPPING SITES OF LEFT LUNG (HCC): ICD-10-CM

## 2018-12-03 LAB
ALBUMIN SERPL-MCNC: 4.6 G/DL (ref 3.5–5)
ALBUMIN/GLOB SERPL: 1.2 G/DL (ref 1–2)
ALP SERPL-CCNC: 137 U/L (ref 38–126)
ALT SERPL W P-5'-P-CCNC: 39 U/L (ref 13–69)
ANION GAP SERPL CALCULATED.3IONS-SCNC: 12.7 MMOL/L (ref 10–20)
APTT PPP: 30.3 SECONDS (ref 25–36)
AST SERPL-CCNC: 41 U/L (ref 15–46)
BASOPHILS # BLD AUTO: 0.03 10*3/MM3 (ref 0–0.2)
BASOPHILS NFR BLD AUTO: 0.6 % (ref 0–2.5)
BILIRUB SERPL-MCNC: 0.9 MG/DL (ref 0.2–1.3)
BUN BLD-MCNC: 13 MG/DL (ref 7–20)
BUN/CREAT SERPL: 13 (ref 7.1–23.5)
CALCIUM SPEC-SCNC: 10.7 MG/DL (ref 8.4–10.2)
CHLORIDE SERPL-SCNC: 103 MMOL/L (ref 98–107)
CO2 SERPL-SCNC: 27 MMOL/L (ref 26–30)
CREAT BLD-MCNC: 1 MG/DL (ref 0.6–1.3)
DEPRECATED RDW RBC AUTO: 42.4 FL (ref 37–54)
EOSINOPHIL # BLD AUTO: 0.28 10*3/MM3 (ref 0–0.7)
EOSINOPHIL NFR BLD AUTO: 5.4 % (ref 0–7)
ERYTHROCYTE [DISTWIDTH] IN BLOOD BY AUTOMATED COUNT: 12.6 % (ref 11.5–14.5)
GFR SERPL CREATININE-BSD FRML MDRD: 56 ML/MIN/1.73
GLOBULIN UR ELPH-MCNC: 3.8 GM/DL
GLUCOSE BLD-MCNC: 119 MG/DL (ref 74–98)
HCT VFR BLD AUTO: 38.8 % (ref 37–47)
HGB BLD-MCNC: 12.9 G/DL (ref 12–16)
IMM GRANULOCYTES # BLD: 0.02 10*3/MM3 (ref 0–0.06)
IMM GRANULOCYTES NFR BLD: 0.4 % (ref 0–0.6)
INR PPP: 1.14 (ref 0.9–1.1)
LDH SERPL-CCNC: 474 U/L (ref 313–618)
LYMPHOCYTES # BLD AUTO: 1.22 10*3/MM3 (ref 0.6–3.4)
LYMPHOCYTES NFR BLD AUTO: 23.6 % (ref 10–50)
MCH RBC QN AUTO: 30.7 PG (ref 27–31)
MCHC RBC AUTO-ENTMCNC: 33.2 G/DL (ref 30–37)
MCV RBC AUTO: 92.4 FL (ref 81–99)
MONOCYTES # BLD AUTO: 0.37 10*3/MM3 (ref 0–0.9)
MONOCYTES NFR BLD AUTO: 7.2 % (ref 0–12)
NEUTROPHILS # BLD AUTO: 3.25 10*3/MM3 (ref 2–6.9)
NEUTROPHILS NFR BLD AUTO: 62.8 % (ref 37–80)
NRBC BLD MANUAL-RTO: 0 /100 WBC (ref 0–0)
PLATELET # BLD AUTO: 156 10*3/MM3 (ref 130–400)
PMV BLD AUTO: 9.4 FL (ref 6–12)
POTASSIUM BLD-SCNC: 3.7 MMOL/L (ref 3.5–5.1)
PROT SERPL-MCNC: 8.4 G/DL (ref 6.3–8.2)
PROTHROMBIN TIME: 12.7 SECONDS (ref 9.3–12.1)
RBC # BLD AUTO: 4.2 10*6/MM3 (ref 4.2–5.4)
SODIUM BLD-SCNC: 139 MMOL/L (ref 137–145)
WBC NRBC COR # BLD: 5.17 10*3/MM3 (ref 4.8–10.8)

## 2018-12-03 PROCEDURE — 85610 PROTHROMBIN TIME: CPT | Performed by: INTERNAL MEDICINE

## 2018-12-03 PROCEDURE — 76604 US EXAM CHEST: CPT

## 2018-12-03 PROCEDURE — 80053 COMPREHEN METABOLIC PANEL: CPT | Performed by: INTERNAL MEDICINE

## 2018-12-03 PROCEDURE — 76942 ECHO GUIDE FOR BIOPSY: CPT

## 2018-12-03 PROCEDURE — 36415 COLL VENOUS BLD VENIPUNCTURE: CPT | Performed by: INTERNAL MEDICINE

## 2018-12-03 PROCEDURE — 85730 THROMBOPLASTIN TIME PARTIAL: CPT | Performed by: INTERNAL MEDICINE

## 2018-12-03 PROCEDURE — 83615 LACTATE (LD) (LDH) ENZYME: CPT | Performed by: INTERNAL MEDICINE

## 2018-12-03 PROCEDURE — 85025 COMPLETE CBC W/AUTO DIFF WBC: CPT | Performed by: INTERNAL MEDICINE

## 2018-12-11 ENCOUNTER — OFFICE VISIT (OUTPATIENT)
Dept: ONCOLOGY | Facility: CLINIC | Age: 63
End: 2018-12-11

## 2018-12-11 VITALS
DIASTOLIC BLOOD PRESSURE: 77 MMHG | RESPIRATION RATE: 19 BRPM | BODY MASS INDEX: 38.09 KG/M2 | WEIGHT: 207 LBS | HEIGHT: 62 IN | TEMPERATURE: 98 F | SYSTOLIC BLOOD PRESSURE: 174 MMHG | HEART RATE: 88 BPM

## 2018-12-11 DIAGNOSIS — C34.82 MALIGNANT NEOPLASM OF OVERLAPPING SITES OF LEFT LUNG (HCC): Primary | ICD-10-CM

## 2018-12-11 DIAGNOSIS — J90 PLEURAL EFFUSION, LEFT: ICD-10-CM

## 2018-12-11 PROCEDURE — 99213 OFFICE O/P EST LOW 20 MIN: CPT | Performed by: INTERNAL MEDICINE

## 2018-12-11 NOTE — PROGRESS NOTES
PROBLEM LIST:  1.  Small cell lung cancer from the left upper lobe.  Limited stage disease, recurrent in a mediastinal node  A.  Treated  initially with etoposide and carboplatin starting 9/13/27.  She responded to the single modality that was started because of symptoms including airway compromise.  B.  Completed combined modality chemoradiation with etoposide and carboplatin . (Chemotherapy completed 12/7/2017).  C.  Recurrence in left paratracheal node.  This was seen on CT scan from 6/19/18 and hypermetabolic on PET scan to confirm.  She's been treated with weekly taxol starting 7/17/2018 along with involved field radiation.  D. Left pleural effusion seen on CT of 11/23/18, loculated and inaccessible for thoracentesis  2.  Anemia.  This appears to be predominantly from chemoradiation.  3.  History of left breast cancer.  A.  Left mastectomy 7/22/2008 for stage IIIA, T3 N1 M0 breast cancer  B.  ER weakly positive, VT negative and HER-2 positive  C.  Adjuvant chemotherapy with Herceptin, Taxotere and carboplatin  D.  Postmastectomy radiation done at [facility]  E.  Adjuvant tamoxifen started 12/12/2008 and continued as extended adjuvant therapy  4.  Diabetes                HISTORY OF PRESENT ILLNESS:   Chief complaint: Follow up about lung cancer  Ms. Horton has a mild infrequent cough with no dyspnea, chest pain, or other new symptoms.  She's been maintaining her usual activities.  She hasn't noted any fevers, chills, sweats or other symptoms of infection.  Radiology felt that her left pleural effusion was not accessible for thoracentesis even with image guidance.      Past Medical History, Past Surgical History, Social History, Family History have been reviewed and are without significant changes except as mentioned.    Review of Systems   A comprehensive 14 point review of systems was performed and was negative except as mentioned.    Medications:  The current medication list was reviewed  "in the EMR    ALLERGIES:  Allergies not on file           /77   Pulse 88   Temp 98 °F (36.7 °C) (Temporal)   Resp 19   Ht 157.5 cm (62\")   Wt 93.9 kg (207 lb)   BMI 37.86 kg/m²       General: well appearing, in no acute distress  HEENT: sclera anicteric, oropharynx clear  Lymphatics: no cervical, supraclavicular, or axillary adenopathy  Cardiovascular: regular rate and rhythm, no murmurs  Lungs: clear to auscultation bilaterally  Abdomen: soft, nontender, nondistended.  No palpable organomegaly  Extremeties: no lower extremity edema  Skin: no rashes, lesions, bruising, or petechiae    RECENT LABS:   WBC   Date Value Ref Range Status   12/03/2018 5.17 4.80 - 10.80 10*3/mm3 Final     Hemoglobin   Date Value Ref Range Status   12/03/2018 12.9 12.0 - 16.0 g/dL Final     Hematocrit   Date Value Ref Range Status   12/03/2018 38.8 37.0 - 47.0 % Final     MCV   Date Value Ref Range Status   12/03/2018 92.4 81.0 - 99.0 fL Final     RDW   Date Value Ref Range Status   12/03/2018 12.6 11.5 - 14.5 % Final     MPV   Date Value Ref Range Status   12/03/2018 9.4 6.0 - 12.0 fL Final     Platelets   Date Value Ref Range Status   12/03/2018 156 130 - 400 10*3/mm3 Final     Immature Grans %   Date Value Ref Range Status   12/03/2018 0.4 0.0 - 0.6 % Final     Neutrophils, Absolute   Date Value Ref Range Status   12/03/2018 3.25 2.00 - 6.90 10*3/mm3 Final     Lymphocytes, Absolute   Date Value Ref Range Status   12/03/2018 1.22 0.60 - 3.40 10*3/mm3 Final     Monocytes, Absolute   Date Value Ref Range Status   12/03/2018 0.37 0.00 - 0.90 10*3/mm3 Final     Eosinophils, Absolute   Date Value Ref Range Status   12/03/2018 0.28 0.00 - 0.70 10*3/mm3 Final     Basophils, Absolute   Date Value Ref Range Status   12/03/2018 0.03 0.00 - 0.20 10*3/mm3 Final     Immature Grans, Absolute   Date Value Ref Range Status   12/03/2018 0.02 0.00 - 0.06 10*3/mm3 Final     nRBC   Date Value Ref Range Status   12/03/2018 0.0 0.0 - 0.0 /100 WBC " Final       Glucose   Date Value Ref Range Status   12/03/2018 119 (H) 74 - 98 mg/dL Final     Sodium   Date Value Ref Range Status   12/03/2018 139 137 - 145 mmol/L Final     Potassium   Date Value Ref Range Status   12/03/2018 3.7 3.5 - 5.1 mmol/L Final     CO2   Date Value Ref Range Status   12/03/2018 27.0 26.0 - 30.0 mmol/L Final     Chloride   Date Value Ref Range Status   12/03/2018 103 98 - 107 mmol/L Final     Anion Gap   Date Value Ref Range Status   12/03/2018 12.7 10.0 - 20.0 mmol/L Final     Creatinine   Date Value Ref Range Status   12/03/2018 1.00 0.60 - 1.30 mg/dL Final   11/23/2018 1.00 0.60 - 1.30 mg/dL Final     Comment:     Serial Number: [id number]:  [id number]     BUN   Date Value Ref Range Status   12/03/2018 13 7 - 20 mg/dL Final     BUN/Creatinine Ratio   Date Value Ref Range Status   12/03/2018 13.0 7.1 - 23.5 Final     Calcium   Date Value Ref Range Status   12/03/2018 10.7 (H) 8.4 - 10.2 mg/dL Final     eGFR Non  Amer   Date Value Ref Range Status   12/03/2018 56 (L) >60 mL/min/1.73 Final     Alkaline Phosphatase   Date Value Ref Range Status   12/03/2018 137 (H) 38 - 126 U/L Final     Total Protein   Date Value Ref Range Status   12/03/2018 8.4 (H) 6.3 - 8.2 g/dL Final     ALT (SGPT)   Date Value Ref Range Status   12/03/2018 39 13 - 69 U/L Final     AST (SGOT)   Date Value Ref Range Status   12/03/2018 41 15 - 46 U/L Final     Total Bilirubin   Date Value Ref Range Status   12/03/2018 0.9 0.2 - 1.3 mg/dL Final     Albumin   Date Value Ref Range Status   12/03/2018 4.60 3.50 - 5.00 g/dL Final     Globulin   Date Value Ref Range Status   12/03/2018 3.8 gm/dL Final       LDH   Date Value Ref Range Status   12/03/2018 474 313 - 618 U/L Final       No results found for: MSPIKE, KAPPALAMB, IGLFLC, FREEKAPPAL              Impression: 1.  Small cell lung cancer with a new left pleural effusion after chemoradiation.  This was not accessible for thoracentesis.  2.  History of breast cancer  without evidence of recurrence    Plan: 1.  I discussed this with Mrs. Horton and I have suggested monitoring her pleural effusion for now.  I don't think additional imaging such as PET scan would help with her management.  If she develops symptoms we can scan immediately, otherwise I would plan to repeat a scan in 6-8 weeks and she'll follow-up with one of my colleagues then, probably Dr. Son since he has treated one of her family members.                     Jac Rader MD  [facility] On[facility]    12/11/18           CC:

## 2018-12-18 ENCOUNTER — TELEPHONE (OUTPATIENT)
Dept: ONCOLOGY | Facility: CLINIC | Age: 63
End: 2018-12-18

## 2018-12-18 RX ORDER — TAMOXIFEN CITRATE 20 MG/1
20 TABLET ORAL DAILY
Qty: 30 TABLET | Refills: 0 | Status: SHIPPED | OUTPATIENT
Start: 2018-12-18 | End: 2020-01-01 | Stop reason: SDUPTHER

## 2018-12-18 NOTE — TELEPHONE ENCOUNTER
----- Message from AQUILES Mckay sent at 12/18/2018 10:48 AM EST -----  Regarding: RE: Tmx Refill  I would give her 1 more month and let new dr decide on stopping. I would think they would stop.   ----- Message -----  From: Nena Chow MA  Sent: 12/18/2018   9:11 AM  To: AQUILES Mckay  Subject: Tmx Refill                                       Patient is requesting Tmx refill.  She has been on Tmx for 10 years this month.  Is she going to continue?    Nena

## 2018-12-18 NOTE — TELEPHONE ENCOUNTER
LM on patient vm.  30 day supply TMX sent to pharmacy.  Patient to discuss with Dr. Son at Jan 2019 appt.

## 2019-01-01 ENCOUNTER — APPOINTMENT (OUTPATIENT)
Dept: MRI IMAGING | Facility: HOSPITAL | Age: 64
End: 2019-01-01

## 2019-01-01 ENCOUNTER — INFUSION (OUTPATIENT)
Dept: ONCOLOGY | Facility: HOSPITAL | Age: 64
End: 2019-01-01

## 2019-01-01 ENCOUNTER — TELEPHONE (OUTPATIENT)
Dept: ONCOLOGY | Facility: CLINIC | Age: 64
End: 2019-01-01

## 2019-01-01 ENCOUNTER — HOSPITAL ENCOUNTER (OUTPATIENT)
Dept: MAMMOGRAPHY | Facility: HOSPITAL | Age: 64
Discharge: HOME | End: 2019-11-20
Admitting: FAMILY MEDICINE

## 2019-01-01 ENCOUNTER — OFFICE VISIT (OUTPATIENT)
Dept: ONCOLOGY | Facility: CLINIC | Age: 64
End: 2019-01-01

## 2019-01-01 ENCOUNTER — HOSPITAL ENCOUNTER (INPATIENT)
Facility: HOSPITAL | Age: 64
LOS: 3 days | Discharge: HOME | End: 2019-12-31
Attending: EMERGENCY MEDICINE | Admitting: HOSPITALIST

## 2019-01-01 ENCOUNTER — DOCUMENTATION (OUTPATIENT)
Dept: RADIATION ONCOLOGY | Facility: HOSPITAL | Age: 64
End: 2019-01-01

## 2019-01-01 VITALS
OXYGEN SATURATION: 96 % | HEART RATE: 81 BPM | SYSTOLIC BLOOD PRESSURE: 151 MMHG | BODY MASS INDEX: 36.44 KG/M2 | TEMPERATURE: 97.1 F | WEIGHT: 198 LBS | HEIGHT: 62 IN | DIASTOLIC BLOOD PRESSURE: 74 MMHG

## 2019-01-01 VITALS
WEIGHT: 198 LBS | TEMPERATURE: 97.7 F | BODY MASS INDEX: 36.44 KG/M2 | HEART RATE: 88 BPM | HEIGHT: 62 IN | DIASTOLIC BLOOD PRESSURE: 63 MMHG | SYSTOLIC BLOOD PRESSURE: 136 MMHG | OXYGEN SATURATION: 97 % | RESPIRATION RATE: 16 BRPM

## 2019-01-01 VITALS
WEIGHT: 200 LBS | SYSTOLIC BLOOD PRESSURE: 157 MMHG | DIASTOLIC BLOOD PRESSURE: 70 MMHG | BODY MASS INDEX: 36.8 KG/M2 | HEIGHT: 62 IN | OXYGEN SATURATION: 95 % | TEMPERATURE: 97.2 F | HEART RATE: 101 BPM

## 2019-01-01 VITALS
BODY MASS INDEX: 36.03 KG/M2 | HEART RATE: 75 BPM | SYSTOLIC BLOOD PRESSURE: 145 MMHG | DIASTOLIC BLOOD PRESSURE: 63 MMHG | WEIGHT: 197 LBS | RESPIRATION RATE: 16 BRPM | TEMPERATURE: 97.4 F

## 2019-01-01 DIAGNOSIS — C77.1 SECONDARY MALIGNANT NEOPLASM OF MEDIASTINAL LYMPH NODE (HCC): ICD-10-CM

## 2019-01-01 DIAGNOSIS — Z74.09 IMPAIRED FUNCTIONAL MOBILITY, BALANCE, GAIT, AND ENDURANCE: ICD-10-CM

## 2019-01-01 DIAGNOSIS — C34.82 MALIGNANT NEOPLASM OF OVERLAPPING SITES OF LEFT LUNG (HCC): Primary | ICD-10-CM

## 2019-01-01 DIAGNOSIS — C79.31 METASTATIC CANCER TO BRAIN (HCC): Primary | ICD-10-CM

## 2019-01-01 DIAGNOSIS — R29.810 FACIAL WEAKNESS: ICD-10-CM

## 2019-01-01 DIAGNOSIS — C34.82 MALIGNANT NEOPLASM OF OVERLAPPING SITES OF LEFT LUNG (HCC): ICD-10-CM

## 2019-01-01 DIAGNOSIS — Z12.31 SCREENING MAMMOGRAM FOR HIGH-RISK PATIENT: ICD-10-CM

## 2019-01-01 LAB
ALBUMIN SERPL-MCNC: 3.5 G/DL (ref 3.5–5.2)
ALBUMIN SERPL-MCNC: 3.9 G/DL (ref 3.5–5.2)
ALBUMIN SERPL-MCNC: 3.9 G/DL (ref 3.5–5.2)
ALBUMIN SERPL-MCNC: 4 G/DL (ref 3.5–5.2)
ALBUMIN/GLOB SERPL: 0.9 G/DL
ALBUMIN/GLOB SERPL: 0.9 G/DL
ALBUMIN/GLOB SERPL: 1 G/DL
ALBUMIN/GLOB SERPL: 1.2 G/DL
ALP SERPL-CCNC: 122 U/L (ref 39–117)
ALP SERPL-CCNC: 124 U/L (ref 39–117)
ALP SERPL-CCNC: 170 U/L (ref 39–117)
ALP SERPL-CCNC: 220 U/L (ref 39–117)
ALT SERPL W P-5'-P-CCNC: 150 U/L (ref 1–33)
ALT SERPL W P-5'-P-CCNC: 67 U/L (ref 1–33)
ALT SERPL W P-5'-P-CCNC: 84 U/L (ref 1–33)
ALT SERPL W P-5'-P-CCNC: 84 U/L (ref 1–33)
ANION GAP SERPL CALCULATED.3IONS-SCNC: 11 MMOL/L (ref 5–15)
ANION GAP SERPL CALCULATED.3IONS-SCNC: 11.8 MMOL/L (ref 5–15)
ANION GAP SERPL CALCULATED.3IONS-SCNC: 12 MMOL/L (ref 5–15)
ANION GAP SERPL CALCULATED.3IONS-SCNC: 12.9 MMOL/L (ref 5–15)
ANION GAP SERPL CALCULATED.3IONS-SCNC: 13 MMOL/L (ref 5–15)
AST SERPL-CCNC: 126 U/L (ref 1–32)
AST SERPL-CCNC: 42 U/L (ref 1–32)
AST SERPL-CCNC: 49 U/L (ref 1–32)
AST SERPL-CCNC: 73 U/L (ref 1–32)
BASOPHILS # BLD AUTO: 0.01 10*3/MM3 (ref 0–0.2)
BASOPHILS # BLD AUTO: 0.01 10*3/MM3 (ref 0–0.2)
BASOPHILS # BLD AUTO: 0.02 10*3/MM3 (ref 0–0.2)
BASOPHILS # BLD AUTO: 0.02 10*3/MM3 (ref 0–0.2)
BASOPHILS NFR BLD AUTO: 0.2 % (ref 0–1.5)
BASOPHILS NFR BLD AUTO: 0.3 % (ref 0–1.5)
BASOPHILS NFR BLD AUTO: 0.3 % (ref 0–1.5)
BASOPHILS NFR BLD AUTO: 0.5 % (ref 0–1.5)
BILIRUB SERPL-MCNC: 0.4 MG/DL (ref 0.2–1.2)
BILIRUB SERPL-MCNC: 0.5 MG/DL (ref 0.2–1.2)
BILIRUB SERPL-MCNC: 0.6 MG/DL (ref 0.2–1.2)
BILIRUB SERPL-MCNC: 1.2 MG/DL (ref 0.2–1.2)
BUN BLD-MCNC: 16 MG/DL (ref 8–23)
BUN BLD-MCNC: 17 MG/DL (ref 8–23)
BUN BLD-MCNC: 21 MG/DL (ref 8–23)
BUN BLD-MCNC: 25 MG/DL (ref 8–23)
BUN BLD-MCNC: 26 MG/DL (ref 8–23)
BUN BLDA-MCNC: 18 MG/DL (ref 8–26)
BUN/CREAT SERPL: 15.7 (ref 7–25)
BUN/CREAT SERPL: 16.7 (ref 7–25)
BUN/CREAT SERPL: 17.2 (ref 7–25)
BUN/CREAT SERPL: 28 (ref 7–25)
BUN/CREAT SERPL: 29.8 (ref 7–25)
CA-I BLDA-SCNC: 1.35 MMOL/L (ref 1.2–1.32)
CALCIUM SPEC-SCNC: 10.2 MG/DL (ref 8.6–10.5)
CALCIUM SPEC-SCNC: 10.3 MG/DL (ref 8.6–10.5)
CALCIUM SPEC-SCNC: 10.4 MG/DL (ref 8.6–10.5)
CALCIUM SPEC-SCNC: 10.4 MG/DL (ref 8.6–10.5)
CALCIUM SPEC-SCNC: 10.9 MG/DL (ref 8.6–10.5)
CHLORIDE BLDA-SCNC: 103 MMOL/L (ref 98–109)
CHLORIDE SERPL-SCNC: 101 MMOL/L (ref 98–107)
CHLORIDE SERPL-SCNC: 101 MMOL/L (ref 98–107)
CHLORIDE SERPL-SCNC: 103 MMOL/L (ref 98–107)
CHLORIDE SERPL-SCNC: 106 MMOL/L (ref 98–107)
CHLORIDE SERPL-SCNC: 99 MMOL/L (ref 98–107)
CO2 BLDA-SCNC: 27 MMOL/L (ref 24–29)
CO2 SERPL-SCNC: 20 MMOL/L (ref 22–29)
CO2 SERPL-SCNC: 23 MMOL/L (ref 22–29)
CO2 SERPL-SCNC: 25.2 MMOL/L (ref 22–29)
CO2 SERPL-SCNC: 27 MMOL/L (ref 22–29)
CO2 SERPL-SCNC: 27.1 MMOL/L (ref 22–29)
CREAT BLD-MCNC: 0.84 MG/DL (ref 0.57–1)
CREAT BLD-MCNC: 0.93 MG/DL (ref 0.57–1)
CREAT BLD-MCNC: 0.96 MG/DL (ref 0.57–1)
CREAT BLD-MCNC: 0.99 MG/DL (ref 0.57–1)
CREAT BLD-MCNC: 1.34 MG/DL (ref 0.57–1)
CREAT BLDA-MCNC: 1.1 MG/DL (ref 0.6–1.3)
DEPRECATED RDW RBC AUTO: 43.8 FL (ref 37–54)
DEPRECATED RDW RBC AUTO: 44.7 FL (ref 37–54)
DEPRECATED RDW RBC AUTO: 45.5 FL (ref 37–54)
DEPRECATED RDW RBC AUTO: 46.1 FL (ref 37–54)
DEPRECATED RDW RBC AUTO: 47.2 FL (ref 37–54)
EOSINOPHIL # BLD AUTO: 0.01 10*3/MM3 (ref 0–0.4)
EOSINOPHIL # BLD AUTO: 0.08 10*3/MM3 (ref 0–0.4)
EOSINOPHIL # BLD AUTO: 0.24 10*3/MM3 (ref 0–0.4)
EOSINOPHIL # BLD AUTO: 0.3 10*3/MM3 (ref 0–0.4)
EOSINOPHIL # BLD MANUAL: 0.1 10*3/MM3 (ref 0–0.4)
EOSINOPHIL NFR BLD AUTO: 0.3 % (ref 0.3–6.2)
EOSINOPHIL NFR BLD AUTO: 1 % (ref 0.3–6.2)
EOSINOPHIL NFR BLD AUTO: 5.5 % (ref 0.3–6.2)
EOSINOPHIL NFR BLD AUTO: 6 % (ref 0.3–6.2)
EOSINOPHIL NFR BLD MANUAL: 4 % (ref 0.3–6.2)
ERYTHROCYTE [DISTWIDTH] IN BLOOD BY AUTOMATED COUNT: 12.3 % (ref 12.3–15.4)
ERYTHROCYTE [DISTWIDTH] IN BLOOD BY AUTOMATED COUNT: 12.4 % (ref 12.3–15.4)
ERYTHROCYTE [DISTWIDTH] IN BLOOD BY AUTOMATED COUNT: 12.6 % (ref 12.3–15.4)
ERYTHROCYTE [DISTWIDTH] IN BLOOD BY AUTOMATED COUNT: 12.9 % (ref 12.3–15.4)
ERYTHROCYTE [DISTWIDTH] IN BLOOD BY AUTOMATED COUNT: 13 % (ref 12.3–15.4)
GFR SERPL CREATININE-BSD FRML MDRD: 40 ML/MIN/1.73
GFR SERPL CREATININE-BSD FRML MDRD: 57 ML/MIN/1.73
GFR SERPL CREATININE-BSD FRML MDRD: 59 ML/MIN/1.73
GFR SERPL CREATININE-BSD FRML MDRD: 61 ML/MIN/1.73
GFR SERPL CREATININE-BSD FRML MDRD: 69 ML/MIN/1.73
GLOBULIN UR ELPH-MCNC: 3.2 GM/DL
GLOBULIN UR ELPH-MCNC: 3.8 GM/DL
GLOBULIN UR ELPH-MCNC: 4 GM/DL
GLOBULIN UR ELPH-MCNC: 4.3 GM/DL
GLUCOSE BLD-MCNC: 106 MG/DL (ref 65–99)
GLUCOSE BLD-MCNC: 135 MG/DL (ref 65–99)
GLUCOSE BLD-MCNC: 155 MG/DL (ref 65–99)
GLUCOSE BLD-MCNC: 171 MG/DL (ref 65–99)
GLUCOSE BLD-MCNC: 89 MG/DL (ref 65–99)
GLUCOSE BLDC GLUCOMTR-MCNC: 112 MG/DL (ref 70–130)
GLUCOSE BLDC GLUCOMTR-MCNC: 115 MG/DL (ref 70–130)
GLUCOSE BLDC GLUCOMTR-MCNC: 125 MG/DL (ref 70–130)
GLUCOSE BLDC GLUCOMTR-MCNC: 134 MG/DL (ref 70–130)
GLUCOSE BLDC GLUCOMTR-MCNC: 144 MG/DL (ref 70–130)
GLUCOSE BLDC GLUCOMTR-MCNC: 166 MG/DL (ref 70–130)
GLUCOSE BLDC GLUCOMTR-MCNC: 178 MG/DL (ref 70–130)
GLUCOSE BLDC GLUCOMTR-MCNC: 196 MG/DL (ref 70–130)
GLUCOSE BLDC GLUCOMTR-MCNC: 215 MG/DL (ref 70–130)
GLUCOSE BLDC GLUCOMTR-MCNC: 92 MG/DL (ref 70–130)
HBA1C MFR BLD: 5.6 % (ref 4.8–5.6)
HCT VFR BLD AUTO: 32.2 % (ref 34–46.6)
HCT VFR BLD AUTO: 33.7 % (ref 34–46.6)
HCT VFR BLD AUTO: 36.9 % (ref 34–46.6)
HCT VFR BLD AUTO: 37 % (ref 34–46.6)
HCT VFR BLD AUTO: 38.9 % (ref 34–46.6)
HCT VFR BLDA CALC: 36 % (ref 38–51)
HGB BLD-MCNC: 11.1 G/DL (ref 12–15.9)
HGB BLD-MCNC: 11.4 G/DL (ref 12–15.9)
HGB BLD-MCNC: 12.2 G/DL (ref 12–15.9)
HGB BLD-MCNC: 12.3 G/DL (ref 12–15.9)
HGB BLD-MCNC: 13 G/DL (ref 12–15.9)
HGB BLDA-MCNC: 12.2 G/DL (ref 12–17)
IMM GRANULOCYTES # BLD AUTO: 0.01 10*3/MM3 (ref 0–0.05)
IMM GRANULOCYTES # BLD AUTO: 0.05 10*3/MM3 (ref 0–0.05)
IMM GRANULOCYTES NFR BLD AUTO: 0.2 % (ref 0–0.5)
IMM GRANULOCYTES NFR BLD AUTO: 0.3 % (ref 0–0.5)
IMM GRANULOCYTES NFR BLD AUTO: 0.3 % (ref 0–0.5)
IMM GRANULOCYTES NFR BLD AUTO: 0.7 % (ref 0–0.5)
LYMPHOCYTES # BLD AUTO: 0.63 10*3/MM3 (ref 0.7–3.1)
LYMPHOCYTES # BLD AUTO: 0.98 10*3/MM3 (ref 0.7–3.1)
LYMPHOCYTES # BLD AUTO: 1.22 10*3/MM3 (ref 0.7–3.1)
LYMPHOCYTES # BLD AUTO: 1.83 10*3/MM3 (ref 0.7–3.1)
LYMPHOCYTES # BLD MANUAL: 0.43 10*3/MM3 (ref 0.7–3.1)
LYMPHOCYTES NFR BLD AUTO: 17.9 % (ref 19.6–45.3)
LYMPHOCYTES NFR BLD AUTO: 18.5 % (ref 19.6–45.3)
LYMPHOCYTES NFR BLD AUTO: 24 % (ref 19.6–45.3)
LYMPHOCYTES NFR BLD AUTO: 30.5 % (ref 19.6–45.3)
LYMPHOCYTES NFR BLD MANUAL: 17 % (ref 19.6–45.3)
LYMPHOCYTES NFR BLD MANUAL: 9 % (ref 5–12)
MAGNESIUM SERPL-MCNC: 2.2 MG/DL (ref 1.6–2.4)
MCH RBC QN AUTO: 32.9 PG (ref 26.6–33)
MCH RBC QN AUTO: 32.9 PG (ref 26.6–33)
MCH RBC QN AUTO: 33.1 PG (ref 26.6–33)
MCH RBC QN AUTO: 33.3 PG (ref 26.6–33)
MCH RBC QN AUTO: 33.4 PG (ref 26.6–33)
MCHC RBC AUTO-ENTMCNC: 33.1 G/DL (ref 31.5–35.7)
MCHC RBC AUTO-ENTMCNC: 33.2 G/DL (ref 31.5–35.7)
MCHC RBC AUTO-ENTMCNC: 33.4 G/DL (ref 31.5–35.7)
MCHC RBC AUTO-ENTMCNC: 33.8 G/DL (ref 31.5–35.7)
MCHC RBC AUTO-ENTMCNC: 34.5 G/DL (ref 31.5–35.7)
MCV RBC AUTO: 100 FL (ref 79–97)
MCV RBC AUTO: 100 FL (ref 79–97)
MCV RBC AUTO: 95.5 FL (ref 79–97)
MCV RBC AUTO: 98.5 FL (ref 79–97)
MCV RBC AUTO: 98.9 FL (ref 79–97)
MONOCYTES # BLD AUTO: 0.09 10*3/MM3 (ref 0.1–0.9)
MONOCYTES # BLD AUTO: 0.23 10*3/MM3 (ref 0.1–0.9)
MONOCYTES # BLD AUTO: 0.33 10*3/MM3 (ref 0.1–0.9)
MONOCYTES # BLD AUTO: 0.33 10*3/MM3 (ref 0.1–0.9)
MONOCYTES # BLD AUTO: 0.4 10*3/MM3 (ref 0.1–0.9)
MONOCYTES NFR BLD AUTO: 2.6 % (ref 5–12)
MONOCYTES NFR BLD AUTO: 5.2 % (ref 5–12)
MONOCYTES NFR BLD AUTO: 6 % (ref 5–12)
MONOCYTES NFR BLD AUTO: 8.3 % (ref 5–12)
NEUTROPHILS # BLD AUTO: 1.63 10*3/MM3 (ref 1.7–7)
NEUTROPHILS # BLD AUTO: 2.18 10*3/MM3 (ref 1.7–7)
NEUTROPHILS # BLD AUTO: 2.66 10*3/MM3 (ref 1.7–7)
NEUTROPHILS # BLD AUTO: 3.85 10*3/MM3 (ref 1.7–7)
NEUTROPHILS # BLD AUTO: 5.24 10*3/MM3 (ref 1.7–7)
NEUTROPHILS NFR BLD AUTO: 54.4 % (ref 42.7–76)
NEUTROPHILS NFR BLD AUTO: 68.8 % (ref 42.7–76)
NEUTROPHILS NFR BLD AUTO: 70.2 % (ref 42.7–76)
NEUTROPHILS NFR BLD AUTO: 78 % (ref 42.7–76)
NEUTROPHILS NFR BLD MANUAL: 62 % (ref 42.7–76)
NEUTS BAND NFR BLD MANUAL: 2 % (ref 0–5)
NRBC BLD AUTO-RTO: 0 /100 WBC (ref 0–0.2)
PLATELET # BLD AUTO: 117 10*3/MM3 (ref 140–450)
PLATELET # BLD AUTO: 119 10*3/MM3 (ref 140–450)
PLATELET # BLD AUTO: 120 10*3/MM3 (ref 140–450)
PLATELET # BLD AUTO: 93 10*3/MM3 (ref 140–450)
PLATELET # BLD AUTO: 95 10*3/MM3 (ref 140–450)
PMV BLD AUTO: 9 FL (ref 6–12)
PMV BLD AUTO: 9.3 FL (ref 6–12)
PMV BLD AUTO: 9.5 FL (ref 6–12)
PMV BLD AUTO: 9.6 FL (ref 6–12)
PMV BLD AUTO: 9.6 FL (ref 6–12)
POTASSIUM BLD-SCNC: 3.1 MMOL/L (ref 3.5–5.2)
POTASSIUM BLD-SCNC: 3.5 MMOL/L (ref 3.5–5.2)
POTASSIUM BLD-SCNC: 3.8 MMOL/L (ref 3.5–5.2)
POTASSIUM BLD-SCNC: 3.8 MMOL/L (ref 3.5–5.2)
POTASSIUM BLD-SCNC: 4 MMOL/L (ref 3.5–5.2)
POTASSIUM BLD-SCNC: 4 MMOL/L (ref 3.5–5.2)
POTASSIUM BLD-SCNC: 4.1 MMOL/L (ref 3.5–5.2)
POTASSIUM BLDA-SCNC: 3.4 MMOL/L (ref 3.5–4.9)
PROT SERPL-MCNC: 7.1 G/DL (ref 6–8.5)
PROT SERPL-MCNC: 7.3 G/DL (ref 6–8.5)
PROT SERPL-MCNC: 8 G/DL (ref 6–8.5)
PROT SERPL-MCNC: 8.2 G/DL (ref 6–8.5)
RBC # BLD AUTO: 3.37 10*6/MM3 (ref 3.77–5.28)
RBC # BLD AUTO: 3.42 10*6/MM3 (ref 3.77–5.28)
RBC # BLD AUTO: 3.69 10*6/MM3 (ref 3.77–5.28)
RBC # BLD AUTO: 3.74 10*6/MM3 (ref 3.77–5.28)
RBC # BLD AUTO: 3.89 10*6/MM3 (ref 3.77–5.28)
RBC MORPH BLD: NORMAL
SCAN SLIDE: NORMAL
SMALL PLATELETS BLD QL SMEAR: ADEQUATE
SODIUM BLD-SCNC: 138 MMOL/L (ref 136–145)
SODIUM BLD-SCNC: 138 MMOL/L (ref 136–145)
SODIUM BLD-SCNC: 139 MMOL/L (ref 136–145)
SODIUM BLDA-SCNC: 141 MMOL/L (ref 138–146)
T4 FREE SERPL-MCNC: 1.35 NG/DL (ref 0.93–1.7)
TSH SERPL DL<=0.05 MIU/L-ACNC: 1.67 UIU/ML (ref 0.27–4.2)
VARIANT LYMPHS NFR BLD MANUAL: 6 % (ref 0–5)
WBC MORPH BLD: NORMAL
WBC NRBC COR # BLD: 2.55 10*3/MM3 (ref 3.4–10.8)
WBC NRBC COR # BLD: 3.41 10*3/MM3 (ref 3.4–10.8)
WBC NRBC COR # BLD: 4 10*3/MM3 (ref 3.4–10.8)
WBC NRBC COR # BLD: 5.48 10*3/MM3 (ref 3.4–10.8)
WBC NRBC COR # BLD: 7.62 10*3/MM3 (ref 3.4–10.8)

## 2019-01-01 PROCEDURE — 80053 COMPREHEN METABOLIC PANEL: CPT | Performed by: INTERNAL MEDICINE

## 2019-01-01 PROCEDURE — 96413 CHEMO IV INFUSION 1 HR: CPT

## 2019-01-01 PROCEDURE — 36415 COLL VENOUS BLD VENIPUNCTURE: CPT

## 2019-01-01 PROCEDURE — 77307 TELETHX ISODOSE PLAN CPLX: CPT | Performed by: RADIOLOGY

## 2019-01-01 PROCEDURE — 99239 HOSP IP/OBS DSCHRG MGMT >30: CPT | Performed by: HOSPITALIST

## 2019-01-01 PROCEDURE — 85014 HEMATOCRIT: CPT

## 2019-01-01 PROCEDURE — 84132 ASSAY OF SERUM POTASSIUM: CPT | Performed by: INTERNAL MEDICINE

## 2019-01-01 PROCEDURE — 80053 COMPREHEN METABOLIC PANEL: CPT

## 2019-01-01 PROCEDURE — 25010000002 IPILIMUMAB 200 MG/40ML SOLUTION 40 ML VIAL: Performed by: INTERNAL MEDICINE

## 2019-01-01 PROCEDURE — 83735 ASSAY OF MAGNESIUM: CPT | Performed by: INTERNAL MEDICINE

## 2019-01-01 PROCEDURE — 96365 THER/PROPH/DIAG IV INF INIT: CPT

## 2019-01-01 PROCEDURE — 99232 SBSQ HOSP IP/OBS MODERATE 35: CPT | Performed by: INTERNAL MEDICINE

## 2019-01-01 PROCEDURE — 63710000001 INSULIN LISPRO (HUMAN) PER 5 UNITS: Performed by: INTERNAL MEDICINE

## 2019-01-01 PROCEDURE — 94640 AIRWAY INHALATION TREATMENT: CPT

## 2019-01-01 PROCEDURE — 99223 1ST HOSP IP/OBS HIGH 75: CPT | Performed by: INTERNAL MEDICINE

## 2019-01-01 PROCEDURE — 85025 COMPLETE CBC W/AUTO DIFF WBC: CPT | Performed by: INTERNAL MEDICINE

## 2019-01-01 PROCEDURE — 77412 RADIATION TX DELIVERY LVL 3: CPT | Performed by: RADIOLOGY

## 2019-01-01 PROCEDURE — 25010000002 NIVOLUMAB 100 MG/10ML SOLUTION 10 ML VIAL: Performed by: INTERNAL MEDICINE

## 2019-01-01 PROCEDURE — 94799 UNLISTED PULMONARY SVC/PX: CPT

## 2019-01-01 PROCEDURE — 82962 GLUCOSE BLOOD TEST: CPT

## 2019-01-01 PROCEDURE — 25010000002 DEXAMETHASONE PER 1 MG: Performed by: INTERNAL MEDICINE

## 2019-01-01 PROCEDURE — 96417 CHEMO IV INFUS EACH ADDL SEQ: CPT

## 2019-01-01 PROCEDURE — 97162 PT EVAL MOD COMPLEX 30 MIN: CPT

## 2019-01-01 PROCEDURE — 96415 CHEMO IV INFUSION ADDL HR: CPT

## 2019-01-01 PROCEDURE — 77290 THER RAD SIMULAJ FIELD CPLX: CPT | Performed by: RADIOLOGY

## 2019-01-01 PROCEDURE — 85025 COMPLETE CBC W/AUTO DIFF WBC: CPT

## 2019-01-01 PROCEDURE — 84443 ASSAY THYROID STIM HORMONE: CPT

## 2019-01-01 PROCEDURE — 25010000002 DEXAMETHASONE PER 1 MG: Performed by: EMERGENCY MEDICINE

## 2019-01-01 PROCEDURE — 85025 COMPLETE CBC W/AUTO DIFF WBC: CPT | Performed by: EMERGENCY MEDICINE

## 2019-01-01 PROCEDURE — 83036 HEMOGLOBIN GLYCOSYLATED A1C: CPT | Performed by: INTERNAL MEDICINE

## 2019-01-01 PROCEDURE — 25010000003 HEPARIN LOCK FLUCH PER 10 UNITS: Performed by: INTERNAL MEDICINE

## 2019-01-01 PROCEDURE — 84439 ASSAY OF FREE THYROXINE: CPT

## 2019-01-01 PROCEDURE — 99284 EMERGENCY DEPT VISIT MOD MDM: CPT

## 2019-01-01 PROCEDURE — 96361 HYDRATE IV INFUSION ADD-ON: CPT

## 2019-01-01 PROCEDURE — 36591 DRAW BLOOD OFF VENOUS DEVICE: CPT

## 2019-01-01 PROCEDURE — 96368 THER/DIAG CONCURRENT INF: CPT

## 2019-01-01 PROCEDURE — 99215 OFFICE O/P EST HI 40 MIN: CPT | Performed by: INTERNAL MEDICINE

## 2019-01-01 PROCEDURE — 77067 SCR MAMMO BI INCL CAD: CPT

## 2019-01-01 PROCEDURE — 80048 BASIC METABOLIC PNL TOTAL CA: CPT | Performed by: INTERNAL MEDICINE

## 2019-01-01 PROCEDURE — 99214 OFFICE O/P EST MOD 30 MIN: CPT | Performed by: INTERNAL MEDICINE

## 2019-01-01 PROCEDURE — A9577 INJ MULTIHANCE: HCPCS | Performed by: EMERGENCY MEDICINE

## 2019-01-01 PROCEDURE — 0 GADOBENATE DIMEGLUMINE 529 MG/ML SOLUTION: Performed by: EMERGENCY MEDICINE

## 2019-01-01 PROCEDURE — 99233 SBSQ HOSP IP/OBS HIGH 50: CPT | Performed by: INTERNAL MEDICINE

## 2019-01-01 PROCEDURE — 77417 THER RADIOLOGY PORT IMAGE(S): CPT | Performed by: RADIOLOGY

## 2019-01-01 PROCEDURE — 25010000002 IPILIMUMAB 50 MG/10ML SOLUTION 10 ML VIAL: Performed by: INTERNAL MEDICINE

## 2019-01-01 PROCEDURE — 25010000003 POTASSIUM CHLORIDE 10 MEQ/100ML SOLUTION: Performed by: INTERNAL MEDICINE

## 2019-01-01 PROCEDURE — 70553 MRI BRAIN STEM W/O & W/DYE: CPT

## 2019-01-01 PROCEDURE — 77063 BREAST TOMOSYNTHESIS BI: CPT

## 2019-01-01 PROCEDURE — 77334 RADIATION TREATMENT AID(S): CPT | Performed by: RADIOLOGY

## 2019-01-01 PROCEDURE — 96360 HYDRATION IV INFUSION INIT: CPT

## 2019-01-01 PROCEDURE — 80047 BASIC METABLC PNL IONIZED CA: CPT

## 2019-01-01 PROCEDURE — 85007 BL SMEAR W/DIFF WBC COUNT: CPT

## 2019-01-01 PROCEDURE — 97165 OT EVAL LOW COMPLEX 30 MIN: CPT

## 2019-01-01 RX ORDER — SODIUM CHLORIDE 0.9 % (FLUSH) 0.9 %
10 SYRINGE (ML) INJECTION AS NEEDED
Status: DISCONTINUED | OUTPATIENT
Start: 2019-01-01 | End: 2019-01-01 | Stop reason: HOSPADM

## 2019-01-01 RX ORDER — SODIUM CHLORIDE 9 MG/ML
250 INJECTION, SOLUTION INTRAVENOUS ONCE
Status: DISCONTINUED | OUTPATIENT
Start: 2019-01-01 | End: 2019-01-01 | Stop reason: HOSPADM

## 2019-01-01 RX ORDER — AZITHROMYCIN 250 MG/1
TABLET, FILM COATED ORAL
Qty: 6 TABLET | Refills: 0 | Status: ON HOLD | OUTPATIENT
Start: 2019-01-01 | End: 2019-01-01

## 2019-01-01 RX ORDER — SODIUM CHLORIDE 9 MG/ML
75 INJECTION, SOLUTION INTRAVENOUS CONTINUOUS
Status: DISCONTINUED | OUTPATIENT
Start: 2019-01-01 | End: 2019-01-01

## 2019-01-01 RX ORDER — GUAIFENESIN AND CODEINE PHOSPHATE 100; 10 MG/5ML; MG/5ML
5 SOLUTION ORAL 3 TIMES DAILY PRN
Status: DISCONTINUED | OUTPATIENT
Start: 2019-01-01 | End: 2019-01-01 | Stop reason: HOSPADM

## 2019-01-01 RX ORDER — PREDNISONE 10 MG/1
10 TABLET ORAL TAKE AS DIRECTED
Status: DISCONTINUED | OUTPATIENT
Start: 2019-01-01 | End: 2019-01-01

## 2019-01-01 RX ORDER — POTASSIUM CHLORIDE 750 MG/1
40 CAPSULE, EXTENDED RELEASE ORAL AS NEEDED
Status: DISCONTINUED | OUTPATIENT
Start: 2019-01-01 | End: 2019-01-01 | Stop reason: HOSPADM

## 2019-01-01 RX ORDER — AMLODIPINE BESYLATE 5 MG/1
5 TABLET ORAL DAILY
Status: DISCONTINUED | OUTPATIENT
Start: 2019-01-01 | End: 2019-01-01 | Stop reason: HOSPADM

## 2019-01-01 RX ORDER — LOSARTAN POTASSIUM 50 MG/1
50 TABLET ORAL
Status: DISCONTINUED | OUTPATIENT
Start: 2019-01-01 | End: 2019-01-01 | Stop reason: HOSPADM

## 2019-01-01 RX ORDER — PREDNISONE 10 MG/1
10 TABLET ORAL TAKE AS DIRECTED
Qty: 50 TABLET | Refills: 0 | Status: ON HOLD | OUTPATIENT
Start: 2019-01-01 | End: 2019-01-01

## 2019-01-01 RX ORDER — IPRATROPIUM BROMIDE AND ALBUTEROL SULFATE 2.5; .5 MG/3ML; MG/3ML
3 SOLUTION RESPIRATORY (INHALATION)
Status: DISCONTINUED | OUTPATIENT
Start: 2019-01-01 | End: 2019-01-01 | Stop reason: SDUPTHER

## 2019-01-01 RX ORDER — POTASSIUM CHLORIDE 1.5 G/1.77G
40 POWDER, FOR SOLUTION ORAL AS NEEDED
Status: DISCONTINUED | OUTPATIENT
Start: 2019-01-01 | End: 2019-01-01 | Stop reason: HOSPADM

## 2019-01-01 RX ORDER — ACETAMINOPHEN 160 MG/5ML
650 SOLUTION ORAL EVERY 4 HOURS PRN
Status: DISCONTINUED | OUTPATIENT
Start: 2019-01-01 | End: 2019-01-01 | Stop reason: HOSPADM

## 2019-01-01 RX ORDER — LOSARTAN POTASSIUM 25 MG/1
25 TABLET ORAL
Status: DISCONTINUED | OUTPATIENT
Start: 2019-01-01 | End: 2019-01-01

## 2019-01-01 RX ORDER — HYDROCHLOROTHIAZIDE 12.5 MG/1
12.5 TABLET ORAL DAILY PRN
COMMUNITY
End: 2019-01-01 | Stop reason: HOSPADM

## 2019-01-01 RX ORDER — POTASSIUM CHLORIDE 1500 MG/1
20 TABLET, FILM COATED, EXTENDED RELEASE ORAL DAILY
COMMUNITY
End: 2020-01-01 | Stop reason: ALTCHOICE

## 2019-01-01 RX ORDER — MAGNESIUM SULFATE HEPTAHYDRATE 40 MG/ML
2 INJECTION, SOLUTION INTRAVENOUS AS NEEDED
Status: DISCONTINUED | OUTPATIENT
Start: 2019-01-01 | End: 2019-01-01 | Stop reason: HOSPADM

## 2019-01-01 RX ORDER — DEXTROSE MONOHYDRATE 25 G/50ML
25 INJECTION, SOLUTION INTRAVENOUS
Status: DISCONTINUED | OUTPATIENT
Start: 2019-01-01 | End: 2019-01-01 | Stop reason: HOSPADM

## 2019-01-01 RX ORDER — ACETAMINOPHEN 325 MG/1
650 TABLET ORAL EVERY 4 HOURS PRN
Status: DISCONTINUED | OUTPATIENT
Start: 2019-01-01 | End: 2019-01-01 | Stop reason: HOSPADM

## 2019-01-01 RX ORDER — IPRATROPIUM BROMIDE AND ALBUTEROL SULFATE 2.5; .5 MG/3ML; MG/3ML
3 SOLUTION RESPIRATORY (INHALATION) 4 TIMES DAILY PRN
Status: DISCONTINUED | OUTPATIENT
Start: 2019-01-01 | End: 2019-01-01 | Stop reason: HOSPADM

## 2019-01-01 RX ORDER — LOSARTAN POTASSIUM 100 MG/1
TABLET ORAL
Refills: 4 | Status: ON HOLD | COMMUNITY
Start: 2019-11-16 | End: 2019-01-01

## 2019-01-01 RX ORDER — IPRATROPIUM BROMIDE AND ALBUTEROL SULFATE 2.5; .5 MG/3ML; MG/3ML
3 SOLUTION RESPIRATORY (INHALATION)
Status: DISCONTINUED | OUTPATIENT
Start: 2019-01-01 | End: 2019-01-01

## 2019-01-01 RX ORDER — HYDROMORPHONE HYDROCHLORIDE 1 MG/ML
0.25 INJECTION, SOLUTION INTRAMUSCULAR; INTRAVENOUS; SUBCUTANEOUS
Status: DISCONTINUED | OUTPATIENT
Start: 2019-01-01 | End: 2019-01-01 | Stop reason: HOSPADM

## 2019-01-01 RX ORDER — SODIUM CHLORIDE 0.9 % (FLUSH) 0.9 %
10 SYRINGE (ML) INJECTION AS NEEDED
Status: CANCELLED | OUTPATIENT
Start: 2019-01-01

## 2019-01-01 RX ORDER — POTASSIUM CHLORIDE 7.45 MG/ML
10 INJECTION INTRAVENOUS ONCE
Status: COMPLETED | OUTPATIENT
Start: 2019-01-01 | End: 2019-01-01

## 2019-01-01 RX ORDER — SODIUM CHLORIDE 9 MG/ML
250 INJECTION, SOLUTION INTRAVENOUS ONCE
Status: CANCELLED | OUTPATIENT
Start: 2019-01-01

## 2019-01-01 RX ORDER — MAGNESIUM SULFATE HEPTAHYDRATE 40 MG/ML
4 INJECTION, SOLUTION INTRAVENOUS AS NEEDED
Status: DISCONTINUED | OUTPATIENT
Start: 2019-01-01 | End: 2019-01-01 | Stop reason: HOSPADM

## 2019-01-01 RX ORDER — TAMOXIFEN CITRATE 10 MG/1
20 TABLET ORAL DAILY
Status: DISCONTINUED | OUTPATIENT
Start: 2019-01-01 | End: 2019-01-01 | Stop reason: HOSPADM

## 2019-01-01 RX ORDER — DEXAMETHASONE IN 0.9 % SOD CHL 10 MG/50ML
10 INTRAVENOUS SOLUTION, PIGGYBACK (ML) INTRAVENOUS ONCE
Status: COMPLETED | OUTPATIENT
Start: 2019-01-01 | End: 2019-01-01

## 2019-01-01 RX ORDER — DEXAMETHASONE SODIUM PHOSPHATE 4 MG/ML
4 INJECTION, SOLUTION INTRA-ARTICULAR; INTRALESIONAL; INTRAMUSCULAR; INTRAVENOUS; SOFT TISSUE EVERY 6 HOURS SCHEDULED
Status: DISCONTINUED | OUTPATIENT
Start: 2019-01-01 | End: 2019-01-01 | Stop reason: HOSPADM

## 2019-01-01 RX ORDER — NICOTINE POLACRILEX 4 MG
15 LOZENGE BUCCAL
Status: DISCONTINUED | OUTPATIENT
Start: 2019-01-01 | End: 2019-01-01 | Stop reason: HOSPADM

## 2019-01-01 RX ORDER — POTASSIUM CHLORIDE 7.45 MG/ML
10 INJECTION INTRAVENOUS
Status: DISCONTINUED | OUTPATIENT
Start: 2019-01-01 | End: 2019-01-01 | Stop reason: HOSPADM

## 2019-01-01 RX ORDER — DEXAMETHASONE 4 MG/1
4 TABLET ORAL 3 TIMES DAILY
Qty: 30 TABLET | Refills: 0 | Status: SHIPPED | OUTPATIENT
Start: 2019-01-01 | End: 2020-01-01

## 2019-01-01 RX ORDER — ASPIRIN 81 MG/1
81 TABLET ORAL DAILY
Status: DISCONTINUED | OUTPATIENT
Start: 2019-01-01 | End: 2019-01-01 | Stop reason: HOSPADM

## 2019-01-01 RX ORDER — PANTOPRAZOLE SODIUM 40 MG/1
40 TABLET, DELAYED RELEASE ORAL EVERY 12 HOURS
Status: DISCONTINUED | OUTPATIENT
Start: 2019-01-01 | End: 2019-01-01 | Stop reason: HOSPADM

## 2019-01-01 RX ORDER — ONDANSETRON 2 MG/ML
4 INJECTION INTRAMUSCULAR; INTRAVENOUS EVERY 6 HOURS PRN
Status: DISCONTINUED | OUTPATIENT
Start: 2019-01-01 | End: 2019-01-01 | Stop reason: HOSPADM

## 2019-01-01 RX ORDER — POTASSIUM CHLORIDE 750 MG/1
10 TABLET, FILM COATED, EXTENDED RELEASE ORAL DAILY
Qty: 14 TABLET | Refills: 0 | Status: SHIPPED | OUTPATIENT
Start: 2019-01-01 | End: 2019-01-01

## 2019-01-01 RX ORDER — SODIUM CHLORIDE 0.9 % (FLUSH) 0.9 %
10 SYRINGE (ML) INJECTION EVERY 12 HOURS SCHEDULED
Status: DISCONTINUED | OUTPATIENT
Start: 2019-01-01 | End: 2019-01-01 | Stop reason: HOSPADM

## 2019-01-01 RX ORDER — ACETAMINOPHEN 650 MG/1
650 SUPPOSITORY RECTAL EVERY 4 HOURS PRN
Status: DISCONTINUED | OUTPATIENT
Start: 2019-01-01 | End: 2019-01-01 | Stop reason: HOSPADM

## 2019-01-01 RX ADMIN — SODIUM CHLORIDE, PRESERVATIVE FREE 10 ML: 5 INJECTION INTRAVENOUS at 12:38

## 2019-01-01 RX ADMIN — SODIUM CHLORIDE, PRESERVATIVE FREE 10 ML: 5 INJECTION INTRAVENOUS at 00:57

## 2019-01-01 RX ADMIN — AMLODIPINE BESYLATE 5 MG: 5 TABLET ORAL at 09:39

## 2019-01-01 RX ADMIN — IPRATROPIUM BROMIDE AND ALBUTEROL SULFATE 3 ML: 2.5; .5 SOLUTION RESPIRATORY (INHALATION) at 15:15

## 2019-01-01 RX ADMIN — INSULIN LISPRO 5 UNITS: 100 INJECTION, SOLUTION INTRAVENOUS; SUBCUTANEOUS at 12:16

## 2019-01-01 RX ADMIN — INSULIN LISPRO 5 UNITS: 100 INJECTION, SOLUTION INTRAVENOUS; SUBCUTANEOUS at 08:50

## 2019-01-01 RX ADMIN — DEXAMETHASONE SODIUM PHOSPHATE 4 MG: 4 INJECTION, SOLUTION INTRAMUSCULAR; INTRAVENOUS at 05:48

## 2019-01-01 RX ADMIN — INSULIN LISPRO 5 UNITS: 100 INJECTION, SOLUTION INTRAVENOUS; SUBCUTANEOUS at 17:22

## 2019-01-01 RX ADMIN — SODIUM CHLORIDE, PRESERVATIVE FREE 10 ML: 5 INJECTION INTRAVENOUS at 09:40

## 2019-01-01 RX ADMIN — POTASSIUM CHLORIDE 10 MEQ: 7.46 INJECTION, SOLUTION INTRAVENOUS at 15:25

## 2019-01-01 RX ADMIN — IPRATROPIUM BROMIDE AND ALBUTEROL SULFATE 3 ML: 2.5; .5 SOLUTION RESPIRATORY (INHALATION) at 08:02

## 2019-01-01 RX ADMIN — LOSARTAN POTASSIUM 50 MG: 50 TABLET, FILM COATED ORAL at 09:38

## 2019-01-01 RX ADMIN — AMLODIPINE BESYLATE 5 MG: 5 TABLET ORAL at 08:57

## 2019-01-01 RX ADMIN — SODIUM CHLORIDE 90 MG: 9 INJECTION, SOLUTION INTRAVENOUS at 10:29

## 2019-01-01 RX ADMIN — INSULIN LISPRO 2 UNITS: 100 INJECTION, SOLUTION INTRAVENOUS; SUBCUTANEOUS at 17:26

## 2019-01-01 RX ADMIN — POLYVINYL ALCOHOL, POVIDONE 1 DROP: 14; 6 SOLUTION/ DROPS OPHTHALMIC at 09:38

## 2019-01-01 RX ADMIN — INSULIN LISPRO 2 UNITS: 100 INJECTION, SOLUTION INTRAVENOUS; SUBCUTANEOUS at 12:30

## 2019-01-01 RX ADMIN — IPRATROPIUM BROMIDE AND ALBUTEROL SULFATE 3 ML: 2.5; .5 SOLUTION RESPIRATORY (INHALATION) at 23:28

## 2019-01-01 RX ADMIN — SODIUM CHLORIDE, PRESERVATIVE FREE 10 ML: 5 INJECTION INTRAVENOUS at 14:49

## 2019-01-01 RX ADMIN — IPRATROPIUM BROMIDE AND ALBUTEROL SULFATE 3 ML: 2.5; .5 SOLUTION RESPIRATORY (INHALATION) at 15:24

## 2019-01-01 RX ADMIN — INSULIN LISPRO 2 UNITS: 100 INJECTION, SOLUTION INTRAVENOUS; SUBCUTANEOUS at 21:14

## 2019-01-01 RX ADMIN — SODIUM CHLORIDE 75 ML/HR: 9 INJECTION, SOLUTION INTRAVENOUS at 00:58

## 2019-01-01 RX ADMIN — DEXAMETHASONE SODIUM PHOSPHATE 4 MG: 4 INJECTION, SOLUTION INTRAMUSCULAR; INTRAVENOUS at 17:25

## 2019-01-01 RX ADMIN — LOSARTAN POTASSIUM 25 MG: 25 TABLET, FILM COATED ORAL at 12:15

## 2019-01-01 RX ADMIN — IPRATROPIUM BROMIDE AND ALBUTEROL SULFATE 3 ML: 2.5; .5 SOLUTION RESPIRATORY (INHALATION) at 23:07

## 2019-01-01 RX ADMIN — PANTOPRAZOLE SODIUM 40 MG: 40 TABLET, DELAYED RELEASE ORAL at 08:48

## 2019-01-01 RX ADMIN — HEPARIN SODIUM (PORCINE) LOCK FLUSH IV SOLN 100 UNIT/ML 500 UNITS: 100 SOLUTION at 12:38

## 2019-01-01 RX ADMIN — TAMOXIFEN CITRATE 20 MG: 10 TABLET, FILM COATED ORAL at 08:57

## 2019-01-01 RX ADMIN — POTASSIUM CHLORIDE 40 MEQ: 1.5 POWDER, FOR SOLUTION ORAL at 08:53

## 2019-01-01 RX ADMIN — TAMOXIFEN CITRATE 20 MG: 10 TABLET, FILM COATED ORAL at 09:39

## 2019-01-01 RX ADMIN — DEXAMETHASONE SODIUM PHOSPHATE 4 MG: 4 INJECTION, SOLUTION INTRAMUSCULAR; INTRAVENOUS at 12:15

## 2019-01-01 RX ADMIN — TAMOXIFEN CITRATE 20 MG: 10 TABLET, FILM COATED ORAL at 08:49

## 2019-01-01 RX ADMIN — DEXAMETHASONE SODIUM PHOSPHATE 4 MG: 4 INJECTION, SOLUTION INTRAMUSCULAR; INTRAVENOUS at 00:15

## 2019-01-01 RX ADMIN — AMLODIPINE BESYLATE 5 MG: 5 TABLET ORAL at 08:48

## 2019-01-01 RX ADMIN — PANTOPRAZOLE SODIUM 40 MG: 40 TABLET, DELAYED RELEASE ORAL at 09:39

## 2019-01-01 RX ADMIN — GADOBENATE DIMEGLUMINE 18 ML: 529 INJECTION, SOLUTION INTRAVENOUS at 19:09

## 2019-01-01 RX ADMIN — DEXAMETHASONE SODIUM PHOSPHATE 10 MG: 10 INJECTION INTRAMUSCULAR; INTRAVENOUS at 20:43

## 2019-01-01 RX ADMIN — DEXAMETHASONE SODIUM PHOSPHATE 4 MG: 4 INJECTION, SOLUTION INTRAMUSCULAR; INTRAVENOUS at 12:30

## 2019-01-01 RX ADMIN — PANTOPRAZOLE SODIUM 40 MG: 40 TABLET, DELAYED RELEASE ORAL at 21:14

## 2019-01-01 RX ADMIN — POLYVINYL ALCOHOL, POVIDONE 1 DROP: 14; 6 SOLUTION/ DROPS OPHTHALMIC at 21:33

## 2019-01-01 RX ADMIN — SODIUM CHLORIDE, PRESERVATIVE FREE 10 ML: 5 INJECTION INTRAVENOUS at 16:29

## 2019-01-01 RX ADMIN — SODIUM CHLORIDE, PRESERVATIVE FREE 10 ML: 5 INJECTION INTRAVENOUS at 21:14

## 2019-01-01 RX ADMIN — SODIUM CHLORIDE 1000 ML: 9 INJECTION, SOLUTION INTRAVENOUS at 14:42

## 2019-01-01 RX ADMIN — INSULIN LISPRO 5 UNITS: 100 INJECTION, SOLUTION INTRAVENOUS; SUBCUTANEOUS at 12:30

## 2019-01-01 RX ADMIN — INSULIN LISPRO 3 UNITS: 100 INJECTION, SOLUTION INTRAVENOUS; SUBCUTANEOUS at 12:17

## 2019-01-01 RX ADMIN — DEXAMETHASONE SODIUM PHOSPHATE 4 MG: 4 INJECTION, SOLUTION INTRAMUSCULAR; INTRAVENOUS at 00:48

## 2019-01-01 RX ADMIN — ASPIRIN 81 MG: 81 TABLET, COATED ORAL at 09:39

## 2019-01-01 RX ADMIN — HEPARIN 500 UNITS: 100 SYRINGE at 14:49

## 2019-01-01 RX ADMIN — IPRATROPIUM BROMIDE AND ALBUTEROL SULFATE 3 ML: 2.5; .5 SOLUTION RESPIRATORY (INHALATION) at 08:30

## 2019-01-01 RX ADMIN — SODIUM CHLORIDE 250 MG: 9 INJECTION, SOLUTION INTRAVENOUS at 11:06

## 2019-01-01 RX ADMIN — PANTOPRAZOLE SODIUM 40 MG: 40 TABLET, DELAYED RELEASE ORAL at 08:57

## 2019-01-01 RX ADMIN — DEXAMETHASONE SODIUM PHOSPHATE 4 MG: 4 INJECTION, SOLUTION INTRAMUSCULAR; INTRAVENOUS at 17:22

## 2019-01-01 RX ADMIN — SODIUM CHLORIDE, PRESERVATIVE FREE 10 ML: 5 INJECTION INTRAVENOUS at 21:33

## 2019-01-01 RX ADMIN — HEPARIN 500 UNITS: 100 SYRINGE at 16:29

## 2019-01-01 RX ADMIN — INSULIN LISPRO 5 UNITS: 100 INJECTION, SOLUTION INTRAVENOUS; SUBCUTANEOUS at 17:25

## 2019-01-01 RX ADMIN — ASPIRIN 81 MG: 81 TABLET, COATED ORAL at 08:48

## 2019-01-01 RX ADMIN — LOSARTAN POTASSIUM 50 MG: 50 TABLET, FILM COATED ORAL at 08:52

## 2019-01-01 RX ADMIN — INSULIN LISPRO 5 UNITS: 100 INJECTION, SOLUTION INTRAVENOUS; SUBCUTANEOUS at 09:44

## 2019-01-01 RX ADMIN — DEXAMETHASONE SODIUM PHOSPHATE 4 MG: 4 INJECTION, SOLUTION INTRAMUSCULAR; INTRAVENOUS at 06:05

## 2019-01-01 RX ADMIN — IPRATROPIUM BROMIDE AND ALBUTEROL SULFATE 3 ML: 2.5; .5 SOLUTION RESPIRATORY (INHALATION) at 23:24

## 2019-01-01 RX ADMIN — ASPIRIN 81 MG: 81 TABLET, COATED ORAL at 08:57

## 2019-01-01 RX ADMIN — PANTOPRAZOLE SODIUM 40 MG: 40 TABLET, DELAYED RELEASE ORAL at 21:33

## 2019-01-08 ENCOUNTER — OFFICE VISIT (OUTPATIENT)
Dept: PULMONOLOGY | Facility: CLINIC | Age: 64
End: 2019-01-08

## 2019-01-08 VITALS
BODY MASS INDEX: 38.46 KG/M2 | RESPIRATION RATE: 18 BRPM | HEIGHT: 62 IN | OXYGEN SATURATION: 94 % | DIASTOLIC BLOOD PRESSURE: 78 MMHG | WEIGHT: 209 LBS | HEART RATE: 74 BPM | SYSTOLIC BLOOD PRESSURE: 110 MMHG

## 2019-01-08 DIAGNOSIS — J44.9 CHRONIC OBSTRUCTIVE PULMONARY DISEASE, UNSPECIFIED COPD TYPE (HCC): Primary | ICD-10-CM

## 2019-01-08 DIAGNOSIS — C34.92 SMALL CELL LUNG CANCER, LEFT (HCC): ICD-10-CM

## 2019-01-08 DIAGNOSIS — J30.89 OTHER ALLERGIC RHINITIS: ICD-10-CM

## 2019-01-08 DIAGNOSIS — R06.02 SHORTNESS OF BREATH: ICD-10-CM

## 2019-01-08 DIAGNOSIS — E66.9 OBESITY (BMI 30-39.9): ICD-10-CM

## 2019-01-08 PROCEDURE — 99214 OFFICE O/P EST MOD 30 MIN: CPT | Performed by: INTERNAL MEDICINE

## 2019-01-08 NOTE — PROGRESS NOTES
"Chief Complaint   Patient presents with   • Follow-up     Small cell lung cancer, left          Subjective   Sanjana Horton is a 63 y.o. female.     History of Present Illness   Patient comes today for follow up of shortness of breath, small cell lung cancer, allergic rhinitis and COPD.     Symptoms have been stable since the last clinic visit. Patient reports no recent exacerbations.     Patient is using medications, as prescribed. Exercise tolerance has also remained stable.     Quit 5 years ago.    She is following closely with oncology for small cell lung cancer and denies any weight loss or loss of appetite etc.    Patient says that she has been using her nasal sprays on a regular basis and describes no significant ongoing issues other than occasional congestion.       The following portions of the patient's history were reviewed and updated as appropriate: allergies, current medications, past family history, past medical history, past social history and past surgical history.    Review of Systems   Constitutional: Negative for chills and fever.   HENT: Positive for postnasal drip. Negative for sinus pressure, sneezing and sore throat.    Respiratory: Positive for cough. Negative for shortness of breath and wheezing.    Psychiatric/Behavioral: Negative for sleep disturbance.       Objective   Visit Vitals  /78   Pulse 74   Resp 18   Ht 157.5 cm (62.01\")   Wt 94.8 kg (209 lb)   SpO2 94%   BMI 38.22 kg/m²       Physical Exam   Constitutional: She is oriented to person, place, and time. She appears well-developed and well-nourished.   HENT:   Head: Atraumatic.   Crowded oropharynx.   Dentures.   Eyes: EOM are normal.   Neck: Neck supple.   Cardiovascular: Normal rate and regular rhythm.   Pulmonary/Chest: Effort normal. No respiratory distress.   Somewhat decreased A/E with out wheezing noted.   Musculoskeletal: She exhibits no edema.   Gait was normal.   Neurological: She is alert and oriented to person, " place, and time.   Skin: Skin is warm and dry.   Psychiatric: She has a normal mood and affect.   Vitals reviewed.      Assessment/Plan   Sanjana was seen today for follow-up.    Diagnoses and all orders for this visit:    Chronic obstructive pulmonary disease, unspecified COPD type (CMS/HCC)  -     Pulmonary Function Test; Future    Small cell lung cancer, left (CMS/HCC)    Other allergic rhinitis    Shortness of breath  -     Pulmonary Function Test; Future    Obesity (BMI 30-39.9)           Return in about 6 months (around 7/8/2019) for Recheck, PFT on day of F/Up.    DISCUSSION (if any):  No change to the current medications has been made.    Compliance with medications stressed.     Side effects of prescribed medications discussed with the patient    Reviewed her last CT scan from November 2018 and shared the findings with the patient.  I told the patient that there is no change in the left upper lobe airspace disease, when compared to September 2018.    Reviewed the patient overnight pulse oximetry and told her that there is no need for oxygen to be used at night.    PFTs will be repeated upon follow-up visit.    She was advised to continue her nasal spray on a regular basis.    She was advised to follow closely with oncology and also to undergo CT scan and it's already scheduled for later this month.      Dictated utilizing Dragon dictation.    This document was electronically signed by Holly Granados MD January 8, 2019  12:00 PM

## 2019-01-21 ENCOUNTER — HOSPITAL ENCOUNTER (OUTPATIENT)
Dept: CT IMAGING | Facility: HOSPITAL | Age: 64
Discharge: HOME | End: 2019-01-21
Admitting: INTERNAL MEDICINE

## 2019-01-21 DIAGNOSIS — J90 PLEURAL EFFUSION, LEFT: ICD-10-CM

## 2019-01-21 DIAGNOSIS — C34.82 MALIGNANT NEOPLASM OF OVERLAPPING SITES OF LEFT LUNG (HCC): ICD-10-CM

## 2019-01-21 PROCEDURE — 25010000002 IOPAMIDOL 61 % SOLUTION: Performed by: INTERNAL MEDICINE

## 2019-01-21 PROCEDURE — 71260 CT THORAX DX C+: CPT

## 2019-01-21 RX ADMIN — IOPAMIDOL 100 ML: 612 INJECTION, SOLUTION INTRAVENOUS at 10:08

## 2019-01-22 NOTE — PROGRESS NOTES
DATE OF VISIT: 1/23/2019    REASON FOR VISIT: Followup for small cell lung cancer     HISTORY OF PRESENT ILLNESS: The patient is a very pleasant 63 y.o. female  with past medical history significant for small cell lung cancer diagnosed August 2017. The  patient is here today for scheduled follow up visit.    SUBJECTIVE: The patient has been doing fairly well. she was able to tolerate  her treatment without any serious side effects. she denied any fever or  chills, no night sweats, denied any headaches    PAST MEDICAL HISTORY/SOCIAL HISTORY/FAMILY HISTORY: Reviewed by me and unchanged from my documentation done on 01/23/19.    Review of Systems   Constitutional: Negative for activity change, appetite change, chills, fatigue, fever and unexpected weight change.   HENT: Negative for hearing loss, mouth sores, nosebleeds, sore throat and trouble swallowing.    Eyes: Negative for visual disturbance.   Respiratory: Negative for cough, chest tightness, shortness of breath and wheezing.    Cardiovascular: Negative for chest pain, palpitations and leg swelling.   Gastrointestinal: Negative for abdominal distention, abdominal pain, blood in stool, constipation, diarrhea, nausea, rectal pain and vomiting.   Endocrine: Negative for cold intolerance and heat intolerance.   Genitourinary: Negative for difficulty urinating, dysuria, frequency and urgency.   Musculoskeletal: Negative for arthralgias, back pain, gait problem, joint swelling and myalgias.   Skin: Negative for rash.   Neurological: Negative for dizziness, tremors, syncope, weakness, light-headedness, numbness and headaches.   Hematological: Negative for adenopathy. Does not bruise/bleed easily.   Psychiatric/Behavioral: Negative for confusion, sleep disturbance and suicidal ideas. The patient is not nervous/anxious.          Current Outpatient Medications:   •  albuterol (VENTOLIN HFA) 108 (90 Base) MCG/ACT inhaler, Inhale 2 puffs Every 4 (Four) Hours As Needed for  "Wheezing or Shortness of Air., Disp: 1 inhaler, Rfl: 5  •  amLODIPine (NORVASC) 5 MG tablet, Take 5 mg by mouth Daily., Disp: , Rfl: 0  •  aspirin 81 MG EC tablet, Take 81 mg by mouth Daily., Disp: , Rfl:   •  azelastine (ASTELIN) 0.1 % nasal spray, 1 spray into the nostril(s) as directed by provider 2 (Two) Times a Day As Needed for Rhinitis or Allergies. Use in each nostril as directed, Disp: 1 each, Rfl: 5  •  hydrochlorothiazide (MICROZIDE) 12.5 MG capsule, Take 12.5 mg by mouth Every Morning., Disp: , Rfl: 0  •  hydrocortisone 2.5 % cream, Apply  topically to the appropriate area as directed 2 (Two) Times a Day., Disp: 20 g, Rfl: 0  •  losartan-hydrochlorothiazide (HYZAAR) 100-12.5 MG per tablet, Take 1 tablet by mouth Daily., Disp: , Rfl: 0  •  metFORMIN (GLUCOPHAGE) 500 MG tablet, Take 500 mg by mouth 2 (Two) Times a Day With Meals., Disp: , Rfl: 0  •  Nystatin (MAGIC MOUTHWASH), Swish and spit 10 mL Every 4 (Four) Hours As Needed (sore mouth)., Disp: 240 mL, Rfl: 1  •  ondansetron (ZOFRAN) 8 MG tablet, Take 1 tablet by mouth 3 (Three) Times a Day As Needed for Nausea or Vomiting., Disp: 30 tablet, Rfl: 5  •  pantoprazole (PROTONIX) 40 MG EC tablet, Take 1 tablet by mouth Every 12 (Twelve) Hours., Disp: 60 tablet, Rfl: 0  •  potassium chloride ER (K-TAB) 20 MEQ tablet controlled-release ER tablet, Take 1 tablet by mouth 2 (Two) Times a Day., Disp: 30 tablet, Rfl: 1  •  tamoxifen (NOLVADEX) 20 MG chemo tablet, Take 1 tablet by mouth Daily., Disp: 30 tablet, Rfl: 0  •  umeclidinium-vilanterol (ANORO ELLIPTA) 62.5-25 MCG/INH aerosol powder  inhaler, Inhale 1 puff Daily., Disp: 1 each, Rfl: 5  •  vitamin D (ERGOCALCIFEROL) 85482 units capsule capsule, Take 50,000 Units by mouth Every 7 (Seven) Days., Disp: , Rfl: 0    PHYSICAL EXAMINATION:   /77   Pulse 80   Temp 97.1 °F (36.2 °C) (Temporal)   Resp 17   Ht 157.5 cm (62.01\")   Wt 94.3 kg (208 lb)   BMI 38.03 kg/m²    ECOG Performance Status: 1 - " Symptomatic but completely ambulatory  General Appearance:  alert, cooperative, no apparent distress and appears stated age   Neurologic/Psychiatric: A&O x 3, gait steady, appropriate affect, strength 5/5 in all muscle groups   HEENT:  Normocephalic, without obvious abnormality, mucous membranes moist   Neck: Supple, symmetrical, trachea midline, no adenopathy;  No thyromegaly, masses, or tenderness   Lungs:   Clear to auscultation bilaterally; respirations regular, even, and unlabored bilaterally   Heart:  Regular rate and rhythm, no murmurs appreciated   Abdomen:   Soft, non-tender, non-distended and no organomegaly   Lymph nodes: No cervical, supraclavicular, inguinal or axillary adenopathy noted   Extremities: Normal, atraumatic; no clubbing, cyanosis, or edema    Skin: No rashes, ulcers, or suspicious lesions noted     No visits with results within 2 Week(s) from this visit.   Latest known visit with results is:   Office Visit on 11/27/2018   Component Date Value Ref Range Status   • Glucose 12/03/2018 119* 74 - 98 mg/dL Final   • BUN 12/03/2018 13  7 - 20 mg/dL Final   • Creatinine 12/03/2018 1.00  0.60 - 1.30 mg/dL Final   • Sodium 12/03/2018 139  137 - 145 mmol/L Final   • Potassium 12/03/2018 3.7  3.5 - 5.1 mmol/L Final   • Chloride 12/03/2018 103  98 - 107 mmol/L Final   • CO2 12/03/2018 27.0  26.0 - 30.0 mmol/L Final   • Calcium 12/03/2018 10.7* 8.4 - 10.2 mg/dL Final   • Total Protein 12/03/2018 8.4* 6.3 - 8.2 g/dL Final   • Albumin 12/03/2018 4.60  3.50 - 5.00 g/dL Final   • ALT (SGPT) 12/03/2018 39  13 - 69 U/L Final   • AST (SGOT) 12/03/2018 41  15 - 46 U/L Final   • Alkaline Phosphatase 12/03/2018 137* 38 - 126 U/L Final   • Total Bilirubin 12/03/2018 0.9  0.2 - 1.3 mg/dL Final   • eGFR Non  Amer 12/03/2018 56* >60 mL/min/1.73 Final   • Globulin 12/03/2018 3.8  gm/dL Final   • A/G Ratio 12/03/2018 1.2  1.0 - 2.0 g/dL Final   • BUN/Creatinine Ratio 12/03/2018 13.0  7.1 - 23.5 Final   • Anion  Gap 12/03/2018 12.7  10.0 - 20.0 mmol/L Final   • LDH 12/03/2018 474  313 - 618 U/L Final   • Protime 12/03/2018 12.7* 9.3 - 12.1 Seconds Final   • INR 12/03/2018 1.14* 0.90 - 1.10 Final   • PTT 12/03/2018 30.3  25.0 - 36.0 seconds Final   • WBC 12/03/2018 5.17  4.80 - 10.80 10*3/mm3 Final   • RBC 12/03/2018 4.20  4.20 - 5.40 10*6/mm3 Final   • Hemoglobin 12/03/2018 12.9  12.0 - 16.0 g/dL Final   • Hematocrit 12/03/2018 38.8  37.0 - 47.0 % Final   • MCV 12/03/2018 92.4  81.0 - 99.0 fL Final   • MCH 12/03/2018 30.7  27.0 - 31.0 pg Final   • MCHC 12/03/2018 33.2  30.0 - 37.0 g/dL Final   • RDW 12/03/2018 12.6  11.5 - 14.5 % Final   • RDW-SD 12/03/2018 42.4  37.0 - 54.0 fl Final   • MPV 12/03/2018 9.4  6.0 - 12.0 fL Final   • Platelets 12/03/2018 156  130 - 400 10*3/mm3 Final   • Neutrophil % 12/03/2018 62.8  37.0 - 80.0 % Final   • Lymphocyte % 12/03/2018 23.6  10.0 - 50.0 % Final   • Monocyte % 12/03/2018 7.2  0.0 - 12.0 % Final   • Eosinophil % 12/03/2018 5.4  0.0 - 7.0 % Final   • Basophil % 12/03/2018 0.6  0.0 - 2.5 % Final   • Immature Grans % 12/03/2018 0.4  0.0 - 0.6 % Final   • Neutrophils, Absolute 12/03/2018 3.25  2.00 - 6.90 10*3/mm3 Final   • Lymphocytes, Absolute 12/03/2018 1.22  0.60 - 3.40 10*3/mm3 Final   • Monocytes, Absolute 12/03/2018 0.37  0.00 - 0.90 10*3/mm3 Final   • Eosinophils, Absolute 12/03/2018 0.28  0.00 - 0.70 10*3/mm3 Final   • Basophils, Absolute 12/03/2018 0.03  0.00 - 0.20 10*3/mm3 Final   • Immature Grans, Absolute 12/03/2018 0.02  0.00 - 0.06 10*3/mm3 Final   • nRBC 12/03/2018 0.0  0.0 - 0.0 /100 WBC Final        Ct Chest With Contrast    Result Date: 1/21/2019  Narrative: PROCEDURE: CT CHEST W CONTRAST-  HISTORY: follow up lung cancer and pleural effusion; C34.82-Malignant neoplasm of overlapping sites of left bronchus and lung; Q34-Sxacleh effusion, not elsewhere classified  COMPARISON: November 23, 2018.  PROCEDURE: Multiple axial CT images were obtained from the thoracic inlet  through the upper abdomen following the administration of intravenous contrast.  FINDINGS: Soft tissue windows redemonstrate a left paratracheal mass and borderline enlarged mediastinal lymph nodes which are unchanged. No new adenopathy is identified. The heart is normal in size. The aorta is normal in caliber.There is a small loculated left pleural effusion at the left lung apex which is unchanged. There is no right effusion. Lung windows redemonstrate airspace disease in the left upper lobe which is unchanged and is nonspecific. No new pulmonary nodules are identified. The visualized upper abdomen is unremarkable. Bone windows reveal no acute osseous abnormalities. Note is made of prior left mastectomy and axillary lymph node dissection.      Impression: No change in the appearance of the chest including a small loculated pleural effusion in the left lung apex and left upper lobe airspace disease which is nonspecific.   This study was performed with techniques to keep radiation doses as low as reasonably achievable (ALARA). Individualized dose reduction techniques using automated exposure control or adjustment of mA and/or kV according to the patient size were employed.  This report was finalized on 1/21/2019 10:16 AM by Jyoti Jensen M.D..      ASSESSMENT: The patient is a very pleasant 63 y.o. female  with small cell lung cancer    PROBLEM LIST:  1. Small cell lung cancer from the left upper lobe.  Limited stage disease, recurrent in a mediastinal node  A.  Treated  initially with etoposide and carboplatin starting 9/13/27.  She responded to the single modality that was started because of symptoms including airway compromise.  B.  Completed combined modality chemoradiation with etoposide and carboplatin . (Chemotherapy completed 12/7/2017).  C.  Recurrence in left paratracheal node.  This was seen on CT scan from 6/19/18 and hypermetabolic on PET scan to confirm.  She's been treated with weekly taxol  starting 7/17/2018 along with involved field radiation.  D. Left pleural effusion seen on CT of 11/23/18, loculated and inaccessible for thoracentesis  2.  Anemia.  This appears to be predominantly from chemoradiation.  3.  History of left breast cancer.  A.  Left mastectomy 7/22/2008 for stage IIIA, T3 N1 M0 breast cancer  B.  ER weakly positive, OH negative and HER-2 positive  C.  Adjuvant chemotherapy with Herceptin, Taxotere and carboplatin  D.  Postmastectomy radiation done at [facility]  E.  Adjuvant tamoxifen started 12/12/2008 and continued as extended adjuvant therapy  4.  COPD.  5. Diabetes  6. HTN    PLAN:  1. I did go over the CT scan results with the patient, I reviewed the films myself. Her effusion is stable. No evidence of active disease.  2. I will continue watchful waiting for now.  3. The patient will follow up with me in 3 month wih repeated scans.  4. We discussed the rule of extended hormone treatment. She has completed 10 years of Tamoxifen at this point.   5. Will continue inhalers as needed for COPD>  6. Will continue Metformin for type 2 DM.  7. Will continue Hyzaar for HTN.      Josh Son MD  1/23/2019

## 2019-01-23 ENCOUNTER — OFFICE VISIT (OUTPATIENT)
Dept: ONCOLOGY | Facility: CLINIC | Age: 64
End: 2019-01-23

## 2019-01-23 VITALS
TEMPERATURE: 97.1 F | HEIGHT: 62 IN | DIASTOLIC BLOOD PRESSURE: 77 MMHG | BODY MASS INDEX: 38.28 KG/M2 | RESPIRATION RATE: 17 BRPM | SYSTOLIC BLOOD PRESSURE: 179 MMHG | WEIGHT: 208 LBS | HEART RATE: 80 BPM

## 2019-01-23 DIAGNOSIS — C34.82 MALIGNANT NEOPLASM OF OVERLAPPING SITES OF LEFT LUNG (HCC): Primary | ICD-10-CM

## 2019-01-23 PROCEDURE — 99214 OFFICE O/P EST MOD 30 MIN: CPT | Performed by: INTERNAL MEDICINE

## 2019-04-22 ENCOUNTER — HOSPITAL ENCOUNTER (OUTPATIENT)
Dept: CT IMAGING | Facility: HOSPITAL | Age: 64
Discharge: HOME | End: 2019-04-22
Admitting: INTERNAL MEDICINE

## 2019-04-22 ENCOUNTER — TRANSCRIBE ORDERS (OUTPATIENT)
Dept: LAB | Facility: HOSPITAL | Age: 64
End: 2019-04-22

## 2019-04-22 ENCOUNTER — LAB (OUTPATIENT)
Dept: LAB | Facility: HOSPITAL | Age: 64
End: 2019-04-22

## 2019-04-22 DIAGNOSIS — D51.3 OTHER DIETARY VITAMIN B12 DEFICIENCY ANEMIA: ICD-10-CM

## 2019-04-22 DIAGNOSIS — C34.82 MALIGNANT NEOPLASM OF OVERLAPPING SITES OF LEFT LUNG (HCC): ICD-10-CM

## 2019-04-22 DIAGNOSIS — E55.9 VITAMIN D DEFICIENCY: ICD-10-CM

## 2019-04-22 DIAGNOSIS — I10 HYPERTENSION, UNSPECIFIED TYPE: ICD-10-CM

## 2019-04-22 DIAGNOSIS — E78.5 HYPERLIPIDEMIA, UNSPECIFIED HYPERLIPIDEMIA TYPE: ICD-10-CM

## 2019-04-22 DIAGNOSIS — E87.5 HYPERKALEMIA: ICD-10-CM

## 2019-04-22 DIAGNOSIS — E11.8 TYPE 2 DIABETES MELLITUS WITH COMPLICATION, UNSPECIFIED WHETHER LONG TERM INSULIN USE: ICD-10-CM

## 2019-04-22 DIAGNOSIS — E11.8 TYPE 2 DIABETES MELLITUS WITH COMPLICATION, UNSPECIFIED WHETHER LONG TERM INSULIN USE: Primary | ICD-10-CM

## 2019-04-22 LAB
25(OH)D3 SERPL-MCNC: 29.3 NG/ML
ALBUMIN SERPL-MCNC: 4.3 G/DL (ref 3.5–5)
ALBUMIN/GLOB SERPL: 1.1 G/DL (ref 1–2)
ALP SERPL-CCNC: 135 U/L (ref 38–126)
ALT SERPL W P-5'-P-CCNC: 59 U/L (ref 13–69)
ANION GAP SERPL CALCULATED.3IONS-SCNC: 16.8 MMOL/L (ref 10–20)
AST SERPL-CCNC: 48 U/L (ref 15–46)
BASOPHILS # BLD AUTO: 0.03 10*3/MM3 (ref 0–0.2)
BASOPHILS NFR BLD AUTO: 0.6 % (ref 0–1.5)
BILIRUB SERPL-MCNC: 0.6 MG/DL (ref 0.2–1.3)
BUN BLD-MCNC: 12 MG/DL (ref 7–20)
BUN/CREAT SERPL: 13.3 (ref 7.1–23.5)
CALCIUM SPEC-SCNC: 10.5 MG/DL (ref 8.4–10.2)
CHLORIDE SERPL-SCNC: 101 MMOL/L (ref 98–107)
CO2 SERPL-SCNC: 25 MMOL/L (ref 26–30)
CREAT BLD-MCNC: 0.9 MG/DL (ref 0.6–1.3)
DEPRECATED RDW RBC AUTO: 42.5 FL (ref 37–54)
EOSINOPHIL # BLD AUTO: 0.11 10*3/MM3 (ref 0–0.4)
EOSINOPHIL NFR BLD AUTO: 2.3 % (ref 0.3–6.2)
ERYTHROCYTE [DISTWIDTH] IN BLOOD BY AUTOMATED COUNT: 12.5 % (ref 12.3–15.4)
FOLATE SERPL-MCNC: 10.7 NG/ML
GFR SERPL CREATININE-BSD FRML MDRD: 64 ML/MIN/1.73
GLOBULIN UR ELPH-MCNC: 3.8 GM/DL
GLUCOSE BLD-MCNC: 107 MG/DL (ref 74–98)
HBA1C MFR BLD: 5.4 % (ref 3–6)
HCT VFR BLD AUTO: 37.3 % (ref 34–46.6)
HGB BLD-MCNC: 12.7 G/DL (ref 12–15.9)
IMM GRANULOCYTES # BLD AUTO: 0.01 10*3/MM3 (ref 0–0.05)
IMM GRANULOCYTES NFR BLD AUTO: 0.2 % (ref 0–0.5)
LYMPHOCYTES # BLD AUTO: 1.19 10*3/MM3 (ref 0.7–3.1)
LYMPHOCYTES NFR BLD AUTO: 24.4 % (ref 19.6–45.3)
MCH RBC QN AUTO: 31.4 PG (ref 26.6–33)
MCHC RBC AUTO-ENTMCNC: 34 G/DL (ref 31.5–35.7)
MCV RBC AUTO: 92.1 FL (ref 79–97)
MONOCYTES # BLD AUTO: 0.29 10*3/MM3 (ref 0.1–0.9)
MONOCYTES NFR BLD AUTO: 5.9 % (ref 5–12)
NEUTROPHILS # BLD AUTO: 3.25 10*3/MM3 (ref 1.7–7)
NEUTROPHILS NFR BLD AUTO: 66.6 % (ref 42.7–76)
NRBC BLD AUTO-RTO: 0 /100 WBC (ref 0–0.2)
PLATELET # BLD AUTO: 149 10*3/MM3 (ref 140–450)
PMV BLD AUTO: 9.3 FL (ref 6–12)
POTASSIUM BLD-SCNC: 3.8 MMOL/L (ref 3.5–5.1)
PROT SERPL-MCNC: 8.1 G/DL (ref 6.3–8.2)
RBC # BLD AUTO: 4.05 10*6/MM3 (ref 3.77–5.28)
SODIUM BLD-SCNC: 139 MMOL/L (ref 137–145)
TSH SERPL DL<=0.05 MIU/L-ACNC: 2.47 MIU/ML (ref 0.47–4.68)
URATE SERPL-MCNC: 6.5 MG/DL (ref 2.5–8.5)
VIT B12 BLD-MCNC: 434 PG/ML (ref 239–931)
WBC NRBC COR # BLD: 4.88 10*3/MM3 (ref 3.4–10.8)

## 2019-04-22 PROCEDURE — 85025 COMPLETE CBC W/AUTO DIFF WBC: CPT

## 2019-04-22 PROCEDURE — 80053 COMPREHEN METABOLIC PANEL: CPT

## 2019-04-22 PROCEDURE — 82746 ASSAY OF FOLIC ACID SERUM: CPT

## 2019-04-22 PROCEDURE — 36415 COLL VENOUS BLD VENIPUNCTURE: CPT

## 2019-04-22 PROCEDURE — 83970 ASSAY OF PARATHORMONE: CPT

## 2019-04-22 PROCEDURE — 80061 LIPID PANEL: CPT

## 2019-04-22 PROCEDURE — 83036 HEMOGLOBIN GLYCOSYLATED A1C: CPT

## 2019-04-22 PROCEDURE — 82330 ASSAY OF CALCIUM: CPT

## 2019-04-22 PROCEDURE — 84550 ASSAY OF BLOOD/URIC ACID: CPT

## 2019-04-22 PROCEDURE — 71260 CT THORAX DX C+: CPT

## 2019-04-22 PROCEDURE — 82607 VITAMIN B-12: CPT

## 2019-04-22 PROCEDURE — 83704 LIPOPROTEIN BLD QUAN PART: CPT

## 2019-04-22 PROCEDURE — 25010000002 IOPAMIDOL 61 % SOLUTION: Performed by: INTERNAL MEDICINE

## 2019-04-22 PROCEDURE — 84443 ASSAY THYROID STIM HORMONE: CPT

## 2019-04-22 PROCEDURE — 82306 VITAMIN D 25 HYDROXY: CPT

## 2019-04-22 PROCEDURE — 82397 CHEMILUMINESCENT ASSAY: CPT

## 2019-04-22 RX ADMIN — IOPAMIDOL 100 ML: 612 INJECTION, SOLUTION INTRAVENOUS at 11:04

## 2019-04-23 LAB
CA-I SERPL ISE-MCNC: 6 MG/DL (ref 4.5–5.6)
CHOLEST SERPL-MCNC: 202 MG/DL (ref 100–199)
HDL SERPL-SCNC: 22.6 UMOL/L
HDLC SERPL-MCNC: 38 MG/DL
LDL-P: 1817 NMOL/L
LDLC REAL SIZE PAT SERPL: 21.2 NM
LDLC SERPL CALC-MCNC: 139 MG/DL (ref 0–99)
PTH-INTACT SERPL-MCNC: 42 PG/ML (ref 15–65)
SMALL LDL-P: 939 NMOL/L
TRIGL SERPL-MCNC: 126 MG/DL (ref 0–149)

## 2019-04-23 NOTE — PROGRESS NOTES
DATE OF VISIT: 4/24/2019    REASON FOR VISIT: Followup for small cell lung cancer     HISTORY OF PRESENT ILLNESS: The patient is a very pleasant 63 y.o. female  with past medical history significant for small cell lung cancer diagnosed August 2017.  Repeated scans done on April 21, 2019 revealed progressive disease.  The patient is here today for scheduled follow up visit.    SUBJECTIVE: The patient is here today with her daughter.  She is doing fairly well but she is complaining of mild fatigue.  Denies any shortness of breath no abdominal pain.    PAST MEDICAL HISTORY/SOCIAL HISTORY/FAMILY HISTORY: Reviewed by me and unchanged from my documentation done on 04/24/19.    Review of Systems   Constitutional: Positive for fatigue. Negative for activity change, appetite change, chills, fever and unexpected weight change.   HENT: Negative for hearing loss, mouth sores, nosebleeds, sore throat and trouble swallowing.    Eyes: Negative for visual disturbance.   Respiratory: Negative for cough, chest tightness, shortness of breath and wheezing.    Cardiovascular: Negative for chest pain, palpitations and leg swelling.   Gastrointestinal: Negative for abdominal distention, abdominal pain, blood in stool, constipation, diarrhea, nausea, rectal pain and vomiting.   Endocrine: Negative for cold intolerance and heat intolerance.   Genitourinary: Negative for difficulty urinating, dysuria, frequency and urgency.   Musculoskeletal: Negative for arthralgias, back pain, gait problem, joint swelling and myalgias.   Skin: Negative for rash.   Neurological: Negative for dizziness, tremors, syncope, weakness, light-headedness, numbness and headaches.   Hematological: Negative for adenopathy. Does not bruise/bleed easily.   Psychiatric/Behavioral: Negative for confusion, sleep disturbance and suicidal ideas. The patient is not nervous/anxious.          Current Outpatient Medications:   •  albuterol (VENTOLIN HFA) 108 (90 Base) MCG/ACT  inhaler, Inhale 2 puffs Every 4 (Four) Hours As Needed for Wheezing or Shortness of Air., Disp: 1 inhaler, Rfl: 5  •  amLODIPine (NORVASC) 5 MG tablet, Take 5 mg by mouth Daily., Disp: , Rfl: 0  •  aspirin 81 MG EC tablet, Take 81 mg by mouth Daily., Disp: , Rfl:   •  azelastine (ASTELIN) 0.1 % nasal spray, 1 spray into the nostril(s) as directed by provider 2 (Two) Times a Day As Needed for Rhinitis or Allergies. Use in each nostril as directed, Disp: 1 each, Rfl: 5  •  hydrochlorothiazide (MICROZIDE) 12.5 MG capsule, Take 12.5 mg by mouth Every Morning., Disp: , Rfl: 0  •  hydrocortisone 2.5 % cream, Apply  topically to the appropriate area as directed 2 (Two) Times a Day., Disp: 20 g, Rfl: 0  •  lidocaine-prilocaine (EMLA) 2.5-2.5 % cream, Apply  topically to the appropriate area as directed As Needed (45-60 minutes prior to port access.  Cover with saran/plastic wrap.)., Disp: 30 g, Rfl: 3  •  losartan-hydrochlorothiazide (HYZAAR) 100-12.5 MG per tablet, Take 1 tablet by mouth Daily., Disp: , Rfl: 0  •  metFORMIN (GLUCOPHAGE) 500 MG tablet, Take 500 mg by mouth 2 (Two) Times a Day With Meals., Disp: , Rfl: 0  •  Nystatin (MAGIC MOUTHWASH), Swish and spit 10 mL Every 4 (Four) Hours As Needed (sore mouth)., Disp: 240 mL, Rfl: 1  •  ondansetron (ZOFRAN) 8 MG tablet, Take 1 tablet by mouth 3 (Three) Times a Day As Needed for Nausea or Vomiting., Disp: 30 tablet, Rfl: 5  •  pantoprazole (PROTONIX) 40 MG EC tablet, Take 1 tablet by mouth Every 12 (Twelve) Hours., Disp: 60 tablet, Rfl: 0  •  potassium chloride ER (K-TAB) 20 MEQ tablet controlled-release ER tablet, Take 1 tablet by mouth 2 (Two) Times a Day., Disp: 30 tablet, Rfl: 1  •  tamoxifen (NOLVADEX) 20 MG chemo tablet, Take 1 tablet by mouth Daily., Disp: 30 tablet, Rfl: 0  •  umeclidinium-vilanterol (ANORO ELLIPTA) 62.5-25 MCG/INH aerosol powder  inhaler, Inhale 1 puff Daily., Disp: 1 each, Rfl: 3  •  vitamin D (ERGOCALCIFEROL) 73278 units capsule capsule, Take  "50,000 Units by mouth Every 7 (Seven) Days., Disp: , Rfl: 0    PHYSICAL EXAMINATION:   /72   Pulse 75   Temp 97.2 °F (36.2 °C) (Temporal)   Resp 16   Ht 157.5 cm (62.01\")   Wt 92.1 kg (203 lb)   BMI 37.12 kg/m²    ECOG Performance Status: 1 - Symptomatic but completely ambulatory  General Appearance:  alert, cooperative, no apparent distress and appears stated age   Neurologic/Psychiatric: A&O x 3, gait steady, appropriate affect, strength 5/5 in all muscle groups   HEENT:  Normocephalic, without obvious abnormality, mucous membranes moist   Neck: Supple, symmetrical, trachea midline, no adenopathy;  No thyromegaly, masses, or tenderness   Lungs:   Clear to auscultation bilaterally; respirations regular, even, and unlabored bilaterally   Heart:  Regular rate and rhythm, no murmurs appreciated   Abdomen:   Soft, non-tender, non-distended and no organomegaly   Lymph nodes: No cervical, supraclavicular, inguinal or axillary adenopathy noted   Extremities: Normal, atraumatic; no clubbing, cyanosis, or edema    Skin: No rashes, ulcers, or suspicious lesions noted     Lab on 04/22/2019   Component Date Value Ref Range Status   • Glucose 04/22/2019 107* 74 - 98 mg/dL Final   • BUN 04/22/2019 12  7 - 20 mg/dL Final   • Creatinine 04/22/2019 0.90  0.60 - 1.30 mg/dL Final   • Sodium 04/22/2019 139  137 - 145 mmol/L Final   • Potassium 04/22/2019 3.8  3.5 - 5.1 mmol/L Final   • Chloride 04/22/2019 101  98 - 107 mmol/L Final   • CO2 04/22/2019 25.0* 26.0 - 30.0 mmol/L Final   • Calcium 04/22/2019 10.5* 8.4 - 10.2 mg/dL Final   • Total Protein 04/22/2019 8.1  6.3 - 8.2 g/dL Final   • Albumin 04/22/2019 4.30  3.50 - 5.00 g/dL Final   • ALT (SGPT) 04/22/2019 59  13 - 69 U/L Final   • AST (SGOT) 04/22/2019 48* 15 - 46 U/L Final   • Alkaline Phosphatase 04/22/2019 135* 38 - 126 U/L Final   • Total Bilirubin 04/22/2019 0.6  0.2 - 1.3 mg/dL Final   • eGFR Non African Amer 04/22/2019 64  >60 mL/min/1.73 Final   • Globulin " 04/22/2019 3.8  gm/dL Final   • A/G Ratio 04/22/2019 1.1  1.0 - 2.0 g/dL Final   • BUN/Creatinine Ratio 04/22/2019 13.3  7.1 - 23.5 Final   • Anion Gap 04/22/2019 16.8  10.0 - 20.0 mmol/L Final   • Hemoglobin A1C 04/22/2019 5.4  3 - 6 % Final   • LDL-P 04/22/2019 1817* <1000 nmol/L Final                              Low                   < 1000                            Moderate         1000 - 1299                            Borderline-High  1300 - 1599                            High             1600 - 2000                            Very High             > 2000   • LDL-C 04/22/2019 139* 0 - 99 mg/dL Final                              Optimal               <  100                            Above optimal     100 -  129                            Borderline        130 -  159                            High              160 -  189                            Very high             >  189  LDL-C is inaccurate if patient is non-fasting.   • HDL-C 04/22/2019 38* >39 mg/dL Final   • Triglycerides 04/22/2019 126  0 - 149 mg/dL Final   • Total Cholesterol 04/22/2019 202* 100 - 199 mg/dL Final   • HDL-P (Total) 04/22/2019 22.6* >=30.5 umol/L Final   • Small LDL-P 04/22/2019 939* <=527 nmol/L Final   • LDL Size 04/22/2019 21.2  >20.5 nm Final    Comment:  ----------------------------------------------------------                   ** INTERPRETATIVE INFORMATION**                   PARTICLE CONCENTRATION AND SIZE                      <--Lower CVD Risk   Higher CVD Risk-->    LDL AND HDL PARTICLES   Percentile in Reference Population    HDL-P (total)        High     75th    50th    25th   Low                         >34.9    34.9    30.5    26.7   <26.7    Small LDL-P          Low      25th    50th    75th   High                         <117     117     527     839    >839    LDL Size   <-Large (Pattern A)->    <-Small (Pattern B)->                      23.0    20.6           20.5      19.0    ----------------------------------------------------------  Small LDL-P and LDL Size are associated with CVD risk, but not after  LDL-P is taken into account.  These assays were developed and their performance characteristics  determined by [facility]. These assays have not been cleared by the  [place] Food and Drug Administration. The clinical utility                            of these  laboratory values have not been fully established.   • TSH 04/22/2019 2.470  0.470 - 4.680 mIU/mL Final   • Uric Acid 04/22/2019 6.5  2.5 - 8.5 mg/dL Final   • Vitamin B-12 04/22/2019 434  239 - 931 pg/mL Final   • Folate 04/22/2019 10.70  >2.76 ng/mL Final   • 25 Hydroxy, Vitamin D 04/22/2019 29.3  ng/ml Final   • PTH, Intact 04/22/2019 42  15 - 65 pg/mL Final   • Ionized Calcium 04/22/2019 6.0* 4.5 - 5.6 mg/dL Final   • WBC 04/22/2019 4.88  3.40 - 10.80 10*3/mm3 Final   • RBC 04/22/2019 4.05  3.77 - 5.28 10*6/mm3 Final   • Hemoglobin 04/22/2019 12.7  12.0 - 15.9 g/dL Final   • Hematocrit 04/22/2019 37.3  34.0 - 46.6 % Final   • MCV 04/22/2019 92.1  79.0 - 97.0 fL Final   • MCH 04/22/2019 31.4  26.6 - 33.0 pg Final   • MCHC 04/22/2019 34.0  31.5 - 35.7 g/dL Final   • RDW 04/22/2019 12.5  12.3 - 15.4 % Final   • RDW-SD 04/22/2019 42.5  37.0 - 54.0 fl Final   • MPV 04/22/2019 9.3  6.0 - 12.0 fL Final   • Platelets 04/22/2019 149  140 - 450 10*3/mm3 Final   • Neutrophil % 04/22/2019 66.6  42.7 - 76.0 % Final   • Lymphocyte % 04/22/2019 24.4  19.6 - 45.3 % Final   • Monocyte % 04/22/2019 5.9  5.0 - 12.0 % Final   • Eosinophil % 04/22/2019 2.3  0.3 - 6.2 % Final   • Basophil % 04/22/2019 0.6  0.0 - 1.5 % Final   • Immature Grans % 04/22/2019 0.2  0.0 - 0.5 % Final   • Neutrophils, Absolute 04/22/2019 3.25  1.70 - 7.00 10*3/mm3 Final   • Lymphocytes, Absolute 04/22/2019 1.19  0.70 - 3.10 10*3/mm3 Final   • Monocytes, Absolute 04/22/2019 0.29  0.10 - 0.90 10*3/mm3 Final   • Eosinophils, Absolute 04/22/2019 0.11  0.00 - 0.40 10*3/mm3 Final    • Basophils, Absolute 04/22/2019 0.03  0.00 - 0.20 10*3/mm3 Final   • Immature Grans, Absolute 04/22/2019 0.01  0.00 - 0.05 10*3/mm3 Final   • nRBC 04/22/2019 0.0  0.0 - 0.2 /100 WBC Final        Ct Chest With Contrast    Result Date: 4/22/2019  Narrative: PROCEDURE: CT CHEST W CONTRAST-  HISTORY: f/u scan; C34.82-Malignant neoplasm of overlapping sites of left bronchus and lung  COMPARISON: 01/21/2019.  PROCEDURE: Multiple axial CT images were obtained from the thoracic inlet through the upper abdomen following the administration of intravenous contrast.  FINDINGS: Soft tissue windows reveal an enlarging left paratracheal mass which measures 2.6 cm on today's exam and previously measured 2.1 cm. No other mediastinal adenopathy is identified. The heart is normal in size. The aorta is normal in caliber.There is a small loculated effusion at the left lung apex. There is also a small pericardial effusion. Lung windows redemonstrate left perihilar airspace disease which is unchanged. Scarring is noted in the anterior left upper and right upper lobes. Within the visualized upper abdomen there is fatty infiltration of the liver. There is a new hypodense lesion in the posterior right lobe of the liver measuring approximately 2.1 cm. The upper abdomen is otherwise unremarkable. Bone windows reveal no acute osseous abnormalities.      Impression: Interval increase in size in the previously noted left paratracheal mass with a new hypodense lesion in the right lobe of the liver consistent with metastatic disease.   This study was performed with techniques to keep radiation doses as low as reasonably achievable (ALARA). Individualized dose reduction techniques using automated exposure control or adjustment of mA and/or kV according to the patient size were employed.  This report was finalized on 4/22/2019 1:08 PM by Jyoti Jensen M.D..      ASSESSMENT: The patient is a very pleasant 63 y.o. female  with small cell lung  cancer    PROBLEM LIST:  1. Small cell lung cancer from the left upper lobe.  Limited stage disease, recurrent in a mediastinal node  A.  Treated  initially with etoposide and carboplatin starting 9/13/27.  She responded to the single modality that was started because of symptoms including airway compromise.  B.  Completed combined modality chemoradiation with etoposide and carboplatin . (Chemotherapy completed 12/7/2017).  C.  Recurrence in left paratracheal node.  This was seen on CT scan from 6/19/18 and hypermetabolic on PET scan to confirm.  She's been treated with weekly taxol starting 7/17/2018 along with involved field radiation.  D. Left pleural effusion seen on CT of 11/23/18, loculated and inaccessible for thoracentesis.  E.  Progressive disease documented on scans done April 22, 2019 with increase in size of left paratracheal lymph node and right hepatic lobe mass.  F.  Will start carboplatin and etoposide with Tecentriq May 1, 2019  2.  Anemia.  This appears to be predominantly from chemoradiation.  3.  History of left breast cancer.  A.  Left mastectomy 7/22/2008 for stage IIIA, T3 N1 M0 breast cancer  B.  ER weakly positive, IL negative and HER-2 positive  C.  Adjuvant chemotherapy with Herceptin, Taxotere and carboplatin  D.  Postmastectomy radiation done at [facility]  E.  Adjuvant tamoxifen started 12/12/2008 and continued as extended adjuvant therapy  4.  COPD.  5. Diabetes  6. HTN    PLAN:  1. I did go over the CT scan results with the patient, I reviewed the films myself.  I went over the picture with the patient her daughter.  Unfortunately she has evidence of progressive disease with increase in size of left paratracheal mass as well as left lung infiltrate and a new 2.1 cm right hepatic lobe metastatic lesion. n I have compared her current scan to previous study done January 21, 2019 the left paratracheal mass has increased in size to 2.6 cm, the hepatic mass is new, she does  have stable right paratracheal lymph node 1.5 cm.  2. I will start the patient on carboplatin and etoposide as well as Tecentriq.  This is an FDA approved treatment for extensive stage small cell lung cancer.  I will see the patient up to meet with my nurse practitioner prior to initiating treatment.  3. The patient will follow up with me in 4 weeks for cycle #2.  4.  I would arrange for port placement.  5.  I will monitor the patient blood work while she is on treatment including blood counts kidney function liver function and electrolytes.  6. We discussed potential side effects of immunotherapy including but not limited to immune mediated reactions with thyroiditis, pneumonitis, hepatitis, colitis, rash, and electrolytes abnormalities, fatigue, multiorgan failure, and possibly death.  7. We reviewed the potential side effects of this regimen including fatigue, vomiting and nausea, hair loss, nephropathy, neuropathy, hearing loss, myelosuppression, and risk of infusion reaction.  8. She has completed 10 years of Tamoxifen at this point.   9. Will continue inhalers as needed for COPD  10. Will continue Metformin for type 2 DM.  Her sugars might get higher while she is in treatment secondary to pre-infusion steroids.  11. Will continue Hyzaar for HTN.  We may have to adjust her antihypertensive agent while she is in treatment.      Josh Son MD  4/24/2019

## 2019-04-24 ENCOUNTER — OFFICE VISIT (OUTPATIENT)
Dept: ONCOLOGY | Facility: CLINIC | Age: 64
End: 2019-04-24

## 2019-04-24 VITALS
WEIGHT: 203 LBS | DIASTOLIC BLOOD PRESSURE: 72 MMHG | HEART RATE: 75 BPM | RESPIRATION RATE: 16 BRPM | TEMPERATURE: 97.2 F | HEIGHT: 62 IN | BODY MASS INDEX: 37.36 KG/M2 | SYSTOLIC BLOOD PRESSURE: 166 MMHG

## 2019-04-24 DIAGNOSIS — C34.82 MALIGNANT NEOPLASM OF OVERLAPPING SITES OF LEFT LUNG (HCC): Primary | ICD-10-CM

## 2019-04-24 PROCEDURE — 99215 OFFICE O/P EST HI 40 MIN: CPT | Performed by: INTERNAL MEDICINE

## 2019-04-24 RX ORDER — LIDOCAINE AND PRILOCAINE 25; 25 MG/G; MG/G
CREAM TOPICAL AS NEEDED
Qty: 30 G | Refills: 3 | Status: SHIPPED | OUTPATIENT
Start: 2019-04-24 | End: 2020-01-01 | Stop reason: SDUPTHER

## 2019-04-26 ENCOUNTER — TELEPHONE (OUTPATIENT)
Dept: SURGERY | Facility: CLINIC | Age: 64
End: 2019-04-26

## 2019-04-26 ENCOUNTER — PREP FOR SURGERY (OUTPATIENT)
Dept: OTHER | Facility: HOSPITAL | Age: 64
End: 2019-04-26

## 2019-04-26 DIAGNOSIS — C22.9 MALIGNANT NEOPLASM OF LIVER, UNSPECIFIED LIVER MALIGNANCY TYPE (HCC): Primary | ICD-10-CM

## 2019-04-26 RX ORDER — CEFAZOLIN SODIUM 2 G/50ML
2 SOLUTION INTRAVENOUS ONCE
Status: CANCELLED | OUTPATIENT
Start: 2019-04-29 | End: 2019-04-26

## 2019-04-28 LAB — PTH RELATED PROT SERPL-SCNC: <2 PMOL/L

## 2019-04-29 ENCOUNTER — ANESTHESIA EVENT (OUTPATIENT)
Dept: PERIOP | Facility: HOSPITAL | Age: 64
End: 2019-04-29

## 2019-04-29 ENCOUNTER — APPOINTMENT (OUTPATIENT)
Dept: GENERAL RADIOLOGY | Facility: HOSPITAL | Age: 64
End: 2019-04-29

## 2019-04-29 ENCOUNTER — ANESTHESIA (OUTPATIENT)
Dept: PERIOP | Facility: HOSPITAL | Age: 64
End: 2019-04-29

## 2019-04-29 ENCOUNTER — HOSPITAL ENCOUNTER (OUTPATIENT)
Facility: HOSPITAL | Age: 64
Setting detail: HOSPITAL OUTPATIENT SURGERY
Discharge: HOME | End: 2019-04-29
Attending: SURGERY | Admitting: SURGERY

## 2019-04-29 VITALS
DIASTOLIC BLOOD PRESSURE: 92 MMHG | OXYGEN SATURATION: 96 % | HEIGHT: 62 IN | RESPIRATION RATE: 18 BRPM | BODY MASS INDEX: 37.17 KG/M2 | SYSTOLIC BLOOD PRESSURE: 150 MMHG | TEMPERATURE: 98.9 F | HEART RATE: 93 BPM | WEIGHT: 202 LBS

## 2019-04-29 DIAGNOSIS — C22.9 MALIGNANT NEOPLASM OF LIVER, UNSPECIFIED LIVER MALIGNANCY TYPE (HCC): ICD-10-CM

## 2019-04-29 LAB — GLUCOSE BLDC GLUCOMTR-MCNC: 109 MG/DL (ref 70–130)

## 2019-04-29 PROCEDURE — 25010000003 CEFAZOLIN SODIUM-DEXTROSE 2-3 GM-%(50ML) RECONSTITUTED SOLUTION: Performed by: SURGERY

## 2019-04-29 PROCEDURE — 71045 X-RAY EXAM CHEST 1 VIEW: CPT

## 2019-04-29 PROCEDURE — 25010000002 HEPARIN (PORCINE) PER 1000 UNITS: Performed by: SURGERY

## 2019-04-29 PROCEDURE — 25010000002 HYDRALAZINE PER 20 MG: Performed by: NURSE ANESTHETIST, CERTIFIED REGISTERED

## 2019-04-29 PROCEDURE — C1788 PORT, INDWELLING, IMP: HCPCS | Performed by: SURGERY

## 2019-04-29 PROCEDURE — S0260 H&P FOR SURGERY: HCPCS | Performed by: SURGERY

## 2019-04-29 PROCEDURE — 76000 FLUOROSCOPY <1 HR PHYS/QHP: CPT

## 2019-04-29 PROCEDURE — 25010000002 METOCLOPRAMIDE PER 10 MG: Performed by: NURSE ANESTHETIST, CERTIFIED REGISTERED

## 2019-04-29 PROCEDURE — 25010000002 FENTANYL CITRATE (PF) 100 MCG/2ML SOLUTION: Performed by: NURSE ANESTHETIST, CERTIFIED REGISTERED

## 2019-04-29 PROCEDURE — 25010000002 PROPOFOL 10 MG/ML EMULSION: Performed by: NURSE ANESTHETIST, CERTIFIED REGISTERED

## 2019-04-29 PROCEDURE — 36561 INSERT TUNNELED CV CATH: CPT | Performed by: SURGERY

## 2019-04-29 PROCEDURE — 82962 GLUCOSE BLOOD TEST: CPT

## 2019-04-29 PROCEDURE — 25010000002 DEXAMETHASONE PER 1 MG: Performed by: NURSE ANESTHETIST, CERTIFIED REGISTERED

## 2019-04-29 PROCEDURE — 25010000002 ONDANSETRON PER 1 MG: Performed by: NURSE ANESTHETIST, CERTIFIED REGISTERED

## 2019-04-29 DEVICE — POWERPORT CLEARVUE IMPLANTABLE PORT WITH ATTACHABLE 8F POLYURETHANE OPEN-ENDED SINGLE-LUMEN VENOUS CATHETER INTERMEDIATE KIT
Type: IMPLANTABLE DEVICE | Site: CHEST | Status: FUNCTIONAL
Brand: POWERPORT CLEARVUE

## 2019-04-29 RX ORDER — PROPOFOL 10 MG/ML
VIAL (ML) INTRAVENOUS AS NEEDED
Status: DISCONTINUED | OUTPATIENT
Start: 2019-04-29 | End: 2019-04-29 | Stop reason: SURG

## 2019-04-29 RX ORDER — PROMETHAZINE HYDROCHLORIDE 25 MG/ML
12.5 INJECTION, SOLUTION INTRAMUSCULAR; INTRAVENOUS ONCE AS NEEDED
Status: DISCONTINUED | OUTPATIENT
Start: 2019-04-29 | End: 2019-04-29 | Stop reason: HOSPADM

## 2019-04-29 RX ORDER — ONDANSETRON 2 MG/ML
INJECTION INTRAMUSCULAR; INTRAVENOUS AS NEEDED
Status: DISCONTINUED | OUTPATIENT
Start: 2019-04-29 | End: 2019-04-29 | Stop reason: SURG

## 2019-04-29 RX ORDER — HEPARIN SODIUM 1000 [USP'U]/ML
INJECTION, SOLUTION INTRAVENOUS; SUBCUTANEOUS AS NEEDED
Status: DISCONTINUED | OUTPATIENT
Start: 2019-04-29 | End: 2019-04-29 | Stop reason: HOSPADM

## 2019-04-29 RX ORDER — ONDANSETRON 2 MG/ML
4 INJECTION INTRAMUSCULAR; INTRAVENOUS ONCE AS NEEDED
Status: CANCELLED | OUTPATIENT
Start: 2019-04-29 | End: 2019-04-29

## 2019-04-29 RX ORDER — METOCLOPRAMIDE HYDROCHLORIDE 5 MG/ML
INJECTION INTRAMUSCULAR; INTRAVENOUS AS NEEDED
Status: DISCONTINUED | OUTPATIENT
Start: 2019-04-29 | End: 2019-04-29 | Stop reason: SURG

## 2019-04-29 RX ORDER — LIDOCAINE HYDROCHLORIDE 10 MG/ML
INJECTION, SOLUTION INFILTRATION; PERINEURAL AS NEEDED
Status: DISCONTINUED | OUTPATIENT
Start: 2019-04-29 | End: 2019-04-29 | Stop reason: HOSPADM

## 2019-04-29 RX ORDER — CEFAZOLIN SODIUM 2 G/50ML
2 SOLUTION INTRAVENOUS ONCE
Status: COMPLETED | OUTPATIENT
Start: 2019-04-29 | End: 2019-04-29

## 2019-04-29 RX ORDER — DEXAMETHASONE SODIUM PHOSPHATE 4 MG/ML
INJECTION, SOLUTION INTRA-ARTICULAR; INTRALESIONAL; INTRAMUSCULAR; INTRAVENOUS; SOFT TISSUE AS NEEDED
Status: DISCONTINUED | OUTPATIENT
Start: 2019-04-29 | End: 2019-04-29 | Stop reason: SURG

## 2019-04-29 RX ORDER — SODIUM CHLORIDE 0.9 % (FLUSH) 0.9 %
3 SYRINGE (ML) INJECTION AS NEEDED
Status: DISCONTINUED | OUTPATIENT
Start: 2019-04-29 | End: 2019-04-29 | Stop reason: HOSPADM

## 2019-04-29 RX ORDER — SODIUM CHLORIDE, SODIUM LACTATE, POTASSIUM CHLORIDE, CALCIUM CHLORIDE 600; 310; 30; 20 MG/100ML; MG/100ML; MG/100ML; MG/100ML
1000 INJECTION, SOLUTION INTRAVENOUS CONTINUOUS
Status: DISCONTINUED | OUTPATIENT
Start: 2019-04-29 | End: 2019-04-29 | Stop reason: HOSPADM

## 2019-04-29 RX ORDER — HYDRALAZINE HYDROCHLORIDE 20 MG/ML
INJECTION INTRAMUSCULAR; INTRAVENOUS AS NEEDED
Status: DISCONTINUED | OUTPATIENT
Start: 2019-04-29 | End: 2019-04-29 | Stop reason: SURG

## 2019-04-29 RX ORDER — ONDANSETRON 2 MG/ML
4 INJECTION INTRAMUSCULAR; INTRAVENOUS ONCE AS NEEDED
Status: DISCONTINUED | OUTPATIENT
Start: 2019-04-29 | End: 2019-04-29 | Stop reason: HOSPADM

## 2019-04-29 RX ORDER — LIDOCAINE HYDROCHLORIDE 20 MG/ML
INJECTION, SOLUTION INFILTRATION; PERINEURAL AS NEEDED
Status: DISCONTINUED | OUTPATIENT
Start: 2019-04-29 | End: 2019-04-29 | Stop reason: SURG

## 2019-04-29 RX ORDER — FENTANYL CITRATE 50 UG/ML
INJECTION, SOLUTION INTRAMUSCULAR; INTRAVENOUS AS NEEDED
Status: DISCONTINUED | OUTPATIENT
Start: 2019-04-29 | End: 2019-04-29 | Stop reason: SURG

## 2019-04-29 RX ADMIN — METOCLOPRAMIDE 10 MG: 5 INJECTION, SOLUTION INTRAMUSCULAR; INTRAVENOUS at 09:55

## 2019-04-29 RX ADMIN — DEXAMETHASONE SODIUM PHOSPHATE 8 MG: 4 INJECTION, SOLUTION INTRAMUSCULAR; INTRAVENOUS at 09:55

## 2019-04-29 RX ADMIN — SODIUM CHLORIDE, POTASSIUM CHLORIDE, SODIUM LACTATE AND CALCIUM CHLORIDE 1000 ML: 600; 310; 30; 20 INJECTION, SOLUTION INTRAVENOUS at 09:40

## 2019-04-29 RX ADMIN — FENTANYL CITRATE 50 MCG: 50 INJECTION, SOLUTION INTRAMUSCULAR; INTRAVENOUS at 10:12

## 2019-04-29 RX ADMIN — LIDOCAINE HYDROCHLORIDE 100 MG: 20 INJECTION, SOLUTION INFILTRATION; PERINEURAL at 09:55

## 2019-04-29 RX ADMIN — FENTANYL CITRATE 50 MCG: 50 INJECTION, SOLUTION INTRAMUSCULAR; INTRAVENOUS at 10:07

## 2019-04-29 RX ADMIN — ONDANSETRON 4 MG: 2 INJECTION INTRAMUSCULAR; INTRAVENOUS at 09:55

## 2019-04-29 RX ADMIN — CEFAZOLIN SODIUM 2 G: 2 SOLUTION INTRAVENOUS at 09:50

## 2019-04-29 RX ADMIN — PROPOFOL 300 MG: 10 INJECTION, EMULSION INTRAVENOUS at 10:20

## 2019-04-29 RX ADMIN — HYDRALAZINE HYDROCHLORIDE 10 MG: 20 INJECTION INTRAMUSCULAR; INTRAVENOUS at 09:56

## 2019-04-29 NOTE — ANESTHESIA POSTPROCEDURE EVALUATION
Patient: Sanjana Horton    Procedure Summary     Date:  04/29/19 Room / Location:  [facility] OR  /  [facility]    Anesthesia Start:  0950 Anesthesia Stop:  1023    Procedure:  INSERTION OF PORTACATH (N/A ) Diagnosis:       Malignant neoplasm of liver, unspecified liver malignancy type (CMS/HCC)      (Malignant neoplasm of liver, unspecified liver malignancy type (CMS/HCC) [C22.9])    Surgeon:  Toño Willis MD Provider:  Memo Suh CRNA    Anesthesia Type:  MAC ASA Status:  3          Anesthesia Type: MAC  Last vitals  BP   150/92 (04/29/19 1100)   Temp   98.9 °F (37.2 °C) (04/29/19 1030)   Pulse   93 (04/29/19 1100)   Resp   18 (04/29/19 1100)     SpO2   96 % (04/29/19 1100)     Post Anesthesia Care and Evaluation    Patient location during evaluation: PHASE II  Patient participation: complete - patient participated  Level of consciousness: awake  Pain score: 1  Pain management: adequate  Airway patency: patent  Anesthetic complications: No anesthetic complications  PONV Status: controlled  Cardiovascular status: acceptable and stable  Respiratory status: acceptable  Hydration status: acceptable

## 2019-04-29 NOTE — ANESTHESIA PREPROCEDURE EVALUATION
Anesthesia Evaluation     Patient summary reviewed and Nursing notes reviewed   no history of anesthetic complications:  NPO Solid Status: > 8 hours  NPO Liquid Status: > 8 hours           Airway   Mallampati: I  TM distance: >3 FB  Neck ROM: full  no difficulty expected  Dental - normal exam   (+) poor dentition and upper dentures    Pulmonary - normal exam   (+) a smoker Former, lung cancer,   Cardiovascular - normal exam  Exercise tolerance: good (4-7 METS)    ECG reviewed    (+) hypertension, PVD,       Neuro/Psych- negative ROS  GI/Hepatic/Renal/Endo    (+) obesity,   liver disease, diabetes mellitus type 2 well controlled,     Musculoskeletal     Abdominal    Substance History - negative use     OB/GYN negative ob/gyn ROS         Other   (+) arthritis   history of cancer                      Anesthesia Plan    ASA 3     MAC     intravenous induction   Anesthetic plan, all risks, benefits, and alternatives have been provided, discussed and informed consent has been obtained with: patient.    Plan discussed with CRNA.

## 2019-04-30 ENCOUNTER — OFFICE VISIT (OUTPATIENT)
Dept: ONCOLOGY | Facility: CLINIC | Age: 64
End: 2019-04-30

## 2019-04-30 VITALS
WEIGHT: 202 LBS | HEART RATE: 68 BPM | HEIGHT: 62 IN | SYSTOLIC BLOOD PRESSURE: 156 MMHG | BODY MASS INDEX: 37.17 KG/M2 | RESPIRATION RATE: 14 BRPM | DIASTOLIC BLOOD PRESSURE: 74 MMHG | TEMPERATURE: 97.2 F

## 2019-04-30 DIAGNOSIS — C77.1 SECONDARY MALIGNANT NEOPLASM OF MEDIASTINAL LYMPH NODE (HCC): ICD-10-CM

## 2019-04-30 DIAGNOSIS — C22.7 OTHER SPECIFIED CARCINOMAS OF LIVER (HCC): ICD-10-CM

## 2019-04-30 DIAGNOSIS — C34.82 MALIGNANT NEOPLASM OF OVERLAPPING SITES OF LEFT LUNG (HCC): Primary | ICD-10-CM

## 2019-04-30 PROCEDURE — 99215 OFFICE O/P EST HI 40 MIN: CPT | Performed by: NURSE PRACTITIONER

## 2019-04-30 RX ORDER — SODIUM CHLORIDE 9 MG/ML
250 INJECTION, SOLUTION INTRAVENOUS ONCE
Status: CANCELLED | OUTPATIENT
Start: 2019-05-02

## 2019-04-30 RX ORDER — DIPHENHYDRAMINE HYDROCHLORIDE 50 MG/ML
50 INJECTION INTRAMUSCULAR; INTRAVENOUS AS NEEDED
Status: CANCELLED | OUTPATIENT
Start: 2019-04-30

## 2019-04-30 RX ORDER — PROCHLORPERAZINE MALEATE 10 MG
10 TABLET ORAL ONCE
Status: CANCELLED | OUTPATIENT
Start: 2019-05-03 | End: 2019-05-02

## 2019-04-30 RX ORDER — ONDANSETRON HYDROCHLORIDE 8 MG/1
8 TABLET, FILM COATED ORAL 3 TIMES DAILY PRN
Qty: 30 TABLET | Refills: 5 | Status: ON HOLD | OUTPATIENT
Start: 2019-04-30 | End: 2019-01-01

## 2019-04-30 RX ORDER — PALONOSETRON 0.05 MG/ML
0.25 INJECTION, SOLUTION INTRAVENOUS ONCE
Status: CANCELLED | OUTPATIENT
Start: 2019-04-30

## 2019-04-30 RX ORDER — PROCHLORPERAZINE MALEATE 10 MG
10 TABLET ORAL ONCE
Status: CANCELLED | OUTPATIENT
Start: 2019-05-02 | End: 2019-05-01

## 2019-04-30 RX ORDER — FAMOTIDINE 10 MG/ML
20 INJECTION, SOLUTION INTRAVENOUS AS NEEDED
Status: CANCELLED | OUTPATIENT
Start: 2019-04-30

## 2019-04-30 RX ORDER — SODIUM CHLORIDE 9 MG/ML
250 INJECTION, SOLUTION INTRAVENOUS ONCE
Status: CANCELLED | OUTPATIENT
Start: 2019-05-03

## 2019-04-30 RX ORDER — SODIUM CHLORIDE 9 MG/ML
250 INJECTION, SOLUTION INTRAVENOUS ONCE
Status: CANCELLED | OUTPATIENT
Start: 2019-04-30

## 2019-04-30 NOTE — PROGRESS NOTES
CHEMOTHERAPY PREPARATION    Sanjana Horton  [id number]  1955    Chief Complaint: No chief complaint on file.      History of present illness:  Sanjana Horton is a 63 y.o. year old female who is here today for chemotherapy preparation and needs assessment. The patient has been diagnosed with Small celll lung cancer and is scheduled to begin treatment with Etoposide, carboplatin, and tecentriq.     Oncology History:       Malignant neoplasm of overlapping sites of left lung (CMS/HCC)    10/26/2017 Initial Diagnosis     Malignant neoplasm of overlapping sites of left lung         11/13/2017 - 12/26/2017 Radiation     Radiation OncologyTreatment Course:  Sanjana Horton received 6000 cGy in 30 fractions to LEFT lung via External Beam Radiation - EBRT.         8/13/2018 - 8/20/2018 Radiation     Radiation OncologyTreatment Course:  Sanjana Horton received 2500 cGy in 5 fractions to lung mass and nodes via Stereotactic Radiation Therapy - SRT.         4/30/2019 -  Chemotherapy     OP LUNG Atezolizumab / CARBOplatin AUC=5 / Etoposide (SCLC)  Chemotherapy education visit 04/30/2019  Infusion to start 05/01/2019            Past Medical History:   Diagnosis Date   • Anemia    • Arthritis    • Breast cancer (CMS/HCC) 2008    LEFT BREAST - (NO STICKS/BP'S LEFT ARM PLEASE)   • Cancer (CMS/HCC)     lung also   • Diabetes mellitus (CMS/HCC)    • History of bronchitis     REPORTS HAD BRONCHITIS AROUND JUNE 2017   • Hot flashes     Tolerating medication well, does have occasional hot flashes but denies night sweats.   • Hypertension    • Wears partial dentures     UPPER       Past Surgical History:   Procedure Laterality Date   • BRONCHOSCOPY N/A 8/31/2017    Procedure: BRONCHOSCOPY  WITH BRUSHINGS AND BXS, ENDOBRONCHIAL ULTRASOUND , FINE NEEDLE ASPIRATION, FLUOROSCOPY;  Surgeon: Holly Granados MD;  Location: [facility];  Service:    • COLONOSCOPY     • DILATATION AND CURETTAGE     • ENDOSCOPY     • MASTECTOMY Left  "07/22/2008   • PORTACATH PLACEMENT N/A 4/29/2019    Procedure: INSERTION OF PORTACATH;  Surgeon: Toño Willis MD;  Location: [facility];  Service: General   • TUBAL ABDOMINAL LIGATION         MEDICATIONS: The current medication list was reviewed and reconciled.     Allergies:  is allergic to codeine.    Family History   Problem Relation Age of Onset   • Heart disease Father    • Breast cancer Sister          Review of Systems   Constitutional: Positive for fatigue. Negative for activity change, appetite change, chills and unexpected weight change.   HENT: Negative.    Eyes: Negative.    Respiratory: Positive for cough. Negative for apnea, chest tightness, shortness of breath and wheezing.    Cardiovascular: Negative.    Gastrointestinal: Negative.    Endocrine: Negative.    Genitourinary: Negative.    Musculoskeletal: Negative.    Skin: Negative.    Allergic/Immunologic: Negative.    Neurological: Negative.    Hematological: Negative.    Psychiatric/Behavioral: Negative.        Physical Exam  Vital Signs: /74   Pulse 68   Temp 97.2 °F (36.2 °C) (Temporal)   Resp 14   Ht 157.5 cm (62\")   Wt 91.6 kg (202 lb)   BMI 36.95 kg/m²    General Appearance:  alert, cooperative, no apparent distress and appears stated age   Neurologic/Psychiatric: A&O x 3, gait steady, appropriate affect   HEENT:  Normocephalic, without obvious abnormality, mucous membranes moist   Lungs:   Clear to auscultation bilaterally; respirations regular, even, and unlabored bilaterally   Heart:  Regular rate and rhythm, no murmurs appreciated   Extremities: Normal, atraumatic; no clubbing, cyanosis, or edema    Skin: No rashes, lesions, or abnormal coloration noted     ECOG Performance Status: (1) Restricted in physically strenuous activity, ambulatory and able to do work of light nature          NEEDS ASSESSMENTS    Genetics  The patient's new diagnosis and family history have been reviewed for genetic counseling needs. A genetic " referral is not recommended.     Psychosocial  The patient has completed a PHQ-9 Depression Screening and the Distress Thermometer (DT) today.   PHQ-9 results show 0 (No Depression). The patient scored their distress today as 0 on a scale of 0-10 with 0 being no distress and 10 being extreme distress.   Problems marked by the patient as being an issue for them within the last week include physical problems.   Results were reviewed along with psychosocial resources offered by our cancer center. Our oncology social worker will be flagged for a DT score of 4 or above, and a same day call will be made for a score of 9 or 10. A mental health referral is not recommended at this time. The patient is not accepting of a referral to [facility].   Copies of patient's questionnaires will be scanned into EMR for details and further reference.    Barriers to care  A barriers form was also completed by the patient today. We discussed services offered by our facility to help her have adequate access to care. The patient was given the name and card for our Oncology Social Worker, Lizet Holbrook. Based upon barriers assessment today, the patient will not require a follow-up call from the  to further discuss needs.   A copy of the barriers form will also be scanned into EMR for details and further reference.     VAD Assessment  The patient and I discussed planned intervenous chemotherapy as well as other IV treatments that are often needed throughout the course of treatment. These may include, but are not limited to blood transfusions, antibiotics, and IV hydration. The vasculature does not appear to be adequate for multiple peripheral IVs throughout their treatment course. Discussed risks and benefits of VADs. The patient would like to pursue Port-A-Cath insertion prior to initiation of treatment. Port placed on 04/29/2018.    Advanced Care Planning  The patient and I discussed advanced care planning,  "\"Conversations that Matter\".   This service was offered, free of charge, for development of advance directives with a certified ACP facilitator.  The patient does not have an up-to-date advanced directive. This document is not on file with our office. The patient is not interested in an appointment with one of our facilitators to create or update their advanced directives.      Palliative Care  The patient and I discussed palliative care services. Palliative care is not the same as Hospice care. This is specialized medical care for people living with serious illness with the goal of improving quality of life for the patient and their family. [facility] has partnered with [facility] to offer our patients outpatient palliative care early along with their treatment to assist in coordination of care, symptom management, pain management, and medical decision making.  Oncology criteria for palliative care referral is not met at this time. The patient is not interested in a palliative care consultation.     Additional Referral needs  none      CHEMOTHERAPY EDUCATION    Booklets Given: Chemotherapy and You [x]  Eating Hints []    Sexuality/Fertility Books []      Chemotherapy/Biotherapy Education Sheets: (list all that apply)  nausea management, acid reflux management, diarrhea management, Cancer resourse contacts information and skin and mouth care                                                                                                                                                                 Chemotherapy Regimen:   Treatment Plans     Name Type Hold Status Plan dates Plan Provider       Active    OP HYDRATION + ANTIEMETICS ONCOLOGY SUPPORTIVE CARE 1 On Automatic Hold  12/22/2017 - Present [facility]    OP LUNG Atezolizumab / CARBOplatin AUC=5 / Etoposide (SCLC) ONCOLOGY TREATMENT Not on Hold  4/29/2019 - Present Josh Son MD                   TOPICS EDUCATION PROVIDED COMMENTS "   ANEMIA:  role of RBC, cause, s/s, ways to manage, role of transfusion [x]    THROMBOCYTOPENIA:  role of platelet, cause, s/s, ways to prevent bleeding, things to avoid, when to seek help [x]    NEUTROPENIA:  role of WBC, cause, infection precautions, s/s of infection, when to call MD [x]    NUTRITION & APPETITE CHANGES:  importance of maintaining healthy diet & weight, ways to manage to improve intake, dietary consult, exercise regimen [x]    DIARRHEA:  causes, s/s of dehydration, ways to manage, dietary changes, when to call MD [x]    CONSTIPATION:  causes, ways to manage, dietary changes, when to call MD [x]    NAUSEA & VOMITING:  cause, use of antiemetics, dietary changes, when to call MD [x]    MOUTH SORES:  causes, oral care, ways to manage [x]    ALOPECIA:  cause, ways to manage, resources [x]    INFERTILITY & SEXUALITY:  causes, fertility preservation options, sexuality changes, ways to manage, importance of birth control [x]    NERVOUS SYSTEM CHANGES:  causes, s/s, neuropathies, cognitive changes, ways to manage [x]    PAIN:  causes, ways to manage [x] ????   SKIN & NAIL CHANGES:  cause, s/s, ways to manage [x]    ORGAN TOXICITIES:  cause, s/s, need for diagnostic tests, labs, when to notify MD [x]    SURVIVORSHIP:  distress, distress assessment, secondary malignancies, early/late effects, follow-up, social issues, social support [x]    HOME CARE:  use of spill kits, storing of PO chemo, how to manage bodily fluids [x]    MISCELLANEOUS:  drug interactions, administration, vesicant, et [x]        Assessment and Plan:    Diagnoses and all orders for this visit:    Malignant neoplasm of overlapping sites of left lung (CMS/HCC)  -     ondansetron (ZOFRAN) 8 MG tablet; Take 1 tablet by mouth 3 (Three) Times a Day As Needed for Nausea or Vomiting.  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)    Other specified carcinomas of liver (CMS/HCC)    Other orders  -     sodium chloride 0.9 % infusion 250 mL  -     palonosetron (ALOXI) injection 0.25 mg  -     Atezolizumab (TECENTRIQ) 1,200 mg in sodium chloride 0.9 % 270 mL chemo IVPB  -     fosaprepitant (EMEND) 150 mg in sodium chloride 0.9 % 100 mL IVPB  -     dexamethasone (DECADRON) 12 mg in sodium chloride 0.9 % IVPB  -     CARBOplatin (PARAPLATIN) 600 mg in sodium chloride 0.9 % 310 mL chemo IVPB  -     etoposide (TOPOSAR) 190 mg in sodium chloride 0.9 % 509.5 mL chemo IVPB  -     hydrocortisone sodium succinate (Solu-CORTEF) injection 100 mg  -     diphenhydrAMINE (BENADRYL) injection 50 mg  -     famotidine (PEPCID) injection 20 mg  -     sodium chloride 0.9 % infusion 250 mL  -     prochlorperazine (COMPAZINE) tablet 10 mg  -     etoposide (TOPOSAR) 190 mg in sodium chloride 0.9 % 509.5 mL chemo IVPB  -     sodium chloride 0.9 % infusion 250 mL  -     prochlorperazine (COMPAZINE) tablet 10 mg  -     etoposide (TOPOSAR) 190 mg in sodium chloride 0.9 % 509.5 mL chemo IVPB        This was a 60 minute face-to-face visit with 55 minutes spent in  counseling and coordination of care as documented above.   The patient and I have reviewed their new cancer diagnosis and scheduled treatment plan. Needs assessment was completed including genetics, psychosocial needs, barriers to care, VAD evaluation, advanced care planning, and palliative care services. Referrals have been ordered as appropriate based upon our evaluation and patient desires.     Chemotherapy teaching was also completed today as documented above. Adequate time was given to answer all questions to her  satisfaction. Patient and family are aware of their care team members and contact information if they have questions or problems throughout the treatment course. Needs assessments and education has been completed. The patient is adequately prepared to begin treatment as scheduled.       Belkis Nayak, APRN

## 2019-05-01 ENCOUNTER — INFUSION (OUTPATIENT)
Dept: ONCOLOGY | Facility: HOSPITAL | Age: 64
End: 2019-05-01

## 2019-05-01 ENCOUNTER — DOCUMENTATION (OUTPATIENT)
Dept: NUTRITION | Facility: HOSPITAL | Age: 64
End: 2019-05-01

## 2019-05-01 VITALS
RESPIRATION RATE: 14 BRPM | BODY MASS INDEX: 37.13 KG/M2 | DIASTOLIC BLOOD PRESSURE: 67 MMHG | WEIGHT: 203 LBS | SYSTOLIC BLOOD PRESSURE: 150 MMHG | HEART RATE: 68 BPM | TEMPERATURE: 98 F

## 2019-05-01 DIAGNOSIS — C34.82 MALIGNANT NEOPLASM OF OVERLAPPING SITES OF LEFT LUNG (HCC): Primary | ICD-10-CM

## 2019-05-01 LAB
ALBUMIN SERPL-MCNC: 4.3 G/DL (ref 3.5–5)
ALBUMIN/GLOB SERPL: 1.2 G/DL (ref 1–2)
ALP SERPL-CCNC: 118 U/L (ref 38–126)
ALT SERPL W P-5'-P-CCNC: 52 U/L (ref 13–69)
ANION GAP SERPL CALCULATED.3IONS-SCNC: 14.5 MMOL/L (ref 10–20)
AST SERPL-CCNC: 53 U/L (ref 15–46)
BASOPHILS # BLD AUTO: 0.04 10*3/MM3 (ref 0–0.2)
BASOPHILS NFR BLD AUTO: 0.6 % (ref 0–1.5)
BILIRUB SERPL-MCNC: 0.6 MG/DL (ref 0.2–1.3)
BUN BLD-MCNC: 26 MG/DL (ref 7–20)
BUN/CREAT SERPL: 26 (ref 7.1–23.5)
CALCIUM SPEC-SCNC: 10.5 MG/DL (ref 8.4–10.2)
CHLORIDE SERPL-SCNC: 103 MMOL/L (ref 98–107)
CO2 SERPL-SCNC: 26 MMOL/L (ref 26–30)
CREAT BLD-MCNC: 1 MG/DL (ref 0.6–1.3)
DEPRECATED RDW RBC AUTO: 44.5 FL (ref 37–54)
EOSINOPHIL # BLD AUTO: 0.07 10*3/MM3 (ref 0–0.4)
EOSINOPHIL NFR BLD AUTO: 1.1 % (ref 0.3–6.2)
ERYTHROCYTE [DISTWIDTH] IN BLOOD BY AUTOMATED COUNT: 13 % (ref 12.3–15.4)
GFR SERPL CREATININE-BSD FRML MDRD: 56 ML/MIN/1.73
GLOBULIN UR ELPH-MCNC: 3.6 GM/DL
GLUCOSE BLD-MCNC: 107 MG/DL (ref 74–98)
HCT VFR BLD AUTO: 37.5 % (ref 34–46.6)
HGB BLD-MCNC: 12.5 G/DL (ref 12–15.9)
IMM GRANULOCYTES # BLD AUTO: 0.03 10*3/MM3 (ref 0–0.05)
IMM GRANULOCYTES NFR BLD AUTO: 0.5 % (ref 0–0.5)
LYMPHOCYTES # BLD AUTO: 1.92 10*3/MM3 (ref 0.7–3.1)
LYMPHOCYTES NFR BLD AUTO: 31.2 % (ref 19.6–45.3)
MCH RBC QN AUTO: 31.3 PG (ref 26.6–33)
MCHC RBC AUTO-ENTMCNC: 33.3 G/DL (ref 31.5–35.7)
MCV RBC AUTO: 93.8 FL (ref 79–97)
MONOCYTES # BLD AUTO: 0.42 10*3/MM3 (ref 0.1–0.9)
MONOCYTES NFR BLD AUTO: 6.8 % (ref 5–12)
NEUTROPHILS # BLD AUTO: 3.68 10*3/MM3 (ref 1.7–7)
NEUTROPHILS NFR BLD AUTO: 59.8 % (ref 42.7–76)
NRBC BLD AUTO-RTO: 0 /100 WBC (ref 0–0.2)
PLATELET # BLD AUTO: 135 10*3/MM3 (ref 140–450)
PMV BLD AUTO: 9.1 FL (ref 6–12)
POTASSIUM BLD-SCNC: 3.5 MMOL/L (ref 3.5–5.1)
PROT SERPL-MCNC: 7.9 G/DL (ref 6.3–8.2)
RBC # BLD AUTO: 4 10*6/MM3 (ref 3.77–5.28)
SODIUM BLD-SCNC: 140 MMOL/L (ref 137–145)
T4 FREE SERPL-MCNC: 1.29 NG/DL (ref 0.78–2.19)
TSH SERPL DL<=0.05 MIU/L-ACNC: 4.13 MIU/ML (ref 0.47–4.68)
WBC NRBC COR # BLD: 6.16 10*3/MM3 (ref 3.4–10.8)

## 2019-05-01 PROCEDURE — 25010000002 ETOPOSIDE 1 GM/50ML SOLUTION 50 ML VIAL: Performed by: NURSE PRACTITIONER

## 2019-05-01 PROCEDURE — 96417 CHEMO IV INFUS EACH ADDL SEQ: CPT

## 2019-05-01 PROCEDURE — 84443 ASSAY THYROID STIM HORMONE: CPT

## 2019-05-01 PROCEDURE — 96367 TX/PROPH/DG ADDL SEQ IV INF: CPT

## 2019-05-01 PROCEDURE — 96376 TX/PRO/DX INJ SAME DRUG ADON: CPT

## 2019-05-01 PROCEDURE — 84439 ASSAY OF FREE THYROXINE: CPT

## 2019-05-01 PROCEDURE — 96368 THER/DIAG CONCURRENT INF: CPT

## 2019-05-01 PROCEDURE — 96366 THER/PROPH/DIAG IV INF ADDON: CPT

## 2019-05-01 PROCEDURE — 85025 COMPLETE CBC W/AUTO DIFF WBC: CPT

## 2019-05-01 PROCEDURE — 25010000002 CARBOPLATIN PER 50 MG: Performed by: NURSE PRACTITIONER

## 2019-05-01 PROCEDURE — 96375 TX/PRO/DX INJ NEW DRUG ADDON: CPT

## 2019-05-01 PROCEDURE — 36415 COLL VENOUS BLD VENIPUNCTURE: CPT

## 2019-05-01 PROCEDURE — 80053 COMPREHEN METABOLIC PANEL: CPT

## 2019-05-01 PROCEDURE — 25010000002 DEXAMETHASONE PER 1 MG: Performed by: NURSE PRACTITIONER

## 2019-05-01 PROCEDURE — 25010000002 ATEZOLIZUMAB 1200 MG/20ML SOLUTION 1,200 MG VIAL: Performed by: NURSE PRACTITIONER

## 2019-05-01 PROCEDURE — 96413 CHEMO IV INFUSION 1 HR: CPT

## 2019-05-01 PROCEDURE — 25010000002 PALONOSETRON PER 25 MCG: Performed by: NURSE PRACTITIONER

## 2019-05-01 PROCEDURE — 84481 FREE ASSAY (FT-3): CPT

## 2019-05-01 PROCEDURE — 25010000002 FOSAPREPITANT PER 1 MG: Performed by: NURSE PRACTITIONER

## 2019-05-01 RX ORDER — SODIUM CHLORIDE 0.9 % (FLUSH) 0.9 %
10 SYRINGE (ML) INJECTION AS NEEDED
Status: DISCONTINUED | OUTPATIENT
Start: 2019-05-01 | End: 2019-05-01 | Stop reason: HOSPADM

## 2019-05-01 RX ORDER — SODIUM CHLORIDE 9 MG/ML
250 INJECTION, SOLUTION INTRAVENOUS ONCE
Status: DISCONTINUED | OUTPATIENT
Start: 2019-05-01 | End: 2019-05-01 | Stop reason: HOSPADM

## 2019-05-01 RX ORDER — SODIUM CHLORIDE 0.9 % (FLUSH) 0.9 %
10 SYRINGE (ML) INJECTION AS NEEDED
Status: CANCELLED | OUTPATIENT
Start: 2019-05-01

## 2019-05-01 RX ORDER — PALONOSETRON 0.05 MG/ML
0.25 INJECTION, SOLUTION INTRAVENOUS ONCE
Status: COMPLETED | OUTPATIENT
Start: 2019-05-01 | End: 2019-05-01

## 2019-05-01 RX ADMIN — SODIUM CHLORIDE, PRESERVATIVE FREE 10 ML: 5 INJECTION INTRAVENOUS at 13:40

## 2019-05-01 RX ADMIN — DEXAMETHASONE SODIUM PHOSPHATE 12 MG: 4 INJECTION, SOLUTION INTRAMUSCULAR; INTRAVENOUS at 11:13

## 2019-05-01 RX ADMIN — CARBOPLATIN 550 MG: 10 INJECTION, SOLUTION INTRAVENOUS at 11:55

## 2019-05-01 RX ADMIN — SODIUM CHLORIDE 150 MG: 9 INJECTION, SOLUTION INTRAVENOUS at 11:13

## 2019-05-01 RX ADMIN — ETOPOSIDE 190 MG: 20 INJECTION, SOLUTION, CONCENTRATE INTRAVENOUS at 12:29

## 2019-05-01 RX ADMIN — PALONOSETRON 0.25 MG: 0.05 INJECTION, SOLUTION INTRAVENOUS at 09:59

## 2019-05-01 RX ADMIN — ATEZOLIZUMAB 1200 MG: 1200 INJECTION, SOLUTION INTRAVENOUS at 10:10

## 2019-05-01 RX ADMIN — HEPARIN SODIUM (PORCINE) LOCK FLUSH IV SOLN 100 UNIT/ML 500 UNITS: 100 SOLUTION at 13:40

## 2019-05-01 NOTE — CONSULTS
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 1 inhaler 5   • amLODIPine (NORVASC) 5 MG tablet Take 5 mg by mouth Daily.  0   • aspirin 81 MG EC tablet Take 81 mg by mouth Daily.     • azelastine (ASTELIN) 0.1 % nasal spray 1 spray into the nostril(s) as directed by provider 2 (Two) Times a Day As Needed for Rhinitis or Allergies. Use in each nostril as directed 1 each 5   • hydrochlorothiazide (MICROZIDE) 12.5 MG capsule Take 12.5 mg by mouth Every Morning. Pt reports that she takes this when swelled as needed.  0   • hydrocortisone 2.5 % cream Apply  topically to the appropriate area as directed 2 (Two) Times a Day. (Patient taking differently: Apply  topically to the appropriate area as directed 2 (Two) Times a Day. Rash improved so pt no longer needed to use it.) 20 g 0   • lidocaine-prilocaine (EMLA) 2.5-2.5 % cream Apply  topically to the appropriate area as directed As Needed (45-60 minutes prior to port access.  Cover with saran/plastic wrap.). 30 g 3   • losartan-hydrochlorothiazide (HYZAAR) 100-12.5 MG per tablet Take 1 tablet by mouth Daily.  0   • metFORMIN (GLUCOPHAGE) 500 MG tablet Take 500 mg by mouth 2 (Two) Times a Day With Meals.  0   • Nystatin (MAGIC MOUTHWASH) Swish and spit 10 mL Every 4 (Four)  "Hours As Needed (sore mouth). 240 mL 1   • ondansetron (ZOFRAN) 8 MG tablet Take 1 tablet by mouth 3 (Three) Times a Day As Needed for Nausea or Vomiting. 30 tablet 5   • pantoprazole (PROTONIX) 40 MG EC tablet Take 1 tablet by mouth Every 12 (Twelve) Hours. 60 tablet 0   • potassium chloride ER (K-TAB) 20 MEQ tablet controlled-release ER tablet Take 1 tablet by mouth 2 (Two) Times a Day. 30 tablet 1   • tamoxifen (NOLVADEX) 20 MG chemo tablet Take 1 tablet by mouth Daily. 30 tablet 0   • umeclidinium-vilanterol (ANORO ELLIPTA) 62.5-25 MCG/INH aerosol powder  inhaler Inhale 1 puff Daily. 1 each 3   • vitamin D (ERGOCALCIFEROL) 18508 units capsule capsule Take 50,000 Units by mouth Every 7 (Seven) Days.  0     No current facility-administered medications for this visit.      Facility-Administered Medications Ordered in Other Visits   Medication Dose Route Frequency Provider Last Rate Last Dose   • heparin flush (porcine) 100 UNIT/ML injection 500 Units  500 Units Intravenous PRN Josh Son MD   500 Units at 05/01/19 1340   • sodium chloride 0.9 % flush 10 mL  10 mL Intravenous PRN Josh Son MD   10 mL at 05/01/19 1340   • sodium chloride 0.9 % infusion 250 mL  250 mL Intravenous Once Belkis Nayak APRN           Glycemic Risk:   NIDDM    Weight:   Height: 62\" Weight: 203 lbs.  Usual Body Weight: 203-205 lbs.   BMI: 37.1  Extremely Obese  Weight has been stable, no recent weight changes    Oral Food Intake:  Regular Diet - No Restrictions  Compared to normal intake, current food intake is the same    Hydration Status:   How many 8 ounce glass of water of fluid do you drink per day?  8    Enteral Feeding:   N/A    Nutrition Symptoms:   No Problems with Eating    Activity:   Normal with no limitations     reports that she quit smoking about 6 years ago. Her smoking use included cigarettes. She has a 40.00 pack-year smoking history. She has never used smokeless tobacco. She reports that she does not drink " alcohol or use drugs.    Evaluation of Nutritional Risk:   Patient identified to be at nutritional risk and/or for nutritional consultation; will follow patient through course of treatment.   Glycemic Risk     Pt was seen by dietitian last year for breast cancer. Her cancer is back now in her lungs with mets to the liver and lymph nodes. Pt does not exhibit low appetite or taste changes at this time. Shee has maintained her weight. She drinks water and Gatorade. Per pt, the doctor said she could not drink soda, tea, milk, or coffee while receiving treatment. RD reinforced her options to flavor water with fruit or diluted juice. She loves to eat vegetables and keeps up with her garden during these spring/summer months. Pt is currently taking Metformin BID and RD encouraged her to continue to do so while on chemotherapy as illness and steroids can often times increase blood sugars.     We also reviewed Oncology Nutrition Therapy tips, Cooking Tips, Taste Change and Treatment solutions; RD informed pt about the baking soda and water mouth rinse and when eating. She was also provided handouts on overall Nutrition Therapy During and After Treatment, Nausea and Vomiting Nutrition Therapy, and [employer] Oncology Cookbook. Pt was grateful for education and had no further questions.    Informed pt I would be calling in 2 weeks to check on progress. Consult RD PRN.      I sincerely would like to thank you for this consult. I have enjoyed working with this patient and look forward to following up with her in the future.      Electronically signed by:  Nguyen Jacobsen RD  2:04 PM

## 2019-05-02 ENCOUNTER — INFUSION (OUTPATIENT)
Dept: ONCOLOGY | Facility: HOSPITAL | Age: 64
End: 2019-05-02

## 2019-05-02 VITALS
RESPIRATION RATE: 16 BRPM | TEMPERATURE: 97.8 F | WEIGHT: 203 LBS | BODY MASS INDEX: 37.13 KG/M2 | DIASTOLIC BLOOD PRESSURE: 66 MMHG | SYSTOLIC BLOOD PRESSURE: 151 MMHG | HEART RATE: 77 BPM

## 2019-05-02 DIAGNOSIS — C34.82 MALIGNANT NEOPLASM OF OVERLAPPING SITES OF LEFT LUNG (HCC): Primary | ICD-10-CM

## 2019-05-02 LAB — T3FREE SERPL-MCNC: 3.6 PG/ML (ref 2–4.4)

## 2019-05-02 PROCEDURE — 25010000002 ETOPOSIDE 1 GM/50ML SOLUTION 50 ML VIAL: Performed by: NURSE PRACTITIONER

## 2019-05-02 PROCEDURE — 63710000001 PROCHLORPERAZINE MALEATE PER 10 MG: Performed by: NURSE PRACTITIONER

## 2019-05-02 PROCEDURE — 96413 CHEMO IV INFUSION 1 HR: CPT

## 2019-05-02 RX ORDER — SODIUM CHLORIDE 9 MG/ML
250 INJECTION, SOLUTION INTRAVENOUS ONCE
Status: DISCONTINUED | OUTPATIENT
Start: 2019-05-02 | End: 2019-05-02 | Stop reason: HOSPADM

## 2019-05-02 RX ORDER — SODIUM CHLORIDE 0.9 % (FLUSH) 0.9 %
10 SYRINGE (ML) INJECTION AS NEEDED
Status: CANCELLED | OUTPATIENT
Start: 2019-05-02

## 2019-05-02 RX ORDER — SODIUM CHLORIDE 0.9 % (FLUSH) 0.9 %
10 SYRINGE (ML) INJECTION AS NEEDED
Status: DISCONTINUED | OUTPATIENT
Start: 2019-05-02 | End: 2019-05-02 | Stop reason: HOSPADM

## 2019-05-02 RX ORDER — PROCHLORPERAZINE MALEATE 10 MG
10 TABLET ORAL ONCE
Status: COMPLETED | OUTPATIENT
Start: 2019-05-02 | End: 2019-05-02

## 2019-05-02 RX ADMIN — PROCHLORPERAZINE MALEATE 10 MG: 10 TABLET, FILM COATED ORAL at 14:38

## 2019-05-02 RX ADMIN — HEPARIN SODIUM (PORCINE) LOCK FLUSH IV SOLN 100 UNIT/ML 500 UNITS: 100 SOLUTION at 15:50

## 2019-05-02 RX ADMIN — ETOPOSIDE 190 MG: 20 INJECTION, SOLUTION, CONCENTRATE INTRAVENOUS at 14:48

## 2019-05-02 RX ADMIN — SODIUM CHLORIDE, PRESERVATIVE FREE 10 ML: 5 INJECTION INTRAVENOUS at 15:50

## 2019-05-03 ENCOUNTER — INFUSION (OUTPATIENT)
Dept: ONCOLOGY | Facility: HOSPITAL | Age: 64
End: 2019-05-03

## 2019-05-03 VITALS
SYSTOLIC BLOOD PRESSURE: 157 MMHG | WEIGHT: 203 LBS | DIASTOLIC BLOOD PRESSURE: 70 MMHG | TEMPERATURE: 98.1 F | BODY MASS INDEX: 37.13 KG/M2 | HEART RATE: 66 BPM | RESPIRATION RATE: 16 BRPM

## 2019-05-03 DIAGNOSIS — C34.82 MALIGNANT NEOPLASM OF OVERLAPPING SITES OF LEFT LUNG (HCC): Primary | ICD-10-CM

## 2019-05-03 PROCEDURE — 96413 CHEMO IV INFUSION 1 HR: CPT

## 2019-05-03 PROCEDURE — 25010000002 ETOPOSIDE 1 GM/50ML SOLUTION 50 ML VIAL: Performed by: NURSE PRACTITIONER

## 2019-05-03 PROCEDURE — 63710000001 PROCHLORPERAZINE MALEATE PER 10 MG: Performed by: NURSE PRACTITIONER

## 2019-05-03 RX ORDER — SODIUM CHLORIDE 0.9 % (FLUSH) 0.9 %
10 SYRINGE (ML) INJECTION AS NEEDED
Status: DISCONTINUED | OUTPATIENT
Start: 2019-05-03 | End: 2019-05-03 | Stop reason: HOSPADM

## 2019-05-03 RX ORDER — PROCHLORPERAZINE MALEATE 10 MG
10 TABLET ORAL ONCE
Status: COMPLETED | OUTPATIENT
Start: 2019-05-03 | End: 2019-05-03

## 2019-05-03 RX ORDER — SODIUM CHLORIDE 0.9 % (FLUSH) 0.9 %
10 SYRINGE (ML) INJECTION AS NEEDED
Status: CANCELLED | OUTPATIENT
Start: 2019-05-03

## 2019-05-03 RX ORDER — SODIUM CHLORIDE 9 MG/ML
250 INJECTION, SOLUTION INTRAVENOUS ONCE
Status: DISCONTINUED | OUTPATIENT
Start: 2019-05-03 | End: 2019-05-03 | Stop reason: HOSPADM

## 2019-05-03 RX ADMIN — PROCHLORPERAZINE MALEATE 10 MG: 10 TABLET, FILM COATED ORAL at 13:59

## 2019-05-03 RX ADMIN — SODIUM CHLORIDE, PRESERVATIVE FREE 500 UNITS: 5 INJECTION INTRAVENOUS at 15:31

## 2019-05-03 RX ADMIN — ETOPOSIDE 190 MG: 20 INJECTION, SOLUTION, CONCENTRATE INTRAVENOUS at 14:30

## 2019-05-03 NOTE — PATIENT INSTRUCTIONS
Use hydrocortisone cream and benadryl over the counter for rash. If the rash has not improved by Monday call the office for further instructions.

## 2019-05-07 NOTE — PROGRESS NOTES
Patient called office with complaints of rash on chest.  She is using hydrocortisone cream three times daily as well as benadryl.  I will send in kenalog to use BID. She will continue to use benadryl as needed. Patient verbalized understanding and will call the office for further  Needs.

## 2019-05-21 NOTE — PROGRESS NOTES
DATE OF VISIT: 5/22/2019    REASON FOR VISIT: Followup for small cell lung cancer     HISTORY OF PRESENT ILLNESS: The patient is a very pleasant 63 y.o. female  with past medical history significant for small cell lung cancer diagnosed August 2017.  Repeated scans done on April 21, 2019 revealed progressive disease.  She was started on carboplatin and etoposide and Tecentriq April 30, 2019.  The patient is here today for scheduled follow up visit with treatment, cycle #2.    SUBJECTIVE: The patient is here today by herself.  She has been able to tolerate chemotherapy fairly well.  She has cough that did not improve with over-the-counter medicine.  She has fatigue.  She has nausea but no vomiting.    PAST MEDICAL HISTORY/SOCIAL HISTORY/FAMILY HISTORY: Reviewed by me and unchanged from my documentation done on 05/22/19.    Review of Systems   Constitutional: Positive for fatigue. Negative for activity change, appetite change, chills, fever and unexpected weight change.   HENT: Negative for hearing loss, mouth sores, nosebleeds, sore throat and trouble swallowing.    Eyes: Negative for visual disturbance.   Respiratory: Negative for cough, chest tightness, shortness of breath and wheezing.    Cardiovascular: Negative for chest pain, palpitations and leg swelling.   Gastrointestinal: Negative for abdominal distention, abdominal pain, blood in stool, constipation, diarrhea, nausea, rectal pain and vomiting.   Endocrine: Negative for cold intolerance and heat intolerance.   Genitourinary: Negative for difficulty urinating, dysuria, frequency and urgency.   Musculoskeletal: Negative for arthralgias, back pain, gait problem, joint swelling and myalgias.   Skin: Negative for rash.   Neurological: Negative for dizziness, tremors, syncope, weakness, light-headedness, numbness and headaches.   Hematological: Negative for adenopathy. Does not bruise/bleed easily.   Psychiatric/Behavioral: Negative for confusion, sleep  disturbance and suicidal ideas. The patient is not nervous/anxious.          Current Outpatient Medications:   •  albuterol (VENTOLIN HFA) 108 (90 Base) MCG/ACT inhaler, Inhale 2 puffs Every 4 (Four) Hours As Needed for Wheezing or Shortness of Air., Disp: 1 inhaler, Rfl: 5  •  amLODIPine (NORVASC) 5 MG tablet, Take 5 mg by mouth Daily., Disp: , Rfl: 0  •  aspirin 81 MG EC tablet, Take 81 mg by mouth Daily., Disp: , Rfl:   •  azelastine (ASTELIN) 0.1 % nasal spray, 1 spray into the nostril(s) as directed by provider 2 (Two) Times a Day As Needed for Rhinitis or Allergies. Use in each nostril as directed, Disp: 1 each, Rfl: 5  •  hydrochlorothiazide (MICROZIDE) 12.5 MG capsule, Take 12.5 mg by mouth Every Morning. Pt reports that she takes this when swelled as needed., Disp: , Rfl: 0  •  hydrocortisone 2.5 % cream, Apply  topically to the appropriate area as directed 2 (Two) Times a Day. (Patient taking differently: Apply  topically to the appropriate area as directed 2 (Two) Times a Day. Rash improved so pt no longer needed to use it.), Disp: 20 g, Rfl: 0  •  lidocaine-prilocaine (EMLA) 2.5-2.5 % cream, Apply  topically to the appropriate area as directed As Needed (45-60 minutes prior to port access.  Cover with saran/plastic wrap.)., Disp: 30 g, Rfl: 3  •  losartan-hydrochlorothiazide (HYZAAR) 100-12.5 MG per tablet, Take 1 tablet by mouth Daily., Disp: , Rfl: 0  •  metFORMIN (GLUCOPHAGE) 500 MG tablet, Take 500 mg by mouth 2 (Two) Times a Day With Meals., Disp: , Rfl: 0  •  Nystatin (MAGIC MOUTHWASH), Swish and spit 10 mL Every 4 (Four) Hours As Needed (sore mouth)., Disp: 240 mL, Rfl: 1  •  ondansetron (ZOFRAN) 8 MG tablet, Take 1 tablet by mouth 3 (Three) Times a Day As Needed for Nausea or Vomiting., Disp: 30 tablet, Rfl: 5  •  pantoprazole (PROTONIX) 40 MG EC tablet, Take 1 tablet by mouth Every 12 (Twelve) Hours., Disp: 60 tablet, Rfl: 0  •  potassium chloride ER (K-TAB) 20 MEQ tablet controlled-release ER  "tablet, Take 1 tablet by mouth 2 (Two) Times a Day., Disp: 30 tablet, Rfl: 1  •  tamoxifen (NOLVADEX) 20 MG chemo tablet, Take 1 tablet by mouth Daily., Disp: 30 tablet, Rfl: 0  •  triamcinolone (KENALOG) 0.1 % ointment, Apply  topically to the appropriate area as directed 2 (Two) Times a Day., Disp: 30 g, Rfl: 3  •  umeclidinium-vilanterol (ANORO ELLIPTA) 62.5-25 MCG/INH aerosol powder  inhaler, Inhale 1 puff Daily., Disp: 1 each, Rfl: 3  •  vitamin D (ERGOCALCIFEROL) 60209 units capsule capsule, Take 50,000 Units by mouth Every 7 (Seven) Days., Disp: , Rfl: 0    PHYSICAL EXAMINATION:   /67   Pulse 98   Temp 97.2 °F (36.2 °C) (Temporal)   Resp 16   Ht 157.5 cm (62\")   Wt 89.8 kg (198 lb)   BMI 36.21 kg/m²    ECOG Performance Status: 1 - Symptomatic but completely ambulatory  General Appearance:  alert, cooperative, no apparent distress and appears stated age   Neurologic/Psychiatric: A&O x 3, gait steady, appropriate affect, strength 5/5 in all muscle groups   HEENT:  Normocephalic, without obvious abnormality, mucous membranes moist   Neck: Supple, symmetrical, trachea midline, no adenopathy;  No thyromegaly, masses, or tenderness   Lungs:   Clear to auscultation bilaterally; respirations regular, even, and unlabored bilaterally   Heart:  Regular rate and rhythm, no murmurs appreciated   Abdomen:   Soft, non-tender, non-distended and no organomegaly   Lymph nodes: No cervical, supraclavicular, inguinal or axillary adenopathy noted   Extremities: Normal, atraumatic; no clubbing, cyanosis, or edema    Skin: No rashes, ulcers, or suspicious lesions noted     No visits with results within 2 Week(s) from this visit.   Latest known visit with results is:   Infusion on 05/01/2019   Component Date Value Ref Range Status   • Glucose 05/01/2019 107* 74 - 98 mg/dL Final   • BUN 05/01/2019 26* 7 - 20 mg/dL Final   • Creatinine 05/01/2019 1.00  0.60 - 1.30 mg/dL Final   • Sodium 05/01/2019 140  137 - 145 mmol/L " Final   • Potassium 05/01/2019 3.5  3.5 - 5.1 mmol/L Final   • Chloride 05/01/2019 103  98 - 107 mmol/L Final   • CO2 05/01/2019 26.0  26.0 - 30.0 mmol/L Final   • Calcium 05/01/2019 10.5* 8.4 - 10.2 mg/dL Final   • Total Protein 05/01/2019 7.9  6.3 - 8.2 g/dL Final   • Albumin 05/01/2019 4.30  3.50 - 5.00 g/dL Final   • ALT (SGPT) 05/01/2019 52  13 - 69 U/L Final   • AST (SGOT) 05/01/2019 53* 15 - 46 U/L Final   • Alkaline Phosphatase 05/01/2019 118  38 - 126 U/L Final   • Total Bilirubin 05/01/2019 0.6  0.2 - 1.3 mg/dL Final   • eGFR Non African Amer 05/01/2019 56* >60 mL/min/1.73 Final   • Globulin 05/01/2019 3.6  gm/dL Final   • A/G Ratio 05/01/2019 1.2  1.0 - 2.0 g/dL Final   • BUN/Creatinine Ratio 05/01/2019 26.0* 7.1 - 23.5 Final   • Anion Gap 05/01/2019 14.5  10.0 - 20.0 mmol/L Final   • TSH 05/01/2019 4.130  0.470 - 4.680 mIU/mL Final   • T3, Free 05/01/2019 3.6  2.0 - 4.4 pg/mL Final   • Free T4 05/01/2019 1.29  0.78 - 2.19 ng/dL Final   • WBC 05/01/2019 6.16  3.40 - 10.80 10*3/mm3 Final   • RBC 05/01/2019 4.00  3.77 - 5.28 10*6/mm3 Final   • Hemoglobin 05/01/2019 12.5  12.0 - 15.9 g/dL Final   • Hematocrit 05/01/2019 37.5  34.0 - 46.6 % Final   • MCV 05/01/2019 93.8  79.0 - 97.0 fL Final   • MCH 05/01/2019 31.3  26.6 - 33.0 pg Final   • MCHC 05/01/2019 33.3  31.5 - 35.7 g/dL Final   • RDW 05/01/2019 13.0  12.3 - 15.4 % Final   • RDW-SD 05/01/2019 44.5  37.0 - 54.0 fl Final   • MPV 05/01/2019 9.1  6.0 - 12.0 fL Final   • Platelets 05/01/2019 135* 140 - 450 10*3/mm3 Final   • Neutrophil % 05/01/2019 59.8  42.7 - 76.0 % Final   • Lymphocyte % 05/01/2019 31.2  19.6 - 45.3 % Final   • Monocyte % 05/01/2019 6.8  5.0 - 12.0 % Final   • Eosinophil % 05/01/2019 1.1  0.3 - 6.2 % Final   • Basophil % 05/01/2019 0.6  0.0 - 1.5 % Final   • Immature Grans % 05/01/2019 0.5  0.0 - 0.5 % Final   • Neutrophils, Absolute 05/01/2019 3.68  1.70 - 7.00 10*3/mm3 Final   • Lymphocytes, Absolute 05/01/2019 1.92  0.70 - 3.10  10*3/mm3 Final   • Monocytes, Absolute 05/01/2019 0.42  0.10 - 0.90 10*3/mm3 Final   • Eosinophils, Absolute 05/01/2019 0.07  0.00 - 0.40 10*3/mm3 Final   • Basophils, Absolute 05/01/2019 0.04  0.00 - 0.20 10*3/mm3 Final   • Immature Grans, Absolute 05/01/2019 0.03  0.00 - 0.05 10*3/mm3 Final   • nRBC 05/01/2019 0.0  0.0 - 0.2 /100 WBC Final        Ct Chest With Contrast    Result Date: 4/22/2019  Narrative: PROCEDURE: CT CHEST W CONTRAST-  HISTORY: f/u scan; C34.82-Malignant neoplasm of overlapping sites of left bronchus and lung  COMPARISON: 01/21/2019.  PROCEDURE: Multiple axial CT images were obtained from the thoracic inlet through the upper abdomen following the administration of intravenous contrast.  FINDINGS: Soft tissue windows reveal an enlarging left paratracheal mass which measures 2.6 cm on today's exam and previously measured 2.1 cm. No other mediastinal adenopathy is identified. The heart is normal in size. The aorta is normal in caliber.There is a small loculated effusion at the left lung apex. There is also a small pericardial effusion. Lung windows redemonstrate left perihilar airspace disease which is unchanged. Scarring is noted in the anterior left upper and right upper lobes. Within the visualized upper abdomen there is fatty infiltration of the liver. There is a new hypodense lesion in the posterior right lobe of the liver measuring approximately 2.1 cm. The upper abdomen is otherwise unremarkable. Bone windows reveal no acute osseous abnormalities.      Impression: Interval increase in size in the previously noted left paratracheal mass with a new hypodense lesion in the right lobe of the liver consistent with metastatic disease.   This study was performed with techniques to keep radiation doses as low as reasonably achievable (ALARA). Individualized dose reduction techniques using automated exposure control or adjustment of mA and/or kV according to the patient size were employed.  This  report was finalized on 4/22/2019 1:08 PM by Jyoti Jensen M.D..    Xr Chest 1 View    Result Date: 4/29/2019  Narrative: PROCEDURE: XR CHEST 1 VW-  HISTORY: portacath insertion; C22.9-Malignant neoplasm of liver, not specified as primary or secondary  COMPARISON: CT dated 04/22/2019.  FINDINGS: A right subclavian Port-A-Cath is in place with the tip in the SVC. The heart is normal in size. The mediastinum is unremarkable. The left suprahilar opacity is unchanged. The lungs are otherwise clear. There is no pneumothorax.  There are no acute osseous abnormalities.      Impression: Placement of a right subclavian Port-A-Cath with no evidence of a pneumothorax.  Continued followup is recommended.  This report was finalized on 4/29/2019 10:58 AM by Jyoti Jensen M.D..    Fl C Arm During Surgery    Result Date: 4/29/2019  Narrative: This procedure was auto-finalized with no dictation required.      ASSESSMENT: The patient is a very pleasant 63 y.o. female  with small cell lung cancer    PROBLEM LIST:  1. Small cell lung cancer from the left upper lobe.  Limited stage disease, recurrent in a mediastinal node  A.  Treated  initially with etoposide and carboplatin starting 9/13/27.  She responded to the single modality that was started because of symptoms including airway compromise.  B.  Completed combined modality chemoradiation with etoposide and carboplatin . (Chemotherapy completed 12/7/2017).  C.  Recurrence in left paratracheal node.  This was seen on CT scan from 6/19/18 and hypermetabolic on PET scan to confirm.  She's been treated with weekly taxol starting 7/17/2018 along with involved field radiation.  D. Left pleural effusion seen on CT of 11/23/18, loculated and inaccessible for thoracentesis.  E.  Progressive disease documented on scans done April 22, 2019 with increase in size of left paratracheal lymph node and right hepatic lobe mass.  F. Started carboplatin and etoposide with Tecentriq May 1,  2019, status post 1 cycle  2.  Anemia.  This appears to be predominantly from chemoradiation.  3.  History of left breast cancer.  A.  Left mastectomy 7/22/2008 for stage IIIA, T3 N1 M0 breast cancer  B.  ER weakly positive, TX negative and HER-2 positive  C.  Adjuvant chemotherapy with Herceptin, Taxotere and carboplatin  D.  Postmastectomy radiation done at [facility]  E.  Adjuvant tamoxifen started 12/12/2008 and continued as extended adjuvant therapy  4.  COPD.  5. Diabetes  6. HTN  7.  Cough    PLAN:  1. I will proceed with treatment as scheduled today carboplatin and etoposide as well as Tecentriq, cycle #2.  This is an FDA approved treatment for extensive stage small cell lung cancer.   2. The patient will follow up with me in 3 weeks for cycle #3.  3.  I will continue port maintenance.  4.  I will monitor the patient blood work while she is on treatment including blood counts kidney function liver function and electrolytes.  5. We discussed potential side effects of immunotherapy including but not limited to immune mediated reactions with thyroiditis, pneumonitis, hepatitis, colitis, rash, and electrolytes abnormalities, fatigue, multiorgan failure, and possibly death.  6. We reviewed the potential side effects of this regimen including fatigue, vomiting and nausea, hair loss, nephropathy, neuropathy, hearing loss, myelosuppression, and risk of infusion reaction.  7. She has completed 10 years of Tamoxifen at this point.   8. Will continue inhalers as needed for COPD  9. Will continue Metformin for type 2 DM.  Her sugars might get higher while she is in treatment secondary to pre-infusion steroids.  10. Will continue Hyzaar for HTN.  We may have to adjust her antihypertensive agent while she is in treatment.  11.  I will repeat scans prior to cycle #4.  If patient is having good response to treatment will stop chemotherapy at cycle #4 and continue with Tecentriq single agent.  12.  I will  start the patient on Robitussin with codeine as needed for cough.  Josh Son MD  5/22/2019

## 2019-05-22 ENCOUNTER — OFFICE VISIT (OUTPATIENT)
Dept: ONCOLOGY | Facility: CLINIC | Age: 64
End: 2019-05-22

## 2019-05-22 ENCOUNTER — INFUSION (OUTPATIENT)
Dept: ONCOLOGY | Facility: HOSPITAL | Age: 64
End: 2019-05-22

## 2019-05-22 VITALS
SYSTOLIC BLOOD PRESSURE: 147 MMHG | HEART RATE: 98 BPM | HEIGHT: 62 IN | DIASTOLIC BLOOD PRESSURE: 67 MMHG | WEIGHT: 198 LBS | RESPIRATION RATE: 16 BRPM | BODY MASS INDEX: 36.44 KG/M2 | TEMPERATURE: 97.2 F

## 2019-05-22 DIAGNOSIS — C34.82 MALIGNANT NEOPLASM OF OVERLAPPING SITES OF LEFT LUNG (HCC): ICD-10-CM

## 2019-05-22 DIAGNOSIS — C34.82 MALIGNANT NEOPLASM OF OVERLAPPING SITES OF LEFT LUNG (HCC): Primary | ICD-10-CM

## 2019-05-22 LAB
ALBUMIN SERPL-MCNC: 4.1 G/DL (ref 3.5–5)
ALBUMIN/GLOB SERPL: 1.1 G/DL (ref 1–2)
ALP SERPL-CCNC: 140 U/L (ref 38–126)
ALT SERPL W P-5'-P-CCNC: 43 U/L (ref 13–69)
ANION GAP SERPL CALCULATED.3IONS-SCNC: 12.3 MMOL/L (ref 10–20)
AST SERPL-CCNC: 38 U/L (ref 15–46)
BASOPHILS # BLD AUTO: 0.02 10*3/MM3 (ref 0–0.2)
BASOPHILS NFR BLD AUTO: 0.5 % (ref 0–1.5)
BILIRUB SERPL-MCNC: 0.6 MG/DL (ref 0.2–1.3)
BUN BLD-MCNC: 13 MG/DL (ref 7–20)
BUN/CREAT SERPL: 13 (ref 7.1–23.5)
CALCIUM SPEC-SCNC: 10 MG/DL (ref 8.4–10.2)
CHLORIDE SERPL-SCNC: 101 MMOL/L (ref 98–107)
CO2 SERPL-SCNC: 28 MMOL/L (ref 26–30)
CREAT BLD-MCNC: 1 MG/DL (ref 0.6–1.3)
DEPRECATED RDW RBC AUTO: 42.3 FL (ref 37–54)
EOSINOPHIL # BLD AUTO: 0.03 10*3/MM3 (ref 0–0.4)
EOSINOPHIL NFR BLD AUTO: 0.7 % (ref 0.3–6.2)
ERYTHROCYTE [DISTWIDTH] IN BLOOD BY AUTOMATED COUNT: 13.6 % (ref 12.3–15.4)
GFR SERPL CREATININE-BSD FRML MDRD: 56 ML/MIN/1.73
GLOBULIN UR ELPH-MCNC: 3.9 GM/DL
GLUCOSE BLD-MCNC: 104 MG/DL (ref 74–98)
HCT VFR BLD AUTO: 29.7 % (ref 34–46.6)
HGB BLD-MCNC: 10.1 G/DL (ref 12–15.9)
IMM GRANULOCYTES # BLD AUTO: 0.04 10*3/MM3 (ref 0–0.05)
IMM GRANULOCYTES NFR BLD AUTO: 1 % (ref 0–0.5)
LYMPHOCYTES # BLD AUTO: 1 10*3/MM3 (ref 0.7–3.1)
LYMPHOCYTES NFR BLD AUTO: 24.2 % (ref 19.6–45.3)
MCH RBC QN AUTO: 31.4 PG (ref 26.6–33)
MCHC RBC AUTO-ENTMCNC: 34 G/DL (ref 31.5–35.7)
MCV RBC AUTO: 92.2 FL (ref 79–97)
MONOCYTES # BLD AUTO: 0.6 10*3/MM3 (ref 0.1–0.9)
MONOCYTES NFR BLD AUTO: 14.5 % (ref 5–12)
NEUTROPHILS # BLD AUTO: 2.45 10*3/MM3 (ref 1.7–7)
NEUTROPHILS NFR BLD AUTO: 59.1 % (ref 42.7–76)
NRBC BLD AUTO-RTO: 0 /100 WBC (ref 0–0.2)
PLATELET # BLD AUTO: 210 10*3/MM3 (ref 140–450)
PMV BLD AUTO: 9.4 FL (ref 6–12)
POTASSIUM BLD-SCNC: 3.3 MMOL/L (ref 3.5–5.1)
PROT SERPL-MCNC: 8 G/DL (ref 6.3–8.2)
RBC # BLD AUTO: 3.22 10*6/MM3 (ref 3.77–5.28)
SODIUM BLD-SCNC: 138 MMOL/L (ref 137–145)
WBC NRBC COR # BLD: 4.14 10*3/MM3 (ref 3.4–10.8)

## 2019-05-22 PROCEDURE — 96367 TX/PROPH/DG ADDL SEQ IV INF: CPT

## 2019-05-22 PROCEDURE — 80053 COMPREHEN METABOLIC PANEL: CPT | Performed by: INTERNAL MEDICINE

## 2019-05-22 PROCEDURE — 25010000002 ETOPOSIDE 1 GM/50ML SOLUTION 50 ML VIAL: Performed by: INTERNAL MEDICINE

## 2019-05-22 PROCEDURE — 96366 THER/PROPH/DIAG IV INF ADDON: CPT

## 2019-05-22 PROCEDURE — 25010000002 DEXAMETHASONE PER 1 MG: Performed by: INTERNAL MEDICINE

## 2019-05-22 PROCEDURE — 96417 CHEMO IV INFUS EACH ADDL SEQ: CPT

## 2019-05-22 PROCEDURE — 25010000002 FOSAPREPITANT PER 1 MG: Performed by: INTERNAL MEDICINE

## 2019-05-22 PROCEDURE — 25010000002 CARBOPLATIN PER 50 MG: Performed by: INTERNAL MEDICINE

## 2019-05-22 PROCEDURE — 96413 CHEMO IV INFUSION 1 HR: CPT

## 2019-05-22 PROCEDURE — 25010000002 PALONOSETRON PER 25 MCG: Performed by: INTERNAL MEDICINE

## 2019-05-22 PROCEDURE — 96376 TX/PRO/DX INJ SAME DRUG ADON: CPT

## 2019-05-22 PROCEDURE — 96375 TX/PRO/DX INJ NEW DRUG ADDON: CPT

## 2019-05-22 PROCEDURE — 96368 THER/DIAG CONCURRENT INF: CPT

## 2019-05-22 PROCEDURE — 36415 COLL VENOUS BLD VENIPUNCTURE: CPT

## 2019-05-22 PROCEDURE — 85025 COMPLETE CBC W/AUTO DIFF WBC: CPT | Performed by: INTERNAL MEDICINE

## 2019-05-22 PROCEDURE — 99215 OFFICE O/P EST HI 40 MIN: CPT | Performed by: INTERNAL MEDICINE

## 2019-05-22 PROCEDURE — 25010000002 ATEZOLIZUMAB 1200 MG/20ML SOLUTION 20 ML VIAL: Performed by: INTERNAL MEDICINE

## 2019-05-22 RX ORDER — DIPHENHYDRAMINE HYDROCHLORIDE 50 MG/ML
50 INJECTION INTRAMUSCULAR; INTRAVENOUS AS NEEDED
Status: CANCELLED | OUTPATIENT
Start: 2019-05-22

## 2019-05-22 RX ORDER — FAMOTIDINE 10 MG/ML
20 INJECTION, SOLUTION INTRAVENOUS AS NEEDED
Status: CANCELLED | OUTPATIENT
Start: 2019-05-22

## 2019-05-22 RX ORDER — SODIUM CHLORIDE 9 MG/ML
250 INJECTION, SOLUTION INTRAVENOUS ONCE
Status: CANCELLED | OUTPATIENT
Start: 2019-05-23

## 2019-05-22 RX ORDER — SODIUM CHLORIDE 9 MG/ML
250 INJECTION, SOLUTION INTRAVENOUS ONCE
Status: DISCONTINUED | OUTPATIENT
Start: 2019-05-22 | End: 2019-05-22 | Stop reason: HOSPADM

## 2019-05-22 RX ORDER — GUAIFENESIN AND CODEINE PHOSPHATE 100; 10 MG/5ML; MG/5ML
5-10 SOLUTION ORAL 3 TIMES DAILY PRN
Qty: 480 ML | Refills: 2 | OUTPATIENT
Start: 2019-05-22 | End: 2020-01-01

## 2019-05-22 RX ORDER — PALONOSETRON 0.05 MG/ML
0.25 INJECTION, SOLUTION INTRAVENOUS ONCE
Status: COMPLETED | OUTPATIENT
Start: 2019-05-22 | End: 2019-05-22

## 2019-05-22 RX ORDER — SODIUM CHLORIDE 9 MG/ML
250 INJECTION, SOLUTION INTRAVENOUS ONCE
Status: CANCELLED | OUTPATIENT
Start: 2019-05-22

## 2019-05-22 RX ORDER — PROCHLORPERAZINE MALEATE 10 MG
10 TABLET ORAL ONCE
Status: CANCELLED | OUTPATIENT
Start: 2019-05-23 | End: 2019-05-23

## 2019-05-22 RX ORDER — SODIUM CHLORIDE 9 MG/ML
250 INJECTION, SOLUTION INTRAVENOUS ONCE
Status: CANCELLED | OUTPATIENT
Start: 2019-05-24

## 2019-05-22 RX ORDER — SODIUM CHLORIDE 0.9 % (FLUSH) 0.9 %
10 SYRINGE (ML) INJECTION AS NEEDED
Status: CANCELLED | OUTPATIENT
Start: 2019-05-22

## 2019-05-22 RX ORDER — PROCHLORPERAZINE MALEATE 10 MG
10 TABLET ORAL ONCE
Status: CANCELLED | OUTPATIENT
Start: 2019-05-24 | End: 2019-05-24

## 2019-05-22 RX ORDER — SODIUM CHLORIDE 0.9 % (FLUSH) 0.9 %
10 SYRINGE (ML) INJECTION AS NEEDED
Status: DISCONTINUED | OUTPATIENT
Start: 2019-05-22 | End: 2019-05-22 | Stop reason: HOSPADM

## 2019-05-22 RX ORDER — PALONOSETRON 0.05 MG/ML
0.25 INJECTION, SOLUTION INTRAVENOUS ONCE
Status: CANCELLED | OUTPATIENT
Start: 2019-05-22

## 2019-05-22 RX ADMIN — SODIUM CHLORIDE 150 MG: 9 INJECTION, SOLUTION INTRAVENOUS at 12:09

## 2019-05-22 RX ADMIN — DEXAMETHASONE SODIUM PHOSPHATE 12 MG: 4 INJECTION, SOLUTION INTRAMUSCULAR; INTRAVENOUS at 12:09

## 2019-05-22 RX ADMIN — ATEZOLIZUMAB 1200 MG: 1200 INJECTION, SOLUTION INTRAVENOUS at 11:31

## 2019-05-22 RX ADMIN — CARBOPLATIN 540 MG: 10 INJECTION, SOLUTION INTRAVENOUS at 12:37

## 2019-05-22 RX ADMIN — ETOPOSIDE 190 MG: 20 INJECTION, SOLUTION, CONCENTRATE INTRAVENOUS at 13:10

## 2019-05-22 RX ADMIN — PALONOSETRON 0.25 MG: 0.05 INJECTION, SOLUTION INTRAVENOUS at 11:28

## 2019-05-22 RX ADMIN — SODIUM CHLORIDE, PRESERVATIVE FREE 10 ML: 5 INJECTION INTRAVENOUS at 14:11

## 2019-05-22 RX ADMIN — HEPARIN 500 UNITS: 100 SYRINGE at 14:11

## 2019-05-23 ENCOUNTER — TELEPHONE (OUTPATIENT)
Dept: ONCOLOGY | Facility: CLINIC | Age: 64
End: 2019-05-23

## 2019-05-23 ENCOUNTER — INFUSION (OUTPATIENT)
Dept: ONCOLOGY | Facility: HOSPITAL | Age: 64
End: 2019-05-23

## 2019-05-23 VITALS
BODY MASS INDEX: 36.58 KG/M2 | RESPIRATION RATE: 18 BRPM | SYSTOLIC BLOOD PRESSURE: 136 MMHG | WEIGHT: 200 LBS | TEMPERATURE: 96.7 F | DIASTOLIC BLOOD PRESSURE: 60 MMHG | HEART RATE: 89 BPM

## 2019-05-23 DIAGNOSIS — C34.82 MALIGNANT NEOPLASM OF OVERLAPPING SITES OF LEFT LUNG (HCC): Primary | ICD-10-CM

## 2019-05-23 PROCEDURE — 63710000001 PROCHLORPERAZINE MALEATE PER 10 MG: Performed by: INTERNAL MEDICINE

## 2019-05-23 PROCEDURE — 96413 CHEMO IV INFUSION 1 HR: CPT

## 2019-05-23 PROCEDURE — 25010000002 ETOPOSIDE 1 GM/50ML SOLUTION 50 ML VIAL: Performed by: INTERNAL MEDICINE

## 2019-05-23 RX ORDER — BENZONATATE 100 MG/1
100-200 CAPSULE ORAL 3 TIMES DAILY PRN
Qty: 120 CAPSULE | Refills: 5 | Status: SHIPPED | OUTPATIENT
Start: 2019-05-23

## 2019-05-23 RX ORDER — PROCHLORPERAZINE MALEATE 10 MG
10 TABLET ORAL ONCE
Status: COMPLETED | OUTPATIENT
Start: 2019-05-23 | End: 2019-05-23

## 2019-05-23 RX ORDER — SODIUM CHLORIDE 0.9 % (FLUSH) 0.9 %
10 SYRINGE (ML) INJECTION AS NEEDED
Status: DISCONTINUED | OUTPATIENT
Start: 2019-05-23 | End: 2019-05-23 | Stop reason: HOSPADM

## 2019-05-23 RX ORDER — SODIUM CHLORIDE 9 MG/ML
250 INJECTION, SOLUTION INTRAVENOUS ONCE
Status: DISCONTINUED | OUTPATIENT
Start: 2019-05-23 | End: 2019-05-23 | Stop reason: HOSPADM

## 2019-05-23 RX ORDER — SODIUM CHLORIDE 0.9 % (FLUSH) 0.9 %
10 SYRINGE (ML) INJECTION AS NEEDED
Status: CANCELLED | OUTPATIENT
Start: 2019-05-23

## 2019-05-23 RX ADMIN — SODIUM CHLORIDE, PRESERVATIVE FREE 10 ML: 5 INJECTION INTRAVENOUS at 14:43

## 2019-05-23 RX ADMIN — HEPARIN 500 UNITS: 100 SYRINGE at 14:43

## 2019-05-23 RX ADMIN — PROCHLORPERAZINE MALEATE 10 MG: 10 TABLET, FILM COATED ORAL at 13:30

## 2019-05-23 RX ADMIN — ETOPOSIDE 190 MG: 20 INJECTION, SOLUTION, CONCENTRATE INTRAVENOUS at 13:44

## 2019-05-23 NOTE — TELEPHONE ENCOUNTER
Advised to try OTC robitussin without the codeine, and will call in tessalon perles to the pharmacy as well

## 2019-05-23 NOTE — TELEPHONE ENCOUNTER
----- Message from Cielo Juárez sent at 5/23/2019 12:50 PM EDT -----  Regarding: DR CARCAMO AND SEBASTIAN GARCIA IS HERE for treatment today. She said the cough syrup was  78.00 and she can't afford that . Would there be another cough syrup cheaper for her.

## 2019-05-24 ENCOUNTER — INFUSION (OUTPATIENT)
Dept: ONCOLOGY | Facility: HOSPITAL | Age: 64
End: 2019-05-24

## 2019-05-24 VITALS
HEART RATE: 81 BPM | TEMPERATURE: 97.4 F | RESPIRATION RATE: 16 BRPM | WEIGHT: 202 LBS | BODY MASS INDEX: 36.95 KG/M2 | DIASTOLIC BLOOD PRESSURE: 63 MMHG | SYSTOLIC BLOOD PRESSURE: 145 MMHG

## 2019-05-24 DIAGNOSIS — C34.82 MALIGNANT NEOPLASM OF OVERLAPPING SITES OF LEFT LUNG (HCC): Primary | ICD-10-CM

## 2019-05-24 PROCEDURE — 96413 CHEMO IV INFUSION 1 HR: CPT

## 2019-05-24 PROCEDURE — 25010000002 ETOPOSIDE 1 GM/50ML SOLUTION 50 ML VIAL: Performed by: INTERNAL MEDICINE

## 2019-05-24 PROCEDURE — 63710000001 PROCHLORPERAZINE MALEATE PER 10 MG: Performed by: INTERNAL MEDICINE

## 2019-05-24 RX ORDER — SODIUM CHLORIDE 9 MG/ML
250 INJECTION, SOLUTION INTRAVENOUS ONCE
Status: DISCONTINUED | OUTPATIENT
Start: 2019-05-24 | End: 2019-05-24 | Stop reason: HOSPADM

## 2019-05-24 RX ORDER — SODIUM CHLORIDE 0.9 % (FLUSH) 0.9 %
10 SYRINGE (ML) INJECTION AS NEEDED
Status: CANCELLED | OUTPATIENT
Start: 2019-05-24

## 2019-05-24 RX ORDER — SODIUM CHLORIDE 0.9 % (FLUSH) 0.9 %
10 SYRINGE (ML) INJECTION AS NEEDED
Status: DISCONTINUED | OUTPATIENT
Start: 2019-05-24 | End: 2019-05-24 | Stop reason: HOSPADM

## 2019-05-24 RX ORDER — PROCHLORPERAZINE MALEATE 10 MG
10 TABLET ORAL ONCE
Status: COMPLETED | OUTPATIENT
Start: 2019-05-24 | End: 2019-05-24

## 2019-05-24 RX ADMIN — SODIUM CHLORIDE, PRESERVATIVE FREE 10 ML: 5 INJECTION INTRAVENOUS at 14:43

## 2019-05-24 RX ADMIN — ETOPOSIDE 190 MG: 20 INJECTION, SOLUTION, CONCENTRATE INTRAVENOUS at 13:41

## 2019-05-24 RX ADMIN — PROCHLORPERAZINE MALEATE 10 MG: 10 TABLET, FILM COATED ORAL at 13:10

## 2019-05-24 RX ADMIN — HEPARIN 500 UNITS: 100 SYRINGE at 14:43

## 2019-05-29 ENCOUNTER — TELEPHONE (OUTPATIENT)
Dept: NUTRITION | Facility: HOSPITAL | Age: 64
End: 2019-05-29

## 2019-05-29 NOTE — PROGRESS NOTES
Nutrition Services    Patient Name:  Sanjana Horton  YOB: 1955  MRN: [id number]  Admit Date:  (Not on file)    RD attempted to follow-up with pt via phone, but was unable to reach her. RD left a message requesting she call back to review nutritional side effects of the treatment.     Electronically signed by:  Nguyen Jacobsen RD  05/29/19 1:44 PM

## 2019-06-12 ENCOUNTER — OFFICE VISIT (OUTPATIENT)
Dept: ONCOLOGY | Facility: CLINIC | Age: 64
End: 2019-06-12

## 2019-06-12 ENCOUNTER — INFUSION (OUTPATIENT)
Dept: ONCOLOGY | Facility: HOSPITAL | Age: 64
End: 2019-06-12

## 2019-06-12 VITALS
RESPIRATION RATE: 15 BRPM | HEART RATE: 102 BPM | SYSTOLIC BLOOD PRESSURE: 144 MMHG | BODY MASS INDEX: 36.99 KG/M2 | DIASTOLIC BLOOD PRESSURE: 67 MMHG | HEIGHT: 62 IN | TEMPERATURE: 97.2 F | WEIGHT: 201 LBS

## 2019-06-12 DIAGNOSIS — C77.1 SECONDARY MALIGNANT NEOPLASM OF MEDIASTINAL LYMPH NODE (HCC): ICD-10-CM

## 2019-06-12 DIAGNOSIS — Z17.0 MALIGNANT NEOPLASM OF LEFT BREAST IN FEMALE, ESTROGEN RECEPTOR POSITIVE, UNSPECIFIED SITE OF BREAST (HCC): ICD-10-CM

## 2019-06-12 DIAGNOSIS — C34.82 MALIGNANT NEOPLASM OF OVERLAPPING SITES OF LEFT LUNG (HCC): Primary | ICD-10-CM

## 2019-06-12 DIAGNOSIS — C50.912 MALIGNANT NEOPLASM OF LEFT BREAST IN FEMALE, ESTROGEN RECEPTOR POSITIVE, UNSPECIFIED SITE OF BREAST (HCC): ICD-10-CM

## 2019-06-12 LAB
ALBUMIN SERPL-MCNC: 4.1 G/DL (ref 3.5–5)
ALBUMIN/GLOB SERPL: 1.1 G/DL (ref 1–2)
ALP SERPL-CCNC: 133 U/L (ref 38–126)
ALT SERPL W P-5'-P-CCNC: 43 U/L (ref 13–69)
ANION GAP SERPL CALCULATED.3IONS-SCNC: 9.8 MMOL/L (ref 10–20)
AST SERPL-CCNC: 36 U/L (ref 15–46)
BASOPHILS # BLD AUTO: 0.01 10*3/MM3 (ref 0–0.2)
BASOPHILS NFR BLD AUTO: 0.2 % (ref 0–1.5)
BILIRUB SERPL-MCNC: 0.5 MG/DL (ref 0.2–1.3)
BUN BLD-MCNC: 11 MG/DL (ref 7–20)
BUN/CREAT SERPL: 13.8 (ref 7.1–23.5)
CALCIUM SPEC-SCNC: 9.9 MG/DL (ref 8.4–10.2)
CHLORIDE SERPL-SCNC: 103 MMOL/L (ref 98–107)
CO2 SERPL-SCNC: 29 MMOL/L (ref 26–30)
CREAT BLD-MCNC: 0.8 MG/DL (ref 0.6–1.3)
DEPRECATED RDW RBC AUTO: 45.7 FL (ref 37–54)
EOSINOPHIL # BLD AUTO: 0.06 10*3/MM3 (ref 0–0.4)
EOSINOPHIL NFR BLD AUTO: 1.3 % (ref 0.3–6.2)
ERYTHROCYTE [DISTWIDTH] IN BLOOD BY AUTOMATED COUNT: 16.6 % (ref 12.3–15.4)
GFR SERPL CREATININE-BSD FRML MDRD: 73 ML/MIN/1.73
GLOBULIN UR ELPH-MCNC: 3.8 GM/DL
GLUCOSE BLD-MCNC: 117 MG/DL (ref 74–98)
HCT VFR BLD AUTO: 25.5 % (ref 34–46.6)
HGB BLD-MCNC: 8.4 G/DL (ref 12–15.9)
IMM GRANULOCYTES # BLD AUTO: 0.1 10*3/MM3 (ref 0–0.05)
IMM GRANULOCYTES NFR BLD AUTO: 2.1 % (ref 0–0.5)
LYMPHOCYTES # BLD AUTO: 0.95 10*3/MM3 (ref 0.7–3.1)
LYMPHOCYTES NFR BLD AUTO: 20.3 % (ref 19.6–45.3)
MCH RBC QN AUTO: 31 PG (ref 26.6–33)
MCHC RBC AUTO-ENTMCNC: 32.9 G/DL (ref 31.5–35.7)
MCV RBC AUTO: 94.1 FL (ref 79–97)
MONOCYTES # BLD AUTO: 0.62 10*3/MM3 (ref 0.1–0.9)
MONOCYTES NFR BLD AUTO: 13.3 % (ref 5–12)
NEUTROPHILS # BLD AUTO: 2.93 10*3/MM3 (ref 1.7–7)
NEUTROPHILS NFR BLD AUTO: 62.8 % (ref 42.7–76)
NRBC BLD AUTO-RTO: 0.4 /100 WBC (ref 0–0.2)
PLATELET # BLD AUTO: 196 10*3/MM3 (ref 140–450)
PMV BLD AUTO: 9.7 FL (ref 6–12)
POTASSIUM BLD-SCNC: 3.8 MMOL/L (ref 3.5–5.1)
PROT SERPL-MCNC: 7.9 G/DL (ref 6.3–8.2)
RBC # BLD AUTO: 2.71 10*6/MM3 (ref 3.77–5.28)
SODIUM BLD-SCNC: 138 MMOL/L (ref 137–145)
T4 FREE SERPL-MCNC: 1.24 NG/DL (ref 0.78–2.19)
TSH SERPL DL<=0.05 MIU/L-ACNC: 2.68 MIU/ML (ref 0.47–4.68)
WBC NRBC COR # BLD: 4.67 10*3/MM3 (ref 3.4–10.8)

## 2019-06-12 PROCEDURE — 36415 COLL VENOUS BLD VENIPUNCTURE: CPT

## 2019-06-12 PROCEDURE — 84481 FREE ASSAY (FT-3): CPT | Performed by: INTERNAL MEDICINE

## 2019-06-12 PROCEDURE — 96376 TX/PRO/DX INJ SAME DRUG ADON: CPT

## 2019-06-12 PROCEDURE — 80053 COMPREHEN METABOLIC PANEL: CPT | Performed by: INTERNAL MEDICINE

## 2019-06-12 PROCEDURE — 25010000002 CARBOPLATIN PER 50 MG: Performed by: INTERNAL MEDICINE

## 2019-06-12 PROCEDURE — 25010000002 FOSAPREPITANT PER 1 MG: Performed by: INTERNAL MEDICINE

## 2019-06-12 PROCEDURE — 84439 ASSAY OF FREE THYROXINE: CPT | Performed by: INTERNAL MEDICINE

## 2019-06-12 PROCEDURE — 25010000002 PALONOSETRON 0.25 MG/5ML SOLUTION PREFILLED SYRINGE: Performed by: INTERNAL MEDICINE

## 2019-06-12 PROCEDURE — 99215 OFFICE O/P EST HI 40 MIN: CPT | Performed by: INTERNAL MEDICINE

## 2019-06-12 PROCEDURE — 25010000002 ETOPOSIDE 1 GM/50ML SOLUTION 50 ML VIAL: Performed by: INTERNAL MEDICINE

## 2019-06-12 PROCEDURE — 96367 TX/PROPH/DG ADDL SEQ IV INF: CPT

## 2019-06-12 PROCEDURE — 85025 COMPLETE CBC W/AUTO DIFF WBC: CPT | Performed by: INTERNAL MEDICINE

## 2019-06-12 PROCEDURE — 96366 THER/PROPH/DIAG IV INF ADDON: CPT

## 2019-06-12 PROCEDURE — 96375 TX/PRO/DX INJ NEW DRUG ADDON: CPT

## 2019-06-12 PROCEDURE — 96413 CHEMO IV INFUSION 1 HR: CPT

## 2019-06-12 PROCEDURE — 84443 ASSAY THYROID STIM HORMONE: CPT | Performed by: INTERNAL MEDICINE

## 2019-06-12 PROCEDURE — 96417 CHEMO IV INFUS EACH ADDL SEQ: CPT

## 2019-06-12 PROCEDURE — 25010000002 ATEZOLIZUMAB 1200 MG/20ML SOLUTION 20 ML VIAL: Performed by: INTERNAL MEDICINE

## 2019-06-12 PROCEDURE — 25010000002 DEXAMETHASONE PER 1 MG: Performed by: INTERNAL MEDICINE

## 2019-06-12 RX ORDER — PROCHLORPERAZINE MALEATE 10 MG
10 TABLET ORAL ONCE
Status: CANCELLED | OUTPATIENT
Start: 2019-06-13 | End: 2019-06-13

## 2019-06-12 RX ORDER — PALONOSETRON 0.05 MG/ML
0.25 INJECTION, SOLUTION INTRAVENOUS ONCE
Status: CANCELLED | OUTPATIENT
Start: 2019-06-12

## 2019-06-12 RX ORDER — SODIUM CHLORIDE 9 MG/ML
250 INJECTION, SOLUTION INTRAVENOUS ONCE
Status: CANCELLED | OUTPATIENT
Start: 2019-06-13

## 2019-06-12 RX ORDER — PROCHLORPERAZINE MALEATE 10 MG
10 TABLET ORAL ONCE
Status: CANCELLED | OUTPATIENT
Start: 2019-06-14 | End: 2019-06-14

## 2019-06-12 RX ORDER — SODIUM CHLORIDE 0.9 % (FLUSH) 0.9 %
10 SYRINGE (ML) INJECTION AS NEEDED
Status: CANCELLED | OUTPATIENT
Start: 2019-06-12

## 2019-06-12 RX ORDER — SODIUM CHLORIDE 0.9 % (FLUSH) 0.9 %
10 SYRINGE (ML) INJECTION AS NEEDED
Status: DISCONTINUED | OUTPATIENT
Start: 2019-06-12 | End: 2019-06-12 | Stop reason: HOSPADM

## 2019-06-12 RX ORDER — SODIUM CHLORIDE 9 MG/ML
250 INJECTION, SOLUTION INTRAVENOUS ONCE
Status: DISCONTINUED | OUTPATIENT
Start: 2019-06-12 | End: 2019-06-12 | Stop reason: HOSPADM

## 2019-06-12 RX ORDER — DIPHENHYDRAMINE HYDROCHLORIDE 50 MG/ML
50 INJECTION INTRAMUSCULAR; INTRAVENOUS AS NEEDED
Status: CANCELLED | OUTPATIENT
Start: 2019-06-12

## 2019-06-12 RX ORDER — SODIUM CHLORIDE 9 MG/ML
250 INJECTION, SOLUTION INTRAVENOUS ONCE
Status: CANCELLED | OUTPATIENT
Start: 2019-06-14

## 2019-06-12 RX ORDER — SODIUM CHLORIDE 9 MG/ML
250 INJECTION, SOLUTION INTRAVENOUS ONCE
Status: CANCELLED | OUTPATIENT
Start: 2019-06-12

## 2019-06-12 RX ORDER — PALONOSETRON 0.05 MG/ML
0.25 INJECTION, SOLUTION INTRAVENOUS ONCE
Status: COMPLETED | OUTPATIENT
Start: 2019-06-12 | End: 2019-06-12

## 2019-06-12 RX ORDER — FAMOTIDINE 10 MG/ML
20 INJECTION, SOLUTION INTRAVENOUS AS NEEDED
Status: CANCELLED | OUTPATIENT
Start: 2019-06-12

## 2019-06-12 RX ADMIN — CARBOPLATIN 660 MG: 10 INJECTION, SOLUTION INTRAVENOUS at 13:13

## 2019-06-12 RX ADMIN — PALONOSETRON 0.25 MG: 0.25 INJECTION, SOLUTION INTRAVENOUS at 10:44

## 2019-06-12 RX ADMIN — SODIUM CHLORIDE, PRESERVATIVE FREE 10 ML: 5 INJECTION INTRAVENOUS at 13:48

## 2019-06-12 RX ADMIN — ETOPOSIDE 190 MG: 20 INJECTION, SOLUTION, CONCENTRATE INTRAVENOUS at 12:11

## 2019-06-12 RX ADMIN — SODIUM CHLORIDE 150 MG: 9 INJECTION, SOLUTION INTRAVENOUS at 11:51

## 2019-06-12 RX ADMIN — HEPARIN 500 UNITS: 100 SYRINGE at 13:48

## 2019-06-12 RX ADMIN — ATEZOLIZUMAB 1200 MG: 1200 INJECTION, SOLUTION INTRAVENOUS at 11:02

## 2019-06-12 RX ADMIN — DEXAMETHASONE SODIUM PHOSPHATE 12 MG: 4 INJECTION, SOLUTION INTRAMUSCULAR; INTRAVENOUS at 11:35

## 2019-06-12 NOTE — PROGRESS NOTES
DATE OF VISIT: 6/12/2019    REASON FOR VISIT: Followup for small cell lung cancer     HISTORY OF PRESENT ILLNESS: The patient is a very pleasant 63 y.o. female  with past medical history significant for small cell lung cancer diagnosed August 2017.  Repeated scans done on April 21, 2019 revealed progressive disease.  She was started on carboplatin and etoposide and Tecentriq April 30, 2019.  The patient is here today for scheduled follow up visit with treatment, cycle #3.    SUBJECTIVE: The patient is here today by herself.  She has been able to tolerate treatment fairly well but she is complaining of itching but no rash.  She has mild nausea but no vomiting.    PAST MEDICAL HISTORY/SOCIAL HISTORY/FAMILY HISTORY: Reviewed by me and unchanged from my documentation done on 06/12/19.    Review of Systems   Constitutional: Positive for fatigue. Negative for activity change, appetite change, chills, fever and unexpected weight change.   HENT: Negative for hearing loss, mouth sores, nosebleeds, sore throat and trouble swallowing.    Eyes: Negative for visual disturbance.   Respiratory: Negative for cough, chest tightness, shortness of breath and wheezing.    Cardiovascular: Negative for chest pain, palpitations and leg swelling.   Gastrointestinal: Positive for nausea. Negative for abdominal distention, abdominal pain, blood in stool, constipation, diarrhea, rectal pain and vomiting.   Endocrine: Negative for cold intolerance and heat intolerance.   Genitourinary: Negative for difficulty urinating, dysuria, frequency and urgency.   Musculoskeletal: Negative for arthralgias, back pain, gait problem, joint swelling and myalgias.   Skin: Negative for rash.        Itching     Neurological: Negative for dizziness, tremors, syncope, weakness, light-headedness, numbness and headaches.   Hematological: Negative for adenopathy. Does not bruise/bleed easily.   Psychiatric/Behavioral: Negative for confusion, sleep disturbance and  suicidal ideas. The patient is not nervous/anxious.          Current Outpatient Medications:   •  albuterol (VENTOLIN HFA) 108 (90 Base) MCG/ACT inhaler, Inhale 2 puffs Every 4 (Four) Hours As Needed for Wheezing or Shortness of Air., Disp: 1 inhaler, Rfl: 5  •  amLODIPine (NORVASC) 5 MG tablet, Take 5 mg by mouth Daily., Disp: , Rfl: 0  •  aspirin 81 MG EC tablet, Take 81 mg by mouth Daily., Disp: , Rfl:   •  azelastine (ASTELIN) 0.1 % nasal spray, 1 spray into the nostril(s) as directed by provider 2 (Two) Times a Day As Needed for Rhinitis or Allergies. Use in each nostril as directed, Disp: 1 each, Rfl: 5  •  benzonatate (TESSALON PERLES) 100 MG capsule, Take 1-2 capsules by mouth 3 (Three) Times a Day As Needed for Cough., Disp: 120 capsule, Rfl: 5  •  guaifenesin-codeine (GUAIFENESIN AC) 100-10 MG/5ML liquid, Take 5-10 mL by mouth 3 (Three) Times a Day As Needed for Cough., Disp: 480 mL, Rfl: 2  •  hydrochlorothiazide (MICROZIDE) 12.5 MG capsule, Take 12.5 mg by mouth Every Morning. Pt reports that she takes this when swelled as needed., Disp: , Rfl: 0  •  hydrocortisone 2.5 % cream, Apply  topically to the appropriate area as directed 2 (Two) Times a Day. (Patient taking differently: Apply  topically to the appropriate area as directed 2 (Two) Times a Day. Rash improved so pt no longer needed to use it.), Disp: 20 g, Rfl: 0  •  lidocaine-prilocaine (EMLA) 2.5-2.5 % cream, Apply  topically to the appropriate area as directed As Needed (45-60 minutes prior to port access.  Cover with saran/plastic wrap.)., Disp: 30 g, Rfl: 3  •  losartan-hydrochlorothiazide (HYZAAR) 100-12.5 MG per tablet, Take 1 tablet by mouth Daily., Disp: , Rfl: 0  •  metFORMIN (GLUCOPHAGE) 500 MG tablet, Take 500 mg by mouth 2 (Two) Times a Day With Meals., Disp: , Rfl: 0  •  Nystatin (MAGIC MOUTHWASH), Swish and spit 10 mL Every 4 (Four) Hours As Needed (sore mouth)., Disp: 240 mL, Rfl: 1  •  ondansetron (ZOFRAN) 8 MG tablet, Take 1  "tablet by mouth 3 (Three) Times a Day As Needed for Nausea or Vomiting., Disp: 30 tablet, Rfl: 5  •  pantoprazole (PROTONIX) 40 MG EC tablet, Take 1 tablet by mouth Every 12 (Twelve) Hours., Disp: 60 tablet, Rfl: 0  •  potassium chloride ER (K-TAB) 20 MEQ tablet controlled-release ER tablet, Take 1 tablet by mouth 2 (Two) Times a Day., Disp: 30 tablet, Rfl: 1  •  tamoxifen (NOLVADEX) 20 MG chemo tablet, Take 1 tablet by mouth Daily., Disp: 30 tablet, Rfl: 0  •  triamcinolone (KENALOG) 0.1 % ointment, Apply  topically to the appropriate area as directed 2 (Two) Times a Day., Disp: 30 g, Rfl: 3  •  umeclidinium-vilanterol (ANORO ELLIPTA) 62.5-25 MCG/INH aerosol powder  inhaler, Inhale 1 puff Daily., Disp: 1 each, Rfl: 3  •  vitamin D (ERGOCALCIFEROL) 27784 units capsule capsule, Take 50,000 Units by mouth Every 7 (Seven) Days., Disp: , Rfl: 0    PHYSICAL EXAMINATION:   /67   Pulse 102   Temp 97.2 °F (36.2 °C) (Temporal)   Resp 15   Ht 157.5 cm (62\")   Wt 91.2 kg (201 lb)   BMI 36.76 kg/m²    ECOG Performance Status: 1 - Symptomatic but completely ambulatory  General Appearance:  alert, cooperative, no apparent distress and appears stated age   Neurologic/Psychiatric: A&O x 3, gait steady, appropriate affect, strength 5/5 in all muscle groups   HEENT:  Normocephalic, without obvious abnormality, mucous membranes moist   Neck: Supple, symmetrical, trachea midline, no adenopathy;  No thyromegaly, masses, or tenderness   Lungs:   Clear to auscultation bilaterally; respirations regular, even, and unlabored bilaterally   Heart:  Regular rate and rhythm, no murmurs appreciated   Abdomen:   Soft, non-tender, non-distended and no organomegaly   Lymph nodes: No cervical, supraclavicular, inguinal or axillary adenopathy noted   Extremities: Normal, atraumatic; no clubbing, cyanosis, or edema    Skin: No rashes, ulcers, or suspicious lesions noted     No visits with results within 2 Week(s) from this visit.   Latest " known visit with results is:   Infusion on 05/22/2019   Component Date Value Ref Range Status   • Glucose 05/22/2019 104* 74 - 98 mg/dL Final   • BUN 05/22/2019 13  7 - 20 mg/dL Final   • Creatinine 05/22/2019 1.00  0.60 - 1.30 mg/dL Final   • Sodium 05/22/2019 138  137 - 145 mmol/L Final   • Potassium 05/22/2019 3.3* 3.5 - 5.1 mmol/L Final   • Chloride 05/22/2019 101  98 - 107 mmol/L Final   • CO2 05/22/2019 28.0  26.0 - 30.0 mmol/L Final   • Calcium 05/22/2019 10.0  8.4 - 10.2 mg/dL Final   • Total Protein 05/22/2019 8.0  6.3 - 8.2 g/dL Final   • Albumin 05/22/2019 4.10  3.50 - 5.00 g/dL Final   • ALT (SGPT) 05/22/2019 43  13 - 69 U/L Final   • AST (SGOT) 05/22/2019 38  15 - 46 U/L Final   • Alkaline Phosphatase 05/22/2019 140* 38 - 126 U/L Final   • Total Bilirubin 05/22/2019 0.6  0.2 - 1.3 mg/dL Final   • eGFR Non African Amer 05/22/2019 56* >60 mL/min/1.73 Final   • Globulin 05/22/2019 3.9  gm/dL Final   • A/G Ratio 05/22/2019 1.1  1.0 - 2.0 g/dL Final   • BUN/Creatinine Ratio 05/22/2019 13.0  7.1 - 23.5 Final   • Anion Gap 05/22/2019 12.3  10.0 - 20.0 mmol/L Final   • WBC 05/22/2019 4.14  3.40 - 10.80 10*3/mm3 Final   • RBC 05/22/2019 3.22* 3.77 - 5.28 10*6/mm3 Final   • Hemoglobin 05/22/2019 10.1* 12.0 - 15.9 g/dL Final   • Hematocrit 05/22/2019 29.7* 34.0 - 46.6 % Final   • MCV 05/22/2019 92.2  79.0 - 97.0 fL Final   • MCH 05/22/2019 31.4  26.6 - 33.0 pg Final   • MCHC 05/22/2019 34.0  31.5 - 35.7 g/dL Final   • RDW 05/22/2019 13.6  12.3 - 15.4 % Final   • RDW-SD 05/22/2019 42.3  37.0 - 54.0 fl Final   • MPV 05/22/2019 9.4  6.0 - 12.0 fL Final   • Platelets 05/22/2019 210  140 - 450 10*3/mm3 Final   • Neutrophil % 05/22/2019 59.1  42.7 - 76.0 % Final   • Lymphocyte % 05/22/2019 24.2  19.6 - 45.3 % Final   • Monocyte % 05/22/2019 14.5* 5.0 - 12.0 % Final   • Eosinophil % 05/22/2019 0.7  0.3 - 6.2 % Final   • Basophil % 05/22/2019 0.5  0.0 - 1.5 % Final   • Immature Grans % 05/22/2019 1.0* 0.0 - 0.5 % Final    • Neutrophils, Absolute 05/22/2019 2.45  1.70 - 7.00 10*3/mm3 Final   • Lymphocytes, Absolute 05/22/2019 1.00  0.70 - 3.10 10*3/mm3 Final   • Monocytes, Absolute 05/22/2019 0.60  0.10 - 0.90 10*3/mm3 Final   • Eosinophils, Absolute 05/22/2019 0.03  0.00 - 0.40 10*3/mm3 Final   • Basophils, Absolute 05/22/2019 0.02  0.00 - 0.20 10*3/mm3 Final   • Immature Grans, Absolute 05/22/2019 0.04  0.00 - 0.05 10*3/mm3 Final   • nRBC 05/22/2019 0.0  0.0 - 0.2 /100 WBC Final        No results found.    ASSESSMENT: The patient is a very pleasant 63 y.o. female  with small cell lung cancer    PROBLEM LIST:  1. Small cell lung cancer from the left upper lobe.  Limited stage disease, recurrent in a mediastinal node  A.  Treated  initially with etoposide and carboplatin starting 9/13/27.  She responded to the single modality that was started because of symptoms including airway compromise.  B.  Completed combined modality chemoradiation with etoposide and carboplatin . (Chemotherapy completed 12/7/2017).  C.  Recurrence in left paratracheal node.  This was seen on CT scan from 6/19/18 and hypermetabolic on PET scan to confirm.  She's been treated with weekly taxol starting 7/17/2018 along with involved field radiation.  D. Left pleural effusion seen on CT of 11/23/18, loculated and inaccessible for thoracentesis.  E.  Progressive disease documented on scans done April 22, 2019 with increase in size of left paratracheal lymph node and right hepatic lobe mass.  F. Started carboplatin and etoposide with Tecentriq May 1, 2019, status post 2 cycles  2.  Anemia.  This appears to be predominantly from chemoradiation.  3.  History of left breast cancer.  A.  Left mastectomy 7/22/2008 for stage IIIA, T3 N1 M0 breast cancer  B.  ER weakly positive, DE negative and HER-2 positive  C.  Adjuvant chemotherapy with Herceptin, Taxotere and carboplatin  D.  Postmastectomy radiation done at [facility]  E.  Adjuvant tamoxifen started  12/12/2008 and continued as extended adjuvant therapy  4.  COPD.  5. Diabetes  6. HTN  7.  Cough  8.  Treatment induced itching 1    PLAN:  1. I will proceed with treatment as scheduled today carboplatin and etoposide as well as Tecentriq, cycle #2.  This is an FDA approved treatment for extensive stage small cell lung cancer.   2. The patient will follow up with me in 3 weeks for cycle #4.  3.  I will continue port maintenance.  4.  I will monitor the patient blood work while she is on treatment including blood counts kidney function liver function and electrolytes.  5. We discussed potential side effects of immunotherapy including but not limited to immune mediated reactions with thyroiditis, pneumonitis, hepatitis, colitis, rash, and electrolytes abnormalities, fatigue, multiorgan failure, and possibly death.  6. We reviewed the potential side effects of this regimen including fatigue, vomiting and nausea, hair loss, nephropathy, neuropathy, hearing loss, myelosuppression, and risk of infusion reaction.  7. She has completed 10 years of Tamoxifen at this point.   8. Will continue inhalers as needed for COPD  9. Will continue Metformin for type 2 DM.  Her sugars might get higher while she is in treatment secondary to pre-infusion steroids.  10. Will continue Hyzaar for HTN.  We may have to adjust her antihypertensive agent while she is in treatment.  11.  I will repeat scans prior to cycle #4.  We ordered prior to return.  If patient is having good response to treatment will stop chemotherapy at cycle #4 and continue with Tecentriq single agent.  12.  I will continue the patient on Robitussin with codeine as needed for cough.  13.  I will start the patient on Benadryl as needed for itching.  Josh Son MD  6/12/2019

## 2019-06-13 ENCOUNTER — INFUSION (OUTPATIENT)
Dept: ONCOLOGY | Facility: HOSPITAL | Age: 64
End: 2019-06-13

## 2019-06-13 VITALS
HEART RATE: 99 BPM | WEIGHT: 202 LBS | SYSTOLIC BLOOD PRESSURE: 137 MMHG | DIASTOLIC BLOOD PRESSURE: 63 MMHG | RESPIRATION RATE: 18 BRPM | BODY MASS INDEX: 36.95 KG/M2 | TEMPERATURE: 97.9 F

## 2019-06-13 DIAGNOSIS — C34.82 MALIGNANT NEOPLASM OF OVERLAPPING SITES OF LEFT LUNG (HCC): Primary | ICD-10-CM

## 2019-06-13 LAB — T3FREE SERPL-MCNC: 3.2 PG/ML (ref 2–4.4)

## 2019-06-13 PROCEDURE — 63710000001 PROCHLORPERAZINE MALEATE PER 10 MG: Performed by: INTERNAL MEDICINE

## 2019-06-13 PROCEDURE — 96413 CHEMO IV INFUSION 1 HR: CPT

## 2019-06-13 PROCEDURE — 25010000002 ETOPOSIDE 1 GM/50ML SOLUTION 50 ML VIAL: Performed by: INTERNAL MEDICINE

## 2019-06-13 RX ORDER — SODIUM CHLORIDE 0.9 % (FLUSH) 0.9 %
10 SYRINGE (ML) INJECTION AS NEEDED
Status: CANCELLED | OUTPATIENT
Start: 2019-06-13

## 2019-06-13 RX ORDER — SODIUM CHLORIDE 9 MG/ML
250 INJECTION, SOLUTION INTRAVENOUS ONCE
Status: DISCONTINUED | OUTPATIENT
Start: 2019-06-13 | End: 2019-06-13 | Stop reason: HOSPADM

## 2019-06-13 RX ORDER — SODIUM CHLORIDE 0.9 % (FLUSH) 0.9 %
10 SYRINGE (ML) INJECTION AS NEEDED
Status: DISCONTINUED | OUTPATIENT
Start: 2019-06-13 | End: 2019-06-13 | Stop reason: HOSPADM

## 2019-06-13 RX ORDER — PROCHLORPERAZINE MALEATE 10 MG
10 TABLET ORAL ONCE
Status: COMPLETED | OUTPATIENT
Start: 2019-06-13 | End: 2019-06-13

## 2019-06-13 RX ADMIN — HEPARIN 500 UNITS: 100 SYRINGE at 12:37

## 2019-06-13 RX ADMIN — ETOPOSIDE 190 MG: 20 INJECTION, SOLUTION, CONCENTRATE INTRAVENOUS at 11:36

## 2019-06-13 RX ADMIN — SODIUM CHLORIDE, PRESERVATIVE FREE 10 ML: 5 INJECTION INTRAVENOUS at 12:37

## 2019-06-13 RX ADMIN — PROCHLORPERAZINE MALEATE 10 MG: 10 TABLET, FILM COATED ORAL at 11:00

## 2019-06-14 ENCOUNTER — INFUSION (OUTPATIENT)
Dept: ONCOLOGY | Facility: HOSPITAL | Age: 64
End: 2019-06-14

## 2019-06-14 VITALS
WEIGHT: 203.1 LBS | DIASTOLIC BLOOD PRESSURE: 60 MMHG | SYSTOLIC BLOOD PRESSURE: 126 MMHG | HEART RATE: 86 BPM | TEMPERATURE: 97.4 F | BODY MASS INDEX: 37.15 KG/M2 | RESPIRATION RATE: 16 BRPM

## 2019-06-14 DIAGNOSIS — C34.82 MALIGNANT NEOPLASM OF OVERLAPPING SITES OF LEFT LUNG (HCC): Primary | ICD-10-CM

## 2019-06-14 PROCEDURE — 25010000002 ETOPOSIDE 1 GM/50ML SOLUTION 50 ML VIAL: Performed by: INTERNAL MEDICINE

## 2019-06-14 PROCEDURE — 63710000001 PROCHLORPERAZINE MALEATE PER 10 MG: Performed by: INTERNAL MEDICINE

## 2019-06-14 PROCEDURE — 96413 CHEMO IV INFUSION 1 HR: CPT

## 2019-06-14 RX ORDER — SODIUM CHLORIDE 0.9 % (FLUSH) 0.9 %
10 SYRINGE (ML) INJECTION AS NEEDED
Status: DISCONTINUED | OUTPATIENT
Start: 2019-06-14 | End: 2019-06-14 | Stop reason: HOSPADM

## 2019-06-14 RX ORDER — PROCHLORPERAZINE MALEATE 10 MG
10 TABLET ORAL ONCE
Status: COMPLETED | OUTPATIENT
Start: 2019-06-14 | End: 2019-06-14

## 2019-06-14 RX ORDER — SODIUM CHLORIDE 9 MG/ML
250 INJECTION, SOLUTION INTRAVENOUS ONCE
Status: DISCONTINUED | OUTPATIENT
Start: 2019-06-14 | End: 2019-06-14 | Stop reason: HOSPADM

## 2019-06-14 RX ORDER — SODIUM CHLORIDE 0.9 % (FLUSH) 0.9 %
10 SYRINGE (ML) INJECTION AS NEEDED
Status: CANCELLED | OUTPATIENT
Start: 2019-06-14

## 2019-06-14 RX ADMIN — HEPARIN SODIUM (PORCINE) LOCK FLUSH IV SOLN 100 UNIT/ML 500 UNITS: 100 SOLUTION at 11:44

## 2019-06-14 RX ADMIN — PROCHLORPERAZINE MALEATE 10 MG: 10 TABLET, FILM COATED ORAL at 10:25

## 2019-06-14 RX ADMIN — ETOPOSIDE 190 MG: 20 INJECTION, SOLUTION, CONCENTRATE INTRAVENOUS at 10:41

## 2019-06-14 RX ADMIN — SODIUM CHLORIDE, PRESERVATIVE FREE 10 ML: 5 INJECTION INTRAVENOUS at 11:44

## 2019-06-24 ENCOUNTER — TELEPHONE (OUTPATIENT)
Dept: ONCOLOGY | Facility: CLINIC | Age: 64
End: 2019-06-24

## 2019-06-24 ENCOUNTER — INFUSION (OUTPATIENT)
Dept: ONCOLOGY | Facility: HOSPITAL | Age: 64
End: 2019-06-24

## 2019-06-24 VITALS
HEART RATE: 101 BPM | SYSTOLIC BLOOD PRESSURE: 161 MMHG | TEMPERATURE: 98.2 F | WEIGHT: 202.1 LBS | DIASTOLIC BLOOD PRESSURE: 69 MMHG | BODY MASS INDEX: 36.96 KG/M2

## 2019-06-24 DIAGNOSIS — C77.1 SECONDARY MALIGNANT NEOPLASM OF MEDIASTINAL LYMPH NODE (HCC): Primary | ICD-10-CM

## 2019-06-24 DIAGNOSIS — C34.82 MALIGNANT NEOPLASM OF OVERLAPPING SITES OF LEFT LUNG (HCC): ICD-10-CM

## 2019-06-24 LAB
ABO GROUP BLD: NORMAL
ABO GROUP BLD: NORMAL
ALBUMIN SERPL-MCNC: 4.1 G/DL (ref 3.5–5)
ALBUMIN/GLOB SERPL: 1.2 G/DL (ref 1–2)
ALP SERPL-CCNC: 124 U/L (ref 38–126)
ALT SERPL W P-5'-P-CCNC: 52 U/L (ref 13–69)
ANION GAP SERPL CALCULATED.3IONS-SCNC: 13.6 MMOL/L (ref 10–20)
AST SERPL-CCNC: 33 U/L (ref 15–46)
BILIRUB SERPL-MCNC: 0.4 MG/DL (ref 0.2–1.3)
BLD GP AB SCN SERPL QL: NEGATIVE
BUN BLD-MCNC: 18 MG/DL (ref 7–20)
BUN/CREAT SERPL: 22.5 (ref 7.1–23.5)
CALCIUM SPEC-SCNC: 9.7 MG/DL (ref 8.4–10.2)
CHLORIDE SERPL-SCNC: 102 MMOL/L (ref 98–107)
CO2 SERPL-SCNC: 27 MMOL/L (ref 26–30)
CREAT BLD-MCNC: 0.8 MG/DL (ref 0.6–1.3)
GFR SERPL CREATININE-BSD FRML MDRD: 73 ML/MIN/1.73
GLOBULIN UR ELPH-MCNC: 3.5 GM/DL
GLUCOSE BLD-MCNC: 120 MG/DL (ref 74–98)
POTASSIUM BLD-SCNC: 3.6 MMOL/L (ref 3.5–5.1)
PROT SERPL-MCNC: 7.6 G/DL (ref 6.3–8.2)
RH BLD: POSITIVE
RH BLD: POSITIVE
SODIUM BLD-SCNC: 139 MMOL/L (ref 137–145)
T&S EXPIRATION DATE: NORMAL

## 2019-06-24 PROCEDURE — 85025 COMPLETE CBC W/AUTO DIFF WBC: CPT

## 2019-06-24 PROCEDURE — 86850 RBC ANTIBODY SCREEN: CPT

## 2019-06-24 PROCEDURE — 80053 COMPREHEN METABOLIC PANEL: CPT

## 2019-06-24 PROCEDURE — 86920 COMPATIBILITY TEST SPIN: CPT

## 2019-06-24 PROCEDURE — 86900 BLOOD TYPING SEROLOGIC ABO: CPT

## 2019-06-24 PROCEDURE — 85007 BL SMEAR W/DIFF WBC COUNT: CPT

## 2019-06-24 PROCEDURE — 86901 BLOOD TYPING SEROLOGIC RH(D): CPT

## 2019-06-24 PROCEDURE — 85060 BLOOD SMEAR INTERPRETATION: CPT

## 2019-06-24 PROCEDURE — 36415 COLL VENOUS BLD VENIPUNCTURE: CPT

## 2019-06-24 PROCEDURE — 36591 DRAW BLOOD OFF VENOUS DEVICE: CPT

## 2019-06-24 RX ORDER — SODIUM CHLORIDE 9 MG/ML
250 INJECTION, SOLUTION INTRAVENOUS AS NEEDED
Status: CANCELLED | OUTPATIENT
Start: 2019-06-24

## 2019-06-24 RX ORDER — SODIUM CHLORIDE 0.9 % (FLUSH) 0.9 %
10 SYRINGE (ML) INJECTION AS NEEDED
Status: DISCONTINUED | OUTPATIENT
Start: 2019-06-24 | End: 2019-06-24 | Stop reason: HOSPADM

## 2019-06-24 RX ORDER — SODIUM CHLORIDE 0.9 % (FLUSH) 0.9 %
10 SYRINGE (ML) INJECTION AS NEEDED
Status: CANCELLED | OUTPATIENT
Start: 2019-06-24

## 2019-06-24 RX ADMIN — SODIUM CHLORIDE, PRESERVATIVE FREE 10 ML: 5 INJECTION INTRAVENOUS at 14:11

## 2019-06-24 RX ADMIN — HEPARIN 500 UNITS: 100 SYRINGE at 14:11

## 2019-06-24 NOTE — TELEPHONE ENCOUNTER
Received call from Stephanie at [facility], Critical results noted: HGB 6.7, PLT 20, WBC 0.87. [facility] can get patient in for transfusion in the morning, Steph to order blood.

## 2019-06-24 NOTE — TELEPHONE ENCOUNTER
"Patient called triage line stating that she was experiencing increased fatigue and felt as though she was \"going to pass out\" on occasion, very weak. Patient denies SOB, chest pain. Denies N/V and reports that she is eating and drinking fine. After discussion with AQUILES Jara patient will have labs done at [facility] today. Patient udated on plan of care and is in agreement, verbalized an understanding.    "

## 2019-06-24 NOTE — TELEPHONE ENCOUNTER
----- Message from Leyla Dennison RN sent at 6/24/2019  1:45 PM EDT -----  Regarding: Critical lab results  YUE Son  Date: 6/24/2019  Time: 1345  Lab Value:               HGB 6.7, PLT 20, WBC 0.87

## 2019-06-25 ENCOUNTER — INFUSION (OUTPATIENT)
Dept: ONCOLOGY | Facility: HOSPITAL | Age: 64
End: 2019-06-25

## 2019-06-25 VITALS
SYSTOLIC BLOOD PRESSURE: 167 MMHG | TEMPERATURE: 98 F | HEART RATE: 101 BPM | RESPIRATION RATE: 16 BRPM | OXYGEN SATURATION: 98 % | DIASTOLIC BLOOD PRESSURE: 74 MMHG | BODY MASS INDEX: 36.76 KG/M2 | WEIGHT: 201 LBS

## 2019-06-25 DIAGNOSIS — C34.82 MALIGNANT NEOPLASM OF OVERLAPPING SITES OF LEFT LUNG (HCC): ICD-10-CM

## 2019-06-25 DIAGNOSIS — C77.1 SECONDARY MALIGNANT NEOPLASM OF MEDIASTINAL LYMPH NODE (HCC): Primary | ICD-10-CM

## 2019-06-25 PROCEDURE — P9016 RBC LEUKOCYTES REDUCED: HCPCS

## 2019-06-25 PROCEDURE — 36430 TRANSFUSION BLD/BLD COMPNT: CPT

## 2019-06-25 PROCEDURE — 86900 BLOOD TYPING SEROLOGIC ABO: CPT

## 2019-06-25 RX ORDER — SODIUM CHLORIDE 9 MG/ML
250 INJECTION, SOLUTION INTRAVENOUS AS NEEDED
Status: DISCONTINUED | OUTPATIENT
Start: 2019-06-25 | End: 2019-06-25 | Stop reason: HOSPADM

## 2019-06-25 RX ORDER — SODIUM CHLORIDE 0.9 % (FLUSH) 0.9 %
10 SYRINGE (ML) INJECTION AS NEEDED
Status: CANCELLED | OUTPATIENT
Start: 2019-06-25

## 2019-06-25 RX ORDER — SODIUM CHLORIDE 0.9 % (FLUSH) 0.9 %
10 SYRINGE (ML) INJECTION AS NEEDED
Status: DISCONTINUED | OUTPATIENT
Start: 2019-06-25 | End: 2019-06-25 | Stop reason: HOSPADM

## 2019-06-25 RX ADMIN — SODIUM CHLORIDE, PRESERVATIVE FREE 10 ML: 5 INJECTION INTRAVENOUS at 13:55

## 2019-06-25 RX ADMIN — HEPARIN 500 UNITS: 100 SYRINGE at 13:55

## 2019-06-26 LAB
ABO + RH BLD: NORMAL
ABO + RH BLD: NORMAL
BH BB BLOOD EXPIRATION DATE: NORMAL
BH BB BLOOD EXPIRATION DATE: NORMAL
BH BB BLOOD TYPE BARCODE: 5100
BH BB BLOOD TYPE BARCODE: 5100
BH BB DISPENSE STATUS: NORMAL
BH BB DISPENSE STATUS: NORMAL
BH BB PRODUCT CODE: NORMAL
BH BB PRODUCT CODE: NORMAL
BH BB UNIT NUMBER: NORMAL
BH BB UNIT NUMBER: NORMAL
CROSSMATCH INTERPRETATION: NORMAL
CROSSMATCH INTERPRETATION: NORMAL
UNIT  ABO: NORMAL
UNIT  ABO: NORMAL
UNIT  RH: NORMAL
UNIT  RH: NORMAL

## 2019-07-01 LAB
BASOPHILS # BLD AUTO: 0.01 10*3/MM3 (ref 0–0.2)
BASOPHILS NFR BLD AUTO: 1.1 % (ref 0–1.5)
CYTOLOGIST CVX/VAG CYTO: NORMAL
DEPRECATED RDW RBC AUTO: 48.2 FL (ref 37–54)
EOSINOPHIL # BLD AUTO: 0.02 10*3/MM3 (ref 0–0.4)
EOSINOPHIL NFR BLD AUTO: 2.3 % (ref 0.3–6.2)
ERYTHROCYTE [DISTWIDTH] IN BLOOD BY AUTOMATED COUNT: 14.8 % (ref 12.3–15.4)
HCT VFR BLD AUTO: 20.1 % (ref 34–46.6)
HGB BLD-MCNC: 6.7 G/DL (ref 12–15.9)
IMM GRANULOCYTES # BLD AUTO: 0 10*3/MM3 (ref 0–0.05)
IMM GRANULOCYTES NFR BLD AUTO: 0 % (ref 0–0.5)
LYMPHOCYTES # BLD AUTO: 0.55 10*3/MM3 (ref 0.7–3.1)
LYMPHOCYTES NFR BLD AUTO: 63.2 % (ref 19.6–45.3)
MCH RBC QN AUTO: 30.5 PG (ref 26.6–33)
MCHC RBC AUTO-ENTMCNC: 33.3 G/DL (ref 31.5–35.7)
MCV RBC AUTO: 91.4 FL (ref 79–97)
MONOCYTES # BLD AUTO: 0.17 10*3/MM3 (ref 0.1–0.9)
MONOCYTES NFR BLD AUTO: 19.5 % (ref 5–12)
NEUTROPHILS # BLD AUTO: 0.12 10*3/MM3 (ref 1.7–7)
NEUTROPHILS NFR BLD AUTO: 13.9 % (ref 42.7–76)
NRBC BLD AUTO-RTO: 0 /100 WBC (ref 0–0.2)
PATH INTERP BLD-IMP: NORMAL
PLATELET # BLD AUTO: 20 10*3/MM3 (ref 140–450)
PMV BLD AUTO: 9.7 FL (ref 6–12)
RBC # BLD AUTO: 2.2 10*6/MM3 (ref 3.77–5.28)
WBC NRBC COR # BLD: 0.87 10*3/MM3 (ref 3.4–10.8)

## 2019-07-05 ENCOUNTER — HOSPITAL ENCOUNTER (OUTPATIENT)
Dept: CT IMAGING | Facility: HOSPITAL | Age: 64
Discharge: HOME | End: 2019-07-05
Admitting: INTERNAL MEDICINE

## 2019-07-05 ENCOUNTER — HOSPITAL ENCOUNTER (OUTPATIENT)
Dept: CT IMAGING | Facility: HOSPITAL | Age: 64
Discharge: HOME | End: 2019-07-05

## 2019-07-05 DIAGNOSIS — C50.912 MALIGNANT NEOPLASM OF LEFT BREAST IN FEMALE, ESTROGEN RECEPTOR POSITIVE, UNSPECIFIED SITE OF BREAST (HCC): ICD-10-CM

## 2019-07-05 DIAGNOSIS — Z17.0 MALIGNANT NEOPLASM OF LEFT BREAST IN FEMALE, ESTROGEN RECEPTOR POSITIVE, UNSPECIFIED SITE OF BREAST (HCC): ICD-10-CM

## 2019-07-05 DIAGNOSIS — C34.82 MALIGNANT NEOPLASM OF OVERLAPPING SITES OF LEFT LUNG (HCC): ICD-10-CM

## 2019-07-05 DIAGNOSIS — C77.1 SECONDARY MALIGNANT NEOPLASM OF MEDIASTINAL LYMPH NODE (HCC): ICD-10-CM

## 2019-07-05 PROCEDURE — 71260 CT THORAX DX C+: CPT

## 2019-07-05 PROCEDURE — 25010000002 IOPAMIDOL 61 % SOLUTION: Performed by: INTERNAL MEDICINE

## 2019-07-05 PROCEDURE — 74177 CT ABD & PELVIS W/CONTRAST: CPT

## 2019-07-05 RX ADMIN — IOPAMIDOL 100 ML: 612 INJECTION, SOLUTION INTRAVENOUS at 09:49

## 2019-07-08 ENCOUNTER — OFFICE VISIT (OUTPATIENT)
Dept: PULMONOLOGY | Facility: CLINIC | Age: 64
End: 2019-07-08

## 2019-07-08 VITALS
RESPIRATION RATE: 18 BRPM | WEIGHT: 200 LBS | SYSTOLIC BLOOD PRESSURE: 130 MMHG | HEIGHT: 62 IN | OXYGEN SATURATION: 97 % | BODY MASS INDEX: 36.8 KG/M2 | DIASTOLIC BLOOD PRESSURE: 70 MMHG | HEART RATE: 80 BPM

## 2019-07-08 DIAGNOSIS — R06.02 SHORTNESS OF BREATH: Primary | ICD-10-CM

## 2019-07-08 DIAGNOSIS — C34.92 SMALL CELL LUNG CANCER, LEFT (HCC): ICD-10-CM

## 2019-07-08 DIAGNOSIS — J44.9 CHRONIC OBSTRUCTIVE PULMONARY DISEASE, UNSPECIFIED COPD TYPE (HCC): ICD-10-CM

## 2019-07-08 DIAGNOSIS — J30.89 OTHER ALLERGIC RHINITIS: ICD-10-CM

## 2019-07-08 DIAGNOSIS — R06.02 SHORTNESS OF BREATH: ICD-10-CM

## 2019-07-08 PROCEDURE — 94060 EVALUATION OF WHEEZING: CPT | Performed by: INTERNAL MEDICINE

## 2019-07-08 PROCEDURE — 99214 OFFICE O/P EST MOD 30 MIN: CPT | Performed by: INTERNAL MEDICINE

## 2019-07-08 PROCEDURE — 94729 DIFFUSING CAPACITY: CPT | Performed by: INTERNAL MEDICINE

## 2019-07-08 PROCEDURE — 94726 PLETHYSMOGRAPHY LUNG VOLUMES: CPT | Performed by: INTERNAL MEDICINE

## 2019-07-08 RX ORDER — CHOLECALCIFEROL (VITAMIN D3) 1250 MCG
50000 CAPSULE ORAL
Refills: 3 | COMMUNITY
Start: 2019-04-11

## 2019-07-08 RX ORDER — HEPATITIS A VACCINE 1440 [IU]/ML
INJECTION, SUSPENSION INTRAMUSCULAR
Refills: 0 | Status: ON HOLD | COMMUNITY
Start: 2019-04-16 | End: 2019-01-01

## 2019-07-08 NOTE — PROGRESS NOTES
"Chief Complaint   Patient presents with   • Follow-up   • Shortness of Breath         Subjective   Sanjana Horton is a 63 y.o. female.     History of Present Illness   Patient comes today for follow up of shortness of breath and COPD.     Patient says that her symptoms have been stable since the last clinic visit. she reports no recent exacerbations.     Patient is using medications, as prescribed. Exercise tolerance has also remained stable.     Patient says that she has been using her nasal sprays on a regular basis and describes no significant ongoing issues other than occasional congestion.     Quit smoking 5 years ago.    She was recently switched to a different chemo and has been tolerating it fairly well.     The following portions of the patient's history were reviewed and updated as appropriate: allergies, current medications, past family history, past medical history, past social history and past surgical history.    Review of Systems   Constitutional: Positive for fatigue. Negative for chills and fever.   HENT: Positive for rhinorrhea and sinus pressure.    Respiratory: Positive for cough, shortness of breath and wheezing.    Psychiatric/Behavioral: Negative for sleep disturbance.       Objective   Visit Vitals  /70   Pulse 80   Resp 18   Ht 157.5 cm (62\")   Wt 90.7 kg (200 lb)   SpO2 97%   BMI 36.58 kg/m²       Physical Exam   Constitutional: She is oriented to person, place, and time. She appears well-developed and well-nourished.   HENT:   Head: Atraumatic.   Crowded oropharynx.   Dentures.   Eyes: EOM are normal.   Neck: Neck supple.   Cardiovascular: Normal rate and regular rhythm.   Right sided port in place.    Pulmonary/Chest: Effort normal. No respiratory distress.   Somewhat decreased A/E with out wheezing noted.   Musculoskeletal: She exhibits no edema.   Gait was normal.   Neurological: She is alert and oriented to person, place, and time.   Skin: Skin is warm and dry.   Psychiatric: " She has a normal mood and affect.   Vitals reviewed.      Assessment/Plan   Sanjana was seen today for follow-up and shortness of breath.    Diagnoses and all orders for this visit:    Shortness of breath    Chronic obstructive pulmonary disease, unspecified COPD type (CMS/HCC)    Small cell lung cancer, left (CMS/HCC)    Other allergic rhinitis    Other orders  -     Discontinue: umeclidinium-vilanterol (ANORO ELLIPTA) 62.5-25 MCG/INH aerosol powder  inhaler; Inhale 1 puff Daily.  -     umeclidinium-vilanterol (ANORO ELLIPTA) 62.5-25 MCG/INH aerosol powder  inhaler; Inhale 1 puff Daily.           Return in about 6 months (around 1/8/2020) for Recheck, Overbook.    DISCUSSION (if any):  Last CT scan was reviewed in great detail with the patient.  Results for orders placed during the hospital encounter of 07/05/19   CT Chest With Contrast    Narrative CT CHEST     INDICATION: Restaging. History of metastatic disease.     FINDINGS: Axial imaging through the chest with intravenous contrast  compared with 04/22/2019. This study was performed with techniques to  keep radiation doses as low as reasonably achievable, (ALARA).     Patient is status post left mastectomy and axillary lymph node  dissection. A predominantly fatty replaced lymph node in the right  axilla measures 2.9 cm in diameter, previously 2.8 cm. Difference is  likely positional. Heart size is normal. Small pericardial effusion is  similar to previous. No significant pleural effusion. Partially  calcified left upper paratracheal lymph node measures 1.4 cm, previously  1.8 cm. A lower right paratracheal lymph node measures 1.3 cm,  previously 1.5 cm. Numerous calcified lymph nodes are again identified.  No new areas of adenopathy. 1.5 cm low-attenuation lesion in the  posterior lateral right hepatic lobe near the dome previously measured  2.1 cm.     No pneumothorax. Again identified is airspace opacification with air  bronchograms involving the left  suprahilar region of the upper lobe, as  well as the superior segment of the left lower lobe. These findings  appear very similar to previous. Some other areas of scarring in each  lung appear unchanged. A tiny subpleural noncalcified nodule in the  periphery of the right lower lobe on series 3 image 26 is unchanged.  Tiny pleural-based right upper lobe nodule on image 20 is also stable.       Impression 1. Improving areas of adenopathy.  2. Decrease in size of a liver lesion. Continued follow-up is  recommended.  3. Other findings are unchanged.     This report was finalized on 7/5/2019 10:30 AM by Nathan Irby MD.     Reviewed Dr. Son's last note. Listed starting Carboplatin and Etoposide from May 1, 2019. It also listed progressive disease as of 4/21/2019.    I reviewed her PFTs with her. Showed mild COPD?    We have reviewed her pulmonary medications in great detail.    Any needed adjustments to her pulmonary medications, either for clinical or insurance coverage reasons, have been made and are reflected in the orders.    Compliance with medications stressed.     Side effects of prescribed medications discussed with the patient    Patient was advised to continue her nasal spray, especially given improvement in symptoms overall.        Dictated utilizing Dragon dictation.    This document was electronically signed by Holly Granados MD on 07/08/19 at 10:57 AM

## 2019-07-10 ENCOUNTER — INFUSION (OUTPATIENT)
Dept: ONCOLOGY | Facility: HOSPITAL | Age: 64
End: 2019-07-10

## 2019-07-10 ENCOUNTER — OFFICE VISIT (OUTPATIENT)
Dept: ONCOLOGY | Facility: CLINIC | Age: 64
End: 2019-07-10

## 2019-07-10 VITALS
HEART RATE: 91 BPM | RESPIRATION RATE: 19 BRPM | DIASTOLIC BLOOD PRESSURE: 70 MMHG | WEIGHT: 203 LBS | SYSTOLIC BLOOD PRESSURE: 159 MMHG | BODY MASS INDEX: 37.36 KG/M2 | HEIGHT: 62 IN | TEMPERATURE: 97.1 F

## 2019-07-10 DIAGNOSIS — C34.82 MALIGNANT NEOPLASM OF OVERLAPPING SITES OF LEFT LUNG (HCC): Primary | ICD-10-CM

## 2019-07-10 LAB
ALBUMIN SERPL-MCNC: 3.9 G/DL (ref 3.5–5)
ALBUMIN/GLOB SERPL: 1 G/DL (ref 1–2)
ALP SERPL-CCNC: 138 U/L (ref 38–126)
ALT SERPL W P-5'-P-CCNC: 37 U/L (ref 13–69)
ANION GAP SERPL CALCULATED.3IONS-SCNC: 10.8 MMOL/L (ref 10–20)
AST SERPL-CCNC: 42 U/L (ref 15–46)
BASOPHILS # BLD AUTO: 0.04 10*3/MM3 (ref 0–0.2)
BASOPHILS NFR BLD AUTO: 0.9 % (ref 0–1.5)
BILIRUB SERPL-MCNC: 0.5 MG/DL (ref 0.2–1.3)
BUN BLD-MCNC: 16 MG/DL (ref 7–20)
BUN/CREAT SERPL: 16 (ref 7.1–23.5)
CALCIUM SPEC-SCNC: 10.1 MG/DL (ref 8.4–10.2)
CHLORIDE SERPL-SCNC: 106 MMOL/L (ref 98–107)
CO2 SERPL-SCNC: 27 MMOL/L (ref 26–30)
CREAT BLD-MCNC: 1 MG/DL (ref 0.6–1.3)
DEPRECATED RDW RBC AUTO: 59.5 FL (ref 37–54)
EOSINOPHIL # BLD AUTO: 0.13 10*3/MM3 (ref 0–0.4)
EOSINOPHIL NFR BLD AUTO: 3 % (ref 0.3–6.2)
ERYTHROCYTE [DISTWIDTH] IN BLOOD BY AUTOMATED COUNT: 17.3 % (ref 12.3–15.4)
GFR SERPL CREATININE-BSD FRML MDRD: 56 ML/MIN/1.73
GLOBULIN UR ELPH-MCNC: 4 GM/DL
GLUCOSE BLD-MCNC: 102 MG/DL (ref 74–98)
HCT VFR BLD AUTO: 28.6 % (ref 34–46.6)
HGB BLD-MCNC: 9.4 G/DL (ref 12–15.9)
IMM GRANULOCYTES # BLD AUTO: 0.03 10*3/MM3 (ref 0–0.05)
IMM GRANULOCYTES NFR BLD AUTO: 0.7 % (ref 0–0.5)
LYMPHOCYTES # BLD AUTO: 0.9 10*3/MM3 (ref 0.7–3.1)
LYMPHOCYTES NFR BLD AUTO: 20.5 % (ref 19.6–45.3)
MCH RBC QN AUTO: 31.4 PG (ref 26.6–33)
MCHC RBC AUTO-ENTMCNC: 32.9 G/DL (ref 31.5–35.7)
MCV RBC AUTO: 95.7 FL (ref 79–97)
MONOCYTES # BLD AUTO: 0.52 10*3/MM3 (ref 0.1–0.9)
MONOCYTES NFR BLD AUTO: 11.9 % (ref 5–12)
NEUTROPHILS # BLD AUTO: 2.76 10*3/MM3 (ref 1.7–7)
NEUTROPHILS NFR BLD AUTO: 63 % (ref 42.7–76)
NRBC BLD AUTO-RTO: 0 /100 WBC (ref 0–0.2)
PLATELET # BLD AUTO: 167 10*3/MM3 (ref 140–450)
PMV BLD AUTO: 9.2 FL (ref 6–12)
POTASSIUM BLD-SCNC: 3.8 MMOL/L (ref 3.5–5.1)
PROT SERPL-MCNC: 7.9 G/DL (ref 6.3–8.2)
RBC # BLD AUTO: 2.99 10*6/MM3 (ref 3.77–5.28)
SODIUM BLD-SCNC: 140 MMOL/L (ref 137–145)
WBC NRBC COR # BLD: 4.38 10*3/MM3 (ref 3.4–10.8)

## 2019-07-10 PROCEDURE — 96367 TX/PROPH/DG ADDL SEQ IV INF: CPT

## 2019-07-10 PROCEDURE — 25010000002 CARBOPLATIN PER 50 MG: Performed by: INTERNAL MEDICINE

## 2019-07-10 PROCEDURE — 85025 COMPLETE CBC W/AUTO DIFF WBC: CPT

## 2019-07-10 PROCEDURE — 25010000002 ETOPOSIDE 1 GM/50ML SOLUTION 50 ML VIAL: Performed by: INTERNAL MEDICINE

## 2019-07-10 PROCEDURE — 96413 CHEMO IV INFUSION 1 HR: CPT

## 2019-07-10 PROCEDURE — 96375 TX/PRO/DX INJ NEW DRUG ADDON: CPT

## 2019-07-10 PROCEDURE — 99215 OFFICE O/P EST HI 40 MIN: CPT | Performed by: INTERNAL MEDICINE

## 2019-07-10 PROCEDURE — 96417 CHEMO IV INFUS EACH ADDL SEQ: CPT

## 2019-07-10 PROCEDURE — 96376 TX/PRO/DX INJ SAME DRUG ADON: CPT

## 2019-07-10 PROCEDURE — 36415 COLL VENOUS BLD VENIPUNCTURE: CPT

## 2019-07-10 PROCEDURE — 25010000002 PALONOSETRON 0.25 MG/5ML SOLUTION PREFILLED SYRINGE: Performed by: INTERNAL MEDICINE

## 2019-07-10 PROCEDURE — 25010000002 DEXAMETHASONE PER 1 MG: Performed by: INTERNAL MEDICINE

## 2019-07-10 PROCEDURE — 80053 COMPREHEN METABOLIC PANEL: CPT

## 2019-07-10 PROCEDURE — 96368 THER/DIAG CONCURRENT INF: CPT

## 2019-07-10 PROCEDURE — 25010000002 FOSAPREPITANT PER 1 MG: Performed by: INTERNAL MEDICINE

## 2019-07-10 PROCEDURE — 25010000002 ATEZOLIZUMAB 1200 MG/20ML SOLUTION 20 ML VIAL: Performed by: INTERNAL MEDICINE

## 2019-07-10 RX ORDER — SODIUM CHLORIDE 9 MG/ML
250 INJECTION, SOLUTION INTRAVENOUS ONCE
Status: CANCELLED | OUTPATIENT
Start: 2019-07-10

## 2019-07-10 RX ORDER — PALONOSETRON 0.05 MG/ML
0.25 INJECTION, SOLUTION INTRAVENOUS ONCE
Status: COMPLETED | OUTPATIENT
Start: 2019-07-10 | End: 2019-07-10

## 2019-07-10 RX ORDER — SODIUM CHLORIDE 9 MG/ML
250 INJECTION, SOLUTION INTRAVENOUS ONCE
Status: DISCONTINUED | OUTPATIENT
Start: 2019-07-10 | End: 2019-07-10 | Stop reason: HOSPADM

## 2019-07-10 RX ORDER — SODIUM CHLORIDE 9 MG/ML
250 INJECTION, SOLUTION INTRAVENOUS ONCE
Status: CANCELLED | OUTPATIENT
Start: 2019-07-11

## 2019-07-10 RX ORDER — SODIUM CHLORIDE 0.9 % (FLUSH) 0.9 %
10 SYRINGE (ML) INJECTION AS NEEDED
Status: CANCELLED | OUTPATIENT
Start: 2019-07-10

## 2019-07-10 RX ORDER — SODIUM CHLORIDE 9 MG/ML
250 INJECTION, SOLUTION INTRAVENOUS ONCE
Status: CANCELLED | OUTPATIENT
Start: 2019-07-12

## 2019-07-10 RX ORDER — SODIUM CHLORIDE 0.9 % (FLUSH) 0.9 %
10 SYRINGE (ML) INJECTION AS NEEDED
Status: DISCONTINUED | OUTPATIENT
Start: 2019-07-10 | End: 2019-07-10 | Stop reason: HOSPADM

## 2019-07-10 RX ORDER — PALONOSETRON 0.05 MG/ML
0.25 INJECTION, SOLUTION INTRAVENOUS ONCE
Status: CANCELLED | OUTPATIENT
Start: 2019-07-10

## 2019-07-10 RX ORDER — PROCHLORPERAZINE MALEATE 10 MG
10 TABLET ORAL ONCE
Status: CANCELLED | OUTPATIENT
Start: 2019-07-11 | End: 2019-07-10

## 2019-07-10 RX ORDER — FAMOTIDINE 10 MG/ML
20 INJECTION, SOLUTION INTRAVENOUS AS NEEDED
Status: CANCELLED | OUTPATIENT
Start: 2019-07-10

## 2019-07-10 RX ORDER — DIPHENHYDRAMINE HYDROCHLORIDE 50 MG/ML
50 INJECTION INTRAMUSCULAR; INTRAVENOUS AS NEEDED
Status: CANCELLED | OUTPATIENT
Start: 2019-07-10

## 2019-07-10 RX ORDER — PROCHLORPERAZINE MALEATE 10 MG
10 TABLET ORAL ONCE
Status: CANCELLED | OUTPATIENT
Start: 2019-07-12 | End: 2019-07-10

## 2019-07-10 RX ADMIN — DEXAMETHASONE SODIUM PHOSPHATE 12 MG: 4 INJECTION, SOLUTION INTRAMUSCULAR; INTRAVENOUS at 11:06

## 2019-07-10 RX ADMIN — CARBOPLATIN 550 MG: 10 INJECTION, SOLUTION INTRAVENOUS at 12:08

## 2019-07-10 RX ADMIN — SODIUM CHLORIDE, PRESERVATIVE FREE 10 ML: 5 INJECTION INTRAVENOUS at 13:43

## 2019-07-10 RX ADMIN — PALONOSETRON 0.25 MG: 0.25 INJECTION, SOLUTION INTRAVENOUS at 10:59

## 2019-07-10 RX ADMIN — HEPARIN 500 UNITS: 100 SYRINGE at 13:43

## 2019-07-10 RX ADMIN — ETOPOSIDE 190 MG: 20 INJECTION, SOLUTION, CONCENTRATE INTRAVENOUS at 12:41

## 2019-07-10 RX ADMIN — SODIUM CHLORIDE 150 MG: 9 INJECTION, SOLUTION INTRAVENOUS at 11:06

## 2019-07-10 RX ADMIN — ATEZOLIZUMAB 1200 MG: 1200 INJECTION, SOLUTION INTRAVENOUS at 11:27

## 2019-07-10 NOTE — PROGRESS NOTES
DATE OF VISIT: 7/10/2019    REASON FOR VISIT: Followup for small cell lung cancer     HISTORY OF PRESENT ILLNESS: The patient is a very pleasant 63 y.o. female  with past medical history significant for small cell lung cancer diagnosed August 2017.  Repeated scans done on April 21, 2019 revealed progressive disease.  She was started on carboplatin and etoposide and Tecentriq April 30, 2019.  The patient is here today for scheduled follow up visit with treatment, cycle #4.     SUBJECTIVE: The patient is here today with her daughter.  All in all she has been able to tolerate treatment fairly well but she does have mild fatigue.  She has nausea but no vibration to complain of mild itching.    PAST MEDICAL HISTORY/SOCIAL HISTORY/FAMILY HISTORY: Reviewed by me and unchanged from my documentation done on 07/10/19.    Review of Systems   Constitutional: Positive for fatigue. Negative for activity change, appetite change, chills, fever and unexpected weight change.   HENT: Negative for hearing loss, mouth sores, nosebleeds, sore throat and trouble swallowing.    Eyes: Negative for visual disturbance.   Respiratory: Negative for cough, chest tightness, shortness of breath and wheezing.    Cardiovascular: Negative for chest pain, palpitations and leg swelling.   Gastrointestinal: Positive for nausea. Negative for abdominal distention, abdominal pain, blood in stool, constipation, diarrhea, rectal pain and vomiting.   Endocrine: Negative for cold intolerance and heat intolerance.   Genitourinary: Negative for difficulty urinating, dysuria, frequency and urgency.   Musculoskeletal: Negative for arthralgias, back pain, gait problem, joint swelling and myalgias.   Skin: Negative for rash.        Itching     Neurological: Negative for dizziness, tremors, syncope, weakness, light-headedness, numbness and headaches.   Hematological: Negative for adenopathy. Does not bruise/bleed easily.   Psychiatric/Behavioral: Negative for  confusion, sleep disturbance and suicidal ideas. The patient is not nervous/anxious.          Current Outpatient Medications:   •  albuterol (VENTOLIN HFA) 108 (90 Base) MCG/ACT inhaler, Inhale 2 puffs Every 4 (Four) Hours As Needed for Wheezing or Shortness of Air., Disp: 1 inhaler, Rfl: 5  •  amLODIPine (NORVASC) 5 MG tablet, Take 5 mg by mouth Daily., Disp: , Rfl: 0  •  aspirin 81 MG EC tablet, Take 81 mg by mouth Daily., Disp: , Rfl:   •  azelastine (ASTELIN) 0.1 % nasal spray, 1 spray into the nostril(s) as directed by provider 2 (Two) Times a Day As Needed for Rhinitis or Allergies. Use in each nostril as directed, Disp: 1 each, Rfl: 5  •  benzonatate (TESSALON PERLES) 100 MG capsule, Take 1-2 capsules by mouth 3 (Three) Times a Day As Needed for Cough., Disp: 120 capsule, Rfl: 5  •  Cholecalciferol (VITAMIN D3) 05830 units capsule, TK 1 C PO Q WK, Disp: , Rfl: 3  •  guaifenesin-codeine (GUAIFENESIN AC) 100-10 MG/5ML liquid, Take 5-10 mL by mouth 3 (Three) Times a Day As Needed for Cough., Disp: 480 mL, Rfl: 2  •  HAVRIX 1440 EL U/ML vaccine, ADM 1ML IM UTD, Disp: , Rfl: 0  •  hydrochlorothiazide (MICROZIDE) 12.5 MG capsule, Take 12.5 mg by mouth Every Morning. Pt reports that she takes this when swelled as needed., Disp: , Rfl: 0  •  hydrocortisone 2.5 % cream, Apply  topically to the appropriate area as directed 2 (Two) Times a Day. (Patient taking differently: Apply  topically to the appropriate area as directed 2 (Two) Times a Day. Rash improved so pt no longer needed to use it.), Disp: 20 g, Rfl: 0  •  lidocaine-prilocaine (EMLA) 2.5-2.5 % cream, Apply  topically to the appropriate area as directed As Needed (45-60 minutes prior to port access.  Cover with saran/plastic wrap.)., Disp: 30 g, Rfl: 3  •  losartan-hydrochlorothiazide (HYZAAR) 100-12.5 MG per tablet, Take 1 tablet by mouth Daily., Disp: , Rfl: 0  •  metFORMIN (GLUCOPHAGE) 500 MG tablet, Take 500 mg by mouth 2 (Two) Times a Day With Meals.,  "Disp: , Rfl: 0  •  Nystatin (MAGIC MOUTHWASH), Swish and spit 10 mL Every 4 (Four) Hours As Needed (sore mouth)., Disp: 240 mL, Rfl: 1  •  ondansetron (ZOFRAN) 8 MG tablet, Take 1 tablet by mouth 3 (Three) Times a Day As Needed for Nausea or Vomiting., Disp: 30 tablet, Rfl: 5  •  pantoprazole (PROTONIX) 40 MG EC tablet, Take 1 tablet by mouth Every 12 (Twelve) Hours., Disp: 60 tablet, Rfl: 0  •  potassium chloride ER (K-TAB) 20 MEQ tablet controlled-release ER tablet, Take 1 tablet by mouth 2 (Two) Times a Day., Disp: 30 tablet, Rfl: 1  •  tamoxifen (NOLVADEX) 20 MG chemo tablet, Take 1 tablet by mouth Daily., Disp: 30 tablet, Rfl: 0  •  triamcinolone (KENALOG) 0.1 % ointment, Apply  topically to the appropriate area as directed 2 (Two) Times a Day., Disp: 30 g, Rfl: 3  •  umeclidinium-vilanterol (ANORO ELLIPTA) 62.5-25 MCG/INH aerosol powder  inhaler, Inhale 1 puff Daily., Disp: 1 each, Rfl: 11    PHYSICAL EXAMINATION:   /70   Pulse 91   Temp 97.1 °F (36.2 °C) (Temporal)   Resp 19   Ht 157.5 cm (62\")   Wt 92.1 kg (203 lb)   BMI 37.13 kg/m²    ECOG Performance Status: 1 - Symptomatic but completely ambulatory  General Appearance:  alert, cooperative, no apparent distress and appears stated age   Neurologic/Psychiatric: A&O x 3, gait steady, appropriate affect, strength 5/5 in all muscle groups   HEENT:  Normocephalic, without obvious abnormality, mucous membranes moist   Neck: Supple, symmetrical, trachea midline, no adenopathy;  No thyromegaly, masses, or tenderness   Lungs:   Clear to auscultation bilaterally; respirations regular, even, and unlabored bilaterally   Heart:  Regular rate and rhythm, no murmurs appreciated   Abdomen:   Soft, non-tender, non-distended and no organomegaly   Lymph nodes: No cervical, supraclavicular, inguinal or axillary adenopathy noted   Extremities: Normal, atraumatic; no clubbing, cyanosis, or edema    Skin: No rashes, ulcers, or suspicious lesions noted     No visits " with results within 2 Week(s) from this visit.   Latest known visit with results is:   Infusion on 06/25/2019   Component Date Value Ref Range Status   • Product Code 06/26/2019 [id number]   Final   • Unit Number 06/26/2019 [id number]   Final   • UNIT  ABO 06/26/2019 O   Final   • UNIT  RH 06/26/2019 POS   Final   • Crossmatch Interpretation 06/26/2019 Compatible   Final   • Dispense Status 06/26/2019 PT   Final   • Blood Type 06/26/2019 OPOS   Final   • Blood Expiration Date 06/26/2019 201907262359   Final   • Blood Type Barcode 06/26/2019 5100   Final   • Product Code 06/26/2019 [id number]   Final   • Unit Number 06/26/2019 [id number]   Final   • UNIT  ABO 06/26/2019 O   Final   • UNIT  RH 06/26/2019 POS   Final   • Crossmatch Interpretation 06/26/2019 Compatible   Final   • Dispense Status 06/26/2019 PT   Final   • Blood Type 06/26/2019 OPOS   Final   • Blood Expiration Date 06/26/2019 [id number]   Final   • Blood Type Barcode 06/26/2019 5100   Final        Ct Chest With Contrast    Result Date: 7/5/2019  Narrative: CT CHEST  INDICATION: Restaging. History of metastatic disease.  FINDINGS: Axial imaging through the chest with intravenous contrast compared with 04/22/2019. This study was performed with techniques to keep radiation doses as low as reasonably achievable, (ALARA).  Patient is status post left mastectomy and axillary lymph node dissection. A predominantly fatty replaced lymph node in the right axilla measures 2.9 cm in diameter, previously 2.8 cm. Difference is likely positional. Heart size is normal. Small pericardial effusion is similar to previous. No significant pleural effusion. Partially calcified left upper paratracheal lymph node measures 1.4 cm, previously 1.8 cm. A lower right paratracheal lymph node measures 1.3 cm, previously 1.5 cm. Numerous calcified lymph nodes are again identified. No new areas of adenopathy. 1.5 cm low-attenuation lesion in the posterior lateral right hepatic  lobe near the dome previously measured 2.1 cm.  No pneumothorax. Again identified is airspace opacification with air bronchograms involving the left suprahilar region of the upper lobe, as well as the superior segment of the left lower lobe. These findings appear very similar to previous. Some other areas of scarring in each lung appear unchanged. A tiny subpleural noncalcified nodule in the periphery of the right lower lobe on series 3 image 26 is unchanged. Tiny pleural-based right upper lobe nodule on image 20 is also stable.      Impression: 1. Improving areas of adenopathy. 2. Decrease in size of a liver lesion. Continued follow-up is recommended. 3. Other findings are unchanged.  This report was finalized on 7/5/2019 10:30 AM by Nathan Irby MD.    Ct Abdomen Pelvis With Contrast    Result Date: 7/5/2019  Narrative: CT ABDOMEN AND PELVIS  INDICATION: Restaging cancer.  FINDINGS: Axial imaging through the abdomen and pelvis with intravenous contrast. Oral contrast was not administered. Comparison with 10/18/2016, as well as a more recent CT of the chest dated 04/22/2019. This study was performed with techniques to keep radiation doses as low as reasonably achievable, (ALARA).  A low-attenuation lesion in the periphery of the right hepatic lobe near the dome is new since the 2016 exam but decreased compared to chest CT dated 04/22/2019. This was described in further detail on the report from the CT of the chest performed today. Otherwise, no new liver lesions. Slight thickening of the adrenal glands appears unchanged. Other abdominal solid organs demonstrate no acute abnormality. Gallbladder is unremarkable. No bowel obstruction. Normal appendix. Urinary bladder is unremarkable. Uterus and ovaries have an unremarkable CT appearance. No free fluid, free air or focal fluid collection. Scattered normal size lymph nodes without areas of adenopathy by size criteria. Some of the lymph nodes in the upper abdomen are  near the upper limits of normal but appear unchanged. Scattered degenerative changes within the spine and pelvis. Some slight heterogeneity of the superior aspect of the L2 vertebral body near the L1-2 disc space may be reactive to mild degenerative disc disease changes at this level. Developing metastatic disease thought to be a less likely consideration.      Impression: 1. Low-density lesion in the right lobe of the liver appears decreased in size compared to a chest CT from 04/22/2019. 2. Other chronic appearing findings.  This report was finalized on 7/5/2019 10:29 AM by Nathan Irby MD.      ASSESSMENT: The patient is a very pleasant 63 y.o. female  with small cell lung cancer    PROBLEM LIST:  1. Small cell lung cancer from the left upper lobe.  Limited stage disease, recurrent in a mediastinal node  A.  Treated  initially with etoposide and carboplatin starting 9/13/27.  She responded to the single modality that was started because of symptoms including airway compromise.  B.  Completed combined modality chemoradiation with etoposide and carboplatin . (Chemotherapy completed 12/7/2017).  C.  Recurrence in left paratracheal node.  This was seen on CT scan from 6/19/18 and hypermetabolic on PET scan to confirm.  She's been treated with weekly taxol starting 7/17/2018 along with involved field radiation.  D. Left pleural effusion seen on CT of 11/23/18, loculated and inaccessible for thoracentesis.  E.  Progressive disease documented on scans done April 22, 2019 with increase in size of left paratracheal lymph node and right hepatic lobe mass.  F. Started carboplatin and etoposide with Tecentriq May 1, 2019, status post 3 cycles  2.  Anemia.  This appears to be predominantly from chemoradiation.  3.  History of left breast cancer.  A.  Left mastectomy 7/22/2008 for stage IIIA, T3 N1 M0 breast cancer  B.  ER weakly positive, [place] negative and HER-2 positive  C.  Adjuvant chemotherapy with Herceptin, Taxotere and  carboplatin  D.  Postmastectomy radiation done at [facility]  E.  Adjuvant tamoxifen started 12/12/2008 and continued as extended adjuvant therapy  4.  COPD.  5. Diabetes  6. HTN  7.  Cough  8.  Treatment induced itching 1    PLAN:  1. I will proceed with treatment as scheduled today carboplatin and etoposide as well as Tecentriq, cycle #4.  This is an FDA approved treatment for extensive stage small cell lung cancer.   2. The patient will follow up with me in 3 weeks for cycle #5.  3.  I will continue port maintenance.   4.  I will monitor the patient blood work while she is on treatment including blood counts kidney function liver function and electrolytes.  5. We discussed potential side effects of immunotherapy including but not limited to immune mediated reactions with thyroiditis, pneumonitis, hepatitis, colitis, rash, and electrolytes abnormalities, fatigue, multiorgan failure, and possibly death.  6. We reviewed the potential side effects of this regimen including fatigue, vomiting and nausea, hair loss, nephropathy, neuropathy, hearing loss, myelosuppression, and risk of infusion reaction.  7. She has completed 10 years of Tamoxifen at this point.   8. Will continue inhalers as needed for COPD  9. Will continue Metformin for type 2 DM.  Her sugars might get higher while she is in treatment secondary to pre-infusion steroids.  10. Will continue Hyzaar for HTN.  We may have to adjust her antihypertensive agent while she is in treatment.  11.  I did go over the scan results with the patient, I reviewed the films myself, she is having partial response.  I repeat scans in 3 months will be due October 2019.  12.  I will continue the patient on Robitussin with codeine as needed for cough.  13.  I will start the patient on Benadryl as needed for itching.  Josh Son MD  7/10/2019

## 2019-07-11 ENCOUNTER — INFUSION (OUTPATIENT)
Dept: ONCOLOGY | Facility: HOSPITAL | Age: 64
End: 2019-07-11

## 2019-07-11 VITALS
DIASTOLIC BLOOD PRESSURE: 65 MMHG | SYSTOLIC BLOOD PRESSURE: 147 MMHG | BODY MASS INDEX: 37.31 KG/M2 | HEART RATE: 76 BPM | RESPIRATION RATE: 16 BRPM | WEIGHT: 204 LBS | TEMPERATURE: 98 F

## 2019-07-11 DIAGNOSIS — C34.82 MALIGNANT NEOPLASM OF OVERLAPPING SITES OF LEFT LUNG (HCC): Primary | ICD-10-CM

## 2019-07-11 PROCEDURE — 25010000002 ETOPOSIDE 1 GM/50ML SOLUTION 50 ML VIAL: Performed by: INTERNAL MEDICINE

## 2019-07-11 PROCEDURE — 63710000001 PROCHLORPERAZINE MALEATE PER 10 MG: Performed by: INTERNAL MEDICINE

## 2019-07-11 PROCEDURE — 96413 CHEMO IV INFUSION 1 HR: CPT

## 2019-07-11 RX ORDER — SODIUM CHLORIDE 0.9 % (FLUSH) 0.9 %
10 SYRINGE (ML) INJECTION AS NEEDED
Status: DISCONTINUED | OUTPATIENT
Start: 2019-07-11 | End: 2019-07-11 | Stop reason: HOSPADM

## 2019-07-11 RX ORDER — PROCHLORPERAZINE MALEATE 10 MG
10 TABLET ORAL ONCE
Status: COMPLETED | OUTPATIENT
Start: 2019-07-11 | End: 2019-07-11

## 2019-07-11 RX ORDER — SODIUM CHLORIDE 9 MG/ML
250 INJECTION, SOLUTION INTRAVENOUS ONCE
Status: DISCONTINUED | OUTPATIENT
Start: 2019-07-11 | End: 2019-07-11 | Stop reason: HOSPADM

## 2019-07-11 RX ORDER — SODIUM CHLORIDE 0.9 % (FLUSH) 0.9 %
10 SYRINGE (ML) INJECTION AS NEEDED
Status: CANCELLED | OUTPATIENT
Start: 2019-07-11

## 2019-07-11 RX ADMIN — HEPARIN 500 UNITS: 100 SYRINGE at 14:27

## 2019-07-11 RX ADMIN — SODIUM CHLORIDE, PRESERVATIVE FREE 10 ML: 5 INJECTION INTRAVENOUS at 14:27

## 2019-07-11 RX ADMIN — PROCHLORPERAZINE MALEATE 10 MG: 10 TABLET, FILM COATED ORAL at 13:15

## 2019-07-11 RX ADMIN — ETOPOSIDE 190 MG: 20 INJECTION, SOLUTION, CONCENTRATE INTRAVENOUS at 13:29

## 2019-07-12 ENCOUNTER — INFUSION (OUTPATIENT)
Dept: ONCOLOGY | Facility: HOSPITAL | Age: 64
End: 2019-07-12

## 2019-07-12 VITALS
BODY MASS INDEX: 37.31 KG/M2 | HEART RATE: 86 BPM | SYSTOLIC BLOOD PRESSURE: 130 MMHG | RESPIRATION RATE: 16 BRPM | TEMPERATURE: 97.1 F | DIASTOLIC BLOOD PRESSURE: 58 MMHG | WEIGHT: 204 LBS

## 2019-07-12 DIAGNOSIS — C34.82 MALIGNANT NEOPLASM OF OVERLAPPING SITES OF LEFT LUNG (HCC): Primary | ICD-10-CM

## 2019-07-12 PROCEDURE — 96413 CHEMO IV INFUSION 1 HR: CPT

## 2019-07-12 PROCEDURE — 25010000002 ETOPOSIDE 1 GM/50ML SOLUTION 50 ML VIAL: Performed by: INTERNAL MEDICINE

## 2019-07-12 PROCEDURE — 63710000001 PROCHLORPERAZINE MALEATE PER 10 MG: Performed by: INTERNAL MEDICINE

## 2019-07-12 RX ORDER — PROCHLORPERAZINE MALEATE 10 MG
10 TABLET ORAL ONCE
Status: COMPLETED | OUTPATIENT
Start: 2019-07-12 | End: 2019-07-12

## 2019-07-12 RX ORDER — SODIUM CHLORIDE 9 MG/ML
250 INJECTION, SOLUTION INTRAVENOUS ONCE
Status: DISCONTINUED | OUTPATIENT
Start: 2019-07-12 | End: 2019-07-12 | Stop reason: HOSPADM

## 2019-07-12 RX ORDER — SODIUM CHLORIDE 0.9 % (FLUSH) 0.9 %
10 SYRINGE (ML) INJECTION AS NEEDED
Status: CANCELLED | OUTPATIENT
Start: 2019-07-12

## 2019-07-12 RX ORDER — SODIUM CHLORIDE 0.9 % (FLUSH) 0.9 %
10 SYRINGE (ML) INJECTION AS NEEDED
Status: DISCONTINUED | OUTPATIENT
Start: 2019-07-12 | End: 2019-07-12 | Stop reason: HOSPADM

## 2019-07-12 RX ADMIN — SODIUM CHLORIDE, PRESERVATIVE FREE 10 ML: 5 INJECTION INTRAVENOUS at 14:46

## 2019-07-12 RX ADMIN — PROCHLORPERAZINE MALEATE 10 MG: 10 TABLET, FILM COATED ORAL at 13:22

## 2019-07-12 RX ADMIN — HEPARIN 500 UNITS: 100 SYRINGE at 14:46

## 2019-07-12 RX ADMIN — ETOPOSIDE 190 MG: 20 INJECTION, SOLUTION, CONCENTRATE INTRAVENOUS at 13:46

## 2019-07-30 NOTE — PROGRESS NOTES
DATE OF VISIT: 7/31/2019    REASON FOR VISIT: Followup for small cell lung cancer     HISTORY OF PRESENT ILLNESS: The patient is a very pleasant 63 y.o. female  with past medical history significant for small cell lung cancer diagnosed August 2017.  Repeated scans done on April 21, 2019 revealed progressive disease.  She was started on carboplatin and etoposide and Tecentriq April 30, 2019.  The patient is here today for scheduled follow up visit with maintenance tecentriq.     SUBJECTIVE: The patient is here today by herself.  She feels very fatigued and like she may need blood. Overall, her appetite has been good and the biggest complaint is her fatigue.  She does have some shortness of breath with activity.  She has nausea but no vomiting.  She does have some mild itching.    PAST MEDICAL HISTORY/SOCIAL HISTORY/FAMILY HISTORY: Reviewed by me and unchanged from my documentation done on 07/31/19.    Review of Systems   Constitutional: Positive for fatigue. Negative for activity change, appetite change, chills, fever and unexpected weight change.   HENT: Negative for hearing loss, mouth sores, nosebleeds, sore throat and trouble swallowing.    Eyes: Negative for visual disturbance.   Respiratory: Negative for cough, chest tightness, shortness of breath and wheezing.    Cardiovascular: Negative for chest pain, palpitations and leg swelling.   Gastrointestinal: Positive for nausea. Negative for abdominal distention, abdominal pain, blood in stool, constipation, diarrhea, rectal pain and vomiting.   Endocrine: Negative for cold intolerance and heat intolerance.   Genitourinary: Negative for difficulty urinating, dysuria, frequency and urgency.   Musculoskeletal: Negative for arthralgias, back pain, gait problem, joint swelling and myalgias.   Skin: Negative for rash.        Itching     Neurological: Negative for dizziness, tremors, syncope, weakness, light-headedness, numbness and headaches.   Hematological: Negative  for adenopathy. Does not bruise/bleed easily.   Psychiatric/Behavioral: Negative for confusion, sleep disturbance and suicidal ideas. The patient is not nervous/anxious.          Current Outpatient Medications:   •  albuterol (VENTOLIN HFA) 108 (90 Base) MCG/ACT inhaler, Inhale 2 puffs Every 4 (Four) Hours As Needed for Wheezing or Shortness of Air., Disp: 1 inhaler, Rfl: 5  •  amLODIPine (NORVASC) 5 MG tablet, Take 5 mg by mouth Daily., Disp: , Rfl: 0  •  aspirin 81 MG EC tablet, Take 81 mg by mouth Daily., Disp: , Rfl:   •  azelastine (ASTELIN) 0.1 % nasal spray, 1 spray into the nostril(s) as directed by provider 2 (Two) Times a Day As Needed for Rhinitis or Allergies. Use in each nostril as directed, Disp: 1 each, Rfl: 5  •  benzonatate (TESSALON PERLES) 100 MG capsule, Take 1-2 capsules by mouth 3 (Three) Times a Day As Needed for Cough., Disp: 120 capsule, Rfl: 5  •  Cholecalciferol (VITAMIN D3) 29132 units capsule, TK 1 C PO Q WK, Disp: , Rfl: 3  •  guaifenesin-codeine (GUAIFENESIN AC) 100-10 MG/5ML liquid, Take 5-10 mL by mouth 3 (Three) Times a Day As Needed for Cough., Disp: 480 mL, Rfl: 2  •  HAVRIX 1440 EL U/ML vaccine, ADM 1ML IM UTD, Disp: , Rfl: 0  •  hydrochlorothiazide (MICROZIDE) 12.5 MG capsule, Take 12.5 mg by mouth Every Morning. Pt reports that she takes this when swelled as needed., Disp: , Rfl: 0  •  hydrocortisone 2.5 % cream, Apply  topically to the appropriate area as directed 2 (Two) Times a Day. (Patient taking differently: Apply  topically to the appropriate area as directed 2 (Two) Times a Day. Rash improved so pt no longer needed to use it.), Disp: 20 g, Rfl: 0  •  lidocaine-prilocaine (EMLA) 2.5-2.5 % cream, Apply  topically to the appropriate area as directed As Needed (45-60 minutes prior to port access.  Cover with saran/plastic wrap.)., Disp: 30 g, Rfl: 3  •  losartan-hydrochlorothiazide (HYZAAR) 100-12.5 MG per tablet, Take 1 tablet by mouth Daily., Disp: , Rfl: 0  •  metFORMIN  "(GLUCOPHAGE) 500 MG tablet, Take 500 mg by mouth 2 (Two) Times a Day With Meals., Disp: , Rfl: 0  •  Nystatin (MAGIC MOUTHWASH), Swish and spit 10 mL Every 4 (Four) Hours As Needed (sore mouth)., Disp: 240 mL, Rfl: 1  •  ondansetron (ZOFRAN) 8 MG tablet, Take 1 tablet by mouth 3 (Three) Times a Day As Needed for Nausea or Vomiting., Disp: 30 tablet, Rfl: 5  •  pantoprazole (PROTONIX) 40 MG EC tablet, Take 1 tablet by mouth Every 12 (Twelve) Hours., Disp: 60 tablet, Rfl: 0  •  potassium chloride ER (K-TAB) 20 MEQ tablet controlled-release ER tablet, Take 1 tablet by mouth 2 (Two) Times a Day., Disp: 30 tablet, Rfl: 1  •  tamoxifen (NOLVADEX) 20 MG chemo tablet, Take 1 tablet by mouth Daily., Disp: 30 tablet, Rfl: 0  •  triamcinolone (KENALOG) 0.1 % ointment, Apply  topically to the appropriate area as directed 2 (Two) Times a Day., Disp: 30 g, Rfl: 3  •  umeclidinium-vilanterol (ANORO ELLIPTA) 62.5-25 MCG/INH aerosol powder  inhaler, Inhale 1 puff Daily., Disp: 1 each, Rfl: 11    PHYSICAL EXAMINATION:   /63   Pulse 108   Temp 97.4 °F (36.3 °C) (Temporal)   Resp 19   Ht 157.5 cm (62\")   Wt 91.6 kg (202 lb)   BMI 36.95 kg/m²    ECOG Performance Status: 1 - Symptomatic but completely ambulatory  General Appearance:  alert, cooperative, no apparent distress and appears stated age   Neurologic/Psychiatric: A&O x 3, gait steady, appropriate affect, strength 5/5 in all muscle groups   HEENT:  Normocephalic, without obvious abnormality, mucous membranes moist   Neck: Supple, symmetrical, trachea midline, no adenopathy;  No thyromegaly, masses, or tenderness   Lungs:   Clear to auscultation bilaterally; respirations regular, even, and unlabored bilaterally   Heart:  Regular rate and rhythm, no murmurs appreciated   Abdomen:   Soft, non-tender, non-distended and no organomegaly   Lymph nodes: No cervical, supraclavicular, inguinal or axillary adenopathy noted   Extremities: Normal, atraumatic; no clubbing, " cyanosis, or edema    Skin: No rashes, ulcers, or suspicious lesions noted     No visits with results within 2 Week(s) from this visit.   Latest known visit with results is:   Infusion on 07/10/2019   Component Date Value Ref Range Status   • Glucose 07/10/2019 102* 74 - 98 mg/dL Final   • BUN 07/10/2019 16  7 - 20 mg/dL Final   • Creatinine 07/10/2019 1.00  0.60 - 1.30 mg/dL Final   • Sodium 07/10/2019 140  137 - 145 mmol/L Final   • Potassium 07/10/2019 3.8  3.5 - 5.1 mmol/L Final   • Chloride 07/10/2019 106  98 - 107 mmol/L Final   • CO2 07/10/2019 27.0  26.0 - 30.0 mmol/L Final   • Calcium 07/10/2019 10.1  8.4 - 10.2 mg/dL Final   • Total Protein 07/10/2019 7.9  6.3 - 8.2 g/dL Final   • Albumin 07/10/2019 3.90  3.50 - 5.00 g/dL Final   • ALT (SGPT) 07/10/2019 37  13 - 69 U/L Final   • AST (SGOT) 07/10/2019 42  15 - 46 U/L Final   • Alkaline Phosphatase 07/10/2019 138* 38 - 126 U/L Final   • Total Bilirubin 07/10/2019 0.5  0.2 - 1.3 mg/dL Final   • eGFR Non African Amer 07/10/2019 56* >60 mL/min/1.73 Final   • Globulin 07/10/2019 4.0  gm/dL Final   • A/G Ratio 07/10/2019 1.0  1.0 - 2.0 g/dL Final   • BUN/Creatinine Ratio 07/10/2019 16.0  7.1 - 23.5 Final   • Anion Gap 07/10/2019 10.8  10.0 - 20.0 mmol/L Final   • WBC 07/10/2019 4.38  3.40 - 10.80 10*3/mm3 Final   • RBC 07/10/2019 2.99* 3.77 - 5.28 10*6/mm3 Final   • Hemoglobin 07/10/2019 9.4* 12.0 - 15.9 g/dL Final   • Hematocrit 07/10/2019 28.6* 34.0 - 46.6 % Final   • MCV 07/10/2019 95.7  79.0 - 97.0 fL Final   • MCH 07/10/2019 31.4  26.6 - 33.0 pg Final   • MCHC 07/10/2019 32.9  31.5 - 35.7 g/dL Final   • RDW 07/10/2019 17.3* 12.3 - 15.4 % Final   • RDW-SD 07/10/2019 59.5* 37.0 - 54.0 fl Final   • MPV 07/10/2019 9.2  6.0 - 12.0 fL Final   • Platelets 07/10/2019 167  140 - 450 10*3/mm3 Final   • Neutrophil % 07/10/2019 63.0  42.7 - 76.0 % Final   • Lymphocyte % 07/10/2019 20.5  19.6 - 45.3 % Final   • Monocyte % 07/10/2019 11.9  5.0 - 12.0 % Final   •  Eosinophil % 07/10/2019 3.0  0.3 - 6.2 % Final   • Basophil % 07/10/2019 0.9  0.0 - 1.5 % Final   • Immature Grans % 07/10/2019 0.7* 0.0 - 0.5 % Final   • Neutrophils, Absolute 07/10/2019 2.76  1.70 - 7.00 10*3/mm3 Final   • Lymphocytes, Absolute 07/10/2019 0.90  0.70 - 3.10 10*3/mm3 Final   • Monocytes, Absolute 07/10/2019 0.52  0.10 - 0.90 10*3/mm3 Final   • Eosinophils, Absolute 07/10/2019 0.13  0.00 - 0.40 10*3/mm3 Final   • Basophils, Absolute 07/10/2019 0.04  0.00 - 0.20 10*3/mm3 Final   • Immature Grans, Absolute 07/10/2019 0.03  0.00 - 0.05 10*3/mm3 Final   • nRBC 07/10/2019 0.0  0.0 - 0.2 /100 WBC Final        Ct Chest With Contrast    Result Date: 7/5/2019  Narrative: CT CHEST  INDICATION: Restaging. History of metastatic disease.  FINDINGS: Axial imaging through the chest with intravenous contrast compared with 04/22/2019. This study was performed with techniques to keep radiation doses as low as reasonably achievable, (ALARA).  Patient is status post left mastectomy and axillary lymph node dissection. A predominantly fatty replaced lymph node in the right axilla measures 2.9 cm in diameter, previously 2.8 cm. Difference is likely positional. Heart size is normal. Small pericardial effusion is similar to previous. No significant pleural effusion. Partially calcified left upper paratracheal lymph node measures 1.4 cm, previously 1.8 cm. A lower right paratracheal lymph node measures 1.3 cm, previously 1.5 cm. Numerous calcified lymph nodes are again identified. No new areas of adenopathy. 1.5 cm low-attenuation lesion in the posterior lateral right hepatic lobe near the dome previously measured 2.1 cm.  No pneumothorax. Again identified is airspace opacification with air bronchograms involving the left suprahilar region of the upper lobe, as well as the superior segment of the left lower lobe. These findings appear very similar to previous. Some other areas of scarring in each lung appear unchanged. A tiny  subpleural noncalcified nodule in the periphery of the right lower lobe on series 3 image 26 is unchanged. Tiny pleural-based right upper lobe nodule on image 20 is also stable.      Impression: 1. Improving areas of adenopathy. 2. Decrease in size of a liver lesion. Continued follow-up is recommended. 3. Other findings are unchanged.  This report was finalized on 7/5/2019 10:30 AM by Nathan Irby MD.    Ct Abdomen Pelvis With Contrast    Result Date: 7/5/2019  Narrative: CT ABDOMEN AND PELVIS  INDICATION: Restaging cancer.  FINDINGS: Axial imaging through the abdomen and pelvis with intravenous contrast. Oral contrast was not administered. Comparison with 10/18/2016, as well as a more recent CT of the chest dated 04/22/2019. This study was performed with techniques to keep radiation doses as low as reasonably achievable, (ALARA).  A low-attenuation lesion in the periphery of the right hepatic lobe near the dome is new since the 2016 exam but decreased compared to chest CT dated 04/22/2019. This was described in further detail on the report from the CT of the chest performed today. Otherwise, no new liver lesions. Slight thickening of the adrenal glands appears unchanged. Other abdominal solid organs demonstrate no acute abnormality. Gallbladder is unremarkable. No bowel obstruction. Normal appendix. Urinary bladder is unremarkable. Uterus and ovaries have an unremarkable CT appearance. No free fluid, free air or focal fluid collection. Scattered normal size lymph nodes without areas of adenopathy by size criteria. Some of the lymph nodes in the upper abdomen are near the upper limits of normal but appear unchanged. Scattered degenerative changes within the spine and pelvis. Some slight heterogeneity of the superior aspect of the L2 vertebral body near the L1-2 disc space may be reactive to mild degenerative disc disease changes at this level. Developing metastatic disease thought to be a less likely consideration.       Impression: 1. Low-density lesion in the right lobe of the liver appears decreased in size compared to a chest CT from 04/22/2019. 2. Other chronic appearing findings.  This report was finalized on 7/5/2019 10:29 AM by Nathan Irby MD.      ASSESSMENT: The patient is a very pleasant 63 y.o. female  with small cell lung cancer    PROBLEM LIST:  1. Small cell lung cancer from the left upper lobe.  Limited stage disease, recurrent in a mediastinal node  A.  Treated  initially with etoposide and carboplatin starting 9/13/27.  She responded to the single modality that was started because of symptoms including airway compromise.  B.  Completed combined modality chemoradiation with etoposide and carboplatin . (Chemotherapy completed 12/7/2017).  C.  Recurrence in left paratracheal node.  This was seen on CT scan from 6/19/18 and hypermetabolic on PET scan to confirm.  She's been treated with weekly taxol starting 7/17/2018 along with involved field radiation.  D. Left pleural effusion seen on CT of 11/23/18, loculated and inaccessible for thoracentesis.  E.  Progressive disease documented on scans done April 22, 2019 with increase in size of left paratracheal lymph node and right hepatic lobe mass.  F. Started carboplatin and etoposide with Tecentriq May 1, 2019, status post 4 cycles. Maintenance Tecentriq started 07/31/2019  2.  Anemia.  This appears to be predominantly from chemoradiation.  3.  History of left breast cancer.  A.  Left mastectomy 7/22/2008 for stage IIIA, T3 N1 M0 breast cancer  B.  ER weakly positive, NH negative and HER-2 positive  C.  Adjuvant chemotherapy with Herceptin, Taxotere and carboplatin  D.  Postmastectomy radiation done at [facility]  E.  Adjuvant tamoxifen started 12/12/2008 and continued as extended adjuvant therapy  4.  COPD.  5. Diabetes  6. HTN  7.  Cough  8.  Treatment induced itching 1    PLAN:  1. I will proceed with treatment as scheduled today maintenance.  This  is an FDA approved treatment for extensive stage small cell lung cancer.   2. The patient will follow up in 3 weeks.  3.  I will continue port maintenance.   4.  I will monitor the patient blood work while she is on treatment including blood counts kidney function liver function and electrolytes.  5. We discussed potential side effects of immunotherapy including but not limited to immune mediated reactions with thyroiditis, pneumonitis, hepatitis, colitis, rash, and electrolytes abnormalities, fatigue, multiorgan failure, and possibly death.  6. We reviewed the potential side effects of this regimen including fatigue, vomiting and nausea, hair loss, nephropathy, neuropathy, hearing loss, myelosuppression, and risk of infusion reaction.  7. She has completed 10 years of Tamoxifen at this point.   8. Will continue inhalers as needed for COPD  9. We will continue Metformin for type 2 DM.  Her blood sugars may get higher while she is in treatment secondary to pre-infusion steroids.  10. Will continue Hyzaar for HTN.  We may have to adjust her antihypertensive agent while she is in treatment.  11. We will plan to repeat scans in 3 months will be due October 2019.  12.  I will continue the patient on Robitussin with codeine as needed for cough.  13.  I will continue the patient on Benadryl as needed for itching.  14. If Hgb is less than 8 we will plan on giving 2 units of PRBC.     Belkis Nayak APRN  7/31/2019

## 2019-07-31 ENCOUNTER — OFFICE VISIT (OUTPATIENT)
Dept: ONCOLOGY | Facility: CLINIC | Age: 64
End: 2019-07-31

## 2019-07-31 ENCOUNTER — INFUSION (OUTPATIENT)
Dept: ONCOLOGY | Facility: HOSPITAL | Age: 64
End: 2019-07-31

## 2019-07-31 VITALS
TEMPERATURE: 97.4 F | HEART RATE: 108 BPM | DIASTOLIC BLOOD PRESSURE: 63 MMHG | HEIGHT: 62 IN | RESPIRATION RATE: 19 BRPM | BODY MASS INDEX: 37.17 KG/M2 | SYSTOLIC BLOOD PRESSURE: 137 MMHG | WEIGHT: 202 LBS

## 2019-07-31 DIAGNOSIS — C34.82 MALIGNANT NEOPLASM OF OVERLAPPING SITES OF LEFT LUNG (HCC): Primary | ICD-10-CM

## 2019-07-31 DIAGNOSIS — D64.9 ANEMIA, UNSPECIFIED TYPE: ICD-10-CM

## 2019-07-31 DIAGNOSIS — C34.82 MALIGNANT NEOPLASM OF OVERLAPPING SITES OF LEFT LUNG (HCC): ICD-10-CM

## 2019-07-31 LAB
ABO GROUP BLD: NORMAL
ALBUMIN SERPL-MCNC: 4 G/DL (ref 3.5–5)
ALBUMIN/GLOB SERPL: 1.1 G/DL (ref 1–2)
ALP SERPL-CCNC: 151 U/L (ref 38–126)
ALT SERPL W P-5'-P-CCNC: 34 U/L (ref 13–69)
ANION GAP SERPL CALCULATED.3IONS-SCNC: 14.6 MMOL/L (ref 10–20)
AST SERPL-CCNC: 35 U/L (ref 15–46)
BASOPHILS # BLD AUTO: 0.01 10*3/MM3 (ref 0–0.2)
BASOPHILS NFR BLD AUTO: 0.3 % (ref 0–1.5)
BILIRUB SERPL-MCNC: 0.6 MG/DL (ref 0.2–1.3)
BLD GP AB SCN SERPL QL: NEGATIVE
BUN BLD-MCNC: 16 MG/DL (ref 7–20)
BUN/CREAT SERPL: 16 (ref 7.1–23.5)
CALCIUM SPEC-SCNC: 10.4 MG/DL (ref 8.4–10.2)
CHLORIDE SERPL-SCNC: 103 MMOL/L (ref 98–107)
CO2 SERPL-SCNC: 27 MMOL/L (ref 26–30)
CREAT BLD-MCNC: 1 MG/DL (ref 0.6–1.3)
DEPRECATED RDW RBC AUTO: 64.7 FL (ref 37–54)
EOSINOPHIL # BLD AUTO: 0.04 10*3/MM3 (ref 0–0.4)
EOSINOPHIL NFR BLD AUTO: 1.2 % (ref 0.3–6.2)
ERYTHROCYTE [DISTWIDTH] IN BLOOD BY AUTOMATED COUNT: 18.7 % (ref 12.3–15.4)
GFR SERPL CREATININE-BSD FRML MDRD: 56 ML/MIN/1.73
GLOBULIN UR ELPH-MCNC: 3.8 GM/DL
GLUCOSE BLD-MCNC: 98 MG/DL (ref 74–98)
HCT VFR BLD AUTO: 23.8 % (ref 34–46.6)
HGB BLD-MCNC: 7.8 G/DL (ref 12–15.9)
IMM GRANULOCYTES # BLD AUTO: 0.03 10*3/MM3 (ref 0–0.05)
IMM GRANULOCYTES NFR BLD AUTO: 0.9 % (ref 0–0.5)
LYMPHOCYTES # BLD AUTO: 0.83 10*3/MM3 (ref 0.7–3.1)
LYMPHOCYTES NFR BLD AUTO: 23.9 % (ref 19.6–45.3)
MCH RBC QN AUTO: 32.5 PG (ref 26.6–33)
MCHC RBC AUTO-ENTMCNC: 32.8 G/DL (ref 31.5–35.7)
MCV RBC AUTO: 99.2 FL (ref 79–97)
MONOCYTES # BLD AUTO: 0.52 10*3/MM3 (ref 0.1–0.9)
MONOCYTES NFR BLD AUTO: 15 % (ref 5–12)
NEUTROPHILS # BLD AUTO: 2.04 10*3/MM3 (ref 1.7–7)
NEUTROPHILS NFR BLD AUTO: 58.7 % (ref 42.7–76)
NRBC BLD AUTO-RTO: 0 /100 WBC (ref 0–0.2)
PLATELET # BLD AUTO: 130 10*3/MM3 (ref 140–450)
PMV BLD AUTO: 9.6 FL (ref 6–12)
POTASSIUM BLD-SCNC: 3.6 MMOL/L (ref 3.5–5.1)
PROT SERPL-MCNC: 7.8 G/DL (ref 6.3–8.2)
RBC # BLD AUTO: 2.4 10*6/MM3 (ref 3.77–5.28)
RH BLD: POSITIVE
SODIUM BLD-SCNC: 141 MMOL/L (ref 137–145)
T&S EXPIRATION DATE: NORMAL
T4 FREE SERPL-MCNC: 1.26 NG/DL (ref 0.78–2.19)
TSH SERPL DL<=0.05 MIU/L-ACNC: 2.08 MIU/ML (ref 0.47–4.68)
WBC NRBC COR # BLD: 3.47 10*3/MM3 (ref 3.4–10.8)

## 2019-07-31 PROCEDURE — 96413 CHEMO IV INFUSION 1 HR: CPT

## 2019-07-31 PROCEDURE — 25010000002 ATEZOLIZUMAB 1200 MG/20ML SOLUTION 20 ML VIAL: Performed by: NURSE PRACTITIONER

## 2019-07-31 PROCEDURE — 25010000002 ONDANSETRON PER 1 MG: Performed by: NURSE PRACTITIONER

## 2019-07-31 PROCEDURE — 84481 FREE ASSAY (FT-3): CPT

## 2019-07-31 PROCEDURE — 85025 COMPLETE CBC W/AUTO DIFF WBC: CPT

## 2019-07-31 PROCEDURE — 86920 COMPATIBILITY TEST SPIN: CPT

## 2019-07-31 PROCEDURE — 86901 BLOOD TYPING SEROLOGIC RH(D): CPT

## 2019-07-31 PROCEDURE — 84439 ASSAY OF FREE THYROXINE: CPT

## 2019-07-31 PROCEDURE — 96375 TX/PRO/DX INJ NEW DRUG ADDON: CPT

## 2019-07-31 PROCEDURE — 99214 OFFICE O/P EST MOD 30 MIN: CPT | Performed by: NURSE PRACTITIONER

## 2019-07-31 PROCEDURE — 80053 COMPREHEN METABOLIC PANEL: CPT

## 2019-07-31 PROCEDURE — 36415 COLL VENOUS BLD VENIPUNCTURE: CPT

## 2019-07-31 PROCEDURE — 84443 ASSAY THYROID STIM HORMONE: CPT

## 2019-07-31 PROCEDURE — 96367 TX/PROPH/DG ADDL SEQ IV INF: CPT

## 2019-07-31 PROCEDURE — 86850 RBC ANTIBODY SCREEN: CPT

## 2019-07-31 PROCEDURE — 86900 BLOOD TYPING SEROLOGIC ABO: CPT

## 2019-07-31 RX ORDER — SODIUM CHLORIDE 9 MG/ML
250 INJECTION, SOLUTION INTRAVENOUS ONCE
Status: DISCONTINUED | OUTPATIENT
Start: 2019-07-31 | End: 2019-07-31 | Stop reason: HOSPADM

## 2019-07-31 RX ORDER — SODIUM CHLORIDE 9 MG/ML
250 INJECTION, SOLUTION INTRAVENOUS ONCE
Status: CANCELLED | OUTPATIENT
Start: 2019-07-31

## 2019-07-31 RX ORDER — SODIUM CHLORIDE 9 MG/ML
250 INJECTION, SOLUTION INTRAVENOUS AS NEEDED
Status: CANCELLED | OUTPATIENT
Start: 2019-07-31

## 2019-07-31 RX ORDER — DIPHENHYDRAMINE HCL 25 MG
25 TABLET ORAL ONCE
Status: CANCELLED | OUTPATIENT
Start: 2019-07-31 | End: 2019-07-31

## 2019-07-31 RX ORDER — ACETAMINOPHEN 325 MG/1
650 TABLET ORAL ONCE
Status: CANCELLED | OUTPATIENT
Start: 2019-07-31 | End: 2019-07-31

## 2019-07-31 RX ORDER — SODIUM CHLORIDE 0.9 % (FLUSH) 0.9 %
10 SYRINGE (ML) INJECTION AS NEEDED
Status: DISCONTINUED | OUTPATIENT
Start: 2019-07-31 | End: 2019-07-31 | Stop reason: HOSPADM

## 2019-07-31 RX ORDER — SODIUM CHLORIDE 0.9 % (FLUSH) 0.9 %
10 SYRINGE (ML) INJECTION AS NEEDED
Status: CANCELLED | OUTPATIENT
Start: 2019-07-31

## 2019-07-31 RX ADMIN — SODIUM CHLORIDE, PRESERVATIVE FREE 10 ML: 5 INJECTION INTRAVENOUS at 13:41

## 2019-07-31 RX ADMIN — HEPARIN 500 UNITS: 100 SYRINGE at 13:42

## 2019-07-31 RX ADMIN — ATEZOLIZUMAB 1200 MG: 1200 INJECTION, SOLUTION INTRAVENOUS at 13:07

## 2019-07-31 RX ADMIN — ONDANSETRON 16 MG: 2 INJECTION INTRAMUSCULAR; INTRAVENOUS at 12:43

## 2019-08-01 ENCOUNTER — INFUSION (OUTPATIENT)
Dept: ONCOLOGY | Facility: HOSPITAL | Age: 64
End: 2019-08-01

## 2019-08-01 VITALS
OXYGEN SATURATION: 96 % | BODY MASS INDEX: 37.17 KG/M2 | SYSTOLIC BLOOD PRESSURE: 170 MMHG | HEIGHT: 62 IN | WEIGHT: 202 LBS | HEART RATE: 83 BPM | RESPIRATION RATE: 16 BRPM | DIASTOLIC BLOOD PRESSURE: 72 MMHG | TEMPERATURE: 98.1 F

## 2019-08-01 DIAGNOSIS — D64.9 ANEMIA, UNSPECIFIED TYPE: ICD-10-CM

## 2019-08-01 DIAGNOSIS — C34.82 MALIGNANT NEOPLASM OF OVERLAPPING SITES OF LEFT LUNG (HCC): Primary | ICD-10-CM

## 2019-08-01 LAB
ABO + RH BLD: NORMAL
ABO + RH BLD: NORMAL
BH BB BLOOD EXPIRATION DATE: NORMAL
BH BB BLOOD EXPIRATION DATE: NORMAL
BH BB BLOOD TYPE BARCODE: 5100
BH BB BLOOD TYPE BARCODE: 5100
BH BB DISPENSE STATUS: NORMAL
BH BB DISPENSE STATUS: NORMAL
BH BB PRODUCT CODE: NORMAL
BH BB PRODUCT CODE: NORMAL
BH BB UNIT NUMBER: NORMAL
BH BB UNIT NUMBER: NORMAL
CROSSMATCH INTERPRETATION: NORMAL
CROSSMATCH INTERPRETATION: NORMAL
T3FREE SERPL-MCNC: 2.8 PG/ML (ref 2–4.4)
UNIT  ABO: NORMAL
UNIT  ABO: NORMAL
UNIT  RH: NORMAL
UNIT  RH: NORMAL

## 2019-08-01 PROCEDURE — 36430 TRANSFUSION BLD/BLD COMPNT: CPT

## 2019-08-01 PROCEDURE — 63710000001 DIPHENHYDRAMINE PER 50 MG: Performed by: NURSE PRACTITIONER

## 2019-08-01 PROCEDURE — P9016 RBC LEUKOCYTES REDUCED: HCPCS

## 2019-08-01 RX ORDER — SODIUM CHLORIDE 0.9 % (FLUSH) 0.9 %
10 SYRINGE (ML) INJECTION AS NEEDED
Status: CANCELLED | OUTPATIENT
Start: 2019-08-01

## 2019-08-01 RX ORDER — SODIUM CHLORIDE 0.9 % (FLUSH) 0.9 %
10 SYRINGE (ML) INJECTION AS NEEDED
Status: DISCONTINUED | OUTPATIENT
Start: 2019-08-01 | End: 2019-08-01 | Stop reason: HOSPADM

## 2019-08-01 RX ORDER — ACETAMINOPHEN 325 MG/1
650 TABLET ORAL ONCE
Status: COMPLETED | OUTPATIENT
Start: 2019-08-01 | End: 2019-08-01

## 2019-08-01 RX ORDER — SODIUM CHLORIDE 9 MG/ML
250 INJECTION, SOLUTION INTRAVENOUS AS NEEDED
Status: DISCONTINUED | OUTPATIENT
Start: 2019-08-01 | End: 2019-08-01 | Stop reason: HOSPADM

## 2019-08-01 RX ORDER — DIPHENHYDRAMINE HCL 25 MG
25 CAPSULE ORAL ONCE
Status: COMPLETED | OUTPATIENT
Start: 2019-08-01 | End: 2019-08-01

## 2019-08-01 RX ADMIN — HEPARIN 500 UNITS: 100 SYRINGE at 13:36

## 2019-08-01 RX ADMIN — DIPHENHYDRAMINE HYDROCHLORIDE 25 MG: 25 CAPSULE ORAL at 08:30

## 2019-08-01 RX ADMIN — SODIUM CHLORIDE, PRESERVATIVE FREE 10 ML: 5 INJECTION INTRAVENOUS at 13:36

## 2019-08-01 RX ADMIN — ACETAMINOPHEN 650 MG: 325 TABLET, FILM COATED ORAL at 08:30

## 2019-08-21 ENCOUNTER — INFUSION (OUTPATIENT)
Dept: ONCOLOGY | Facility: HOSPITAL | Age: 64
End: 2019-08-21

## 2019-08-21 ENCOUNTER — OFFICE VISIT (OUTPATIENT)
Dept: ONCOLOGY | Facility: CLINIC | Age: 64
End: 2019-08-21

## 2019-08-21 VITALS
HEIGHT: 62 IN | WEIGHT: 203 LBS | DIASTOLIC BLOOD PRESSURE: 80 MMHG | TEMPERATURE: 97.1 F | RESPIRATION RATE: 14 BRPM | HEART RATE: 84 BPM | SYSTOLIC BLOOD PRESSURE: 178 MMHG | BODY MASS INDEX: 37.36 KG/M2

## 2019-08-21 DIAGNOSIS — C34.82 MALIGNANT NEOPLASM OF OVERLAPPING SITES OF LEFT LUNG (HCC): Primary | ICD-10-CM

## 2019-08-21 DIAGNOSIS — C34.82 MALIGNANT NEOPLASM OF OVERLAPPING SITES OF LEFT LUNG (HCC): ICD-10-CM

## 2019-08-21 LAB
ALBUMIN SERPL-MCNC: 4.1 G/DL (ref 3.5–5.2)
ALBUMIN/GLOB SERPL: 1 G/DL
ALP SERPL-CCNC: 137 U/L (ref 39–117)
ALT SERPL W P-5'-P-CCNC: 40 U/L (ref 1–33)
ANION GAP SERPL CALCULATED.3IONS-SCNC: 11.7 MMOL/L (ref 5–15)
AST SERPL-CCNC: 39 U/L (ref 1–32)
BASOPHILS # BLD AUTO: 0.04 10*3/MM3 (ref 0–0.2)
BASOPHILS NFR BLD AUTO: 0.9 % (ref 0–1.5)
BILIRUB SERPL-MCNC: 0.6 MG/DL (ref 0.2–1.2)
BUN BLD-MCNC: 19 MG/DL (ref 8–23)
BUN/CREAT SERPL: 17.9 (ref 7–25)
CALCIUM SPEC-SCNC: 10.5 MG/DL (ref 8.6–10.5)
CHLORIDE SERPL-SCNC: 101 MMOL/L (ref 98–107)
CO2 SERPL-SCNC: 26.3 MMOL/L (ref 22–29)
CREAT BLD-MCNC: 1.06 MG/DL (ref 0.57–1)
DEPRECATED RDW RBC AUTO: 59.6 FL (ref 37–54)
EOSINOPHIL # BLD AUTO: 0.37 10*3/MM3 (ref 0–0.4)
EOSINOPHIL NFR BLD AUTO: 8.2 % (ref 0.3–6.2)
ERYTHROCYTE [DISTWIDTH] IN BLOOD BY AUTOMATED COUNT: 16.9 % (ref 12.3–15.4)
GFR SERPL CREATININE-BSD FRML MDRD: 53 ML/MIN/1.73
GLOBULIN UR ELPH-MCNC: 4 GM/DL
GLUCOSE BLD-MCNC: 103 MG/DL (ref 65–99)
HCT VFR BLD AUTO: 33.4 % (ref 34–46.6)
HGB BLD-MCNC: 11.1 G/DL (ref 12–15.9)
IMM GRANULOCYTES # BLD AUTO: 0.02 10*3/MM3 (ref 0–0.05)
IMM GRANULOCYTES NFR BLD AUTO: 0.4 % (ref 0–0.5)
LYMPHOCYTES # BLD AUTO: 0.92 10*3/MM3 (ref 0.7–3.1)
LYMPHOCYTES NFR BLD AUTO: 20.3 % (ref 19.6–45.3)
MCH RBC QN AUTO: 32.1 PG (ref 26.6–33)
MCHC RBC AUTO-ENTMCNC: 33.2 G/DL (ref 31.5–35.7)
MCV RBC AUTO: 96.5 FL (ref 79–97)
MONOCYTES # BLD AUTO: 0.45 10*3/MM3 (ref 0.1–0.9)
MONOCYTES NFR BLD AUTO: 9.9 % (ref 5–12)
NEUTROPHILS # BLD AUTO: 2.73 10*3/MM3 (ref 1.7–7)
NEUTROPHILS NFR BLD AUTO: 60.3 % (ref 42.7–76)
NRBC BLD AUTO-RTO: 0 /100 WBC (ref 0–0.2)
PLATELET # BLD AUTO: 124 10*3/MM3 (ref 140–450)
PMV BLD AUTO: 9.6 FL (ref 6–12)
POTASSIUM BLD-SCNC: 3.7 MMOL/L (ref 3.5–5.2)
PROT SERPL-MCNC: 8.1 G/DL (ref 6–8.5)
RBC # BLD AUTO: 3.46 10*6/MM3 (ref 3.77–5.28)
SODIUM BLD-SCNC: 139 MMOL/L (ref 136–145)
WBC NRBC COR # BLD: 4.53 10*3/MM3 (ref 3.4–10.8)

## 2019-08-21 PROCEDURE — 80053 COMPREHEN METABOLIC PANEL: CPT

## 2019-08-21 PROCEDURE — 96413 CHEMO IV INFUSION 1 HR: CPT

## 2019-08-21 PROCEDURE — 85025 COMPLETE CBC W/AUTO DIFF WBC: CPT

## 2019-08-21 PROCEDURE — 36415 COLL VENOUS BLD VENIPUNCTURE: CPT

## 2019-08-21 PROCEDURE — 96367 TX/PROPH/DG ADDL SEQ IV INF: CPT

## 2019-08-21 PROCEDURE — 99215 OFFICE O/P EST HI 40 MIN: CPT | Performed by: INTERNAL MEDICINE

## 2019-08-21 PROCEDURE — 25010000002 ONDANSETRON PER 1 MG: Performed by: INTERNAL MEDICINE

## 2019-08-21 PROCEDURE — 25010000002 ATEZOLIZUMAB 1200 MG/20ML SOLUTION 20 ML VIAL: Performed by: INTERNAL MEDICINE

## 2019-08-21 PROCEDURE — 96366 THER/PROPH/DIAG IV INF ADDON: CPT

## 2019-08-21 RX ORDER — SODIUM CHLORIDE 0.9 % (FLUSH) 0.9 %
10 SYRINGE (ML) INJECTION AS NEEDED
Status: CANCELLED | OUTPATIENT
Start: 2019-08-21

## 2019-08-21 RX ORDER — SODIUM CHLORIDE 9 MG/ML
250 INJECTION, SOLUTION INTRAVENOUS ONCE
Status: CANCELLED | OUTPATIENT
Start: 2019-08-21

## 2019-08-21 RX ORDER — SODIUM CHLORIDE 9 MG/ML
250 INJECTION, SOLUTION INTRAVENOUS ONCE
Status: DISCONTINUED | OUTPATIENT
Start: 2019-08-21 | End: 2019-08-21 | Stop reason: HOSPADM

## 2019-08-21 RX ORDER — SODIUM CHLORIDE 0.9 % (FLUSH) 0.9 %
10 SYRINGE (ML) INJECTION AS NEEDED
Status: DISCONTINUED | OUTPATIENT
Start: 2019-08-21 | End: 2019-08-21 | Stop reason: HOSPADM

## 2019-08-21 RX ADMIN — HEPARIN 500 UNITS: 100 SYRINGE at 12:20

## 2019-08-21 RX ADMIN — ATEZOLIZUMAB 1200 MG: 1200 INJECTION, SOLUTION INTRAVENOUS at 11:48

## 2019-08-21 RX ADMIN — SODIUM CHLORIDE, PRESERVATIVE FREE 10 ML: 5 INJECTION INTRAVENOUS at 12:20

## 2019-08-21 RX ADMIN — ONDANSETRON 16 MG: 2 INJECTION INTRAMUSCULAR; INTRAVENOUS at 11:21

## 2019-08-21 NOTE — PROGRESS NOTES
DATE OF VISIT: 8/21/2019    REASON FOR VISIT: Followup for small cell lung cancer     HISTORY OF PRESENT ILLNESS: The patient is a very pleasant 64 y.o. female  with past medical history significant for small cell lung cancer diagnosed August 2017.  Repeated scans done on April 21, 2019 revealed progressive disease.  She was started on carboplatin and etoposide and Tecentriq April 30, 2019.  Chemo was stopped after 4 cycles and patient was started on maintenance Tecentriq.  The patient is here today for scheduled follow up visit with treatment, cycle #6.     SUBJECTIVE: The patient is here today by herself.  Treatment was easier on her last time since she received all immunotherapy with no chemo.  She is complaining of fatigue.  She is worried that she might have blood transfusion.    PAST MEDICAL HISTORY/SOCIAL HISTORY/FAMILY HISTORY: Reviewed by me and unchanged from my documentation done on 08/21/19.    Review of Systems   Constitutional: Positive for fatigue. Negative for activity change, appetite change, chills, fever and unexpected weight change.   HENT: Negative for hearing loss, mouth sores, nosebleeds, sore throat and trouble swallowing.    Eyes: Negative for visual disturbance.   Respiratory: Negative for cough, chest tightness, shortness of breath and wheezing.    Cardiovascular: Negative for chest pain, palpitations and leg swelling.   Gastrointestinal: Positive for nausea. Negative for abdominal distention, abdominal pain, blood in stool, constipation, diarrhea, rectal pain and vomiting.   Endocrine: Negative for cold intolerance and heat intolerance.   Genitourinary: Negative for difficulty urinating, dysuria, frequency and urgency.   Musculoskeletal: Negative for arthralgias, back pain, gait problem, joint swelling and myalgias.   Skin: Negative for rash.        Itching     Neurological: Negative for dizziness, tremors, syncope, weakness, light-headedness, numbness and headaches.   Hematological:  Negative for adenopathy. Does not bruise/bleed easily.   Psychiatric/Behavioral: Negative for confusion, sleep disturbance and suicidal ideas. The patient is not nervous/anxious.          Current Outpatient Medications:   •  albuterol (VENTOLIN HFA) 108 (90 Base) MCG/ACT inhaler, Inhale 2 puffs Every 4 (Four) Hours As Needed for Wheezing or Shortness of Air., Disp: 1 inhaler, Rfl: 5  •  amLODIPine (NORVASC) 5 MG tablet, Take 5 mg by mouth Daily., Disp: , Rfl: 0  •  ANORO ELLIPTA 62.5-25 MCG/INH aerosol powder  inhaler, INHALE 1 PUFF BY MOUTH DAILY, Disp: 60 each, Rfl: 11  •  aspirin 81 MG EC tablet, Take 81 mg by mouth Daily., Disp: , Rfl:   •  azelastine (ASTELIN) 0.1 % nasal spray, 1 spray into the nostril(s) as directed by provider 2 (Two) Times a Day As Needed for Rhinitis or Allergies. Use in each nostril as directed, Disp: 1 each, Rfl: 5  •  benzonatate (TESSALON PERLES) 100 MG capsule, Take 1-2 capsules by mouth 3 (Three) Times a Day As Needed for Cough., Disp: 120 capsule, Rfl: 5  •  Cholecalciferol (VITAMIN D3) 22794 units capsule, TK 1 C PO Q WK, Disp: , Rfl: 3  •  guaifenesin-codeine (GUAIFENESIN AC) 100-10 MG/5ML liquid, Take 5-10 mL by mouth 3 (Three) Times a Day As Needed for Cough., Disp: 480 mL, Rfl: 2  •  HAVRIX 1440 EL U/ML vaccine, ADM 1ML IM UTD, Disp: , Rfl: 0  •  hydrochlorothiazide (MICROZIDE) 12.5 MG capsule, Take 12.5 mg by mouth Every Morning. Pt reports that she takes this when swelled as needed., Disp: , Rfl: 0  •  hydrocortisone 2.5 % cream, Apply  topically to the appropriate area as directed 2 (Two) Times a Day. (Patient taking differently: Apply  topically to the appropriate area as directed 2 (Two) Times a Day. Rash improved so pt no longer needed to use it.), Disp: 20 g, Rfl: 0  •  lidocaine-prilocaine (EMLA) 2.5-2.5 % cream, Apply  topically to the appropriate area as directed As Needed (45-60 minutes prior to port access.  Cover with saran/plastic wrap.)., Disp: 30 g, Rfl: 3  •   losartan-hydrochlorothiazide (HYZAAR) 100-12.5 MG per tablet, Take 1 tablet by mouth Daily., Disp: , Rfl: 0  •  metFORMIN (GLUCOPHAGE) 500 MG tablet, Take 500 mg by mouth 2 (Two) Times a Day With Meals., Disp: , Rfl: 0  •  Nystatin (MAGIC MOUTHWASH), Swish and spit 10 mL Every 4 (Four) Hours As Needed (sore mouth)., Disp: 240 mL, Rfl: 1  •  ondansetron (ZOFRAN) 8 MG tablet, Take 1 tablet by mouth 3 (Three) Times a Day As Needed for Nausea or Vomiting., Disp: 30 tablet, Rfl: 5  •  pantoprazole (PROTONIX) 40 MG EC tablet, Take 1 tablet by mouth Every 12 (Twelve) Hours., Disp: 60 tablet, Rfl: 0  •  potassium chloride ER (K-TAB) 20 MEQ tablet controlled-release ER tablet, Take 1 tablet by mouth 2 (Two) Times a Day., Disp: 30 tablet, Rfl: 1  •  tamoxifen (NOLVADEX) 20 MG chemo tablet, Take 1 tablet by mouth Daily., Disp: 30 tablet, Rfl: 0  •  triamcinolone (KENALOG) 0.1 % ointment, Apply  topically to the appropriate area as directed 2 (Two) Times a Day., Disp: 30 g, Rfl: 3  •  umeclidinium-vilanterol (ANORO ELLIPTA) 62.5-25 MCG/INH aerosol powder  inhaler, Inhale 1 puff Daily., Disp: 1 each, Rfl: 11    PHYSICAL EXAMINATION:   There were no vitals taken for this visit.   ECOG Performance Status: 1 - Symptomatic but completely ambulatory  General Appearance:  alert, cooperative, no apparent distress and appears stated age   Neurologic/Psychiatric: A&O x 3, gait steady, appropriate affect, strength 5/5 in all muscle groups   HEENT:  Normocephalic, without obvious abnormality, mucous membranes moist   Neck: Supple, symmetrical, trachea midline, no adenopathy;  No thyromegaly, masses, or tenderness   Lungs:   Clear to auscultation bilaterally; respirations regular, even, and unlabored bilaterally   Heart:  Regular rate and rhythm, no murmurs appreciated   Abdomen:   Soft, non-tender, non-distended and no organomegaly   Lymph nodes: No cervical, supraclavicular, inguinal or axillary adenopathy noted   Extremities: Normal,  atraumatic; no clubbing, cyanosis, or edema    Skin: No rashes, ulcers, or suspicious lesions noted     No visits with results within 2 Week(s) from this visit.   Latest known visit with results is:   Infusion on 08/01/2019   Component Date Value Ref Range Status   • Product Code 08/01/2019 [id number]   Final   • Unit Number 08/01/2019 [id number]   Final   • UNIT  ABO 08/01/2019 O   Final   • UNIT  RH 08/01/2019 POS   Final   • Crossmatch Interpretation 08/01/2019 Compatible   Final   • Dispense Status 08/01/2019 PT   Final   • Blood Type 08/01/2019 OPOS   Final   • Blood Expiration Date 08/01/2019 201908242359   Final   • Blood Type Barcode 08/01/2019 5100   Final   • Product Code 08/01/2019 [id number]   Final   • Unit Number 08/01/2019 [id number]   Final   • UNIT  ABO 08/01/2019 O   Final   • UNIT  RH 08/01/2019 POS   Final   • Crossmatch Interpretation 08/01/2019 Compatible   Final   • Dispense Status 08/01/2019 PT   Final   • Blood Type 08/01/2019 OPOS   Final   • Blood Expiration Date 08/01/2019 [id number]   Final   • Blood Type Barcode 08/01/2019 5100   Final        No results found.    ASSESSMENT: The patient is a very pleasant 64 y.o. female  with small cell lung cancer    PROBLEM LIST:  1. Small cell lung cancer from the left upper lobe.  Limited stage disease, recurrent in a mediastinal node  A.  Treated  initially with etoposide and carboplatin starting 9/13/27.  She responded to the single modality that was started because of symptoms including airway compromise.  B.  Completed combined modality chemoradiation with etoposide and carboplatin . (Chemotherapy completed 12/7/2017).  C.  Recurrence in left paratracheal node.  This was seen on CT scan from 6/19/18 and hypermetabolic on PET scan to confirm.  She's been treated with weekly taxol starting 7/17/2018 along with involved field radiation.  D. Left pleural effusion seen on CT of 11/23/18, loculated and inaccessible for thoracentesis.  E.   Progressive disease documented on scans done April 22, 2019 with increase in size of left paratracheal lymph node and right hepatic lobe mass.  F. Started carboplatin and etoposide with Tecentriq May 1, 2019, status post 5 cycles.     2.  Anemia.  This appears to be predominantly from chemoradiation.  3.  History of left breast cancer.  A.  Left mastectomy 7/22/2008 for stage IIIA, T3 N1 M0 breast cancer  B.  ER weakly positive, SC negative and HER-2 positive  C.  Adjuvant chemotherapy with Herceptin, Taxotere and carboplatin  D.  Postmastectomy radiation done at [facility]  E.  Adjuvant tamoxifen started 12/12/2008 and continued as extended adjuvant therapy  4.  COPD.  5. Diabetes  6. HTN  7.  Cough  8.  Treatment induced itching 1    PLAN:  1. I will proceed with treatment as scheduled today Tecentriq, cycle #6.  This is an FDA approved treatment for extensive stage small cell lung cancer.   2. The patient will follow up in 3 weeks for cycle #7.  3.  I will continue port maintenance.   4.  I will monitor the patient blood work while she is on treatment including blood counts kidney function liver function and electrolytes.  5. We discussed potential side effects of immunotherapy including but not limited to immune mediated reactions with thyroiditis, pneumonitis, hepatitis, colitis, rash, and electrolytes abnormalities, fatigue, multiorgan failure, and possibly death.  6. We reviewed the potential side effects of this regimen including fatigue, vomiting and nausea, hair loss, nephropathy, neuropathy, hearing loss, myelosuppression, and risk of infusion reaction.  7. She has completed 10 years of Tamoxifen at this point.   8. Will continue inhalers as needed for COPD  9. We will continue Metformin for type 2 DM.  Her blood sugars may get higher while she is in treatment secondary to pre-infusion steroids.  10. Will continue Hyzaar for HTN.  We may have to adjust her antihypertensive agent while she  is in treatment.  11. We will plan to repeat scans in 3 months will be due October 2019.  12.  I will continue the patient on Robitussin with codeine as needed for cough.  13.  I will continue the patient on Benadryl as needed for itching.  14. If Hgb is less than 8 we will plan on giving 2 units of PRBC.     Josh Son MD    8/21/2019

## 2019-09-10 NOTE — PROGRESS NOTES
DATE OF VISIT: 9/11/2019    REASON FOR VISIT: Followup for small cell lung cancer     HISTORY OF PRESENT ILLNESS: The patient is a very pleasant 64 y.o. female  with past medical history significant for small cell lung cancer diagnosed August 2017.  Repeated scans done on April 21, 2019 revealed progressive disease.  She was started on carboplatin and etoposide and Tecentriq April 30, 2019.  Chemo was stopped after 4 cycles and patient was started on maintenance Tecentriq.  The patient is here today for scheduled follow up visit with treatment, cycle #7.     SUBJECTIVE: The patient is here today by herself. She has been tolerating treatment well. For the past 3 night she reports that she has had worsening bone pain located in her right hip, right femur, and left knee. This is new and she has had to take OTC medications. This helps with the pain.         PAST MEDICAL HISTORY/SOCIAL HISTORY/FAMILY HISTORY: Reviewed by me and unchanged from my documentation done on 09/11/19.    Review of Systems   Constitutional: Positive for fatigue. Negative for activity change, appetite change, chills, fever and unexpected weight change.   HENT: Negative for hearing loss, mouth sores, nosebleeds, sore throat and trouble swallowing.    Eyes: Negative for visual disturbance.   Respiratory: Negative for cough, chest tightness, shortness of breath and wheezing.    Cardiovascular: Negative for chest pain, palpitations and leg swelling.   Gastrointestinal: Positive for nausea. Negative for abdominal distention, abdominal pain, blood in stool, constipation, diarrhea, rectal pain and vomiting.   Endocrine: Negative for cold intolerance and heat intolerance.   Genitourinary: Negative for difficulty urinating, dysuria, frequency and urgency.   Musculoskeletal: Positive for arthralgias. Negative for back pain, gait problem, joint swelling and myalgias.   Skin: Negative for rash.        Itching     Neurological: Negative for dizziness, tremors,  syncope, weakness, light-headedness, numbness and headaches.   Hematological: Negative for adenopathy. Does not bruise/bleed easily.   Psychiatric/Behavioral: Negative for confusion, sleep disturbance and suicidal ideas. The patient is not nervous/anxious.          Current Outpatient Medications:   •  albuterol (VENTOLIN HFA) 108 (90 Base) MCG/ACT inhaler, Inhale 2 puffs Every 4 (Four) Hours As Needed for Wheezing or Shortness of Air., Disp: 1 inhaler, Rfl: 5  •  amLODIPine (NORVASC) 5 MG tablet, Take 5 mg by mouth Daily., Disp: , Rfl: 0  •  ANORO ELLIPTA 62.5-25 MCG/INH aerosol powder  inhaler, INHALE 1 PUFF BY MOUTH DAILY, Disp: 60 each, Rfl: 11  •  aspirin 81 MG EC tablet, Take 81 mg by mouth Daily., Disp: , Rfl:   •  azelastine (ASTELIN) 0.1 % nasal spray, 1 spray into the nostril(s) as directed by provider 2 (Two) Times a Day As Needed for Rhinitis or Allergies. Use in each nostril as directed, Disp: 1 each, Rfl: 5  •  benzonatate (TESSALON PERLES) 100 MG capsule, Take 1-2 capsules by mouth 3 (Three) Times a Day As Needed for Cough., Disp: 120 capsule, Rfl: 5  •  Cholecalciferol (VITAMIN D3) 86944 units capsule, TK 1 C PO Q WK, Disp: , Rfl: 3  •  guaifenesin-codeine (GUAIFENESIN AC) 100-10 MG/5ML liquid, Take 5-10 mL by mouth 3 (Three) Times a Day As Needed for Cough., Disp: 480 mL, Rfl: 2  •  HAVRIX 1440 EL U/ML vaccine, ADM 1ML IM UTD, Disp: , Rfl: 0  •  hydrochlorothiazide (MICROZIDE) 12.5 MG capsule, Take 12.5 mg by mouth Every Morning. Pt reports that she takes this when swelled as needed., Disp: , Rfl: 0  •  hydrocortisone 2.5 % cream, Apply  topically to the appropriate area as directed 2 (Two) Times a Day. (Patient taking differently: Apply  topically to the appropriate area as directed 2 (Two) Times a Day. Rash improved so pt no longer needed to use it.), Disp: 20 g, Rfl: 0  •  lidocaine-prilocaine (EMLA) 2.5-2.5 % cream, Apply  topically to the appropriate area as directed As Needed (45-60 minutes  "prior to port access.  Cover with saran/plastic wrap.)., Disp: 30 g, Rfl: 3  •  losartan-hydrochlorothiazide (HYZAAR) 100-12.5 MG per tablet, Take 1 tablet by mouth Daily., Disp: , Rfl: 0  •  metFORMIN (GLUCOPHAGE) 500 MG tablet, Take 500 mg by mouth 2 (Two) Times a Day With Meals., Disp: , Rfl: 0  •  Nystatin (MAGIC MOUTHWASH), Swish and spit 10 mL Every 4 (Four) Hours As Needed (sore mouth)., Disp: 240 mL, Rfl: 1  •  ondansetron (ZOFRAN) 8 MG tablet, Take 1 tablet by mouth 3 (Three) Times a Day As Needed for Nausea or Vomiting., Disp: 30 tablet, Rfl: 5  •  pantoprazole (PROTONIX) 40 MG EC tablet, Take 1 tablet by mouth Every 12 (Twelve) Hours., Disp: 60 tablet, Rfl: 0  •  potassium chloride ER (K-TAB) 20 MEQ tablet controlled-release ER tablet, Take 1 tablet by mouth 2 (Two) Times a Day., Disp: 30 tablet, Rfl: 1  •  tamoxifen (NOLVADEX) 20 MG chemo tablet, Take 1 tablet by mouth Daily., Disp: 30 tablet, Rfl: 0  •  triamcinolone (KENALOG) 0.1 % ointment, Apply  topically to the appropriate area as directed 2 (Two) Times a Day., Disp: 30 g, Rfl: 3  •  umeclidinium-vilanterol (ANORO ELLIPTA) 62.5-25 MCG/INH aerosol powder  inhaler, Inhale 1 puff Daily., Disp: 1 each, Rfl: 11    PHYSICAL EXAMINATION:   /78   Pulse 91   Temp 97 °F (36.1 °C) (Temporal)   Resp 16   Ht 157.5 cm (62\")   Wt 92.5 kg (204 lb)   SpO2 94%   BMI 37.31 kg/m²    ECOG Performance Status: 1 - Symptomatic but completely ambulatory  General Appearance:  alert, cooperative, no apparent distress and appears stated age   Neurologic/Psychiatric: A&O x 3, gait steady, appropriate affect, strength 5/5 in all muscle groups   HEENT:  Normocephalic, without obvious abnormality, mucous membranes moist   Neck: Supple, symmetrical, trachea midline, no adenopathy;  No thyromegaly, masses, or tenderness   Lungs:   Clear to auscultation bilaterally; respirations regular, even, and unlabored bilaterally   Heart:  Regular rate and rhythm, no murmurs " appreciated   Abdomen:   Soft, non-tender, non-distended and no organomegaly   Lymph nodes: No cervical, supraclavicular, inguinal or axillary adenopathy noted   Extremities: Normal, atraumatic; no clubbing, cyanosis, or edema    Skin: No rashes, ulcers, or suspicious lesions noted     No visits with results within 2 Week(s) from this visit.   Latest known visit with results is:   Infusion on 08/21/2019   Component Date Value Ref Range Status   • Glucose 08/21/2019 103* 65 - 99 mg/dL Final   • BUN 08/21/2019 19  8 - 23 mg/dL Final   • Creatinine 08/21/2019 1.06* 0.57 - 1.00 mg/dL Final   • Sodium 08/21/2019 139  136 - 145 mmol/L Final   • Potassium 08/21/2019 3.7  3.5 - 5.2 mmol/L Final   • Chloride 08/21/2019 101  98 - 107 mmol/L Final   • CO2 08/21/2019 26.3  22.0 - 29.0 mmol/L Final   • Calcium 08/21/2019 10.5  8.6 - 10.5 mg/dL Final   • Total Protein 08/21/2019 8.1  6.0 - 8.5 g/dL Final   • Albumin 08/21/2019 4.10  3.50 - 5.20 g/dL Final   • ALT (SGPT) 08/21/2019 40* 1 - 33 U/L Final   • AST (SGOT) 08/21/2019 39* 1 - 32 U/L Final   • Alkaline Phosphatase 08/21/2019 137* 39 - 117 U/L Final   • Total Bilirubin 08/21/2019 0.6  0.2 - 1.2 mg/dL Final   • eGFR Non African Amer 08/21/2019 53* >60 mL/min/1.73 Final   • Globulin 08/21/2019 4.0  gm/dL Final   • A/G Ratio 08/21/2019 1.0  g/dL Final   • BUN/Creatinine Ratio 08/21/2019 17.9  7.0 - 25.0 Final   • Anion Gap 08/21/2019 11.7  5.0 - 15.0 mmol/L Final   • WBC 08/21/2019 4.53  3.40 - 10.80 10*3/mm3 Final   • RBC 08/21/2019 3.46* 3.77 - 5.28 10*6/mm3 Final   • Hemoglobin 08/21/2019 11.1* 12.0 - 15.9 g/dL Final   • Hematocrit 08/21/2019 33.4* 34.0 - 46.6 % Final   • MCV 08/21/2019 96.5  79.0 - 97.0 fL Final   • MCH 08/21/2019 32.1  26.6 - 33.0 pg Final   • MCHC 08/21/2019 33.2  31.5 - 35.7 g/dL Final   • RDW 08/21/2019 16.9* 12.3 - 15.4 % Final   • RDW-SD 08/21/2019 59.6* 37.0 - 54.0 fl Final   • MPV 08/21/2019 9.6  6.0 - 12.0 fL Final   • Platelets 08/21/2019 124*  140 - 450 10*3/mm3 Final   • Neutrophil % 08/21/2019 60.3  42.7 - 76.0 % Final   • Lymphocyte % 08/21/2019 20.3  19.6 - 45.3 % Final   • Monocyte % 08/21/2019 9.9  5.0 - 12.0 % Final   • Eosinophil % 08/21/2019 8.2* 0.3 - 6.2 % Final   • Basophil % 08/21/2019 0.9  0.0 - 1.5 % Final   • Immature Grans % 08/21/2019 0.4  0.0 - 0.5 % Final   • Neutrophils, Absolute 08/21/2019 2.73  1.70 - 7.00 10*3/mm3 Final   • Lymphocytes, Absolute 08/21/2019 0.92  0.70 - 3.10 10*3/mm3 Final   • Monocytes, Absolute 08/21/2019 0.45  0.10 - 0.90 10*3/mm3 Final   • Eosinophils, Absolute 08/21/2019 0.37  0.00 - 0.40 10*3/mm3 Final   • Basophils, Absolute 08/21/2019 0.04  0.00 - 0.20 10*3/mm3 Final   • Immature Grans, Absolute 08/21/2019 0.02  0.00 - 0.05 10*3/mm3 Final   • nRBC 08/21/2019 0.0  0.0 - 0.2 /100 WBC Final        No results found.    ASSESSMENT: The patient is a very pleasant 64 y.o. female  with small cell lung cancer    PROBLEM LIST:  1. Small cell lung cancer from the left upper lobe.  Limited stage disease, recurrent in a mediastinal node  A.  Treated  initially with etoposide and carboplatin starting 9/13/27.  She responded to the single modality that was started because of symptoms including airway compromise.  B.  Completed combined modality chemoradiation with etoposide and carboplatin . (Chemotherapy completed 12/7/2017).  C.  Recurrence in left paratracheal node.  This was seen on CT scan from 6/19/18 and hypermetabolic on PET scan to confirm.  She's been treated with weekly taxol starting 7/17/2018 along with involved field radiation.  D. Left pleural effusion seen on CT of 11/23/18, loculated and inaccessible for thoracentesis.  E.  Progressive disease documented on scans done April 22, 2019 with increase in size of left paratracheal lymph node and right hepatic lobe mass.  F. Started carboplatin and etoposide with Tecentriq May 1, 2019, status post 5 cycles.     2.  Anemia.  This appears to be predominantly from  chemoradiation.  3.  History of left breast cancer.  A.  Left mastectomy 7/22/2008 for stage IIIA, T3 N1 M0 breast cancer  B.  ER weakly positive, NE negative and HER-2 positive  C.  Adjuvant chemotherapy with Herceptin, Taxotere and carboplatin  D.  Postmastectomy radiation done at [facility]  E.  Adjuvant tamoxifen started 12/12/2008 and continued as extended adjuvant therapy  4.  COPD.  5. Diabetes  6. HTN  7.  Cough  8.  Treatment induced itching 1    PLAN:  1. I will proceed with treatment as scheduled today Tecentriq, cycle #7.  This is an FDA approved treatment for extensive stage small cell lung cancer.   2. The patient will follow up in 3 weeks for cycle #8.  3.  I will continue port maintenance.   4.  I will continue to monitor the patient blood work while she is on treatment including blood counts kidney function liver function and electrolytes.  5. We discussed potential side effects of immunotherapy including but not limited to immune mediated reactions with thyroiditis, pneumonitis, hepatitis, colitis, rash, and electrolytes abnormalities, fatigue, multiorgan failure, and possibly death.  6. We reviewed the potential side effects of this regimen including fatigue, vomiting and nausea, hair loss, nephropathy, neuropathy, hearing loss, myelosuppression, and risk of infusion reaction.  7. She has completed 10 years of Tamoxifen at this point.   8. Will continue inhalers as needed for COPD  9. We will continue Metformin for type 2 DM.  Her blood sugars may get higher while she is in treatment secondary to pre-infusion steroids.  10. Will continue Hyzaar for HTN.  We may have to adjust her antihypertensive agent while she is in treatment.  11. We will plan to repeat scans in 3 months will be due October 2019. I will add a bone scan to evaluate new bony aches. I will order prior to return.  12.  I will continue the patient on Robitussin with codeine as needed for cough.  13.  I will continue  the patient on Benadryl as needed for itching.  14. If Hgb is less than 8 we will plan on giving 2 units of PRBC.     Belkis Nayak, APRN      9/11/2019

## 2019-09-11 ENCOUNTER — INFUSION (OUTPATIENT)
Dept: ONCOLOGY | Facility: HOSPITAL | Age: 64
End: 2019-09-11

## 2019-09-11 ENCOUNTER — OFFICE VISIT (OUTPATIENT)
Dept: ONCOLOGY | Facility: CLINIC | Age: 64
End: 2019-09-11

## 2019-09-11 VITALS
TEMPERATURE: 97 F | HEART RATE: 91 BPM | OXYGEN SATURATION: 94 % | WEIGHT: 204 LBS | DIASTOLIC BLOOD PRESSURE: 78 MMHG | SYSTOLIC BLOOD PRESSURE: 177 MMHG | BODY MASS INDEX: 37.54 KG/M2 | RESPIRATION RATE: 16 BRPM | HEIGHT: 62 IN

## 2019-09-11 DIAGNOSIS — C34.82 MALIGNANT NEOPLASM OF OVERLAPPING SITES OF LEFT LUNG (HCC): Primary | ICD-10-CM

## 2019-09-11 DIAGNOSIS — C34.82 MALIGNANT NEOPLASM OF OVERLAPPING SITES OF LEFT LUNG (HCC): ICD-10-CM

## 2019-09-11 LAB
ALBUMIN SERPL-MCNC: 3.9 G/DL (ref 3.5–5.2)
ALBUMIN/GLOB SERPL: 1 G/DL
ALP SERPL-CCNC: 126 U/L (ref 39–117)
ALT SERPL W P-5'-P-CCNC: 31 U/L (ref 1–33)
ANION GAP SERPL CALCULATED.3IONS-SCNC: 13.6 MMOL/L (ref 5–15)
AST SERPL-CCNC: 29 U/L (ref 1–32)
BASOPHILS # BLD AUTO: 0.02 10*3/MM3 (ref 0–0.2)
BASOPHILS NFR BLD AUTO: 0.4 % (ref 0–1.5)
BILIRUB SERPL-MCNC: 0.4 MG/DL (ref 0.2–1.2)
BUN BLD-MCNC: 15 MG/DL (ref 8–23)
BUN/CREAT SERPL: 15.6 (ref 7–25)
CALCIUM SPEC-SCNC: 10.6 MG/DL (ref 8.6–10.5)
CHLORIDE SERPL-SCNC: 103 MMOL/L (ref 98–107)
CO2 SERPL-SCNC: 24.4 MMOL/L (ref 22–29)
CREAT BLD-MCNC: 0.96 MG/DL (ref 0.57–1)
DEPRECATED RDW RBC AUTO: 52.6 FL (ref 37–54)
EOSINOPHIL # BLD AUTO: 0.33 10*3/MM3 (ref 0–0.4)
EOSINOPHIL NFR BLD AUTO: 6.8 % (ref 0.3–6.2)
ERYTHROCYTE [DISTWIDTH] IN BLOOD BY AUTOMATED COUNT: 14.6 % (ref 12.3–15.4)
GFR SERPL CREATININE-BSD FRML MDRD: 59 ML/MIN/1.73
GLOBULIN UR ELPH-MCNC: 4.1 GM/DL
GLUCOSE BLD-MCNC: 108 MG/DL (ref 65–99)
HCT VFR BLD AUTO: 31.5 % (ref 34–46.6)
HGB BLD-MCNC: 10.5 G/DL (ref 12–15.9)
IMM GRANULOCYTES # BLD AUTO: 0.03 10*3/MM3 (ref 0–0.05)
IMM GRANULOCYTES NFR BLD AUTO: 0.6 % (ref 0–0.5)
LYMPHOCYTES # BLD AUTO: 0.97 10*3/MM3 (ref 0.7–3.1)
LYMPHOCYTES NFR BLD AUTO: 20.1 % (ref 19.6–45.3)
MCH RBC QN AUTO: 32.6 PG (ref 26.6–33)
MCHC RBC AUTO-ENTMCNC: 33.3 G/DL (ref 31.5–35.7)
MCV RBC AUTO: 97.8 FL (ref 79–97)
MONOCYTES # BLD AUTO: 0.37 10*3/MM3 (ref 0.1–0.9)
MONOCYTES NFR BLD AUTO: 7.7 % (ref 5–12)
NEUTROPHILS # BLD AUTO: 3.1 10*3/MM3 (ref 1.7–7)
NEUTROPHILS NFR BLD AUTO: 64.4 % (ref 42.7–76)
NRBC BLD AUTO-RTO: 0 /100 WBC (ref 0–0.2)
PLATELET # BLD AUTO: 112 10*3/MM3 (ref 140–450)
PMV BLD AUTO: 9.4 FL (ref 6–12)
POTASSIUM BLD-SCNC: 3.8 MMOL/L (ref 3.5–5.2)
PROT SERPL-MCNC: 8 G/DL (ref 6–8.5)
RBC # BLD AUTO: 3.22 10*6/MM3 (ref 3.77–5.28)
SODIUM BLD-SCNC: 141 MMOL/L (ref 136–145)
T3FREE SERPL-MCNC: 3 PG/ML (ref 2–4.4)
T4 FREE SERPL-MCNC: 1.22 NG/DL (ref 0.93–1.7)
TSH SERPL DL<=0.05 MIU/L-ACNC: 1.94 UIU/ML (ref 0.27–4.2)
WBC NRBC COR # BLD: 4.82 10*3/MM3 (ref 3.4–10.8)

## 2019-09-11 PROCEDURE — 99214 OFFICE O/P EST MOD 30 MIN: CPT | Performed by: NURSE PRACTITIONER

## 2019-09-11 PROCEDURE — 96413 CHEMO IV INFUSION 1 HR: CPT

## 2019-09-11 PROCEDURE — 80053 COMPREHEN METABOLIC PANEL: CPT

## 2019-09-11 PROCEDURE — 25010000002 ATEZOLIZUMAB 1200 MG/20ML SOLUTION 20 ML VIAL: Performed by: NURSE PRACTITIONER

## 2019-09-11 PROCEDURE — 84443 ASSAY THYROID STIM HORMONE: CPT

## 2019-09-11 PROCEDURE — 84439 ASSAY OF FREE THYROXINE: CPT

## 2019-09-11 PROCEDURE — 36415 COLL VENOUS BLD VENIPUNCTURE: CPT

## 2019-09-11 PROCEDURE — 84481 FREE ASSAY (FT-3): CPT

## 2019-09-11 PROCEDURE — 85025 COMPLETE CBC W/AUTO DIFF WBC: CPT

## 2019-09-11 RX ORDER — SODIUM CHLORIDE 9 MG/ML
250 INJECTION, SOLUTION INTRAVENOUS ONCE
Status: CANCELLED | OUTPATIENT
Start: 2019-09-11

## 2019-09-11 RX ORDER — SODIUM CHLORIDE 0.9 % (FLUSH) 0.9 %
10 SYRINGE (ML) INJECTION AS NEEDED
Status: DISCONTINUED | OUTPATIENT
Start: 2019-09-11 | End: 2019-09-11 | Stop reason: HOSPADM

## 2019-09-11 RX ORDER — SODIUM CHLORIDE 0.9 % (FLUSH) 0.9 %
10 SYRINGE (ML) INJECTION AS NEEDED
Status: CANCELLED | OUTPATIENT
Start: 2019-09-11

## 2019-09-11 RX ORDER — SODIUM CHLORIDE 9 MG/ML
250 INJECTION, SOLUTION INTRAVENOUS ONCE
Status: DISCONTINUED | OUTPATIENT
Start: 2019-09-11 | End: 2019-09-11 | Stop reason: HOSPADM

## 2019-09-11 RX ADMIN — HEPARIN SODIUM (PORCINE) LOCK FLUSH IV SOLN 100 UNIT/ML 500 UNITS: 100 SOLUTION at 12:15

## 2019-09-11 RX ADMIN — SODIUM CHLORIDE, PRESERVATIVE FREE 10 ML: 5 INJECTION INTRAVENOUS at 12:15

## 2019-09-11 RX ADMIN — ATEZOLIZUMAB 1200 MG: 1200 INJECTION, SOLUTION INTRAVENOUS at 11:42

## 2019-09-30 ENCOUNTER — APPOINTMENT (OUTPATIENT)
Dept: CT IMAGING | Facility: HOSPITAL | Age: 64
End: 2019-09-30

## 2019-09-30 ENCOUNTER — APPOINTMENT (OUTPATIENT)
Dept: NUCLEAR MEDICINE | Facility: HOSPITAL | Age: 64
End: 2019-09-30

## 2019-10-02 ENCOUNTER — INFUSION (OUTPATIENT)
Dept: ONCOLOGY | Facility: HOSPITAL | Age: 64
End: 2019-10-02

## 2019-10-02 ENCOUNTER — OFFICE VISIT (OUTPATIENT)
Dept: ONCOLOGY | Facility: CLINIC | Age: 64
End: 2019-10-02

## 2019-10-02 VITALS
HEIGHT: 62 IN | RESPIRATION RATE: 18 BRPM | DIASTOLIC BLOOD PRESSURE: 71 MMHG | TEMPERATURE: 97.8 F | SYSTOLIC BLOOD PRESSURE: 156 MMHG | WEIGHT: 201 LBS | HEART RATE: 94 BPM | BODY MASS INDEX: 36.99 KG/M2

## 2019-10-02 DIAGNOSIS — C34.82 MALIGNANT NEOPLASM OF OVERLAPPING SITES OF LEFT LUNG (HCC): Primary | ICD-10-CM

## 2019-10-02 LAB
ALBUMIN SERPL-MCNC: 4.1 G/DL (ref 3.5–5.2)
ALBUMIN/GLOB SERPL: 1 G/DL
ALP SERPL-CCNC: 160 U/L (ref 39–117)
ALT SERPL W P-5'-P-CCNC: 34 U/L (ref 1–33)
ANION GAP SERPL CALCULATED.3IONS-SCNC: 14.6 MMOL/L (ref 5–15)
AST SERPL-CCNC: 31 U/L (ref 1–32)
BASOPHILS # BLD AUTO: 0.03 10*3/MM3 (ref 0–0.2)
BASOPHILS NFR BLD AUTO: 0.7 % (ref 0–1.5)
BILIRUB SERPL-MCNC: 0.6 MG/DL (ref 0.2–1.2)
BUN BLD-MCNC: 13 MG/DL (ref 8–23)
BUN/CREAT SERPL: 14.4 (ref 7–25)
CALCIUM SPEC-SCNC: 10.6 MG/DL (ref 8.6–10.5)
CHLORIDE SERPL-SCNC: 99 MMOL/L (ref 98–107)
CO2 SERPL-SCNC: 24.4 MMOL/L (ref 22–29)
CREAT BLD-MCNC: 0.9 MG/DL (ref 0.57–1)
DEPRECATED RDW RBC AUTO: 49.7 FL (ref 37–54)
EOSINOPHIL # BLD AUTO: 0.2 10*3/MM3 (ref 0–0.4)
EOSINOPHIL NFR BLD AUTO: 4.4 % (ref 0.3–6.2)
ERYTHROCYTE [DISTWIDTH] IN BLOOD BY AUTOMATED COUNT: 13.9 % (ref 12.3–15.4)
GFR SERPL CREATININE-BSD FRML MDRD: 63 ML/MIN/1.73
GLOBULIN UR ELPH-MCNC: 4.1 GM/DL
GLUCOSE BLD-MCNC: 108 MG/DL (ref 65–99)
HCT VFR BLD AUTO: 35 % (ref 34–46.6)
HGB BLD-MCNC: 11.5 G/DL (ref 12–15.9)
IMM GRANULOCYTES # BLD AUTO: 0.02 10*3/MM3 (ref 0–0.05)
IMM GRANULOCYTES NFR BLD AUTO: 0.4 % (ref 0–0.5)
LYMPHOCYTES # BLD AUTO: 1.06 10*3/MM3 (ref 0.7–3.1)
LYMPHOCYTES NFR BLD AUTO: 23.5 % (ref 19.6–45.3)
MCH RBC QN AUTO: 32 PG (ref 26.6–33)
MCHC RBC AUTO-ENTMCNC: 32.9 G/DL (ref 31.5–35.7)
MCV RBC AUTO: 97.5 FL (ref 79–97)
MONOCYTES # BLD AUTO: 0.33 10*3/MM3 (ref 0.1–0.9)
MONOCYTES NFR BLD AUTO: 7.3 % (ref 5–12)
NEUTROPHILS # BLD AUTO: 2.87 10*3/MM3 (ref 1.7–7)
NEUTROPHILS NFR BLD AUTO: 63.7 % (ref 42.7–76)
NRBC BLD AUTO-RTO: 0 /100 WBC (ref 0–0.2)
PLATELET # BLD AUTO: 114 10*3/MM3 (ref 140–450)
PMV BLD AUTO: 9.3 FL (ref 6–12)
POTASSIUM BLD-SCNC: 3.5 MMOL/L (ref 3.5–5.2)
PROT SERPL-MCNC: 8.2 G/DL (ref 6–8.5)
RBC # BLD AUTO: 3.59 10*6/MM3 (ref 3.77–5.28)
SODIUM BLD-SCNC: 138 MMOL/L (ref 136–145)
WBC NRBC COR # BLD: 4.51 10*3/MM3 (ref 3.4–10.8)

## 2019-10-02 PROCEDURE — 25010000002 ATEZOLIZUMAB 1200 MG/20ML SOLUTION 20 ML VIAL: Performed by: INTERNAL MEDICINE

## 2019-10-02 PROCEDURE — 85025 COMPLETE CBC W/AUTO DIFF WBC: CPT

## 2019-10-02 PROCEDURE — 36415 COLL VENOUS BLD VENIPUNCTURE: CPT

## 2019-10-02 PROCEDURE — 99215 OFFICE O/P EST HI 40 MIN: CPT | Performed by: INTERNAL MEDICINE

## 2019-10-02 PROCEDURE — 80053 COMPREHEN METABOLIC PANEL: CPT

## 2019-10-02 PROCEDURE — 96413 CHEMO IV INFUSION 1 HR: CPT

## 2019-10-02 RX ORDER — SODIUM CHLORIDE 9 MG/ML
250 INJECTION, SOLUTION INTRAVENOUS ONCE
Status: CANCELLED | OUTPATIENT
Start: 2019-10-02

## 2019-10-02 RX ORDER — SODIUM CHLORIDE 0.9 % (FLUSH) 0.9 %
10 SYRINGE (ML) INJECTION AS NEEDED
Status: DISCONTINUED | OUTPATIENT
Start: 2019-10-02 | End: 2019-10-02 | Stop reason: HOSPADM

## 2019-10-02 RX ORDER — SODIUM CHLORIDE 9 MG/ML
250 INJECTION, SOLUTION INTRAVENOUS ONCE
Status: DISCONTINUED | OUTPATIENT
Start: 2019-10-02 | End: 2019-10-02 | Stop reason: HOSPADM

## 2019-10-02 RX ORDER — SODIUM CHLORIDE 0.9 % (FLUSH) 0.9 %
10 SYRINGE (ML) INJECTION AS NEEDED
Status: CANCELLED | OUTPATIENT
Start: 2019-10-02

## 2019-10-02 RX ADMIN — HEPARIN 500 UNITS: 100 SYRINGE at 12:23

## 2019-10-02 RX ADMIN — ATEZOLIZUMAB 1200 MG: 1200 INJECTION, SOLUTION INTRAVENOUS at 11:42

## 2019-10-02 RX ADMIN — SODIUM CHLORIDE, PRESERVATIVE FREE 10 ML: 5 INJECTION INTRAVENOUS at 12:23

## 2019-10-02 NOTE — PROGRESS NOTES
DATE OF VISIT: 10/2/2019    REASON FOR VISIT: Followup for small cell lung cancer     HISTORY OF PRESENT ILLNESS: The patient is a very pleasant 64 y.o. female  with past medical history significant for small cell lung cancer diagnosed August 2017.  Repeated scans done on April 21, 2019 revealed progressive disease.  She was started on carboplatin and etoposide and Tecentriq April 30, 2019.  Chemo was stopped after 4 cycles and patient was started on maintenance Tecentriq.  The patient is here today for scheduled follow up visit with treatment, cycle #8.     SUBJECTIVE: The patient is here today by herself.  She could negative scans done secondary to insurance approval.  She can you complain of low back pain as well as lower extremity tenderness.  She has been able to tolerate the current treatment better than the first combination regimen.      PAST MEDICAL HISTORY/SOCIAL HISTORY/FAMILY HISTORY: Reviewed by me and unchanged from my documentation done on 10/02/19.    Review of Systems   Constitutional: Positive for fatigue. Negative for activity change, appetite change, chills, fever and unexpected weight change.   HENT: Negative for hearing loss, mouth sores, nosebleeds, sore throat and trouble swallowing.    Eyes: Negative for visual disturbance.   Respiratory: Negative for cough, chest tightness, shortness of breath and wheezing.    Cardiovascular: Negative for chest pain, palpitations and leg swelling.   Gastrointestinal: Positive for nausea. Negative for abdominal distention, abdominal pain, blood in stool, constipation, diarrhea, rectal pain and vomiting.   Endocrine: Negative for cold intolerance and heat intolerance.   Genitourinary: Negative for difficulty urinating, dysuria, frequency and urgency.   Musculoskeletal: Positive for arthralgias. Negative for back pain, gait problem, joint swelling and myalgias.   Skin: Negative for rash.        Itching     Neurological: Negative for dizziness, tremors,  syncope, weakness, light-headedness, numbness and headaches.   Hematological: Negative for adenopathy. Does not bruise/bleed easily.   Psychiatric/Behavioral: Negative for confusion, sleep disturbance and suicidal ideas. The patient is not nervous/anxious.          Current Outpatient Medications:   •  albuterol (VENTOLIN HFA) 108 (90 Base) MCG/ACT inhaler, Inhale 2 puffs Every 4 (Four) Hours As Needed for Wheezing or Shortness of Air., Disp: 1 inhaler, Rfl: 5  •  amLODIPine (NORVASC) 5 MG tablet, Take 5 mg by mouth Daily., Disp: , Rfl: 0  •  ANORO ELLIPTA 62.5-25 MCG/INH aerosol powder  inhaler, INHALE 1 PUFF BY MOUTH DAILY, Disp: 60 each, Rfl: 11  •  aspirin 81 MG EC tablet, Take 81 mg by mouth Daily., Disp: , Rfl:   •  azelastine (ASTELIN) 0.1 % nasal spray, 1 spray into the nostril(s) as directed by provider 2 (Two) Times a Day As Needed for Rhinitis or Allergies. Use in each nostril as directed, Disp: 1 each, Rfl: 5  •  benzonatate (TESSALON PERLES) 100 MG capsule, Take 1-2 capsules by mouth 3 (Three) Times a Day As Needed for Cough., Disp: 120 capsule, Rfl: 5  •  Cholecalciferol (VITAMIN D3) 32557 units capsule, TK 1 C PO Q WK, Disp: , Rfl: 3  •  guaifenesin-codeine (GUAIFENESIN AC) 100-10 MG/5ML liquid, Take 5-10 mL by mouth 3 (Three) Times a Day As Needed for Cough., Disp: 480 mL, Rfl: 2  •  HAVRIX 1440 EL U/ML vaccine, ADM 1ML IM UTD, Disp: , Rfl: 0  •  hydrochlorothiazide (MICROZIDE) 12.5 MG capsule, Take 12.5 mg by mouth Every Morning. Pt reports that she takes this when swelled as needed., Disp: , Rfl: 0  •  hydrocortisone 2.5 % cream, Apply  topically to the appropriate area as directed 2 (Two) Times a Day. (Patient taking differently: Apply  topically to the appropriate area as directed 2 (Two) Times a Day. Rash improved so pt no longer needed to use it.), Disp: 20 g, Rfl: 0  •  lidocaine-prilocaine (EMLA) 2.5-2.5 % cream, Apply  topically to the appropriate area as directed As Needed (45-60 minutes  "prior to port access.  Cover with saran/plastic wrap.)., Disp: 30 g, Rfl: 3  •  losartan-hydrochlorothiazide (HYZAAR) 100-12.5 MG per tablet, Take 1 tablet by mouth Daily., Disp: , Rfl: 0  •  metFORMIN (GLUCOPHAGE) 500 MG tablet, Take 500 mg by mouth 2 (Two) Times a Day With Meals., Disp: , Rfl: 0  •  Nystatin (MAGIC MOUTHWASH), Swish and spit 10 mL Every 4 (Four) Hours As Needed (sore mouth)., Disp: 240 mL, Rfl: 1  •  ondansetron (ZOFRAN) 8 MG tablet, Take 1 tablet by mouth 3 (Three) Times a Day As Needed for Nausea or Vomiting., Disp: 30 tablet, Rfl: 5  •  pantoprazole (PROTONIX) 40 MG EC tablet, Take 1 tablet by mouth Every 12 (Twelve) Hours., Disp: 60 tablet, Rfl: 0  •  potassium chloride ER (K-TAB) 20 MEQ tablet controlled-release ER tablet, Take 1 tablet by mouth 2 (Two) Times a Day., Disp: 30 tablet, Rfl: 1  •  tamoxifen (NOLVADEX) 20 MG chemo tablet, Take 1 tablet by mouth Daily., Disp: 30 tablet, Rfl: 0  •  triamcinolone (KENALOG) 0.1 % ointment, Apply  topically to the appropriate area as directed 2 (Two) Times a Day., Disp: 30 g, Rfl: 3  •  umeclidinium-vilanterol (ANORO ELLIPTA) 62.5-25 MCG/INH aerosol powder  inhaler, Inhale 1 puff Daily., Disp: 1 each, Rfl: 11    PHYSICAL EXAMINATION:   /71   Pulse 94   Temp 97.8 °F (36.6 °C) (Temporal)   Resp 18   Ht 157.5 cm (62\")   Wt 91.2 kg (201 lb)   BMI 36.76 kg/m²    ECOG Performance Status: 1 - Symptomatic but completely ambulatory  General Appearance:  alert, cooperative, no apparent distress and appears stated age   Neurologic/Psychiatric: A&O x 3, gait steady, appropriate affect, strength 5/5 in all muscle groups   HEENT:  Normocephalic, without obvious abnormality, mucous membranes moist   Neck: Supple, symmetrical, trachea midline, no adenopathy;  No thyromegaly, masses, or tenderness   Lungs:   Clear to auscultation bilaterally; respirations regular, even, and unlabored bilaterally   Heart:  Regular rate and rhythm, no murmurs appreciated "   Abdomen:   Soft, non-tender, non-distended and no organomegaly   Lymph nodes: No cervical, supraclavicular, inguinal or axillary adenopathy noted   Extremities: Normal, atraumatic; no clubbing, cyanosis, or edema    Skin: No rashes, ulcers, or suspicious lesions noted     No visits with results within 2 Week(s) from this visit.   Latest known visit with results is:   Infusion on 09/11/2019   Component Date Value Ref Range Status   • Glucose 09/11/2019 108* 65 - 99 mg/dL Final   • BUN 09/11/2019 15  8 - 23 mg/dL Final   • Creatinine 09/11/2019 0.96  0.57 - 1.00 mg/dL Final   • Sodium 09/11/2019 141  136 - 145 mmol/L Final   • Potassium 09/11/2019 3.8  3.5 - 5.2 mmol/L Final   • Chloride 09/11/2019 103  98 - 107 mmol/L Final   • CO2 09/11/2019 24.4  22.0 - 29.0 mmol/L Final   • Calcium 09/11/2019 10.6* 8.6 - 10.5 mg/dL Final   • Total Protein 09/11/2019 8.0  6.0 - 8.5 g/dL Final   • Albumin 09/11/2019 3.90  3.50 - 5.20 g/dL Final   • ALT (SGPT) 09/11/2019 31  1 - 33 U/L Final   • AST (SGOT) 09/11/2019 29  1 - 32 U/L Final   • Alkaline Phosphatase 09/11/2019 126* 39 - 117 U/L Final   • Total Bilirubin 09/11/2019 0.4  0.2 - 1.2 mg/dL Final   • eGFR Non African Amer 09/11/2019 59* >60 mL/min/1.73 Final   • Globulin 09/11/2019 4.1  gm/dL Final   • A/G Ratio 09/11/2019 1.0  g/dL Final   • BUN/Creatinine Ratio 09/11/2019 15.6  7.0 - 25.0 Final   • Anion Gap 09/11/2019 13.6  5.0 - 15.0 mmol/L Final   • TSH 09/11/2019 1.940  0.270 - 4.200 uIU/mL Final   • T3, Free 09/11/2019 3.00  2.00 - 4.40 pg/mL Final   • Free T4 09/11/2019 1.22  0.93 - 1.70 ng/dL Final   • WBC 09/11/2019 4.82  3.40 - 10.80 10*3/mm3 Final   • RBC 09/11/2019 3.22* 3.77 - 5.28 10*6/mm3 Final   • Hemoglobin 09/11/2019 10.5* 12.0 - 15.9 g/dL Final   • Hematocrit 09/11/2019 31.5* 34.0 - 46.6 % Final   • MCV 09/11/2019 97.8* 79.0 - 97.0 fL Final   • MCH 09/11/2019 32.6  26.6 - 33.0 pg Final   • MCHC 09/11/2019 33.3  31.5 - 35.7 g/dL Final   • RDW 09/11/2019  14.6  12.3 - 15.4 % Final   • RDW-SD 09/11/2019 52.6  37.0 - 54.0 fl Final   • MPV 09/11/2019 9.4  6.0 - 12.0 fL Final   • Platelets 09/11/2019 112* 140 - 450 10*3/mm3 Final   • Neutrophil % 09/11/2019 64.4  42.7 - 76.0 % Final   • Lymphocyte % 09/11/2019 20.1  19.6 - 45.3 % Final   • Monocyte % 09/11/2019 7.7  5.0 - 12.0 % Final   • Eosinophil % 09/11/2019 6.8* 0.3 - 6.2 % Final   • Basophil % 09/11/2019 0.4  0.0 - 1.5 % Final   • Immature Grans % 09/11/2019 0.6* 0.0 - 0.5 % Final   • Neutrophils, Absolute 09/11/2019 3.10  1.70 - 7.00 10*3/mm3 Final   • Lymphocytes, Absolute 09/11/2019 0.97  0.70 - 3.10 10*3/mm3 Final   • Monocytes, Absolute 09/11/2019 0.37  0.10 - 0.90 10*3/mm3 Final   • Eosinophils, Absolute 09/11/2019 0.33  0.00 - 0.40 10*3/mm3 Final   • Basophils, Absolute 09/11/2019 0.02  0.00 - 0.20 10*3/mm3 Final   • Immature Grans, Absolute 09/11/2019 0.03  0.00 - 0.05 10*3/mm3 Final   • nRBC 09/11/2019 0.0  0.0 - 0.2 /100 WBC Final        No results found.    ASSESSMENT: The patient is a very pleasant 64 y.o. female  with small cell lung cancer    PROBLEM LIST:  1. Small cell lung cancer from the left upper lobe.  Limited stage disease, recurrent in a mediastinal node  A.  Treated  initially with etoposide and carboplatin starting 9/13/27.  She responded to the single modality that was started because of symptoms including airway compromise.  B.  Completed combined modality chemoradiation with etoposide and carboplatin . (Chemotherapy completed 12/7/2017).  C.  Recurrence in left paratracheal node.  This was seen on CT scan from 6/19/18 and hypermetabolic on PET scan to confirm.  She's been treated with weekly taxol starting 7/17/2018 along with involved field radiation.  D. Left pleural effusion seen on CT of 11/23/18, loculated and inaccessible for thoracentesis.  E.  Progressive disease documented on scans done April 22, 2019 with increase in size of left paratracheal lymph node and right hepatic lobe  mass.  F. Started carboplatin and etoposide with Tecentriq May 1, 2019, status post 7 cycles.     2.  Anemia.  This appears to be predominantly from chemoradiation.  3.  History of left breast cancer.  A.  Left mastectomy 7/22/2008 for stage IIIA, T3 N1 M0 breast cancer  B.  ER weakly positive, NE negative and HER-2 positive  C.  Adjuvant chemotherapy with Herceptin, Taxotere and carboplatin  D.  Postmastectomy [facility] done at [facility]  E.  Adjuvant tamoxifen started 12/12/2008 and continued as extended adjuvant therapy  4.  COPD.  5. Diabetes  6. HTN  7.  Cough  8.  Treatment induced itching 1    PLAN:  1. I will proceed with treatment as scheduled today Tecentriq, cycle #8.  This is an FDA approved treatment for extensive stage small cell lung cancer.   2. The patient will follow up in 3 weeks for cycle #9.  3.  I will continue port maintenance.   4.  I will continue to monitor the patient blood work while she is on treatment including blood counts kidney function liver function and electrolytes.  5. We discussed potential side effects of immunotherapy including but not limited to immune mediated reactions with thyroiditis, pneumonitis, hepatitis, colitis, rash, and electrolytes abnormalities, fatigue, multiorgan failure, and possibly death.  6. We reviewed the potential side effects of this regimen including fatigue, vomiting and nausea, hair loss, nephropathy, neuropathy, hearing loss, myelosuppression, and risk of infusion reaction.  7. She has completed 10 years of Tamoxifen at this point.   8. Will continue inhalers as needed for COPD  9. We will continue Metformin for type 2 DM.  Her blood sugars may get higher while she is in treatment secondary to pre-infusion steroids.  10. Will continue Hyzaar for HTN.  We may have to adjust her antihypertensive agent while she is in treatment.  11. We will plan to repeat scans in 3 months will be due October 2019. I will add a bone scan to evaluate  new bony aches. I will order prior to return.  12.  I will continue the patient on Robitussin with codeine as needed for cough.  13.  I will continue the patient on Benadryl as needed for itching.  14. If Hgb is less than 8 we will plan on giving 2 units of PRBC.     Josh Son MD  10/2/2019

## 2019-10-09 ENCOUNTER — TRANSCRIBE ORDERS (OUTPATIENT)
Dept: ADMINISTRATIVE | Facility: HOSPITAL | Age: 64
End: 2019-10-09

## 2019-10-09 DIAGNOSIS — Z12.31 SCREENING MAMMOGRAM FOR HIGH-RISK PATIENT: Primary | ICD-10-CM

## 2019-10-21 ENCOUNTER — HOSPITAL ENCOUNTER (OUTPATIENT)
Dept: CT IMAGING | Facility: HOSPITAL | Age: 64
Discharge: HOME | End: 2019-10-21

## 2019-10-21 ENCOUNTER — HOSPITAL ENCOUNTER (OUTPATIENT)
Dept: NUCLEAR MEDICINE | Facility: HOSPITAL | Age: 64
Discharge: HOME | End: 2019-10-21

## 2019-10-21 ENCOUNTER — HOSPITAL ENCOUNTER (OUTPATIENT)
Dept: CT IMAGING | Facility: HOSPITAL | Age: 64
Discharge: HOME | End: 2019-10-21
Admitting: NURSE PRACTITIONER

## 2019-10-21 DIAGNOSIS — C34.82 MALIGNANT NEOPLASM OF OVERLAPPING SITES OF LEFT LUNG (HCC): ICD-10-CM

## 2019-10-21 PROCEDURE — 25010000002 IOPAMIDOL 61 % SOLUTION: Performed by: NURSE PRACTITIONER

## 2019-10-21 PROCEDURE — 71260 CT THORAX DX C+: CPT

## 2019-10-21 PROCEDURE — 78306 BONE IMAGING WHOLE BODY: CPT

## 2019-10-21 PROCEDURE — 0 TECHNETIUM MEDRONATE KIT: Performed by: NURSE PRACTITIONER

## 2019-10-21 PROCEDURE — 74177 CT ABD & PELVIS W/CONTRAST: CPT

## 2019-10-21 PROCEDURE — A9503 TC99M MEDRONATE: HCPCS | Performed by: NURSE PRACTITIONER

## 2019-10-21 RX ORDER — TC 99M MEDRONATE 20 MG/10ML
26 INJECTION, POWDER, LYOPHILIZED, FOR SOLUTION INTRAVENOUS
Status: COMPLETED | OUTPATIENT
Start: 2019-10-21 | End: 2019-10-21

## 2019-10-21 RX ADMIN — IOPAMIDOL 100 ML: 612 INJECTION, SOLUTION INTRAVENOUS at 10:16

## 2019-10-21 RX ADMIN — TC 99M MEDRONATE 26 MILLICURIE: 20 INJECTION, POWDER, LYOPHILIZED, FOR SOLUTION INTRAVENOUS at 10:08

## 2019-10-23 ENCOUNTER — INFUSION (OUTPATIENT)
Dept: ONCOLOGY | Facility: HOSPITAL | Age: 64
End: 2019-10-23

## 2019-10-23 ENCOUNTER — OFFICE VISIT (OUTPATIENT)
Dept: ONCOLOGY | Facility: CLINIC | Age: 64
End: 2019-10-23

## 2019-10-23 VITALS
HEIGHT: 62 IN | DIASTOLIC BLOOD PRESSURE: 82 MMHG | BODY MASS INDEX: 37.36 KG/M2 | TEMPERATURE: 97 F | WEIGHT: 203 LBS | HEART RATE: 77 BPM | RESPIRATION RATE: 19 BRPM | SYSTOLIC BLOOD PRESSURE: 190 MMHG

## 2019-10-23 DIAGNOSIS — C50.012 MALIGNANT NEOPLASM OF NIPPLE OF LEFT BREAST IN FEMALE, UNSPECIFIED ESTROGEN RECEPTOR STATUS (HCC): Primary | ICD-10-CM

## 2019-10-23 DIAGNOSIS — C34.82 MALIGNANT NEOPLASM OF OVERLAPPING SITES OF LEFT LUNG (HCC): Primary | ICD-10-CM

## 2019-10-23 DIAGNOSIS — C34.82 MALIGNANT NEOPLASM OF OVERLAPPING SITES OF LEFT LUNG (HCC): ICD-10-CM

## 2019-10-23 LAB
ALBUMIN SERPL-MCNC: 4.2 G/DL (ref 3.5–5.2)
ALBUMIN/GLOB SERPL: 1.1 G/DL
ALP SERPL-CCNC: 161 U/L (ref 39–117)
ALT SERPL W P-5'-P-CCNC: 38 U/L (ref 1–33)
ANION GAP SERPL CALCULATED.3IONS-SCNC: 12.7 MMOL/L (ref 5–15)
AST SERPL-CCNC: 35 U/L (ref 1–32)
BASOPHILS # BLD AUTO: 0.03 10*3/MM3 (ref 0–0.2)
BASOPHILS NFR BLD AUTO: 0.6 % (ref 0–1.5)
BILIRUB SERPL-MCNC: 0.6 MG/DL (ref 0.2–1.2)
BUN BLD-MCNC: 15 MG/DL (ref 8–23)
BUN/CREAT SERPL: 17.4 (ref 7–25)
CALCIUM SPEC-SCNC: 10.7 MG/DL (ref 8.6–10.5)
CHLORIDE SERPL-SCNC: 104 MMOL/L (ref 98–107)
CO2 SERPL-SCNC: 23.3 MMOL/L (ref 22–29)
CREAT BLD-MCNC: 0.86 MG/DL (ref 0.57–1)
DEPRECATED RDW RBC AUTO: 46.4 FL (ref 37–54)
EOSINOPHIL # BLD AUTO: 0.21 10*3/MM3 (ref 0–0.4)
EOSINOPHIL NFR BLD AUTO: 4.3 % (ref 0.3–6.2)
ERYTHROCYTE [DISTWIDTH] IN BLOOD BY AUTOMATED COUNT: 12.9 % (ref 12.3–15.4)
GFR SERPL CREATININE-BSD FRML MDRD: 67 ML/MIN/1.73
GLOBULIN UR ELPH-MCNC: 4 GM/DL
GLUCOSE BLD-MCNC: 108 MG/DL (ref 65–99)
HCT VFR BLD AUTO: 36 % (ref 34–46.6)
HGB BLD-MCNC: 12.1 G/DL (ref 12–15.9)
IMM GRANULOCYTES # BLD AUTO: 0.01 10*3/MM3 (ref 0–0.05)
IMM GRANULOCYTES NFR BLD AUTO: 0.2 % (ref 0–0.5)
LYMPHOCYTES # BLD AUTO: 1.06 10*3/MM3 (ref 0.7–3.1)
LYMPHOCYTES NFR BLD AUTO: 21.8 % (ref 19.6–45.3)
MCH RBC QN AUTO: 32.9 PG (ref 26.6–33)
MCHC RBC AUTO-ENTMCNC: 33.6 G/DL (ref 31.5–35.7)
MCV RBC AUTO: 97.8 FL (ref 79–97)
MONOCYTES # BLD AUTO: 0.33 10*3/MM3 (ref 0.1–0.9)
MONOCYTES NFR BLD AUTO: 6.8 % (ref 5–12)
NEUTROPHILS # BLD AUTO: 3.22 10*3/MM3 (ref 1.7–7)
NEUTROPHILS NFR BLD AUTO: 66.3 % (ref 42.7–76)
NRBC BLD AUTO-RTO: 0 /100 WBC (ref 0–0.2)
PLATELET # BLD AUTO: 119 10*3/MM3 (ref 140–450)
PMV BLD AUTO: 9.2 FL (ref 6–12)
POTASSIUM BLD-SCNC: 3.7 MMOL/L (ref 3.5–5.2)
PROT SERPL-MCNC: 8.2 G/DL (ref 6–8.5)
RBC # BLD AUTO: 3.68 10*6/MM3 (ref 3.77–5.28)
SODIUM BLD-SCNC: 140 MMOL/L (ref 136–145)
T3FREE SERPL-MCNC: 3.15 PG/ML (ref 2–4.4)
T4 FREE SERPL-MCNC: 1.18 NG/DL (ref 0.93–1.7)
TSH SERPL DL<=0.05 MIU/L-ACNC: 2.27 UIU/ML (ref 0.27–4.2)
WBC NRBC COR # BLD: 4.86 10*3/MM3 (ref 3.4–10.8)

## 2019-10-23 PROCEDURE — 36415 COLL VENOUS BLD VENIPUNCTURE: CPT

## 2019-10-23 PROCEDURE — 99215 OFFICE O/P EST HI 40 MIN: CPT | Performed by: INTERNAL MEDICINE

## 2019-10-23 PROCEDURE — 96413 CHEMO IV INFUSION 1 HR: CPT

## 2019-10-23 PROCEDURE — 25010000002 ATEZOLIZUMAB 1200 MG/20ML SOLUTION 20 ML VIAL: Performed by: INTERNAL MEDICINE

## 2019-10-23 PROCEDURE — 80053 COMPREHEN METABOLIC PANEL: CPT

## 2019-10-23 PROCEDURE — 84481 FREE ASSAY (FT-3): CPT

## 2019-10-23 PROCEDURE — 84443 ASSAY THYROID STIM HORMONE: CPT

## 2019-10-23 PROCEDURE — 85025 COMPLETE CBC W/AUTO DIFF WBC: CPT

## 2019-10-23 PROCEDURE — 84439 ASSAY OF FREE THYROXINE: CPT

## 2019-10-23 RX ORDER — SODIUM CHLORIDE 9 MG/ML
250 INJECTION, SOLUTION INTRAVENOUS ONCE
Status: DISCONTINUED | OUTPATIENT
Start: 2019-10-23 | End: 2019-10-23 | Stop reason: HOSPADM

## 2019-10-23 RX ORDER — SODIUM CHLORIDE 0.9 % (FLUSH) 0.9 %
10 SYRINGE (ML) INJECTION AS NEEDED
Status: CANCELLED | OUTPATIENT
Start: 2019-10-23

## 2019-10-23 RX ORDER — SODIUM CHLORIDE 0.9 % (FLUSH) 0.9 %
10 SYRINGE (ML) INJECTION AS NEEDED
Status: DISCONTINUED | OUTPATIENT
Start: 2019-10-23 | End: 2019-10-23 | Stop reason: HOSPADM

## 2019-10-23 RX ORDER — SODIUM CHLORIDE 9 MG/ML
250 INJECTION, SOLUTION INTRAVENOUS ONCE
Status: CANCELLED | OUTPATIENT
Start: 2019-10-23

## 2019-10-23 RX ADMIN — HEPARIN 500 UNITS: 100 SYRINGE at 13:04

## 2019-10-23 RX ADMIN — ATEZOLIZUMAB 1200 MG: 1200 INJECTION, SOLUTION INTRAVENOUS at 12:29

## 2019-10-23 NOTE — PROGRESS NOTES
DATE OF VISIT: 10/23/2019    REASON FOR VISIT: Followup for small cell lung cancer     HISTORY OF PRESENT ILLNESS: The patient is a very pleasant 64 y.o. female  with past medical history significant for small cell lung cancer diagnosed August 2017.  Repeated scans done on April 21, 2019 revealed progressive disease.  She was started on carboplatin and etoposide and Tecentriq April 30, 2019.  Chemo was stopped after 4 cycles and patient was started on maintenance Tecentriq.  The patient is here today for scheduled follow up visit with treatment, cycle #9.     SUBJECTIVE: The patient is here today with her  but she is doing fairly well, she had fever chills night sweats.  She is complaining of fatigue.  She has mild itching.    PAST MEDICAL HISTORY/SOCIAL HISTORY/FAMILY HISTORY: Reviewed by me and unchanged from my documentation done on 10/23/19.    Review of Systems   Constitutional: Positive for fatigue. Negative for activity change, appetite change, chills, fever and unexpected weight change.   HENT: Negative for hearing loss, mouth sores, nosebleeds, sore throat and trouble swallowing.    Eyes: Negative for visual disturbance.   Respiratory: Negative for cough, chest tightness, shortness of breath and wheezing.    Cardiovascular: Negative for chest pain, palpitations and leg swelling.   Gastrointestinal: Positive for nausea. Negative for abdominal distention, abdominal pain, blood in stool, constipation, diarrhea, rectal pain and vomiting.   Endocrine: Negative for cold intolerance and heat intolerance.   Genitourinary: Negative for difficulty urinating, dysuria, frequency and urgency.   Musculoskeletal: Positive for arthralgias. Negative for back pain, gait problem, joint swelling and myalgias.   Skin: Negative for rash.        Itching     Neurological: Negative for dizziness, tremors, syncope, weakness, light-headedness, numbness and headaches.   Hematological: Negative for adenopathy. Does not  bruise/bleed easily.   Psychiatric/Behavioral: Negative for confusion, sleep disturbance and suicidal ideas. The patient is not nervous/anxious.          Current Outpatient Medications:   •  albuterol (VENTOLIN HFA) 108 (90 Base) MCG/ACT inhaler, Inhale 2 puffs Every 4 (Four) Hours As Needed for Wheezing or Shortness of Air., Disp: 1 inhaler, Rfl: 5  •  amLODIPine (NORVASC) 5 MG tablet, Take 5 mg by mouth Daily., Disp: , Rfl: 0  •  ANORO ELLIPTA 62.5-25 MCG/INH aerosol powder  inhaler, INHALE 1 PUFF BY MOUTH DAILY, Disp: 60 each, Rfl: 11  •  aspirin 81 MG EC tablet, Take 81 mg by mouth Daily., Disp: , Rfl:   •  azelastine (ASTELIN) 0.1 % nasal spray, 1 spray into the nostril(s) as directed by provider 2 (Two) Times a Day As Needed for Rhinitis or Allergies. Use in each nostril as directed, Disp: 1 each, Rfl: 5  •  benzonatate (TESSALON PERLES) 100 MG capsule, Take 1-2 capsules by mouth 3 (Three) Times a Day As Needed for Cough., Disp: 120 capsule, Rfl: 5  •  Cholecalciferol (VITAMIN D3) 57005 units capsule, TK 1 C PO Q WK, Disp: , Rfl: 3  •  guaifenesin-codeine (GUAIFENESIN AC) 100-10 MG/5ML liquid, Take 5-10 mL by mouth 3 (Three) Times a Day As Needed for Cough., Disp: 480 mL, Rfl: 2  •  HAVRIX 1440 EL U/ML vaccine, ADM 1ML IM UTD, Disp: , Rfl: 0  •  hydrochlorothiazide (MICROZIDE) 12.5 MG capsule, Take 12.5 mg by mouth Every Morning. Pt reports that she takes this when swelled as needed., Disp: , Rfl: 0  •  hydrocortisone 2.5 % cream, Apply  topically to the appropriate area as directed 2 (Two) Times a Day. (Patient taking differently: Apply  topically to the appropriate area as directed 2 (Two) Times a Day. Rash improved so pt no longer needed to use it.), Disp: 20 g, Rfl: 0  •  lidocaine-prilocaine (EMLA) 2.5-2.5 % cream, Apply  topically to the appropriate area as directed As Needed (45-60 minutes prior to port access.  Cover with saran/plastic wrap.)., Disp: 30 g, Rfl: 3  •  losartan-hydrochlorothiazide  "(HYZAAR) 100-12.5 MG per tablet, Take 1 tablet by mouth Daily., Disp: , Rfl: 0  •  metFORMIN (GLUCOPHAGE) 500 MG tablet, Take 500 mg by mouth 2 (Two) Times a Day With Meals., Disp: , Rfl: 0  •  Nystatin (MAGIC MOUTHWASH), Swish and spit 10 mL Every 4 (Four) Hours As Needed (sore mouth)., Disp: 240 mL, Rfl: 1  •  ondansetron (ZOFRAN) 8 MG tablet, Take 1 tablet by mouth 3 (Three) Times a Day As Needed for Nausea or Vomiting., Disp: 30 tablet, Rfl: 5  •  pantoprazole (PROTONIX) 40 MG EC tablet, Take 1 tablet by mouth Every 12 (Twelve) Hours., Disp: 60 tablet, Rfl: 0  •  potassium chloride ER (K-TAB) 20 MEQ tablet controlled-release ER tablet, Take 1 tablet by mouth 2 (Two) Times a Day., Disp: 30 tablet, Rfl: 1  •  tamoxifen (NOLVADEX) 20 MG chemo tablet, Take 1 tablet by mouth Daily., Disp: 30 tablet, Rfl: 0  •  triamcinolone (KENALOG) 0.1 % ointment, Apply  topically to the appropriate area as directed 2 (Two) Times a Day., Disp: 30 g, Rfl: 3  •  umeclidinium-vilanterol (ANORO ELLIPTA) 62.5-25 MCG/INH aerosol powder  inhaler, Inhale 1 puff Daily., Disp: 1 each, Rfl: 11    PHYSICAL EXAMINATION:   BP (!) 190/82   Pulse 77   Temp 97 °F (36.1 °C) (Temporal)   Resp 19   Ht 157.5 cm (62\")   Wt 92.1 kg (203 lb)   BMI 37.13 kg/m²    ECOG Performance Status: 1 - Symptomatic but completely ambulatory  General Appearance:  alert, cooperative, no apparent distress and appears stated age   Neurologic/Psychiatric: A&O x 3, gait steady, appropriate affect, strength 5/5 in all muscle groups   HEENT:  Normocephalic, without obvious abnormality, mucous membranes moist   Neck: Supple, symmetrical, trachea midline, no adenopathy;  No thyromegaly, masses, or tenderness   Lungs:   Clear to auscultation bilaterally; respirations regular, even, and unlabored bilaterally   Heart:  Regular rate and rhythm, no murmurs appreciated   Abdomen:   Soft, non-tender, non-distended and no organomegaly   Lymph nodes: No cervical, supraclavicular, " inguinal or axillary adenopathy noted   Extremities: Normal, atraumatic; no clubbing, cyanosis, or edema    Skin: No rashes, ulcers, or suspicious lesions noted     No visits with results within 2 Week(s) from this visit.   Latest known visit with results is:   Infusion on 10/02/2019   Component Date Value Ref Range Status   • Glucose 10/02/2019 108* 65 - 99 mg/dL Final   • BUN 10/02/2019 13  8 - 23 mg/dL Final   • Creatinine 10/02/2019 0.90  0.57 - 1.00 mg/dL Final   • Sodium 10/02/2019 138  136 - 145 mmol/L Final   • Potassium 10/02/2019 3.5  3.5 - 5.2 mmol/L Final   • Chloride 10/02/2019 99  98 - 107 mmol/L Final   • CO2 10/02/2019 24.4  22.0 - 29.0 mmol/L Final   • Calcium 10/02/2019 10.6* 8.6 - 10.5 mg/dL Final   • Total Protein 10/02/2019 8.2  6.0 - 8.5 g/dL Final   • Albumin 10/02/2019 4.10  3.50 - 5.20 g/dL Final   • ALT (SGPT) 10/02/2019 34* 1 - 33 U/L Final   • AST (SGOT) 10/02/2019 31  1 - 32 U/L Final   • Alkaline Phosphatase 10/02/2019 160* 39 - 117 U/L Final   • Total Bilirubin 10/02/2019 0.6  0.2 - 1.2 mg/dL Final   • eGFR Non African Amer 10/02/2019 63  >60 mL/min/1.73 Final   • Globulin 10/02/2019 4.1  gm/dL Final   • A/G Ratio 10/02/2019 1.0  g/dL Final   • BUN/Creatinine Ratio 10/02/2019 14.4  7.0 - 25.0 Final   • Anion Gap 10/02/2019 14.6  5.0 - 15.0 mmol/L Final   • WBC 10/02/2019 4.51  3.40 - 10.80 10*3/mm3 Final   • RBC 10/02/2019 3.59* 3.77 - 5.28 10*6/mm3 Final   • Hemoglobin 10/02/2019 11.5* 12.0 - 15.9 g/dL Final   • Hematocrit 10/02/2019 35.0  34.0 - 46.6 % Final   • MCV 10/02/2019 97.5* 79.0 - 97.0 fL Final   • MCH 10/02/2019 32.0  26.6 - 33.0 pg Final   • MCHC 10/02/2019 32.9  31.5 - 35.7 g/dL Final   • RDW 10/02/2019 13.9  12.3 - 15.4 % Final   • RDW-SD 10/02/2019 49.7  37.0 - 54.0 fl Final   • MPV 10/02/2019 9.3  6.0 - 12.0 fL Final   • Platelets 10/02/2019 114* 140 - 450 10*3/mm3 Final   • Neutrophil % 10/02/2019 63.7  42.7 - 76.0 % Final   • Lymphocyte % 10/02/2019 23.5  19.6 - 45.3  % Final   • Monocyte % 10/02/2019 7.3  5.0 - 12.0 % Final   • Eosinophil % 10/02/2019 4.4  0.3 - 6.2 % Final   • Basophil % 10/02/2019 0.7  0.0 - 1.5 % Final   • Immature Grans % 10/02/2019 0.4  0.0 - 0.5 % Final   • Neutrophils, Absolute 10/02/2019 2.87  1.70 - 7.00 10*3/mm3 Final   • Lymphocytes, Absolute 10/02/2019 1.06  0.70 - 3.10 10*3/mm3 Final   • Monocytes, Absolute 10/02/2019 0.33  0.10 - 0.90 10*3/mm3 Final   • Eosinophils, Absolute 10/02/2019 0.20  0.00 - 0.40 10*3/mm3 Final   • Basophils, Absolute 10/02/2019 0.03  0.00 - 0.20 10*3/mm3 Final   • Immature Grans, Absolute 10/02/2019 0.02  0.00 - 0.05 10*3/mm3 Final   • nRBC 10/02/2019 0.0  0.0 - 0.2 /100 WBC Final        Ct Chest With Contrast    Result Date: 10/21/2019  Narrative: CT CHEST WITH CONTRAST  HISTORY: Follow up extensive stage small cell lung cancer.; C34.82-Malignant neoplasm of overlapping sites of left bronchus and lung.  COMPARISON: 07/05/2019.  TECHNIQUE: Axial CT with IV contrast administration.  FINDINGS:  Fibrotic changes are seen in the left upper thorax presumably related to posttreatment changes, similar from prior exam. There is no lung mass identified.    There is abnormal soft tissue fullness in the left paratracheal region with an enlarged lymph node inseparable from the esophagus noted. This is best visualized on image 12 measuring up to 20 mm, previously 11 mm.  No pleural or pericardial effusion is seen.  Small pericardial effusion is noted. Mild left pleural scarring is seen. Surgical changes of left mastectomy are present and left axillary lymph node dissection.      Impression: 1. Adenopathy of the upper mediastinum inseparable from the esophagus in the left paratracheal region significantly increased in size from prior exam suspicious for recurrent disease. PET exam may help confirm. 2. Stable posttreatment changes, left lung.   This study was performed with techniques to keep radiation doses as low as reasonably  achievable (ALARA). Individualized dose reduction techniques using automated exposure control or adjustment of vA and/or kV according to the patient size were employed.  This report was finalized on 10/21/2019 12:47 PM by Mikal Newell MD.    Nm Bone Scan Whole Body    Result Date: 10/22/2019  Narrative: NUCLEAR MEDICINE BONE SCAN WHOLE BODY-  HISTORY: Followup extensive small cell lung cancer: New bony aches; C34.82-Malignant neoplasm of overlapping sites of left bronchus and lung.  COMPARISON: Existing relevant imaging studies: 01/23/2018.  PROCEDURE: The patient was injected with 26.0 mCi of technetium 99m MDP.  Three-hour delayed images were obtained.  FINDINGS: Abnormal uptake is seen of the mid feet bilaterally as well as patellofemoral joints likely degenerative or posttraumatic. No other abnormal tracer activity is identified to suggest occult fracture or metastatic disease.      Impression: No findings to indicate metastatic bone disease .   This report was finalized on 10/22/2019 8:39 AM by Mikal Newell MD.    Ct Abdomen Pelvis With Contrast    Result Date: 10/21/2019  Narrative: CT ABDOMEN PELVIS WITH CONTRAST  TECHNIQUE: Oral and IV contrast enhanced exam.  HISTORY: Small cell lung cancer. Restaging.  COMPARISON: 07/05/2019  FINDINGS:  ABDOMEN: Hypodense lesion is seen within the lateral aspect of the right liver measuring up to 1.8 cm, previously 1.5 cm. No new liver lesion is seen. The remaining solid organs are normal. Adenopathy is noted in the wen hepatis. Portal caval lymph node on image 24 measures 25 x 14 mm, previously 20 x 12 mm. A lymph node in the lesser omentum on image 19 measures 11 mm, previously 9 mm.  No ascites is seen.  PELVIS: Appendix is normal. There is no free fluid. Uterus and ovaries are unremarkable.      Impression: Mild interval enlargement of solitary presumed liver metastasis and mild progression of upper abdominal adenopathy.    This study was performed with techniques  to keep radiation doses as low as reasonably achievable (ALARA). Individualized dose reduction techniques using automated exposure control or adjustment of vA and/or kV according to the patient size were employed.  This report was finalized on 10/21/2019 12:47 PM by Mikal Newell MD.      ASSESSMENT: The patient is a very pleasant 64 y.o. female  with small cell lung cancer    PROBLEM LIST:  1. Small cell lung cancer from the left upper lobe.  Limited stage disease, recurrent in a mediastinal node  A.  Treated  initially with etoposide and carboplatin starting 9/13/27.  She responded to the single modality that was started because of symptoms including airway compromise.  B.  Completed combined modality chemoradiation with etoposide and carboplatin . (Chemotherapy completed 12/7/2017).  C.  Recurrence in left paratracheal node.  This was seen on CT scan from 6/19/18 and hypermetabolic on PET scan to confirm.  She's been treated with weekly taxol starting 7/17/2018 along with involved field radiation.  D. Left pleural effusion seen on CT of 11/23/18, loculated and inaccessible for thoracentesis.  E.  Progressive disease documented on scans done April 22, 2019 with increase in size of left paratracheal lymph node and right hepatic lobe mass.  F. Started carboplatin and etoposide with Tecentriq May 1, 2019, status post 8 cycles.     G.  Will start Opdivo plus Yervoy November 13, 2019  2.  Anemia.  This appears to be predominantly from chemoradiation.  3.  History of left breast cancer.  A.  Left mastectomy 7/22/2008 for stage IIIA, T3 N1 M0 breast cancer  B.  ER weakly positive, VA negative and HER-2 positive  C.  Adjuvant chemotherapy with Herceptin, Taxotere and carboplatin  D.  Postmastectomy radiation done at [facility]  E.  Adjuvant tamoxifen started 12/12/2008 and continued as extended adjuvant therapy  4.  COPD.  5. Diabetes  6. HTN  7.  Cough  8.  Treatment induced itching 1    PLAN:  1. I will  proceed with treatment as scheduled today Tecentriq, cycle #9.  This is an FDA approved treatment for extensive stage small cell lung cancer.   2. The patient will follow up in 3 weeks.  I will switch her treatment at that point to Pavithra Bowser given her progression on the current scans.  3.  I will continue port maintenance.   4.  I will continue to monitor the patient blood work while she is on treatment including blood counts kidney function liver function and electrolytes.  5. We discussed potential side effects of immunotherapy including but not limited to immune mediated reactions with thyroiditis, pneumonitis, hepatitis, colitis, rash, and electrolytes abnormalities, fatigue, multiorgan failure, and possibly death.  6. We reviewed the potential side effects of this regimen including fatigue, vomiting and nausea, hair loss, nephropathy, neuropathy, hearing loss, myelosuppression, and risk of infusion reaction.  7. She has completed 10 years of Tamoxifen at this point.   8. Will continue inhalers as needed for COPD  9. We will continue Metformin for type 2 DM.  Her blood sugars may get higher while she is in treatment secondary to pre-infusion steroids.  10. Will continue Hyzaar for HTN.  We may have to adjust her antihypertensive agent while she is in treatment.  11. We will plan to repeat scans in 3 months will be due October 2019. I will add a bone scan to evaluate new bony aches. I will order prior to return.  12.  I will continue the patient on Robitussin with codeine as needed for cough.  13.  I will continue the patient on Benadryl as needed for itching.  14. If Hgb is less than 8 we will plan on giving 2 units of PRBC.     Josh Son MD  10/23/2019

## 2019-11-13 ENCOUNTER — OFFICE VISIT (OUTPATIENT)
Dept: ONCOLOGY | Facility: CLINIC | Age: 64
End: 2019-11-13

## 2019-11-13 ENCOUNTER — INFUSION (OUTPATIENT)
Dept: ONCOLOGY | Facility: HOSPITAL | Age: 64
End: 2019-11-13

## 2019-11-13 VITALS
TEMPERATURE: 96.8 F | HEIGHT: 62 IN | BODY MASS INDEX: 36.8 KG/M2 | WEIGHT: 200 LBS | SYSTOLIC BLOOD PRESSURE: 125 MMHG | HEART RATE: 87 BPM | DIASTOLIC BLOOD PRESSURE: 71 MMHG

## 2019-11-13 DIAGNOSIS — C34.82 MALIGNANT NEOPLASM OF OVERLAPPING SITES OF LEFT LUNG (HCC): Primary | ICD-10-CM

## 2019-11-13 DIAGNOSIS — C34.82 MALIGNANT NEOPLASM OF OVERLAPPING SITES OF LEFT LUNG (HCC): ICD-10-CM

## 2019-11-13 LAB
ALBUMIN SERPL-MCNC: 4.1 G/DL (ref 3.5–5.2)
ALBUMIN/GLOB SERPL: 1 G/DL
ALP SERPL-CCNC: 165 U/L (ref 39–117)
ALT SERPL W P-5'-P-CCNC: 46 U/L (ref 1–33)
ANION GAP SERPL CALCULATED.3IONS-SCNC: 12.5 MMOL/L (ref 5–15)
AST SERPL-CCNC: 44 U/L (ref 1–32)
BASOPHILS # BLD AUTO: 0.03 10*3/MM3 (ref 0–0.2)
BASOPHILS NFR BLD AUTO: 0.7 % (ref 0–1.5)
BILIRUB SERPL-MCNC: 0.7 MG/DL (ref 0.2–1.2)
BUN BLD-MCNC: 16 MG/DL (ref 8–23)
BUN/CREAT SERPL: 14.7 (ref 7–25)
CALCIUM SPEC-SCNC: 10.7 MG/DL (ref 8.6–10.5)
CHLORIDE SERPL-SCNC: 101 MMOL/L (ref 98–107)
CO2 SERPL-SCNC: 25.5 MMOL/L (ref 22–29)
CREAT BLD-MCNC: 1.09 MG/DL (ref 0.57–1)
DEPRECATED RDW RBC AUTO: 44.4 FL (ref 37–54)
EOSINOPHIL # BLD AUTO: 0.16 10*3/MM3 (ref 0–0.4)
EOSINOPHIL NFR BLD AUTO: 3.6 % (ref 0.3–6.2)
ERYTHROCYTE [DISTWIDTH] IN BLOOD BY AUTOMATED COUNT: 12.4 % (ref 12.3–15.4)
GFR SERPL CREATININE-BSD FRML MDRD: 51 ML/MIN/1.73
GLOBULIN UR ELPH-MCNC: 4.3 GM/DL
GLUCOSE BLD-MCNC: 112 MG/DL (ref 65–99)
HCT VFR BLD AUTO: 35.8 % (ref 34–46.6)
HGB BLD-MCNC: 12.2 G/DL (ref 12–15.9)
IMM GRANULOCYTES # BLD AUTO: 0.01 10*3/MM3 (ref 0–0.05)
IMM GRANULOCYTES NFR BLD AUTO: 0.2 % (ref 0–0.5)
LYMPHOCYTES # BLD AUTO: 1 10*3/MM3 (ref 0.7–3.1)
LYMPHOCYTES NFR BLD AUTO: 22.7 % (ref 19.6–45.3)
MCH RBC QN AUTO: 33.1 PG (ref 26.6–33)
MCHC RBC AUTO-ENTMCNC: 34.1 G/DL (ref 31.5–35.7)
MCV RBC AUTO: 97 FL (ref 79–97)
MONOCYTES # BLD AUTO: 0.32 10*3/MM3 (ref 0.1–0.9)
MONOCYTES NFR BLD AUTO: 7.3 % (ref 5–12)
NEUTROPHILS # BLD AUTO: 2.88 10*3/MM3 (ref 1.7–7)
NEUTROPHILS NFR BLD AUTO: 65.5 % (ref 42.7–76)
NRBC BLD AUTO-RTO: 0 /100 WBC (ref 0–0.2)
PLATELET # BLD AUTO: 114 10*3/MM3 (ref 140–450)
PMV BLD AUTO: 9 FL (ref 6–12)
POTASSIUM BLD-SCNC: 3.4 MMOL/L (ref 3.5–5.2)
PROT SERPL-MCNC: 8.4 G/DL (ref 6–8.5)
RBC # BLD AUTO: 3.69 10*6/MM3 (ref 3.77–5.28)
SODIUM BLD-SCNC: 139 MMOL/L (ref 136–145)
T4 FREE SERPL-MCNC: 1.21 NG/DL (ref 0.93–1.7)
TSH SERPL DL<=0.05 MIU/L-ACNC: 2.05 UIU/ML (ref 0.27–4.2)
WBC NRBC COR # BLD: 4.4 10*3/MM3 (ref 3.4–10.8)

## 2019-11-13 PROCEDURE — 96415 CHEMO IV INFUSION ADDL HR: CPT

## 2019-11-13 PROCEDURE — 85025 COMPLETE CBC W/AUTO DIFF WBC: CPT

## 2019-11-13 PROCEDURE — 99215 OFFICE O/P EST HI 40 MIN: CPT | Performed by: NURSE PRACTITIONER

## 2019-11-13 PROCEDURE — 80053 COMPREHEN METABOLIC PANEL: CPT

## 2019-11-13 PROCEDURE — 96417 CHEMO IV INFUS EACH ADDL SEQ: CPT

## 2019-11-13 PROCEDURE — 25010000002 NIVOLUMAB 100 MG/10ML SOLUTION 10 ML VIAL: Performed by: NURSE PRACTITIONER

## 2019-11-13 PROCEDURE — 84439 ASSAY OF FREE THYROXINE: CPT

## 2019-11-13 PROCEDURE — 96413 CHEMO IV INFUSION 1 HR: CPT

## 2019-11-13 PROCEDURE — 25010000002 IPILIMUMAB 200 MG/40ML SOLUTION 40 ML VIAL: Performed by: NURSE PRACTITIONER

## 2019-11-13 PROCEDURE — 36415 COLL VENOUS BLD VENIPUNCTURE: CPT

## 2019-11-13 PROCEDURE — 84443 ASSAY THYROID STIM HORMONE: CPT

## 2019-11-13 PROCEDURE — 25010000002 IPILIMUMAB 50 MG/10ML SOLUTION 10 ML VIAL: Performed by: NURSE PRACTITIONER

## 2019-11-13 RX ORDER — SODIUM CHLORIDE 9 MG/ML
250 INJECTION, SOLUTION INTRAVENOUS ONCE
Status: DISCONTINUED | OUTPATIENT
Start: 2019-11-13 | End: 2019-11-13 | Stop reason: HOSPADM

## 2019-11-13 RX ORDER — SODIUM CHLORIDE 0.9 % (FLUSH) 0.9 %
10 SYRINGE (ML) INJECTION AS NEEDED
Status: DISCONTINUED | OUTPATIENT
Start: 2019-11-13 | End: 2019-11-13 | Stop reason: HOSPADM

## 2019-11-13 RX ORDER — ONDANSETRON HYDROCHLORIDE 8 MG/1
8 TABLET, FILM COATED ORAL 3 TIMES DAILY PRN
Qty: 30 TABLET | Refills: 5 | Status: SHIPPED | OUTPATIENT
Start: 2019-11-13

## 2019-11-13 RX ORDER — SODIUM CHLORIDE 0.9 % (FLUSH) 0.9 %
10 SYRINGE (ML) INJECTION AS NEEDED
Status: CANCELLED | OUTPATIENT
Start: 2019-11-13

## 2019-11-13 RX ORDER — SODIUM CHLORIDE 9 MG/ML
250 INJECTION, SOLUTION INTRAVENOUS ONCE
Status: CANCELLED | OUTPATIENT
Start: 2019-11-13

## 2019-11-13 RX ADMIN — HEPARIN 500 UNITS: 100 SYRINGE at 15:06

## 2019-11-13 RX ADMIN — SODIUM CHLORIDE 90 MG: 9 INJECTION, SOLUTION INTRAVENOUS at 12:55

## 2019-11-13 RX ADMIN — SODIUM CHLORIDE 250 MG: 9 INJECTION, SOLUTION INTRAVENOUS at 13:34

## 2019-11-13 RX ADMIN — SODIUM CHLORIDE, PRESERVATIVE FREE 10 ML: 5 INJECTION INTRAVENOUS at 15:06

## 2019-11-13 NOTE — PROGRESS NOTES
DATE OF VISIT: 11/13/2019    REASON FOR VISIT: Followup for small cell lung cancer     HISTORY OF PRESENT ILLNESS: The patient is a very pleasant 64 y.o. female  with past medical history significant for small cell lung cancer diagnosed August 2017.  Repeated scans done on April 21, 2019 revealed progressive disease.  She was started on carboplatin and etoposide and Tecentriq April 30, 2019.  Chemo was stopped after 4 cycles and patient was started on maintenance Tecentriq. Ct scans showed progression. She will start on Yervoy and opdivo 1/13/2019. The patient is here today for scheduled follow up visit with treatment, cycle #1.     SUBJECTIVE: The patient is here today with her  but she is doing fairly well. Denies fever chills night sweats.  She is complaining of fatigue.  Itching has resolved. She reports that she is nervous about changing treatment. She would like to review her scans again to make sure she understands the results.     PAST MEDICAL HISTORY/SOCIAL HISTORY/FAMILY HISTORY: Reviewed by me and unchanged from my documentation done on 11/13/19.    Review of Systems   Constitutional: Positive for fatigue. Negative for activity change, appetite change, chills, fever and unexpected weight change.   HENT: Negative for hearing loss, mouth sores, nosebleeds, sore throat and trouble swallowing.    Eyes: Negative for visual disturbance.   Respiratory: Negative for cough, chest tightness, shortness of breath and wheezing.    Cardiovascular: Negative for chest pain, palpitations and leg swelling.   Gastrointestinal: Positive for nausea. Negative for abdominal distention, abdominal pain, blood in stool, constipation, diarrhea, rectal pain and vomiting.   Endocrine: Negative for cold intolerance and heat intolerance.   Genitourinary: Negative for difficulty urinating, dysuria, frequency and urgency.   Musculoskeletal: Positive for arthralgias. Negative for back pain, gait problem, joint swelling and  myalgias.   Skin: Negative for rash.             Neurological: Negative for dizziness, tremors, syncope, weakness, light-headedness, numbness and headaches.   Hematological: Negative for adenopathy. Does not bruise/bleed easily.   Psychiatric/Behavioral: Negative for confusion, sleep disturbance and suicidal ideas. The patient is not nervous/anxious.          Current Outpatient Medications:   •  albuterol (VENTOLIN HFA) 108 (90 Base) MCG/ACT inhaler, Inhale 2 puffs Every 4 (Four) Hours As Needed for Wheezing or Shortness of Air., Disp: 1 inhaler, Rfl: 5  •  amLODIPine (NORVASC) 5 MG tablet, Take 5 mg by mouth Daily., Disp: , Rfl: 0  •  ANORO ELLIPTA 62.5-25 MCG/INH aerosol powder  inhaler, INHALE 1 PUFF BY MOUTH DAILY, Disp: 60 each, Rfl: 11  •  aspirin 81 MG EC tablet, Take 81 mg by mouth Daily., Disp: , Rfl:   •  azelastine (ASTELIN) 0.1 % nasal spray, 1 spray into the nostril(s) as directed by provider 2 (Two) Times a Day As Needed for Rhinitis or Allergies. Use in each nostril as directed, Disp: 1 each, Rfl: 5  •  benzonatate (TESSALON PERLES) 100 MG capsule, Take 1-2 capsules by mouth 3 (Three) Times a Day As Needed for Cough., Disp: 120 capsule, Rfl: 5  •  Cholecalciferol (VITAMIN D3) 71182 units capsule, TK 1 C PO Q WK, Disp: , Rfl: 3  •  guaifenesin-codeine (GUAIFENESIN AC) 100-10 MG/5ML liquid, Take 5-10 mL by mouth 3 (Three) Times a Day As Needed for Cough., Disp: 480 mL, Rfl: 2  •  HAVRIX 1440 EL U/ML vaccine, ADM 1ML IM UTD, Disp: , Rfl: 0  •  hydrochlorothiazide (MICROZIDE) 12.5 MG capsule, Take 12.5 mg by mouth Every Morning. Pt reports that she takes this when swelled as needed., Disp: , Rfl: 0  •  hydrocortisone 2.5 % cream, Apply  topically to the appropriate area as directed 2 (Two) Times a Day. (Patient taking differently: Apply  topically to the appropriate area as directed 2 (Two) Times a Day. Rash improved so pt no longer needed to use it.), Disp: 20 g, Rfl: 0  •  lidocaine-prilocaine (EMLA)  "2.5-2.5 % cream, Apply  topically to the appropriate area as directed As Needed (45-60 minutes prior to port access.  Cover with saran/plastic wrap.)., Disp: 30 g, Rfl: 3  •  losartan-hydrochlorothiazide (HYZAAR) 100-12.5 MG per tablet, Take 1 tablet by mouth Daily., Disp: , Rfl: 0  •  metFORMIN (GLUCOPHAGE) 500 MG tablet, Take 500 mg by mouth 2 (Two) Times a Day With Meals., Disp: , Rfl: 0  •  Nystatin (MAGIC MOUTHWASH), Swish and spit 10 mL Every 4 (Four) Hours As Needed (sore mouth)., Disp: 240 mL, Rfl: 1  •  ondansetron (ZOFRAN) 8 MG tablet, Take 1 tablet by mouth 3 (Three) Times a Day As Needed for Nausea or Vomiting., Disp: 30 tablet, Rfl: 5  •  pantoprazole (PROTONIX) 40 MG EC tablet, Take 1 tablet by mouth Every 12 (Twelve) Hours., Disp: 60 tablet, Rfl: 0  •  potassium chloride ER (K-TAB) 20 MEQ tablet controlled-release ER tablet, Take 1 tablet by mouth 2 (Two) Times a Day., Disp: 30 tablet, Rfl: 1  •  tamoxifen (NOLVADEX) 20 MG chemo tablet, Take 1 tablet by mouth Daily., Disp: 30 tablet, Rfl: 0  •  triamcinolone (KENALOG) 0.1 % ointment, Apply  topically to the appropriate area as directed 2 (Two) Times a Day., Disp: 30 g, Rfl: 3  •  umeclidinium-vilanterol (ANORO ELLIPTA) 62.5-25 MCG/INH aerosol powder  inhaler, Inhale 1 puff Daily., Disp: 1 each, Rfl: 11    PHYSICAL EXAMINATION:   /71   Pulse 87   Temp 96.8 °F (36 °C)   Ht 157.5 cm (62\")   Wt 90.7 kg (200 lb)   Breastfeeding? No   BMI 36.58 kg/m²    ECOG Performance Status: 1 - Symptomatic but completely ambulatory  General Appearance:  alert, cooperative, no apparent distress and appears stated age   Neurologic/Psychiatric: A&O x 3, gait steady, appropriate affect, strength 5/5 in all muscle groups   HEENT:  Normocephalic, without obvious abnormality, mucous membranes moist   Neck: Supple, symmetrical, trachea midline, no adenopathy;  No thyromegaly, masses, or tenderness   Lungs:   Clear to auscultation bilaterally; respirations regular, " even, and unlabored bilaterally   Heart:  Regular rate and rhythm, no murmurs appreciated   Abdomen:   Soft, non-tender, non-distended and no organomegaly   Lymph nodes: No cervical, supraclavicular, inguinal or axillary adenopathy noted   Extremities: Normal, atraumatic; no clubbing, cyanosis, or edema    Skin: No rashes, ulcers, or suspicious lesions noted     No visits with results within 2 Week(s) from this visit.   Latest known visit with results is:   Infusion on 10/23/2019   Component Date Value Ref Range Status   • Glucose 10/23/2019 108* 65 - 99 mg/dL Final   • BUN 10/23/2019 15  8 - 23 mg/dL Final   • Creatinine 10/23/2019 0.86  0.57 - 1.00 mg/dL Final   • Sodium 10/23/2019 140  136 - 145 mmol/L Final   • Potassium 10/23/2019 3.7  3.5 - 5.2 mmol/L Final   • Chloride 10/23/2019 104  98 - 107 mmol/L Final   • CO2 10/23/2019 23.3  22.0 - 29.0 mmol/L Final   • Calcium 10/23/2019 10.7* 8.6 - 10.5 mg/dL Final   • Total Protein 10/23/2019 8.2  6.0 - 8.5 g/dL Final   • Albumin 10/23/2019 4.20  3.50 - 5.20 g/dL Final   • ALT (SGPT) 10/23/2019 38* 1 - 33 U/L Final   • AST (SGOT) 10/23/2019 35* 1 - 32 U/L Final   • Alkaline Phosphatase 10/23/2019 161* 39 - 117 U/L Final   • Total Bilirubin 10/23/2019 0.6  0.2 - 1.2 mg/dL Final   • eGFR Non  Amer 10/23/2019 67  >60 mL/min/1.73 Final   • Globulin 10/23/2019 4.0  gm/dL Final   • A/G Ratio 10/23/2019 1.1  g/dL Final   • BUN/Creatinine Ratio 10/23/2019 17.4  7.0 - 25.0 Final   • Anion Gap 10/23/2019 12.7  5.0 - 15.0 mmol/L Final   • TSH 10/23/2019 2.270  0.270 - 4.200 uIU/mL Final   • T3, Free 10/23/2019 3.15  2.00 - 4.40 pg/mL Final   • Free T4 10/23/2019 1.18  0.93 - 1.70 ng/dL Final   • WBC 10/23/2019 4.86  3.40 - 10.80 10*3/mm3 Final   • RBC 10/23/2019 3.68* 3.77 - 5.28 10*6/mm3 Final   • Hemoglobin 10/23/2019 12.1  12.0 - 15.9 g/dL Final   • Hematocrit 10/23/2019 36.0  34.0 - 46.6 % Final   • MCV 10/23/2019 97.8* 79.0 - 97.0 fL Final   • MCH 10/23/2019 32.9   26.6 - 33.0 pg Final   • MCHC 10/23/2019 33.6  31.5 - 35.7 g/dL Final   • RDW 10/23/2019 12.9  12.3 - 15.4 % Final   • RDW-SD 10/23/2019 46.4  37.0 - 54.0 fl Final   • MPV 10/23/2019 9.2  6.0 - 12.0 fL Final   • Platelets 10/23/2019 119* 140 - 450 10*3/mm3 Final   • Neutrophil % 10/23/2019 66.3  42.7 - 76.0 % Final   • Lymphocyte % 10/23/2019 21.8  19.6 - 45.3 % Final   • Monocyte % 10/23/2019 6.8  5.0 - 12.0 % Final   • Eosinophil % 10/23/2019 4.3  0.3 - 6.2 % Final   • Basophil % 10/23/2019 0.6  0.0 - 1.5 % Final   • Immature Grans % 10/23/2019 0.2  0.0 - 0.5 % Final   • Neutrophils, Absolute 10/23/2019 3.22  1.70 - 7.00 10*3/mm3 Final   • Lymphocytes, Absolute 10/23/2019 1.06  0.70 - 3.10 10*3/mm3 Final   • Monocytes, Absolute 10/23/2019 0.33  0.10 - 0.90 10*3/mm3 Final   • Eosinophils, Absolute 10/23/2019 0.21  0.00 - 0.40 10*3/mm3 Final   • Basophils, Absolute 10/23/2019 0.03  0.00 - 0.20 10*3/mm3 Final   • Immature Grans, Absolute 10/23/2019 0.01  0.00 - 0.05 10*3/mm3 Final   • nRBC 10/23/2019 0.0  0.0 - 0.2 /100 WBC Final        Ct Chest With Contrast    Result Date: 10/21/2019  Narrative: CT CHEST WITH CONTRAST  HISTORY: Follow up extensive stage small cell lung cancer.; C34.82-Malignant neoplasm of overlapping sites of left bronchus and lung.  COMPARISON: 07/05/2019.  TECHNIQUE: Axial CT with IV contrast administration.  FINDINGS:  Fibrotic changes are seen in the left upper thorax presumably related to posttreatment changes, similar from prior exam. There is no lung mass identified.    There is abnormal soft tissue fullness in the left paratracheal region with an enlarged lymph node inseparable from the esophagus noted. This is best visualized on image 12 measuring up to 20 mm, previously 11 mm.  No pleural or pericardial effusion is seen.  Small pericardial effusion is noted. Mild left pleural scarring is seen. Surgical changes of left mastectomy are present and left axillary lymph node dissection.       Impression: 1. Adenopathy of the upper mediastinum inseparable from the esophagus in the left paratracheal region significantly increased in size from prior exam suspicious for recurrent disease. PET exam may help confirm. 2. Stable posttreatment changes, left lung.   This study was performed with techniques to keep radiation doses as low as reasonably achievable (ALARA). Individualized dose reduction techniques using automated exposure control or adjustment of vA and/or kV according to the patient size were employed.  This report was finalized on 10/21/2019 12:47 PM by Mikal Newell MD.    Nm Bone Scan Whole Body    Result Date: 10/22/2019  Narrative: NUCLEAR MEDICINE BONE SCAN WHOLE BODY-  HISTORY: Followup extensive small cell lung cancer: New bony aches; C34.82-Malignant neoplasm of overlapping sites of left bronchus and lung.  COMPARISON: Existing relevant imaging studies: 01/23/2018.  PROCEDURE: The patient was injected with 26.0 mCi of technetium 99m MDP.  Three-hour delayed images were obtained.  FINDINGS: Abnormal uptake is seen of the mid feet bilaterally as well as patellofemoral joints likely degenerative or posttraumatic. No other abnormal tracer activity is identified to suggest occult fracture or metastatic disease.      Impression: No findings to indicate metastatic bone disease .   This report was finalized on 10/22/2019 8:39 AM by Mikal Newell MD.    Ct Abdomen Pelvis With Contrast    Result Date: 10/21/2019  Narrative: CT ABDOMEN PELVIS WITH CONTRAST  TECHNIQUE: Oral and IV contrast enhanced exam.  HISTORY: Small cell lung cancer. Restaging.  COMPARISON: 07/05/2019  FINDINGS:  ABDOMEN: Hypodense lesion is seen within the lateral aspect of the right liver measuring up to 1.8 cm, previously 1.5 cm. No new liver lesion is seen. The remaining solid organs are normal. Adenopathy is noted in the wen hepatis. Portal caval lymph node on image 24 measures 25 x 14 mm, previously 20 x 12 mm. A lymph  node in the lesser omentum on image 19 measures 11 mm, previously 9 mm.  No ascites is seen.  PELVIS: Appendix is normal. There is no free fluid. Uterus and ovaries are unremarkable.      Impression: Mild interval enlargement of solitary presumed liver metastasis and mild progression of upper abdominal adenopathy.    This study was performed with techniques to keep radiation doses as low as reasonably achievable (ALARA). Individualized dose reduction techniques using automated exposure control or adjustment of vA and/or kV according to the patient size were employed.  This report was finalized on 10/21/2019 12:47 PM by Mikal Newell MD.    Chemotherapy Regimen:   Treatment Plans     Name Type Plan dates Plan Provider         Active    OP lung nivolumab/Ipilimumab ONCOLOGY TREATMENT  Present Dr. Josh Son                    TOPICS EDUCATION PROVIDED COMMENTS   ANEMIA:  role of RBC, cause, s/s, ways to manage, role of transfusion [x]    THROMBOCYTOPENIA:  role of platelet, cause, s/s, ways to prevent bleeding, things to avoid, when to seek help [x]    NEUTROPENIA:  role of WBC, cause, infection precautions, s/s of infection, when to call MD [x]    NUTRITION & APPETITE CHANGES:  importance of maintaining healthy diet & weight, ways to manage to improve intake, dietary consult, exercise regimen [x]    DIARRHEA:  causes, s/s of dehydration, ways to manage, dietary changes, when to call MD [x]    CONSTIPATION:  causes, ways to manage, dietary changes, when to call MD [x]    NAUSEA & VOMITING:  cause, use of antiemetics, dietary changes, when to call MD [x]    MOUTH SORES:  causes, oral care, ways to manage [x]    ALOPECIA:  cause, ways to manage, resources [x]    INFERTILITY & SEXUALITY:  causes, fertility preservation options, sexuality changes, ways to manage, importance of birth control [x]    NERVOUS SYSTEM CHANGES:  causes, s/s, neuropathies, cognitive changes, ways to manage [x]    PAIN:  causes, ways to manage  [x] ????   SKIN & NAIL CHANGES:  cause, s/s, ways to manage [x]    ORGAN TOXICITIES:  cause, s/s, need for diagnostic tests, labs, when to notify MD [x]    SURVIVORSHIP:  distress, distress assessment, secondary malignancies, early/late effects, follow-up, social issues, social support [x]    HOME CARE:  use of spill kits, storing of PO chemo, how to manage bodily fluids []    MISCELLANEOUS:  drug interactions, administration, vesicant, et [x]        ASSESSMENT: The patient is a very pleasant 64 y.o. female  with small cell lung cancer    PROBLEM LIST:  1. Small cell lung cancer from the left upper lobe.  Limited stage disease, recurrent in a mediastinal node  A.  Treated  initially with etoposide and carboplatin starting 9/13/27.  She responded to the single modality that was started because of symptoms including airway compromise.  B.  Completed combined modality chemoradiation with etoposide and carboplatin . (Chemotherapy completed 12/7/2017).  C.  Recurrence in left paratracheal node.  This was seen on CT scan from 6/19/18 and hypermetabolic on PET scan to confirm.  She's been treated with weekly taxol starting 7/17/2018 along with involved field radiation.  D. Left pleural effusion seen on CT of 11/23/18, loculated and inaccessible for thoracentesis.  E.  Progressive disease documented on scans done April 22, 2019 with increase in size of left paratracheal lymph node and right hepatic lobe mass.  F. Started carboplatin and etoposide with Tecentriq May 1, 2019, status post 8 cycles.     G.  Started Opdivo plus Yervoy November 13, 2019  2.  Anemia.  This appears to be predominantly from chemoradiation.  3.  History of left breast cancer.  A.  Left mastectomy 7/22/2008 for stage IIIA, T3 N1 M0 breast cancer  B.  ER weakly positive, LA negative and HER-2 positive  C.  Adjuvant chemotherapy with Herceptin, Taxotere and carboplatin  D.  Postmastectomy radiation done at [facility]  E.  Adjuvant  tamoxifen started 12/12/2008 and continued as extended adjuvant therapy  4.  COPD.  5. Diabetes  6. HTN  7.  Cough  8.  Treatment induced itching 1    PLAN:  1. I will proceed with treatment as scheduled today with  Pavithra Bowser given her progression on the current scans.  This is an FDA approved treatment for extensive stage small cell lung cancer.   2. The patient will follow up in 3 weeks with cycle #2.   3.  I will continue port maintenance.   4.  I will continue to monitor the patient blood work while she is on treatment including blood counts kidney function liver function and electrolytes.  5. We discussed potential side effects of immunotherapy including but not limited to immune mediated reactions with thyroiditis, pneumonitis, hepatitis, colitis, rash, and electrolytes abnormalities, fatigue, multiorgan failure, and possibly death.  6. She has completed 10 years of Tamoxifen at this point.   7. Will continue inhalers as needed for COPD  8. We will continue Metformin for type 2 DM.  Her blood sugars may get higher while she is in treatment secondary to pre-infusion steroids.  9. Will continue Hyzaar for HTN.  We may have to adjust her antihypertensive agent while she is in treatment.  10. We reviewed that scans showed progression. Ct scans showed Adenopathy of the upper mediastinum inseparable from the esophagus in  the left paratracheal region significantly increased in size from prior exam suspicious for recurrent disease. We also reviewed that there was Mild interval enlargement of solitary presumed liver  metastasis and mild progression of upper abdominal adenopathy.  We will plan to repeat scans in 3 months will be due January 2019.  11.  I will continue the patient on Robitussin with codeine as needed for cough.  12.  I will continue the patient on Benadryl as needed for itching.  13. If Hgb is less than 8 we will plan on giving 2 units of PRBC.   14.  The patient and I have reviewed their  progressive cancer diagnosis and scheduled treatment plan. Chemotherapy/Immunotherapy teaching was also completed today as documented above. Adequate time was given to answer all questions to her satisfaction. Patient and family are aware of their care team members and contact information if they have questions or problems throughout the treatment course.    Belkis Nayak, APRN  11/13/2019

## 2019-11-19 ENCOUNTER — TELEPHONE (OUTPATIENT)
Dept: ONCOLOGY | Facility: CLINIC | Age: 64
End: 2019-11-19

## 2019-11-19 RX ORDER — ACYCLOVIR 400 MG/1
400 TABLET ORAL 3 TIMES DAILY
Qty: 21 TABLET | Refills: 0 | Status: ON HOLD | OUTPATIENT
Start: 2019-11-19 | End: 2019-01-01

## 2019-11-19 NOTE — TELEPHONE ENCOUNTER
Patient called triage line reporting that her lip was swollen and her mouth felt tight.    Returned call to patient and asked patient if she had any other symptoms like tongue being sore or pain any where's else in mouth. Patient stating that she didn't have any other symptoms. However reports her daughter stated she did see some blister on her upper lip. Asked patient if she had a history of having cold sores? Patient stated yes she does. At this time informing patient that sometimes with treatment patient may increased cold sores. Informing her that nurse will discuss with Steph KWAN and will call in new medications.

## 2019-11-20 NOTE — PROGRESS NOTES
DATE OF VISIT: 11/20/2019    REASON FOR VISIT: Followup for small cell lung cancer     HISTORY OF PRESENT ILLNESS: The patient is a very pleasant 64 y.o. female  with past medical history significant for small cell lung cancer diagnosed August 2017.  Repeated scans done on April 21, 2019 revealed progressive disease.  She was started on carboplatin and etoposide and Tecentriq April 30, 2019.  Chemo was stopped after 4 cycles and patient was started on maintenance Tecentriq. Ct scans showed progression. She was started on Yervoy and opdivo 11/13/2019. The patient is here today for an acute visit.     SUBJECTIVE: The patient is here today with her  but she is complaining of extreme fatigue.  She had mouth sores started yesterday.  She is been having poor appetite.  Denies any diarrhea.    PAST MEDICAL HISTORY/SOCIAL HISTORY/FAMILY HISTORY: Reviewed by me and unchanged from my documentation done on 11/20/19.    Review of Systems   Constitutional: Positive for fatigue. Negative for activity change, appetite change, chills, fever and unexpected weight change.   HENT: Negative for hearing loss, mouth sores, nosebleeds, sore throat and trouble swallowing.    Eyes: Negative for visual disturbance.   Respiratory: Negative for cough, chest tightness, shortness of breath and wheezing.    Cardiovascular: Negative for chest pain, palpitations and leg swelling.   Gastrointestinal: Positive for nausea. Negative for abdominal distention, abdominal pain, blood in stool, constipation, diarrhea, rectal pain and vomiting.   Endocrine: Negative for cold intolerance and heat intolerance.   Genitourinary: Negative for difficulty urinating, dysuria, frequency and urgency.   Musculoskeletal: Positive for arthralgias. Negative for back pain, gait problem, joint swelling and myalgias.   Skin: Negative for rash.             Neurological: Negative for dizziness, tremors, syncope, weakness, light-headedness, numbness and headaches.    Hematological: Negative for adenopathy. Does not bruise/bleed easily.   Psychiatric/Behavioral: Negative for confusion, sleep disturbance and suicidal ideas. The patient is not nervous/anxious.          Current Outpatient Medications:   •  acyclovir (ZOVIRAX) 400 MG tablet, Take 1 tablet by mouth 3 (Three) Times a Day. Take no more than 5 doses a day., Disp: 21 tablet, Rfl: 0  •  albuterol (VENTOLIN HFA) 108 (90 Base) MCG/ACT inhaler, Inhale 2 puffs Every 4 (Four) Hours As Needed for Wheezing or Shortness of Air., Disp: 1 inhaler, Rfl: 5  •  amLODIPine (NORVASC) 5 MG tablet, Take 5 mg by mouth Daily., Disp: , Rfl: 0  •  ANORO ELLIPTA 62.5-25 MCG/INH aerosol powder  inhaler, INHALE 1 PUFF BY MOUTH DAILY, Disp: 60 each, Rfl: 11  •  aspirin 81 MG EC tablet, Take 81 mg by mouth Daily., Disp: , Rfl:   •  azelastine (ASTELIN) 0.1 % nasal spray, 1 spray into the nostril(s) as directed by provider 2 (Two) Times a Day As Needed for Rhinitis or Allergies. Use in each nostril as directed, Disp: 1 each, Rfl: 5  •  benzonatate (TESSALON PERLES) 100 MG capsule, Take 1-2 capsules by mouth 3 (Three) Times a Day As Needed for Cough., Disp: 120 capsule, Rfl: 5  •  Cholecalciferol (VITAMIN D3) 94910 units capsule, TK 1 C PO Q WK, Disp: , Rfl: 3  •  guaifenesin-codeine (GUAIFENESIN AC) 100-10 MG/5ML liquid, Take 5-10 mL by mouth 3 (Three) Times a Day As Needed for Cough., Disp: 480 mL, Rfl: 2  •  hydrochlorothiazide (MICROZIDE) 12.5 MG capsule, Take 12.5 mg by mouth Every Morning. Pt reports that she takes this when swelled as needed., Disp: , Rfl: 0  •  hydrocortisone 2.5 % cream, Apply  topically to the appropriate area as directed 2 (Two) Times a Day. (Patient taking differently: Apply  topically to the appropriate area as directed 2 (Two) Times a Day. Rash improved so pt no longer needed to use it.), Disp: 20 g, Rfl: 0  •  lidocaine-prilocaine (EMLA) 2.5-2.5 % cream, Apply  topically to the appropriate area as directed As Needed  "(45-60 minutes prior to port access.  Cover with saran/plastic wrap.)., Disp: 30 g, Rfl: 3  •  losartan-hydrochlorothiazide (HYZAAR) 100-12.5 MG per tablet, Take 1 tablet by mouth Daily., Disp: , Rfl: 0  •  metFORMIN (GLUCOPHAGE) 500 MG tablet, Take 500 mg by mouth 2 (Two) Times a Day With Meals., Disp: , Rfl: 0  •  Nystatin (MAGIC MOUTHWASH), Swish and spit 10 mL Every 4 (Four) Hours As Needed (sore mouth)., Disp: 240 mL, Rfl: 1  •  ondansetron (ZOFRAN) 8 MG tablet, Take 1 tablet by mouth 3 (Three) Times a Day As Needed for Nausea or Vomiting., Disp: 30 tablet, Rfl: 5  •  ondansetron (ZOFRAN) 8 MG tablet, Take 1 tablet by mouth 3 (Three) Times a Day As Needed for Nausea or Vomiting., Disp: 30 tablet, Rfl: 5  •  pantoprazole (PROTONIX) 40 MG EC tablet, Take 1 tablet by mouth Every 12 (Twelve) Hours., Disp: 60 tablet, Rfl: 0  •  potassium chloride ER (K-TAB) 20 MEQ tablet controlled-release ER tablet, Take 1 tablet by mouth 2 (Two) Times a Day., Disp: 30 tablet, Rfl: 1  •  tamoxifen (NOLVADEX) 20 MG chemo tablet, Take 1 tablet by mouth Daily., Disp: 30 tablet, Rfl: 0  •  triamcinolone (KENALOG) 0.1 % ointment, Apply  topically to the appropriate area as directed 2 (Two) Times a Day., Disp: 30 g, Rfl: 3  •  umeclidinium-vilanterol (ANORO ELLIPTA) 62.5-25 MCG/INH aerosol powder  inhaler, Inhale 1 puff Daily., Disp: 1 each, Rfl: 11  •  HAVRIX 1440 EL U/ML vaccine, ADM 1ML IM UTD, Disp: , Rfl: 0    PHYSICAL EXAMINATION:   /70   Pulse 101   Temp 97.2 °F (36.2 °C)   Ht 157.5 cm (62\")   Wt 90.7 kg (200 lb)   SpO2 95%   Breastfeeding? No   BMI 36.58 kg/m²    ECOG Performance Status: 1 - Symptomatic but completely ambulatory  General Appearance:  alert, cooperative, no apparent distress and appears stated age   Neurologic/Psychiatric: A&O x 3, gait steady, appropriate affect, strength 5/5 in all muscle groups   HEENT:  Normocephalic, without obvious abnormality, mucous membranes moist   Neck: Supple, symmetrical, " trachea midline, no adenopathy;  No thyromegaly, masses, or tenderness   Lungs:   Clear to auscultation bilaterally; respirations regular, even, and unlabored bilaterally   Heart:  Regular rate and rhythm, no murmurs appreciated   Abdomen:   Soft, non-tender, non-distended and no organomegaly   Lymph nodes: No cervical, supraclavicular, inguinal or axillary adenopathy noted   Extremities: Normal, atraumatic; no clubbing, cyanosis, or edema    Skin: No rashes, ulcers, or suspicious lesions noted     Infusion on 11/13/2019   Component Date Value Ref Range Status   • Free T4 11/13/2019 1.21  0.93 - 1.70 ng/dL Final   • TSH 11/13/2019 2.050  0.270 - 4.200 uIU/mL Final   • Glucose 11/13/2019 112* 65 - 99 mg/dL Final   • BUN 11/13/2019 16  8 - 23 mg/dL Final   • Creatinine 11/13/2019 1.09* 0.57 - 1.00 mg/dL Final   • Sodium 11/13/2019 139  136 - 145 mmol/L Final   • Potassium 11/13/2019 3.4* 3.5 - 5.2 mmol/L Final   • Chloride 11/13/2019 101  98 - 107 mmol/L Final   • CO2 11/13/2019 25.5  22.0 - 29.0 mmol/L Final   • Calcium 11/13/2019 10.7* 8.6 - 10.5 mg/dL Final   • Total Protein 11/13/2019 8.4  6.0 - 8.5 g/dL Final   • Albumin 11/13/2019 4.10  3.50 - 5.20 g/dL Final   • ALT (SGPT) 11/13/2019 46* 1 - 33 U/L Final   • AST (SGOT) 11/13/2019 44* 1 - 32 U/L Final   • Alkaline Phosphatase 11/13/2019 165* 39 - 117 U/L Final   • Total Bilirubin 11/13/2019 0.7  0.2 - 1.2 mg/dL Final   • eGFR Non African Amer 11/13/2019 51* >60 mL/min/1.73 Final   • Globulin 11/13/2019 4.3  gm/dL Final   • A/G Ratio 11/13/2019 1.0  g/dL Final   • BUN/Creatinine Ratio 11/13/2019 14.7  7.0 - 25.0 Final   • Anion Gap 11/13/2019 12.5  5.0 - 15.0 mmol/L Final   • WBC 11/13/2019 4.40  3.40 - 10.80 10*3/mm3 Final   • RBC 11/13/2019 3.69* 3.77 - 5.28 10*6/mm3 Final   • Hemoglobin 11/13/2019 12.2  12.0 - 15.9 g/dL Final   • Hematocrit 11/13/2019 35.8  34.0 - 46.6 % Final   • MCV 11/13/2019 97.0  79.0 - 97.0 fL Final   • MCH 11/13/2019 33.1* 26.6 - 33.0  pg Final   • MCHC 11/13/2019 34.1  31.5 - 35.7 g/dL Final   • RDW 11/13/2019 12.4  12.3 - 15.4 % Final   • RDW-SD 11/13/2019 44.4  37.0 - 54.0 fl Final   • MPV 11/13/2019 9.0  6.0 - 12.0 fL Final   • Platelets 11/13/2019 114* 140 - 450 10*3/mm3 Final   • Neutrophil % 11/13/2019 65.5  42.7 - 76.0 % Final   • Lymphocyte % 11/13/2019 22.7  19.6 - 45.3 % Final   • Monocyte % 11/13/2019 7.3  5.0 - 12.0 % Final   • Eosinophil % 11/13/2019 3.6  0.3 - 6.2 % Final   • Basophil % 11/13/2019 0.7  0.0 - 1.5 % Final   • Immature Grans % 11/13/2019 0.2  0.0 - 0.5 % Final   • Neutrophils, Absolute 11/13/2019 2.88  1.70 - 7.00 10*3/mm3 Final   • Lymphocytes, Absolute 11/13/2019 1.00  0.70 - 3.10 10*3/mm3 Final   • Monocytes, Absolute 11/13/2019 0.32  0.10 - 0.90 10*3/mm3 Final   • Eosinophils, Absolute 11/13/2019 0.16  0.00 - 0.40 10*3/mm3 Final   • Basophils, Absolute 11/13/2019 0.03  0.00 - 0.20 10*3/mm3 Final   • Immature Grans, Absolute 11/13/2019 0.01  0.00 - 0.05 10*3/mm3 Final   • nRBC 11/13/2019 0.0  0.0 - 0.2 /100 WBC Final        Mammo Screening Modified With Tomosynthesis Right With Cad    Result Date: 11/20/2019  Narrative: MAMMO SCREENING MODIFIED WITH TOMOSYNTHESIS RIGHT WITH CAD-  CLINICAL INDICATION:  Screening/only has right breast; Z12.31-Encounter for screening mammogram for malignant neoplasm of breast. History of left breast cancer status post mastectomy.  TECHNIQUE: Right CC and MLO views were obtained with both 2-D and 3-D acquisitions. The study was read with the assistance of CAD.  COMPARISON: 11/12/2018.  FINDINGS:  There are scattered fibroglandular densities. Nodular asymmetry along the posterior glandular margin in the central CC projection appears similar compared to numerous previous studies dating back to 2014. Minimal punctate benign-appearing calcifications are also similar to prior. Visualized right axillary lymph nodes are also unchanged from prior studies. There are no suspicious masses, areas  of architectural distortion or clustered microcalcifications.      Impression: BI-RADS CATEGORY: 2 , BENIGN FINDING(S).  RECOMMENDED FOLLOW-UP: 12M  FOLLOW UP SCREENING MAMMOGRAM IN ONE YEAR.      NOTES: Mammography does not detect approximately 10-15% of breast cancers. Physical examination of the breasts by a physician and regular monthly breast self examinations are integral parts of breast cancer screening.  A normal mammogram does not exclude breast cancer if there is an abnormal finding on physical examination. When clinically indicated, a biopsy should not be postponed because of a normal mammogram report.  [place]. [address]  NOTE: If a biopsy is performed on this patient, a copy of the pathology report would be appreciated.  This report was finalized on 11/20/2019 9:52 AM by Nathan Irby MD.    Chemotherapy Regimen:   Treatment Plans     Name Type Plan dates Plan Provider         Active    OP lung nivolumab/Ipilimumab ONCOLOGY TREATMENT  Present Dr. Josh Son                    TOPICS EDUCATION PROVIDED COMMENTS   ANEMIA:  role of RBC, cause, s/s, ways to manage, role of transfusion [x]    THROMBOCYTOPENIA:  role of platelet, cause, s/s, ways to prevent bleeding, things to avoid, when to seek help [x]    NEUTROPENIA:  role of WBC, cause, infection precautions, s/s of infection, when to call MD [x]    NUTRITION & APPETITE CHANGES:  importance of maintaining healthy diet & weight, ways to manage to improve intake, dietary consult, exercise regimen [x]    DIARRHEA:  causes, s/s of dehydration, ways to manage, dietary changes, when to call MD [x]    CONSTIPATION:  causes, ways to manage, dietary changes, when to call MD [x]    NAUSEA & VOMITING:  cause, use of antiemetics, dietary changes, when to call MD [x]    MOUTH SORES:  causes, oral care, ways to manage [x]    ALOPECIA:  cause, ways to manage, resources [x]    INFERTILITY & SEXUALITY:  causes, fertility  preservation options, sexuality changes, ways to manage, importance of birth control [x]    NERVOUS SYSTEM CHANGES:  causes, s/s, neuropathies, cognitive changes, ways to manage [x]    PAIN:  causes, ways to manage [x] ????   SKIN & NAIL CHANGES:  cause, s/s, ways to manage [x]    ORGAN TOXICITIES:  cause, s/s, need for diagnostic tests, labs, when to notify MD [x]    SURVIVORSHIP:  distress, distress assessment, secondary malignancies, early/late effects, follow-up, social issues, social support [x]    HOME CARE:  use of spill kits, storing of PO chemo, how to manage bodily fluids []    MISCELLANEOUS:  drug interactions, administration, vesicant, et [x]        ASSESSMENT: The patient is a very pleasant 64 y.o. female  with small cell lung cancer    PROBLEM LIST:  1. Small cell lung cancer from the left upper lobe.  Limited stage disease, recurrent in a mediastinal node  A.  Treated  initially with etoposide and carboplatin starting 9/13/27.  She responded to the single modality that was started because of symptoms including airway compromise.  B.  Completed combined modality chemoradiation with etoposide and carboplatin . (Chemotherapy completed 12/7/2017).  C.  Recurrence in left paratracheal node.  This was seen on CT scan from 6/19/18 and hypermetabolic on PET scan to confirm.  She's been treated with weekly taxol starting 7/17/2018 along with involved field radiation.  D. Left pleural effusion seen on CT of 11/23/18, loculated and inaccessible for thoracentesis.  E.  Progressive disease documented on scans done April 22, 2019 with increase in size of left paratracheal lymph node and right hepatic lobe mass.  F. Started carboplatin and etoposide with Tecentriq May 1, 2019, status post 8 cycles.     G.  Started Opdivo plus Yervoy November 13, 2019, status post 1 cycle  2.  Anemia.  This appears to be predominantly from chemoradiation.  3.  History of left breast cancer.  A.  Left mastectomy 7/22/2008 for stage  IIIA, T3 N1 M0 breast cancer  B.  ER weakly positive, MS negative and HER-2 positive  C.  Adjuvant chemotherapy with Herceptin, Taxotere and carboplatin  D.  Postmastectomy [facility] done at [facility]  E.  Adjuvant tamoxifen started 12/12/2008 and continued as extended adjuvant therapy  4.  COPD.  5. Diabetes  6. HTN  7.  Cough  8.  Treatment induced itching 1  9.  Treatment induced mucositis  10.  Treatment induced diarrhea    PLAN:  1. I will treat the patient with 1 L of IV fluid.  2.  I will order CBC and CMP.  3.  We will transfuse 2 units of blood for hemoglobin less than 8.  4.  We will consider adding steroids if no improvement after IV fluid.  5. We will continue acyclovir.  Josh Son MD  11/20/2019

## 2019-11-20 NOTE — TELEPHONE ENCOUNTER
Patient's daughter called triage line and left a message that she was taking patient to the ER for increased SOA.  Returned call to patient with no answer.

## 2019-12-09 NOTE — TELEPHONE ENCOUNTER
Patient called triage line and reported that she is having a dry cough and sounds horse.  Patient denied fever, productive cough, tightness in chest.  Patient describes cough as a tickle in the back of her throat, like it is sinus drainage and she wanted to know what she can take for these symptoms.  Discussed with NP and returned call to patient to educated on OTC medication such as zyrtec for sinus drainage and robitussin for cough.  Patient verbalized understanding.

## 2019-12-27 NOTE — TELEPHONE ENCOUNTER
Patient called triage line and reported that her son and granddaughter is being treated for a sinus infection.  Patient reports that she is experiencing productive cough, earache that is causing a headache, sneezing.  Patient reports a temperature of 98.9 this morning.  Discussed with Dr. Son and was given a verbal for a Zpac.  Returned call to patient and educated on medication, she verbalized understanding.

## 2019-12-28 PROBLEM — I10 HTN (HYPERTENSION): Status: ACTIVE | Noted: 2019-01-01

## 2019-12-28 PROBLEM — N17.9 AKI (ACUTE KIDNEY INJURY) (HCC): Status: ACTIVE | Noted: 2019-01-01

## 2019-12-28 PROBLEM — C34.90 EXTENSIVE STAGE PRIMARY SMALL CELL CARCINOMA OF LUNG (HCC): Status: ACTIVE | Noted: 2019-01-01

## 2019-12-28 PROBLEM — C79.31 METASTATIC CANCER TO BRAIN (HCC): Status: ACTIVE | Noted: 2019-01-01

## 2019-12-28 PROBLEM — E87.6 HYPOKALEMIA: Status: ACTIVE | Noted: 2019-01-01

## 2019-12-28 PROBLEM — E11.9 TYPE 2 DIABETES MELLITUS (HCC): Status: ACTIVE | Noted: 2019-01-01

## 2020-01-01 ENCOUNTER — HOSPITAL ENCOUNTER (OUTPATIENT)
Dept: RADIATION ONCOLOGY | Facility: HOSPITAL | Age: 65
Discharge: HOME | End: 2020-01-13

## 2020-01-01 ENCOUNTER — TELEPHONE (OUTPATIENT)
Dept: ONCOLOGY | Facility: CLINIC | Age: 65
End: 2020-01-01

## 2020-01-01 ENCOUNTER — INFUSION (OUTPATIENT)
Dept: ONCOLOGY | Facility: HOSPITAL | Age: 65
End: 2020-01-01

## 2020-01-01 ENCOUNTER — HOSPITAL ENCOUNTER (OUTPATIENT)
Dept: MRI IMAGING | Facility: HOSPITAL | Age: 65
End: 2020-01-01

## 2020-01-01 ENCOUNTER — HOSPITAL ENCOUNTER (OUTPATIENT)
Dept: MRI IMAGING | Facility: HOSPITAL | Age: 65
Discharge: HOME | End: 2020-08-19
Admitting: RADIOLOGY

## 2020-01-01 ENCOUNTER — DOCUMENTATION (OUTPATIENT)
Dept: RADIATION ONCOLOGY | Facility: HOSPITAL | Age: 65
End: 2020-01-01

## 2020-01-01 ENCOUNTER — HOSPITAL ENCOUNTER (OUTPATIENT)
Dept: RADIATION ONCOLOGY | Facility: HOSPITAL | Age: 65
Discharge: HOME | End: 2020-09-08

## 2020-01-01 ENCOUNTER — OFFICE VISIT (OUTPATIENT)
Dept: ONCOLOGY | Facility: CLINIC | Age: 65
End: 2020-01-01

## 2020-01-01 ENCOUNTER — HOSPITAL ENCOUNTER (OUTPATIENT)
Dept: CT IMAGING | Facility: HOSPITAL | Age: 65
Discharge: HOME | End: 2020-04-20

## 2020-01-01 ENCOUNTER — HOSPITAL ENCOUNTER (OUTPATIENT)
Dept: MRI IMAGING | Facility: HOSPITAL | Age: 65
Discharge: HOME | End: 2020-10-12

## 2020-01-01 ENCOUNTER — HOSPITAL ENCOUNTER (OUTPATIENT)
Dept: MRI IMAGING | Facility: HOSPITAL | Age: 65
Discharge: HOME | End: 2020-04-20
Admitting: NURSE PRACTITIONER

## 2020-01-01 ENCOUNTER — HOSPITAL ENCOUNTER (OUTPATIENT)
Dept: RADIATION ONCOLOGY | Facility: HOSPITAL | Age: 65
Discharge: HOME | End: 2020-01-06

## 2020-01-01 ENCOUNTER — READMISSION MANAGEMENT (OUTPATIENT)
Dept: CALL CENTER | Facility: HOSPITAL | Age: 65
End: 2020-01-01

## 2020-01-01 ENCOUNTER — HOSPITAL ENCOUNTER (OUTPATIENT)
Dept: CT IMAGING | Facility: HOSPITAL | Age: 65
Discharge: HOME | End: 2020-08-10

## 2020-01-01 ENCOUNTER — APPOINTMENT (OUTPATIENT)
Dept: CT IMAGING | Facility: HOSPITAL | Age: 65
End: 2020-01-01

## 2020-01-01 ENCOUNTER — HOSPITAL ENCOUNTER (OUTPATIENT)
Dept: RADIATION ONCOLOGY | Facility: HOSPITAL | Age: 65
Discharge: HOME | End: 2020-08-19

## 2020-01-01 ENCOUNTER — HOSPITAL ENCOUNTER (OUTPATIENT)
Dept: RADIATION ONCOLOGY | Facility: HOSPITAL | Age: 65
Discharge: HOME | End: 2020-09-03

## 2020-01-01 ENCOUNTER — OFFICE VISIT (OUTPATIENT)
Dept: NEUROSURGERY | Facility: CLINIC | Age: 65
End: 2020-01-01

## 2020-01-01 ENCOUNTER — HOSPITAL ENCOUNTER (OUTPATIENT)
Dept: RADIATION ONCOLOGY | Facility: HOSPITAL | Age: 65
Setting detail: RADIATION/ONCOLOGY SERIES
Discharge: HOME | End: 2020-10-07

## 2020-01-01 ENCOUNTER — HOSPITAL ENCOUNTER (OUTPATIENT)
Dept: RADIATION ONCOLOGY | Facility: HOSPITAL | Age: 65
Discharge: HOME | End: 2020-01-10

## 2020-01-01 ENCOUNTER — HOSPITAL ENCOUNTER (OUTPATIENT)
Dept: RADIATION ONCOLOGY | Facility: HOSPITAL | Age: 65
Discharge: HOME | End: 2020-01-08

## 2020-01-01 ENCOUNTER — HOSPITAL ENCOUNTER (OUTPATIENT)
Dept: RADIATION ONCOLOGY | Facility: HOSPITAL | Age: 65
Discharge: HOME | End: 2020-01-09

## 2020-01-01 ENCOUNTER — DOCUMENTATION (OUTPATIENT)
Dept: NUTRITION | Facility: HOSPITAL | Age: 65
End: 2020-01-01

## 2020-01-01 ENCOUNTER — HOSPITAL ENCOUNTER (OUTPATIENT)
Dept: CT IMAGING | Facility: HOSPITAL | Age: 65
Discharge: HOME | End: 2020-02-21

## 2020-01-01 ENCOUNTER — CLINICAL SUPPORT NO REQUIREMENTS (OUTPATIENT)
Dept: NEUROSURGERY | Facility: CLINIC | Age: 65
End: 2020-01-01

## 2020-01-01 ENCOUNTER — APPOINTMENT (OUTPATIENT)
Dept: MRI IMAGING | Facility: HOSPITAL | Age: 65
End: 2020-01-01

## 2020-01-01 ENCOUNTER — HOSPITAL ENCOUNTER (OUTPATIENT)
Dept: RADIATION ONCOLOGY | Facility: HOSPITAL | Age: 65
Discharge: HOME | End: 2020-01-02

## 2020-01-01 ENCOUNTER — OFFICE VISIT (OUTPATIENT)
Dept: RADIATION ONCOLOGY | Facility: HOSPITAL | Age: 65
End: 2020-01-01

## 2020-01-01 ENCOUNTER — HOSPITAL ENCOUNTER (OUTPATIENT)
Dept: RADIATION ONCOLOGY | Facility: HOSPITAL | Age: 65
Setting detail: RADIATION/ONCOLOGY SERIES
Discharge: HOME | End: 2020-08-17

## 2020-01-01 ENCOUNTER — HOSPITAL ENCOUNTER (OUTPATIENT)
Dept: RADIATION ONCOLOGY | Facility: HOSPITAL | Age: 65
Discharge: HOME | End: 2020-09-04

## 2020-01-01 ENCOUNTER — HOSPITAL ENCOUNTER (OUTPATIENT)
Dept: RADIATION ONCOLOGY | Facility: HOSPITAL | Age: 65
Setting detail: RADIATION/ONCOLOGY SERIES
Discharge: HOME | End: 2020-02-10

## 2020-01-01 ENCOUNTER — HOSPITAL ENCOUNTER (OUTPATIENT)
Dept: RADIATION ONCOLOGY | Facility: HOSPITAL | Age: 65
Setting detail: RADIATION/ONCOLOGY SERIES
Discharge: HOME | End: 2020-01-02

## 2020-01-01 ENCOUNTER — OFFICE VISIT (OUTPATIENT)
Dept: PULMONOLOGY | Facility: CLINIC | Age: 65
End: 2020-01-01

## 2020-01-01 ENCOUNTER — HOSPITAL ENCOUNTER (OUTPATIENT)
Dept: RADIATION ONCOLOGY | Facility: HOSPITAL | Age: 65
Discharge: HOME | End: 2020-01-03

## 2020-01-01 ENCOUNTER — HOSPITAL ENCOUNTER (OUTPATIENT)
Dept: MRI IMAGING | Facility: HOSPITAL | Age: 65
Discharge: HOME | End: 2020-08-10
Admitting: NURSE PRACTITIONER

## 2020-01-01 ENCOUNTER — HOSPITAL ENCOUNTER (OUTPATIENT)
Dept: MRI IMAGING | Facility: HOSPITAL | Age: 65
Discharge: HOME | End: 2020-02-24
Admitting: NURSE PRACTITIONER

## 2020-01-01 ENCOUNTER — TELEPHONE (OUTPATIENT)
Dept: NEUROSURGERY | Facility: CLINIC | Age: 65
End: 2020-01-01

## 2020-01-01 ENCOUNTER — HOSPITAL ENCOUNTER (OUTPATIENT)
Dept: CT IMAGING | Facility: HOSPITAL | Age: 65
Discharge: HOME | End: 2020-02-21
Admitting: NURSE PRACTITIONER

## 2020-01-01 ENCOUNTER — TELEPHONE (OUTPATIENT)
Dept: RADIATION ONCOLOGY | Facility: HOSPITAL | Age: 65
End: 2020-01-01

## 2020-01-01 ENCOUNTER — HOSPITAL ENCOUNTER (OUTPATIENT)
Dept: RADIATION ONCOLOGY | Facility: HOSPITAL | Age: 65
Setting detail: RADIATION/ONCOLOGY SERIES
Discharge: HOME | End: 2020-09-01

## 2020-01-01 ENCOUNTER — HOSPITAL ENCOUNTER (OUTPATIENT)
Dept: RADIATION ONCOLOGY | Facility: HOSPITAL | Age: 65
Discharge: HOME | End: 2020-01-07

## 2020-01-01 VITALS
DIASTOLIC BLOOD PRESSURE: 77 MMHG | WEIGHT: 184.1 LBS | RESPIRATION RATE: 18 BRPM | OXYGEN SATURATION: 93 % | HEART RATE: 101 BPM | SYSTOLIC BLOOD PRESSURE: 158 MMHG | HEIGHT: 62 IN | TEMPERATURE: 98.4 F | BODY MASS INDEX: 33.88 KG/M2

## 2020-01-01 VITALS — HEIGHT: 62 IN | WEIGHT: 164 LBS | TEMPERATURE: 96.8 F | BODY MASS INDEX: 30.18 KG/M2

## 2020-01-01 VITALS
RESPIRATION RATE: 16 BRPM | OXYGEN SATURATION: 98 % | HEART RATE: 84 BPM | WEIGHT: 163 LBS | SYSTOLIC BLOOD PRESSURE: 151 MMHG | BODY MASS INDEX: 29.81 KG/M2 | DIASTOLIC BLOOD PRESSURE: 71 MMHG | TEMPERATURE: 97.4 F

## 2020-01-01 VITALS
WEIGHT: 173.1 LBS | RESPIRATION RATE: 12 BRPM | BODY MASS INDEX: 31.66 KG/M2 | OXYGEN SATURATION: 100 % | HEART RATE: 80 BPM | DIASTOLIC BLOOD PRESSURE: 76 MMHG | TEMPERATURE: 97.9 F | SYSTOLIC BLOOD PRESSURE: 158 MMHG

## 2020-01-01 VITALS
HEART RATE: 99 BPM | WEIGHT: 166 LBS | TEMPERATURE: 98.7 F | BODY MASS INDEX: 30.36 KG/M2 | RESPIRATION RATE: 14 BRPM | SYSTOLIC BLOOD PRESSURE: 149 MMHG | DIASTOLIC BLOOD PRESSURE: 71 MMHG

## 2020-01-01 VITALS
RESPIRATION RATE: 12 BRPM | TEMPERATURE: 97.3 F | WEIGHT: 164 LBS | BODY MASS INDEX: 30.18 KG/M2 | HEART RATE: 75 BPM | DIASTOLIC BLOOD PRESSURE: 67 MMHG | HEIGHT: 62 IN | OXYGEN SATURATION: 98 % | SYSTOLIC BLOOD PRESSURE: 143 MMHG

## 2020-01-01 VITALS
HEART RATE: 80 BPM | WEIGHT: 172 LBS | BODY MASS INDEX: 31.46 KG/M2 | DIASTOLIC BLOOD PRESSURE: 80 MMHG | TEMPERATURE: 98.9 F | RESPIRATION RATE: 16 BRPM | SYSTOLIC BLOOD PRESSURE: 170 MMHG

## 2020-01-01 VITALS
SYSTOLIC BLOOD PRESSURE: 157 MMHG | BODY MASS INDEX: 31.46 KG/M2 | RESPIRATION RATE: 14 BRPM | DIASTOLIC BLOOD PRESSURE: 71 MMHG | WEIGHT: 172 LBS | HEART RATE: 82 BPM | TEMPERATURE: 98.4 F

## 2020-01-01 VITALS
SYSTOLIC BLOOD PRESSURE: 157 MMHG | RESPIRATION RATE: 16 BRPM | WEIGHT: 175 LBS | DIASTOLIC BLOOD PRESSURE: 94 MMHG | TEMPERATURE: 97.9 F | HEART RATE: 86 BPM | BODY MASS INDEX: 32.01 KG/M2

## 2020-01-01 VITALS
TEMPERATURE: 98.4 F | OXYGEN SATURATION: 97 % | SYSTOLIC BLOOD PRESSURE: 160 MMHG | WEIGHT: 175 LBS | RESPIRATION RATE: 16 BRPM | HEART RATE: 89 BPM | DIASTOLIC BLOOD PRESSURE: 70 MMHG | BODY MASS INDEX: 32.2 KG/M2 | HEIGHT: 62 IN

## 2020-01-01 VITALS
DIASTOLIC BLOOD PRESSURE: 80 MMHG | TEMPERATURE: 97.9 F | WEIGHT: 181 LBS | HEIGHT: 62 IN | HEART RATE: 88 BPM | BODY MASS INDEX: 33.31 KG/M2 | SYSTOLIC BLOOD PRESSURE: 190 MMHG | OXYGEN SATURATION: 98 % | RESPIRATION RATE: 12 BRPM

## 2020-01-01 VITALS
DIASTOLIC BLOOD PRESSURE: 56 MMHG | WEIGHT: 189 LBS | SYSTOLIC BLOOD PRESSURE: 120 MMHG | RESPIRATION RATE: 16 BRPM | BODY MASS INDEX: 34.57 KG/M2 | TEMPERATURE: 98.7 F | HEART RATE: 93 BPM

## 2020-01-01 VITALS
BODY MASS INDEX: 31.92 KG/M2 | HEART RATE: 86 BPM | SYSTOLIC BLOOD PRESSURE: 156 MMHG | RESPIRATION RATE: 14 BRPM | TEMPERATURE: 98.4 F | WEIGHT: 174.5 LBS | OXYGEN SATURATION: 100 % | DIASTOLIC BLOOD PRESSURE: 71 MMHG

## 2020-01-01 VITALS
RESPIRATION RATE: 16 BRPM | WEIGHT: 168 LBS | BODY MASS INDEX: 30.73 KG/M2 | SYSTOLIC BLOOD PRESSURE: 145 MMHG | TEMPERATURE: 98.1 F | HEART RATE: 87 BPM | DIASTOLIC BLOOD PRESSURE: 74 MMHG

## 2020-01-01 VITALS
SYSTOLIC BLOOD PRESSURE: 149 MMHG | WEIGHT: 166.8 LBS | TEMPERATURE: 98.6 F | DIASTOLIC BLOOD PRESSURE: 69 MMHG | BODY MASS INDEX: 30.69 KG/M2 | HEART RATE: 100 BPM | RESPIRATION RATE: 20 BRPM | HEIGHT: 62 IN

## 2020-01-01 VITALS
HEIGHT: 62 IN | WEIGHT: 174 LBS | OXYGEN SATURATION: 99 % | SYSTOLIC BLOOD PRESSURE: 140 MMHG | BODY MASS INDEX: 32.02 KG/M2 | HEART RATE: 93 BPM | RESPIRATION RATE: 12 BRPM | TEMPERATURE: 97.4 F | DIASTOLIC BLOOD PRESSURE: 64 MMHG

## 2020-01-01 VITALS
DIASTOLIC BLOOD PRESSURE: 63 MMHG | SYSTOLIC BLOOD PRESSURE: 151 MMHG | HEART RATE: 77 BPM | WEIGHT: 174.3 LBS | BODY MASS INDEX: 31.88 KG/M2 | OXYGEN SATURATION: 95 % | TEMPERATURE: 97.3 F | RESPIRATION RATE: 18 BRPM

## 2020-01-01 VITALS
HEART RATE: 91 BPM | WEIGHT: 167 LBS | RESPIRATION RATE: 14 BRPM | TEMPERATURE: 98.6 F | SYSTOLIC BLOOD PRESSURE: 146 MMHG | BODY MASS INDEX: 30.54 KG/M2 | DIASTOLIC BLOOD PRESSURE: 69 MMHG

## 2020-01-01 VITALS
HEART RATE: 79 BPM | SYSTOLIC BLOOD PRESSURE: 164 MMHG | SYSTOLIC BLOOD PRESSURE: 159 MMHG | RESPIRATION RATE: 14 BRPM | HEART RATE: 97 BPM | WEIGHT: 165 LBS | DIASTOLIC BLOOD PRESSURE: 76 MMHG | BODY MASS INDEX: 30.18 KG/M2 | DIASTOLIC BLOOD PRESSURE: 77 MMHG | TEMPERATURE: 98.6 F

## 2020-01-01 VITALS — DIASTOLIC BLOOD PRESSURE: 75 MMHG | HEART RATE: 77 BPM | SYSTOLIC BLOOD PRESSURE: 154 MMHG

## 2020-01-01 VITALS
WEIGHT: 164 LBS | HEART RATE: 75 BPM | RESPIRATION RATE: 12 BRPM | HEIGHT: 62 IN | OXYGEN SATURATION: 99 % | SYSTOLIC BLOOD PRESSURE: 124 MMHG | DIASTOLIC BLOOD PRESSURE: 61 MMHG | BODY MASS INDEX: 30.18 KG/M2 | TEMPERATURE: 97.3 F

## 2020-01-01 VITALS
RESPIRATION RATE: 12 BRPM | HEIGHT: 62 IN | BODY MASS INDEX: 32.94 KG/M2 | OXYGEN SATURATION: 98 % | TEMPERATURE: 98.4 F | DIASTOLIC BLOOD PRESSURE: 67 MMHG | HEART RATE: 88 BPM | WEIGHT: 179 LBS | SYSTOLIC BLOOD PRESSURE: 149 MMHG

## 2020-01-01 VITALS
DIASTOLIC BLOOD PRESSURE: 67 MMHG | SYSTOLIC BLOOD PRESSURE: 148 MMHG | TEMPERATURE: 98.4 F | RESPIRATION RATE: 16 BRPM | WEIGHT: 172 LBS | HEART RATE: 81 BPM | BODY MASS INDEX: 31.46 KG/M2

## 2020-01-01 VITALS
TEMPERATURE: 97.3 F | DIASTOLIC BLOOD PRESSURE: 68 MMHG | BODY MASS INDEX: 34.46 KG/M2 | OXYGEN SATURATION: 98 % | SYSTOLIC BLOOD PRESSURE: 117 MMHG | WEIGHT: 188.4 LBS | HEART RATE: 74 BPM

## 2020-01-01 VITALS — BODY MASS INDEX: 34.48 KG/M2 | WEIGHT: 188.5 LBS

## 2020-01-01 VITALS
OXYGEN SATURATION: 98 % | HEART RATE: 69 BPM | SYSTOLIC BLOOD PRESSURE: 138 MMHG | RESPIRATION RATE: 18 BRPM | DIASTOLIC BLOOD PRESSURE: 80 MMHG | BODY MASS INDEX: 34.78 KG/M2 | WEIGHT: 189 LBS | HEIGHT: 62 IN

## 2020-01-01 VITALS
RESPIRATION RATE: 16 BRPM | HEART RATE: 92 BPM | OXYGEN SATURATION: 98 % | HEIGHT: 62 IN | TEMPERATURE: 97.8 F | SYSTOLIC BLOOD PRESSURE: 179 MMHG | DIASTOLIC BLOOD PRESSURE: 77 MMHG | WEIGHT: 172 LBS | BODY MASS INDEX: 31.65 KG/M2

## 2020-01-01 VITALS
SYSTOLIC BLOOD PRESSURE: 127 MMHG | WEIGHT: 173 LBS | HEART RATE: 100 BPM | BODY MASS INDEX: 31.64 KG/M2 | TEMPERATURE: 98.6 F | DIASTOLIC BLOOD PRESSURE: 62 MMHG | RESPIRATION RATE: 16 BRPM

## 2020-01-01 VITALS
WEIGHT: 172 LBS | RESPIRATION RATE: 16 BRPM | HEART RATE: 70 BPM | SYSTOLIC BLOOD PRESSURE: 125 MMHG | BODY MASS INDEX: 31.46 KG/M2 | DIASTOLIC BLOOD PRESSURE: 66 MMHG | TEMPERATURE: 98.7 F

## 2020-01-01 VITALS
TEMPERATURE: 97.7 F | HEART RATE: 102 BPM | RESPIRATION RATE: 16 BRPM | DIASTOLIC BLOOD PRESSURE: 60 MMHG | SYSTOLIC BLOOD PRESSURE: 136 MMHG | BODY MASS INDEX: 30.91 KG/M2 | OXYGEN SATURATION: 98 % | WEIGHT: 168 LBS | HEIGHT: 62 IN

## 2020-01-01 VITALS
SYSTOLIC BLOOD PRESSURE: 145 MMHG | RESPIRATION RATE: 16 BRPM | WEIGHT: 168 LBS | TEMPERATURE: 98.1 F | BODY MASS INDEX: 30.73 KG/M2 | HEART RATE: 87 BPM | DIASTOLIC BLOOD PRESSURE: 74 MMHG

## 2020-01-01 VITALS
HEIGHT: 62 IN | BODY MASS INDEX: 30 KG/M2 | HEART RATE: 99 BPM | WEIGHT: 163 LBS | SYSTOLIC BLOOD PRESSURE: 126 MMHG | TEMPERATURE: 97.3 F | DIASTOLIC BLOOD PRESSURE: 78 MMHG | RESPIRATION RATE: 18 BRPM | OXYGEN SATURATION: 96 %

## 2020-01-01 VITALS
RESPIRATION RATE: 18 BRPM | WEIGHT: 170.1 LBS | HEIGHT: 62 IN | BODY MASS INDEX: 31.3 KG/M2 | DIASTOLIC BLOOD PRESSURE: 68 MMHG | TEMPERATURE: 97.5 F | OXYGEN SATURATION: 99 % | HEART RATE: 87 BPM | SYSTOLIC BLOOD PRESSURE: 159 MMHG

## 2020-01-01 VITALS
BODY MASS INDEX: 32.19 KG/M2 | TEMPERATURE: 98.6 F | DIASTOLIC BLOOD PRESSURE: 77 MMHG | HEART RATE: 87 BPM | WEIGHT: 176 LBS | SYSTOLIC BLOOD PRESSURE: 171 MMHG | RESPIRATION RATE: 16 BRPM

## 2020-01-01 VITALS
SYSTOLIC BLOOD PRESSURE: 150 MMHG | WEIGHT: 162 LBS | BODY MASS INDEX: 29.81 KG/M2 | TEMPERATURE: 98 F | HEIGHT: 62 IN | HEART RATE: 87 BPM | OXYGEN SATURATION: 98 % | DIASTOLIC BLOOD PRESSURE: 70 MMHG | RESPIRATION RATE: 12 BRPM

## 2020-01-01 VITALS
SYSTOLIC BLOOD PRESSURE: 127 MMHG | BODY MASS INDEX: 30.73 KG/M2 | HEART RATE: 82 BPM | DIASTOLIC BLOOD PRESSURE: 60 MMHG | RESPIRATION RATE: 14 BRPM | TEMPERATURE: 98.8 F | WEIGHT: 168 LBS

## 2020-01-01 VITALS
OXYGEN SATURATION: 95 % | HEIGHT: 62 IN | RESPIRATION RATE: 12 BRPM | SYSTOLIC BLOOD PRESSURE: 130 MMHG | WEIGHT: 176 LBS | TEMPERATURE: 97.6 F | BODY MASS INDEX: 32.39 KG/M2 | DIASTOLIC BLOOD PRESSURE: 63 MMHG | HEART RATE: 90 BPM

## 2020-01-01 VITALS
HEART RATE: 94 BPM | TEMPERATURE: 97 F | OXYGEN SATURATION: 98 % | SYSTOLIC BLOOD PRESSURE: 187 MMHG | BODY MASS INDEX: 31.9 KG/M2 | DIASTOLIC BLOOD PRESSURE: 86 MMHG | WEIGHT: 174.4 LBS

## 2020-01-01 VITALS
HEART RATE: 91 BPM | DIASTOLIC BLOOD PRESSURE: 69 MMHG | SYSTOLIC BLOOD PRESSURE: 158 MMHG | WEIGHT: 176 LBS | TEMPERATURE: 97.8 F | RESPIRATION RATE: 16 BRPM | BODY MASS INDEX: 32.19 KG/M2

## 2020-01-01 VITALS
HEART RATE: 119 BPM | TEMPERATURE: 97.3 F | BODY MASS INDEX: 33.49 KG/M2 | DIASTOLIC BLOOD PRESSURE: 72 MMHG | RESPIRATION RATE: 16 BRPM | WEIGHT: 182 LBS | HEIGHT: 62 IN | SYSTOLIC BLOOD PRESSURE: 167 MMHG | OXYGEN SATURATION: 96 %

## 2020-01-01 VITALS
RESPIRATION RATE: 16 BRPM | TEMPERATURE: 98.9 F | DIASTOLIC BLOOD PRESSURE: 67 MMHG | WEIGHT: 164 LBS | HEART RATE: 82 BPM | BODY MASS INDEX: 30 KG/M2 | SYSTOLIC BLOOD PRESSURE: 142 MMHG

## 2020-01-01 VITALS — HEIGHT: 62 IN | BODY MASS INDEX: 30.95 KG/M2 | WEIGHT: 168.2 LBS | TEMPERATURE: 96.9 F

## 2020-01-01 VITALS
HEART RATE: 106 BPM | RESPIRATION RATE: 16 BRPM | WEIGHT: 164 LBS | TEMPERATURE: 97.8 F | BODY MASS INDEX: 30 KG/M2 | DIASTOLIC BLOOD PRESSURE: 67 MMHG | SYSTOLIC BLOOD PRESSURE: 139 MMHG

## 2020-01-01 VITALS
WEIGHT: 173 LBS | BODY MASS INDEX: 31.64 KG/M2 | DIASTOLIC BLOOD PRESSURE: 70 MMHG | RESPIRATION RATE: 18 BRPM | HEART RATE: 80 BPM | TEMPERATURE: 98.3 F | SYSTOLIC BLOOD PRESSURE: 145 MMHG

## 2020-01-01 VITALS
TEMPERATURE: 98.9 F | SYSTOLIC BLOOD PRESSURE: 117 MMHG | DIASTOLIC BLOOD PRESSURE: 57 MMHG | RESPIRATION RATE: 16 BRPM | HEART RATE: 121 BPM

## 2020-01-01 DIAGNOSIS — C34.82 MALIGNANT NEOPLASM OF OVERLAPPING SITES OF LEFT LUNG (HCC): Primary | ICD-10-CM

## 2020-01-01 DIAGNOSIS — C34.90 EXTENSIVE STAGE PRIMARY SMALL CELL CARCINOMA OF LUNG (HCC): ICD-10-CM

## 2020-01-01 DIAGNOSIS — C34.92 SMALL CELL LUNG CANCER, LEFT (HCC): ICD-10-CM

## 2020-01-01 DIAGNOSIS — C50.012 MALIGNANT NEOPLASM OF NIPPLE OF LEFT BREAST IN FEMALE, UNSPECIFIED ESTROGEN RECEPTOR STATUS (HCC): ICD-10-CM

## 2020-01-01 DIAGNOSIS — C34.82 MALIGNANT NEOPLASM OF OVERLAPPING SITES OF LEFT LUNG (HCC): ICD-10-CM

## 2020-01-01 DIAGNOSIS — C79.31 METASTATIC SMALL CELL CARCINOMA TO BRAIN (HCC): ICD-10-CM

## 2020-01-01 DIAGNOSIS — C50.012 MALIGNANT NEOPLASM OF NIPPLE OF LEFT BREAST IN FEMALE, UNSPECIFIED ESTROGEN RECEPTOR STATUS (HCC): Primary | ICD-10-CM

## 2020-01-01 DIAGNOSIS — C22.7 OTHER SPECIFIED CARCINOMAS OF LIVER (HCC): ICD-10-CM

## 2020-01-01 DIAGNOSIS — J44.9 CHRONIC OBSTRUCTIVE PULMONARY DISEASE, UNSPECIFIED COPD TYPE (HCC): ICD-10-CM

## 2020-01-01 DIAGNOSIS — R22.1 NECK MASS: ICD-10-CM

## 2020-01-01 DIAGNOSIS — C79.31 BRAIN METASTASES: ICD-10-CM

## 2020-01-01 DIAGNOSIS — C77.1 SECONDARY MALIGNANT NEOPLASM OF MEDIASTINAL LYMPH NODE (HCC): Primary | ICD-10-CM

## 2020-01-01 DIAGNOSIS — G89.3 CANCER RELATED PAIN: Primary | ICD-10-CM

## 2020-01-01 DIAGNOSIS — Z17.0 MALIGNANT NEOPLASM OF LEFT BREAST IN FEMALE, ESTROGEN RECEPTOR POSITIVE, UNSPECIFIED SITE OF BREAST (HCC): ICD-10-CM

## 2020-01-01 DIAGNOSIS — C77.1 SECONDARY MALIGNANT NEOPLASM OF MEDIASTINAL LYMPH NODE (HCC): ICD-10-CM

## 2020-01-01 DIAGNOSIS — C79.31 BRAIN METASTASES: Primary | ICD-10-CM

## 2020-01-01 DIAGNOSIS — B02.9 HERPES ZOSTER WITHOUT COMPLICATION: Primary | ICD-10-CM

## 2020-01-01 DIAGNOSIS — J30.89 OTHER ALLERGIC RHINITIS: ICD-10-CM

## 2020-01-01 DIAGNOSIS — G89.3 CANCER RELATED PAIN: ICD-10-CM

## 2020-01-01 DIAGNOSIS — C50.912 MALIGNANT NEOPLASM OF LEFT BREAST IN FEMALE, ESTROGEN RECEPTOR POSITIVE, UNSPECIFIED SITE OF BREAST (HCC): ICD-10-CM

## 2020-01-01 DIAGNOSIS — C79.31 METASTATIC SMALL CELL CARCINOMA TO BRAIN (HCC): Primary | ICD-10-CM

## 2020-01-01 DIAGNOSIS — C79.49 METASTASIS TO SPINAL CORD (HCC): ICD-10-CM

## 2020-01-01 DIAGNOSIS — R06.02 SHORTNESS OF BREATH: Primary | ICD-10-CM

## 2020-01-01 DIAGNOSIS — C22.7 OTHER SPECIFIED CARCINOMAS OF LIVER (HCC): Primary | ICD-10-CM

## 2020-01-01 LAB
ALBUMIN SERPL-MCNC: 3 G/DL (ref 3.5–5.2)
ALBUMIN SERPL-MCNC: 3.4 G/DL (ref 3.5–5.2)
ALBUMIN SERPL-MCNC: 3.5 G/DL (ref 3.5–5.2)
ALBUMIN SERPL-MCNC: 3.6 G/DL (ref 3.5–5.2)
ALBUMIN SERPL-MCNC: 3.7 G/DL (ref 3.5–5.2)
ALBUMIN SERPL-MCNC: 3.8 G/DL (ref 3.5–5.2)
ALBUMIN SERPL-MCNC: 3.9 G/DL (ref 3.5–5.2)
ALBUMIN/GLOB SERPL: 0.9 G/DL
ALBUMIN/GLOB SERPL: 0.9 G/DL
ALBUMIN/GLOB SERPL: 1.2 G/DL
ALBUMIN/GLOB SERPL: 1.3 G/DL
ALBUMIN/GLOB SERPL: 1.3 G/DL
ALBUMIN/GLOB SERPL: 1.4 G/DL
ALP SERPL-CCNC: 104 U/L (ref 39–117)
ALP SERPL-CCNC: 58 U/L (ref 39–117)
ALP SERPL-CCNC: 59 U/L (ref 39–117)
ALP SERPL-CCNC: 64 U/L (ref 39–117)
ALP SERPL-CCNC: 67 U/L (ref 39–117)
ALP SERPL-CCNC: 70 U/L (ref 39–117)
ALP SERPL-CCNC: 71 U/L (ref 39–117)
ALP SERPL-CCNC: 72 U/L (ref 39–117)
ALP SERPL-CCNC: 77 U/L (ref 39–117)
ALP SERPL-CCNC: 80 U/L (ref 39–117)
ALP SERPL-CCNC: 82 U/L (ref 39–117)
ALP SERPL-CCNC: 87 U/L (ref 39–117)
ALT SERPL W P-5'-P-CCNC: 12 U/L (ref 1–33)
ALT SERPL W P-5'-P-CCNC: 13 U/L (ref 1–33)
ALT SERPL W P-5'-P-CCNC: 19 U/L (ref 1–33)
ALT SERPL W P-5'-P-CCNC: 24 U/L (ref 1–33)
ALT SERPL W P-5'-P-CCNC: 26 U/L (ref 1–33)
ALT SERPL W P-5'-P-CCNC: 28 U/L (ref 1–33)
ALT SERPL W P-5'-P-CCNC: 30 U/L (ref 1–33)
ALT SERPL W P-5'-P-CCNC: 45 U/L (ref 1–33)
ALT SERPL W P-5'-P-CCNC: 58 U/L (ref 1–33)
ANION GAP SERPL CALCULATED.3IONS-SCNC: 10.6 MMOL/L (ref 5–15)
ANION GAP SERPL CALCULATED.3IONS-SCNC: 10.7 MMOL/L (ref 5–15)
ANION GAP SERPL CALCULATED.3IONS-SCNC: 11.7 MMOL/L (ref 5–15)
ANION GAP SERPL CALCULATED.3IONS-SCNC: 11.8 MMOL/L (ref 5–15)
ANION GAP SERPL CALCULATED.3IONS-SCNC: 11.9 MMOL/L (ref 5–15)
ANION GAP SERPL CALCULATED.3IONS-SCNC: 12.8 MMOL/L (ref 5–15)
ANION GAP SERPL CALCULATED.3IONS-SCNC: 13.4 MMOL/L (ref 5–15)
ANION GAP SERPL CALCULATED.3IONS-SCNC: 13.6 MMOL/L (ref 5–15)
ANION GAP SERPL CALCULATED.3IONS-SCNC: 13.7 MMOL/L (ref 5–15)
ANION GAP SERPL CALCULATED.3IONS-SCNC: 7.9 MMOL/L (ref 5–15)
ANION GAP SERPL CALCULATED.3IONS-SCNC: 9 MMOL/L (ref 5–15)
ANION GAP SERPL CALCULATED.3IONS-SCNC: 9.5 MMOL/L (ref 5–15)
AST SERPL-CCNC: 16 U/L (ref 1–32)
AST SERPL-CCNC: 16 U/L (ref 1–32)
AST SERPL-CCNC: 17 U/L (ref 1–32)
AST SERPL-CCNC: 18 U/L (ref 1–32)
AST SERPL-CCNC: 18 U/L (ref 1–32)
AST SERPL-CCNC: 22 U/L (ref 1–32)
AST SERPL-CCNC: 27 U/L (ref 1–32)
AST SERPL-CCNC: 27 U/L (ref 1–32)
AST SERPL-CCNC: 30 U/L (ref 1–32)
AST SERPL-CCNC: 35 U/L (ref 1–32)
AST SERPL-CCNC: 35 U/L (ref 1–32)
AST SERPL-CCNC: 40 U/L (ref 1–32)
BACTERIA UR QL AUTO: ABNORMAL /HPF
BASOPHILS # BLD AUTO: 0.01 10*3/MM3 (ref 0–0.2)
BASOPHILS # BLD AUTO: 0.02 10*3/MM3 (ref 0–0.2)
BASOPHILS # BLD AUTO: 0.03 10*3/MM3 (ref 0–0.2)
BASOPHILS # BLD AUTO: 0.03 10*3/MM3 (ref 0–0.2)
BASOPHILS NFR BLD AUTO: 0.1 % (ref 0–1.5)
BASOPHILS NFR BLD AUTO: 0.2 % (ref 0–1.5)
BASOPHILS NFR BLD AUTO: 0.3 % (ref 0–1.5)
BASOPHILS NFR BLD AUTO: 0.4 % (ref 0–1.5)
BASOPHILS NFR BLD AUTO: 0.5 % (ref 0–1.5)
BASOPHILS NFR BLD AUTO: 0.6 % (ref 0–1.5)
BASOPHILS NFR BLD AUTO: 0.7 % (ref 0–1.5)
BASOPHILS NFR BLD AUTO: 0.8 % (ref 0–1.5)
BASOPHILS NFR BLD AUTO: 0.9 % (ref 0–1.5)
BASOPHILS NFR BLD AUTO: 0.9 % (ref 0–1.5)
BASOPHILS NFR BLD AUTO: 1 % (ref 0–1.5)
BASOPHILS NFR BLD AUTO: 1.4 % (ref 0–1.5)
BILIRUB SERPL-MCNC: 0.3 MG/DL (ref 0.2–1.2)
BILIRUB SERPL-MCNC: 0.4 MG/DL (ref 0.2–1.2)
BILIRUB SERPL-MCNC: 0.4 MG/DL (ref 0.2–1.2)
BILIRUB SERPL-MCNC: 0.5 MG/DL (ref 0.2–1.2)
BILIRUB SERPL-MCNC: 0.5 MG/DL (ref 0.2–1.2)
BILIRUB SERPL-MCNC: 0.5 MG/DL (ref 0–1.2)
BILIRUB SERPL-MCNC: 0.6 MG/DL (ref 0.2–1.2)
BILIRUB SERPL-MCNC: 0.9 MG/DL (ref 0.2–1.2)
BILIRUB UR QL STRIP: NEGATIVE
BUN BLD-MCNC: 10 MG/DL (ref 8–23)
BUN BLD-MCNC: 10 MG/DL (ref 8–23)
BUN BLD-MCNC: 11 MG/DL (ref 8–23)
BUN BLD-MCNC: 13 MG/DL (ref 8–23)
BUN BLD-MCNC: 14 MG/DL (ref 8–23)
BUN BLD-MCNC: 14 MG/DL (ref 8–23)
BUN BLD-MCNC: 21 MG/DL (ref 8–23)
BUN BLD-MCNC: 23 MG/DL (ref 8–23)
BUN BLD-MCNC: 9 MG/DL (ref 8–23)
BUN SERPL-MCNC: 14 MG/DL (ref 8–23)
BUN SERPL-MCNC: 15 MG/DL (ref 8–23)
BUN SERPL-MCNC: 16 MG/DL (ref 8–23)
BUN/CREAT SERPL: 11.1 (ref 7–25)
BUN/CREAT SERPL: 11.5 (ref 7–25)
BUN/CREAT SERPL: 12.6 (ref 7–25)
BUN/CREAT SERPL: 14.3 (ref 7–25)
BUN/CREAT SERPL: 14.9 (ref 7–25)
BUN/CREAT SERPL: 15.5 (ref 7–25)
BUN/CREAT SERPL: 15.6 (ref 7–25)
BUN/CREAT SERPL: 15.9 (ref 7–25)
BUN/CREAT SERPL: 19.4 (ref 7–25)
BUN/CREAT SERPL: 21.3 (ref 7–25)
BUN/CREAT SERPL: 21.9 (ref 7–25)
BUN/CREAT SERPL: 9.7 (ref 7–25)
CALCIUM SPEC-SCNC: 10 MG/DL (ref 8.6–10.5)
CALCIUM SPEC-SCNC: 10.1 MG/DL (ref 8.6–10.5)
CALCIUM SPEC-SCNC: 10.1 MG/DL (ref 8.6–10.5)
CALCIUM SPEC-SCNC: 10.2 MG/DL (ref 8.6–10.5)
CALCIUM SPEC-SCNC: 10.5 MG/DL (ref 8.6–10.5)
CALCIUM SPEC-SCNC: 9.4 MG/DL (ref 8.6–10.5)
CALCIUM SPEC-SCNC: 9.7 MG/DL (ref 8.6–10.5)
CALCIUM SPEC-SCNC: 9.7 MG/DL (ref 8.6–10.5)
CALCIUM SPEC-SCNC: 9.9 MG/DL (ref 8.6–10.5)
CHLORIDE SERPL-SCNC: 102 MMOL/L (ref 98–107)
CHLORIDE SERPL-SCNC: 103 MMOL/L (ref 98–107)
CHLORIDE SERPL-SCNC: 104 MMOL/L (ref 98–107)
CHLORIDE SERPL-SCNC: 105 MMOL/L (ref 98–107)
CHLORIDE SERPL-SCNC: 106 MMOL/L (ref 98–107)
CHLORIDE SERPL-SCNC: 99 MMOL/L (ref 98–107)
CLARITY UR: CLEAR
CO2 SERPL-SCNC: 23.6 MMOL/L (ref 22–29)
CO2 SERPL-SCNC: 24.2 MMOL/L (ref 22–29)
CO2 SERPL-SCNC: 24.2 MMOL/L (ref 22–29)
CO2 SERPL-SCNC: 24.3 MMOL/L (ref 22–29)
CO2 SERPL-SCNC: 25 MMOL/L (ref 22–29)
CO2 SERPL-SCNC: 25.1 MMOL/L (ref 22–29)
CO2 SERPL-SCNC: 25.4 MMOL/L (ref 22–29)
CO2 SERPL-SCNC: 25.5 MMOL/L (ref 22–29)
CO2 SERPL-SCNC: 27.1 MMOL/L (ref 22–29)
CO2 SERPL-SCNC: 27.4 MMOL/L (ref 22–29)
COLOR UR: YELLOW
CREAT BLD-MCNC: 0.74 MG/DL (ref 0.57–1)
CREAT BLD-MCNC: 0.87 MG/DL (ref 0.57–1)
CREAT BLD-MCNC: 0.9 MG/DL (ref 0.57–1)
CREAT BLD-MCNC: 0.9 MG/DL (ref 0.57–1)
CREAT BLD-MCNC: 0.91 MG/DL (ref 0.57–1)
CREAT BLD-MCNC: 0.93 MG/DL (ref 0.57–1)
CREAT BLD-MCNC: 1.05 MG/DL (ref 0.57–1)
CREAT BLD-MCNC: 1.08 MG/DL (ref 0.57–1)
CREAT BLD-MCNC: 1.11 MG/DL (ref 0.57–1)
CREAT SERPL-MCNC: 0.75 MG/DL (ref 0.57–1)
CREAT SERPL-MCNC: 0.88 MG/DL (ref 0.57–1)
CREAT SERPL-MCNC: 0.97 MG/DL (ref 0.57–1)
DEPRECATED RDW RBC AUTO: 44.8 FL (ref 37–54)
DEPRECATED RDW RBC AUTO: 45.2 FL (ref 37–54)
DEPRECATED RDW RBC AUTO: 45.5 FL (ref 37–54)
DEPRECATED RDW RBC AUTO: 46 FL (ref 37–54)
DEPRECATED RDW RBC AUTO: 46.1 FL (ref 37–54)
DEPRECATED RDW RBC AUTO: 46.6 FL (ref 37–54)
DEPRECATED RDW RBC AUTO: 46.9 FL (ref 37–54)
DEPRECATED RDW RBC AUTO: 47.1 FL (ref 37–54)
DEPRECATED RDW RBC AUTO: 48.3 FL (ref 37–54)
DEPRECATED RDW RBC AUTO: 48.6 FL (ref 37–54)
DEPRECATED RDW RBC AUTO: 49 FL (ref 37–54)
DEPRECATED RDW RBC AUTO: 49.5 FL (ref 37–54)
DEPRECATED RDW RBC AUTO: 49.9 FL (ref 37–54)
DEPRECATED RDW RBC AUTO: 51.3 FL (ref 37–54)
DEPRECATED RDW RBC AUTO: 51.6 FL (ref 37–54)
DEPRECATED RDW RBC AUTO: 51.7 FL (ref 37–54)
DEPRECATED RDW RBC AUTO: 52 FL (ref 37–54)
DEPRECATED RDW RBC AUTO: 53 FL (ref 37–54)
DEPRECATED RDW RBC AUTO: 53.1 FL (ref 37–54)
DEPRECATED RDW RBC AUTO: 53.5 FL (ref 37–54)
DEPRECATED RDW RBC AUTO: 55.5 FL (ref 37–54)
DEPRECATED RDW RBC AUTO: 56.1 FL (ref 37–54)
EOSINOPHIL # BLD AUTO: 0.02 10*3/MM3 (ref 0–0.4)
EOSINOPHIL # BLD AUTO: 0.02 10*3/MM3 (ref 0–0.4)
EOSINOPHIL # BLD AUTO: 0.03 10*3/MM3 (ref 0–0.4)
EOSINOPHIL # BLD AUTO: 0.04 10*3/MM3 (ref 0–0.4)
EOSINOPHIL # BLD AUTO: 0.05 10*3/MM3 (ref 0–0.4)
EOSINOPHIL # BLD AUTO: 0.06 10*3/MM3 (ref 0–0.4)
EOSINOPHIL # BLD AUTO: 0.07 10*3/MM3 (ref 0–0.4)
EOSINOPHIL # BLD AUTO: 0.07 10*3/MM3 (ref 0–0.4)
EOSINOPHIL # BLD AUTO: 0.08 10*3/MM3 (ref 0–0.4)
EOSINOPHIL # BLD AUTO: 0.08 10*3/MM3 (ref 0–0.4)
EOSINOPHIL # BLD AUTO: 0.09 10*3/MM3 (ref 0–0.4)
EOSINOPHIL # BLD AUTO: 0.09 10*3/MM3 (ref 0–0.4)
EOSINOPHIL # BLD AUTO: 0.1 10*3/MM3 (ref 0–0.4)
EOSINOPHIL # BLD AUTO: 0.11 10*3/MM3 (ref 0–0.4)
EOSINOPHIL # BLD AUTO: 0.12 10*3/MM3 (ref 0–0.4)
EOSINOPHIL # BLD AUTO: 0.14 10*3/MM3 (ref 0–0.4)
EOSINOPHIL # BLD AUTO: 0.24 10*3/MM3 (ref 0–0.4)
EOSINOPHIL NFR BLD AUTO: 0.6 % (ref 0.3–6.2)
EOSINOPHIL NFR BLD AUTO: 0.7 % (ref 0.3–6.2)
EOSINOPHIL NFR BLD AUTO: 0.8 % (ref 0.3–6.2)
EOSINOPHIL NFR BLD AUTO: 0.9 % (ref 0.3–6.2)
EOSINOPHIL NFR BLD AUTO: 1 % (ref 0.3–6.2)
EOSINOPHIL NFR BLD AUTO: 1.1 % (ref 0.3–6.2)
EOSINOPHIL NFR BLD AUTO: 1.2 % (ref 0.3–6.2)
EOSINOPHIL NFR BLD AUTO: 1.2 % (ref 0.3–6.2)
EOSINOPHIL NFR BLD AUTO: 1.3 % (ref 0.3–6.2)
EOSINOPHIL NFR BLD AUTO: 1.3 % (ref 0.3–6.2)
EOSINOPHIL NFR BLD AUTO: 1.5 % (ref 0.3–6.2)
EOSINOPHIL NFR BLD AUTO: 1.7 % (ref 0.3–6.2)
EOSINOPHIL NFR BLD AUTO: 1.7 % (ref 0.3–6.2)
EOSINOPHIL NFR BLD AUTO: 2.2 % (ref 0.3–6.2)
EOSINOPHIL NFR BLD AUTO: 2.4 % (ref 0.3–6.2)
EOSINOPHIL NFR BLD AUTO: 3.1 % (ref 0.3–6.2)
EOSINOPHIL NFR BLD AUTO: 3.2 % (ref 0.3–6.2)
EOSINOPHIL NFR BLD AUTO: 3.9 % (ref 0.3–6.2)
EOSINOPHIL NFR BLD AUTO: 4.1 % (ref 0.3–6.2)
EOSINOPHIL NFR BLD AUTO: 5.1 % (ref 0.3–6.2)
EOSINOPHIL NFR BLD AUTO: 7 % (ref 0.3–6.2)
EOSINOPHIL NFR BLD AUTO: 7.2 % (ref 0.3–6.2)
ERYTHROCYTE [DISTWIDTH] IN BLOOD BY AUTOMATED COUNT: 12.8 % (ref 12.3–15.4)
ERYTHROCYTE [DISTWIDTH] IN BLOOD BY AUTOMATED COUNT: 13 % (ref 12.3–15.4)
ERYTHROCYTE [DISTWIDTH] IN BLOOD BY AUTOMATED COUNT: 13.2 % (ref 12.3–15.4)
ERYTHROCYTE [DISTWIDTH] IN BLOOD BY AUTOMATED COUNT: 13.2 % (ref 12.3–15.4)
ERYTHROCYTE [DISTWIDTH] IN BLOOD BY AUTOMATED COUNT: 13.5 % (ref 12.3–15.4)
ERYTHROCYTE [DISTWIDTH] IN BLOOD BY AUTOMATED COUNT: 13.6 % (ref 12.3–15.4)
ERYTHROCYTE [DISTWIDTH] IN BLOOD BY AUTOMATED COUNT: 13.7 % (ref 12.3–15.4)
ERYTHROCYTE [DISTWIDTH] IN BLOOD BY AUTOMATED COUNT: 13.7 % (ref 12.3–15.4)
ERYTHROCYTE [DISTWIDTH] IN BLOOD BY AUTOMATED COUNT: 13.8 % (ref 12.3–15.4)
ERYTHROCYTE [DISTWIDTH] IN BLOOD BY AUTOMATED COUNT: 13.8 % (ref 12.3–15.4)
ERYTHROCYTE [DISTWIDTH] IN BLOOD BY AUTOMATED COUNT: 14.4 % (ref 12.3–15.4)
ERYTHROCYTE [DISTWIDTH] IN BLOOD BY AUTOMATED COUNT: 14.5 % (ref 12.3–15.4)
ERYTHROCYTE [DISTWIDTH] IN BLOOD BY AUTOMATED COUNT: 14.6 % (ref 12.3–15.4)
ERYTHROCYTE [DISTWIDTH] IN BLOOD BY AUTOMATED COUNT: 14.7 % (ref 12.3–15.4)
ERYTHROCYTE [DISTWIDTH] IN BLOOD BY AUTOMATED COUNT: 14.8 % (ref 12.3–15.4)
ERYTHROCYTE [DISTWIDTH] IN BLOOD BY AUTOMATED COUNT: 15.3 % (ref 12.3–15.4)
ERYTHROCYTE [DISTWIDTH] IN BLOOD BY AUTOMATED COUNT: 15.3 % (ref 12.3–15.4)
ERYTHROCYTE [DISTWIDTH] IN BLOOD BY AUTOMATED COUNT: 15.9 % (ref 12.3–15.4)
ERYTHROCYTE [DISTWIDTH] IN BLOOD BY AUTOMATED COUNT: 15.9 % (ref 12.3–15.4)
GFR SERPL CREATININE-BSD FRML MDRD: 50 ML/MIN/1.73
GFR SERPL CREATININE-BSD FRML MDRD: 51 ML/MIN/1.73
GFR SERPL CREATININE-BSD FRML MDRD: 53 ML/MIN/1.73
GFR SERPL CREATININE-BSD FRML MDRD: 58 ML/MIN/1.73
GFR SERPL CREATININE-BSD FRML MDRD: 61 ML/MIN/1.73
GFR SERPL CREATININE-BSD FRML MDRD: 63 ML/MIN/1.73
GFR SERPL CREATININE-BSD FRML MDRD: 65 ML/MIN/1.73
GFR SERPL CREATININE-BSD FRML MDRD: 66 ML/MIN/1.73
GFR SERPL CREATININE-BSD FRML MDRD: 78 ML/MIN/1.73
GFR SERPL CREATININE-BSD FRML MDRD: 80 ML/MIN/1.73
GLOBULIN UR ELPH-MCNC: 2.7 GM/DL
GLOBULIN UR ELPH-MCNC: 2.8 GM/DL
GLOBULIN UR ELPH-MCNC: 2.9 GM/DL
GLOBULIN UR ELPH-MCNC: 3.2 GM/DL
GLOBULIN UR ELPH-MCNC: 3.2 GM/DL
GLOBULIN UR ELPH-MCNC: 3.3 GM/DL
GLOBULIN UR ELPH-MCNC: 3.3 GM/DL
GLOBULIN UR ELPH-MCNC: 3.5 GM/DL
GLOBULIN UR ELPH-MCNC: 3.9 GM/DL
GLUCOSE BLD-MCNC: 103 MG/DL (ref 65–99)
GLUCOSE BLD-MCNC: 124 MG/DL (ref 65–99)
GLUCOSE BLD-MCNC: 129 MG/DL (ref 65–99)
GLUCOSE BLD-MCNC: 130 MG/DL (ref 65–99)
GLUCOSE BLD-MCNC: 147 MG/DL (ref 65–99)
GLUCOSE BLD-MCNC: 96 MG/DL (ref 65–99)
GLUCOSE BLD-MCNC: 96 MG/DL (ref 65–99)
GLUCOSE BLD-MCNC: 98 MG/DL (ref 65–99)
GLUCOSE BLD-MCNC: 99 MG/DL (ref 65–99)
GLUCOSE SERPL-MCNC: 105 MG/DL (ref 65–99)
GLUCOSE SERPL-MCNC: 119 MG/DL (ref 65–99)
GLUCOSE SERPL-MCNC: 99 MG/DL (ref 65–99)
GLUCOSE UR STRIP-MCNC: NEGATIVE MG/DL
HCT VFR BLD AUTO: 26.5 % (ref 34–46.6)
HCT VFR BLD AUTO: 26.6 % (ref 34–46.6)
HCT VFR BLD AUTO: 27.3 % (ref 34–46.6)
HCT VFR BLD AUTO: 27.5 % (ref 34–46.6)
HCT VFR BLD AUTO: 27.7 % (ref 34–46.6)
HCT VFR BLD AUTO: 27.8 % (ref 34–46.6)
HCT VFR BLD AUTO: 28 % (ref 34–46.6)
HCT VFR BLD AUTO: 28.1 % (ref 34–46.6)
HCT VFR BLD AUTO: 28.2 % (ref 34–46.6)
HCT VFR BLD AUTO: 28.2 % (ref 34–46.6)
HCT VFR BLD AUTO: 28.3 % (ref 34–46.6)
HCT VFR BLD AUTO: 28.4 % (ref 34–46.6)
HCT VFR BLD AUTO: 28.4 % (ref 34–46.6)
HCT VFR BLD AUTO: 28.7 % (ref 34–46.6)
HCT VFR BLD AUTO: 29 % (ref 34–46.6)
HCT VFR BLD AUTO: 29.1 % (ref 34–46.6)
HCT VFR BLD AUTO: 29.2 % (ref 34–46.6)
HCT VFR BLD AUTO: 29.2 % (ref 34–46.6)
HCT VFR BLD AUTO: 29.9 % (ref 34–46.6)
HCT VFR BLD AUTO: 30.1 % (ref 34–46.6)
HCT VFR BLD AUTO: 30.3 % (ref 34–46.6)
HCT VFR BLD AUTO: 30.6 % (ref 34–46.6)
HCT VFR BLD AUTO: 30.8 % (ref 34–46.6)
HCT VFR BLD AUTO: 32.4 % (ref 34–46.6)
HGB BLD-MCNC: 10.2 G/DL (ref 12–15.9)
HGB BLD-MCNC: 10.3 G/DL (ref 12–15.9)
HGB BLD-MCNC: 11.5 G/DL (ref 12–15.9)
HGB BLD-MCNC: 9 G/DL (ref 12–15.9)
HGB BLD-MCNC: 9.1 G/DL (ref 12–15.9)
HGB BLD-MCNC: 9.1 G/DL (ref 12–15.9)
HGB BLD-MCNC: 9.2 G/DL (ref 12–15.9)
HGB BLD-MCNC: 9.2 G/DL (ref 12–15.9)
HGB BLD-MCNC: 9.3 G/DL (ref 12–15.9)
HGB BLD-MCNC: 9.4 G/DL (ref 12–15.9)
HGB BLD-MCNC: 9.5 G/DL (ref 12–15.9)
HGB BLD-MCNC: 9.6 G/DL (ref 12–15.9)
HGB BLD-MCNC: 9.6 G/DL (ref 12–15.9)
HGB BLD-MCNC: 9.7 G/DL (ref 12–15.9)
HGB BLD-MCNC: 9.8 G/DL (ref 12–15.9)
HGB BLD-MCNC: 9.8 G/DL (ref 12–15.9)
HGB BLD-MCNC: 9.9 G/DL (ref 12–15.9)
HGB UR QL STRIP.AUTO: ABNORMAL
HYALINE CASTS UR QL AUTO: ABNORMAL /LPF
IMM GRANULOCYTES # BLD AUTO: 0.01 10*3/MM3 (ref 0–0.05)
IMM GRANULOCYTES # BLD AUTO: 0.02 10*3/MM3 (ref 0–0.05)
IMM GRANULOCYTES # BLD AUTO: 0.03 10*3/MM3 (ref 0–0.05)
IMM GRANULOCYTES # BLD AUTO: 0.04 10*3/MM3 (ref 0–0.05)
IMM GRANULOCYTES # BLD AUTO: 0.04 10*3/MM3 (ref 0–0.05)
IMM GRANULOCYTES # BLD AUTO: 0.05 10*3/MM3 (ref 0–0.05)
IMM GRANULOCYTES # BLD AUTO: 0.09 10*3/MM3 (ref 0–0.05)
IMM GRANULOCYTES NFR BLD AUTO: 0.2 % (ref 0–0.5)
IMM GRANULOCYTES NFR BLD AUTO: 0.3 % (ref 0–0.5)
IMM GRANULOCYTES NFR BLD AUTO: 0.4 % (ref 0–0.5)
IMM GRANULOCYTES NFR BLD AUTO: 0.4 % (ref 0–0.5)
IMM GRANULOCYTES NFR BLD AUTO: 0.5 % (ref 0–0.5)
IMM GRANULOCYTES NFR BLD AUTO: 0.5 % (ref 0–0.5)
IMM GRANULOCYTES NFR BLD AUTO: 0.6 % (ref 0–0.5)
IMM GRANULOCYTES NFR BLD AUTO: 0.7 % (ref 0–0.5)
IMM GRANULOCYTES NFR BLD AUTO: 0.7 % (ref 0–0.5)
IMM GRANULOCYTES NFR BLD AUTO: 0.9 % (ref 0–0.5)
IMM GRANULOCYTES NFR BLD AUTO: 0.9 % (ref 0–0.5)
IMM GRANULOCYTES NFR BLD AUTO: 1 % (ref 0–0.5)
IMM GRANULOCYTES NFR BLD AUTO: 1 % (ref 0–0.5)
IMM GRANULOCYTES NFR BLD AUTO: 2 % (ref 0–0.5)
IMM GRANULOCYTES NFR BLD AUTO: 2 % (ref 0–0.5)
KETONES UR QL STRIP: NEGATIVE
LEUKOCYTE ESTERASE UR QL STRIP.AUTO: ABNORMAL
LYMPHOCYTES # BLD AUTO: 0.47 10*3/MM3 (ref 0.7–3.1)
LYMPHOCYTES # BLD AUTO: 0.51 10*3/MM3 (ref 0.7–3.1)
LYMPHOCYTES # BLD AUTO: 0.57 10*3/MM3 (ref 0.7–3.1)
LYMPHOCYTES # BLD AUTO: 0.7 10*3/MM3 (ref 0.7–3.1)
LYMPHOCYTES # BLD AUTO: 0.76 10*3/MM3 (ref 0.7–3.1)
LYMPHOCYTES # BLD AUTO: 0.79 10*3/MM3 (ref 0.7–3.1)
LYMPHOCYTES # BLD AUTO: 0.8 10*3/MM3 (ref 0.7–3.1)
LYMPHOCYTES # BLD AUTO: 0.8 10*3/MM3 (ref 0.7–3.1)
LYMPHOCYTES # BLD AUTO: 0.81 10*3/MM3 (ref 0.7–3.1)
LYMPHOCYTES # BLD AUTO: 0.82 10*3/MM3 (ref 0.7–3.1)
LYMPHOCYTES # BLD AUTO: 0.83 10*3/MM3 (ref 0.7–3.1)
LYMPHOCYTES # BLD AUTO: 0.84 10*3/MM3 (ref 0.7–3.1)
LYMPHOCYTES # BLD AUTO: 0.84 10*3/MM3 (ref 0.7–3.1)
LYMPHOCYTES # BLD AUTO: 0.85 10*3/MM3 (ref 0.7–3.1)
LYMPHOCYTES # BLD AUTO: 0.9 10*3/MM3 (ref 0.7–3.1)
LYMPHOCYTES # BLD AUTO: 0.91 10*3/MM3 (ref 0.7–3.1)
LYMPHOCYTES # BLD AUTO: 0.91 10*3/MM3 (ref 0.7–3.1)
LYMPHOCYTES # BLD AUTO: 0.92 10*3/MM3 (ref 0.7–3.1)
LYMPHOCYTES # BLD AUTO: 0.93 10*3/MM3 (ref 0.7–3.1)
LYMPHOCYTES # BLD AUTO: 0.94 10*3/MM3 (ref 0.7–3.1)
LYMPHOCYTES # BLD AUTO: 0.96 10*3/MM3 (ref 0.7–3.1)
LYMPHOCYTES # BLD AUTO: 0.97 10*3/MM3 (ref 0.7–3.1)
LYMPHOCYTES # BLD AUTO: 1.02 10*3/MM3 (ref 0.7–3.1)
LYMPHOCYTES # BLD AUTO: 1.08 10*3/MM3 (ref 0.7–3.1)
LYMPHOCYTES NFR BLD AUTO: 15.2 % (ref 19.6–45.3)
LYMPHOCYTES NFR BLD AUTO: 19.4 % (ref 19.6–45.3)
LYMPHOCYTES NFR BLD AUTO: 20.4 % (ref 19.6–45.3)
LYMPHOCYTES NFR BLD AUTO: 20.8 % (ref 19.6–45.3)
LYMPHOCYTES NFR BLD AUTO: 22.1 % (ref 19.6–45.3)
LYMPHOCYTES NFR BLD AUTO: 22.1 % (ref 19.6–45.3)
LYMPHOCYTES NFR BLD AUTO: 22.2 % (ref 19.6–45.3)
LYMPHOCYTES NFR BLD AUTO: 23.2 % (ref 19.6–45.3)
LYMPHOCYTES NFR BLD AUTO: 23.8 % (ref 19.6–45.3)
LYMPHOCYTES NFR BLD AUTO: 24.5 % (ref 19.6–45.3)
LYMPHOCYTES NFR BLD AUTO: 26.4 % (ref 19.6–45.3)
LYMPHOCYTES NFR BLD AUTO: 26.5 % (ref 19.6–45.3)
LYMPHOCYTES NFR BLD AUTO: 26.9 % (ref 19.6–45.3)
LYMPHOCYTES NFR BLD AUTO: 27.5 % (ref 19.6–45.3)
LYMPHOCYTES NFR BLD AUTO: 27.8 % (ref 19.6–45.3)
LYMPHOCYTES NFR BLD AUTO: 28.7 % (ref 19.6–45.3)
LYMPHOCYTES NFR BLD AUTO: 33.1 % (ref 19.6–45.3)
LYMPHOCYTES NFR BLD AUTO: 33.7 % (ref 19.6–45.3)
LYMPHOCYTES NFR BLD AUTO: 34.2 % (ref 19.6–45.3)
LYMPHOCYTES NFR BLD AUTO: 38.5 % (ref 19.6–45.3)
LYMPHOCYTES NFR BLD AUTO: 39 % (ref 19.6–45.3)
LYMPHOCYTES NFR BLD AUTO: 39.9 % (ref 19.6–45.3)
LYMPHOCYTES NFR BLD AUTO: 40.3 % (ref 19.6–45.3)
LYMPHOCYTES NFR BLD AUTO: 7 % (ref 19.6–45.3)
MCH RBC QN AUTO: 30.6 PG (ref 26.6–33)
MCH RBC QN AUTO: 31.3 PG (ref 26.6–33)
MCH RBC QN AUTO: 31.6 PG (ref 26.6–33)
MCH RBC QN AUTO: 31.7 PG (ref 26.6–33)
MCH RBC QN AUTO: 31.7 PG (ref 26.6–33)
MCH RBC QN AUTO: 31.9 PG (ref 26.6–33)
MCH RBC QN AUTO: 32.1 PG (ref 26.6–33)
MCH RBC QN AUTO: 32.1 PG (ref 26.6–33)
MCH RBC QN AUTO: 32.2 PG (ref 26.6–33)
MCH RBC QN AUTO: 32.2 PG (ref 26.6–33)
MCH RBC QN AUTO: 32.3 PG (ref 26.6–33)
MCH RBC QN AUTO: 32.4 PG (ref 26.6–33)
MCH RBC QN AUTO: 32.5 PG (ref 26.6–33)
MCH RBC QN AUTO: 32.6 PG (ref 26.6–33)
MCH RBC QN AUTO: 32.7 PG (ref 26.6–33)
MCH RBC QN AUTO: 32.9 PG (ref 26.6–33)
MCH RBC QN AUTO: 32.9 PG (ref 26.6–33)
MCH RBC QN AUTO: 33 PG (ref 26.6–33)
MCH RBC QN AUTO: 33 PG (ref 26.6–33)
MCH RBC QN AUTO: 33.1 PG (ref 26.6–33)
MCH RBC QN AUTO: 33.3 PG (ref 26.6–33)
MCH RBC QN AUTO: 34 PG (ref 26.6–33)
MCHC RBC AUTO-ENTMCNC: 32.3 G/DL (ref 31.5–35.7)
MCHC RBC AUTO-ENTMCNC: 32.4 G/DL (ref 31.5–35.7)
MCHC RBC AUTO-ENTMCNC: 32.5 G/DL (ref 31.5–35.7)
MCHC RBC AUTO-ENTMCNC: 32.6 G/DL (ref 31.5–35.7)
MCHC RBC AUTO-ENTMCNC: 32.7 G/DL (ref 31.5–35.7)
MCHC RBC AUTO-ENTMCNC: 32.8 G/DL (ref 31.5–35.7)
MCHC RBC AUTO-ENTMCNC: 32.9 G/DL (ref 31.5–35.7)
MCHC RBC AUTO-ENTMCNC: 33 G/DL (ref 31.5–35.7)
MCHC RBC AUTO-ENTMCNC: 33.1 G/DL (ref 31.5–35.7)
MCHC RBC AUTO-ENTMCNC: 33.1 G/DL (ref 31.5–35.7)
MCHC RBC AUTO-ENTMCNC: 33.3 G/DL (ref 31.5–35.7)
MCHC RBC AUTO-ENTMCNC: 33.3 G/DL (ref 31.5–35.7)
MCHC RBC AUTO-ENTMCNC: 33.5 G/DL (ref 31.5–35.7)
MCHC RBC AUTO-ENTMCNC: 33.7 G/DL (ref 31.5–35.7)
MCHC RBC AUTO-ENTMCNC: 33.7 G/DL (ref 31.5–35.7)
MCHC RBC AUTO-ENTMCNC: 33.8 G/DL (ref 31.5–35.7)
MCHC RBC AUTO-ENTMCNC: 33.9 G/DL (ref 31.5–35.7)
MCHC RBC AUTO-ENTMCNC: 34.1 G/DL (ref 31.5–35.7)
MCHC RBC AUTO-ENTMCNC: 34.2 G/DL (ref 31.5–35.7)
MCHC RBC AUTO-ENTMCNC: 34.2 G/DL (ref 31.5–35.7)
MCHC RBC AUTO-ENTMCNC: 34.3 G/DL (ref 31.5–35.7)
MCHC RBC AUTO-ENTMCNC: 35.5 G/DL (ref 31.5–35.7)
MCV RBC AUTO: 101 FL (ref 79–97)
MCV RBC AUTO: 92.4 FL (ref 79–97)
MCV RBC AUTO: 93.7 FL (ref 79–97)
MCV RBC AUTO: 94 FL (ref 79–97)
MCV RBC AUTO: 94.5 FL (ref 79–97)
MCV RBC AUTO: 94.5 FL (ref 79–97)
MCV RBC AUTO: 94.7 FL (ref 79–97)
MCV RBC AUTO: 94.8 FL (ref 79–97)
MCV RBC AUTO: 95.9 FL (ref 79–97)
MCV RBC AUTO: 96.2 FL (ref 79–97)
MCV RBC AUTO: 96.5 FL (ref 79–97)
MCV RBC AUTO: 96.8 FL (ref 79–97)
MCV RBC AUTO: 97.1 FL (ref 79–97)
MCV RBC AUTO: 97.2 FL (ref 79–97)
MCV RBC AUTO: 97.2 FL (ref 79–97)
MCV RBC AUTO: 97.6 FL (ref 79–97)
MCV RBC AUTO: 97.9 FL (ref 79–97)
MCV RBC AUTO: 98 FL (ref 79–97)
MCV RBC AUTO: 98.2 FL (ref 79–97)
MCV RBC AUTO: 98.4 FL (ref 79–97)
MCV RBC AUTO: 98.6 FL (ref 79–97)
MCV RBC AUTO: 98.9 FL (ref 79–97)
MCV RBC AUTO: 99.3 FL (ref 79–97)
MCV RBC AUTO: 99.3 FL (ref 79–97)
MONOCYTES # BLD AUTO: 0.09 10*3/MM3 (ref 0.1–0.9)
MONOCYTES # BLD AUTO: 0.1 10*3/MM3 (ref 0.1–0.9)
MONOCYTES # BLD AUTO: 0.1 10*3/MM3 (ref 0.1–0.9)
MONOCYTES # BLD AUTO: 0.12 10*3/MM3 (ref 0.1–0.9)
MONOCYTES # BLD AUTO: 0.16 10*3/MM3 (ref 0.1–0.9)
MONOCYTES # BLD AUTO: 0.18 10*3/MM3 (ref 0.1–0.9)
MONOCYTES # BLD AUTO: 0.18 10*3/MM3 (ref 0.1–0.9)
MONOCYTES # BLD AUTO: 0.19 10*3/MM3 (ref 0.1–0.9)
MONOCYTES # BLD AUTO: 0.2 10*3/MM3 (ref 0.1–0.9)
MONOCYTES # BLD AUTO: 0.2 10*3/MM3 (ref 0.1–0.9)
MONOCYTES # BLD AUTO: 0.23 10*3/MM3 (ref 0.1–0.9)
MONOCYTES # BLD AUTO: 0.23 10*3/MM3 (ref 0.1–0.9)
MONOCYTES # BLD AUTO: 0.27 10*3/MM3 (ref 0.1–0.9)
MONOCYTES # BLD AUTO: 0.3 10*3/MM3 (ref 0.1–0.9)
MONOCYTES # BLD AUTO: 0.3 10*3/MM3 (ref 0.1–0.9)
MONOCYTES # BLD AUTO: 0.36 10*3/MM3 (ref 0.1–0.9)
MONOCYTES # BLD AUTO: 0.36 10*3/MM3 (ref 0.1–0.9)
MONOCYTES # BLD AUTO: 0.38 10*3/MM3 (ref 0.1–0.9)
MONOCYTES # BLD AUTO: 0.39 10*3/MM3 (ref 0.1–0.9)
MONOCYTES # BLD AUTO: 0.4 10*3/MM3 (ref 0.1–0.9)
MONOCYTES # BLD AUTO: 0.41 10*3/MM3 (ref 0.1–0.9)
MONOCYTES # BLD AUTO: 0.48 10*3/MM3 (ref 0.1–0.9)
MONOCYTES NFR BLD AUTO: 11 % (ref 5–12)
MONOCYTES NFR BLD AUTO: 11.5 % (ref 5–12)
MONOCYTES NFR BLD AUTO: 11.6 % (ref 5–12)
MONOCYTES NFR BLD AUTO: 11.7 % (ref 5–12)
MONOCYTES NFR BLD AUTO: 12.1 % (ref 5–12)
MONOCYTES NFR BLD AUTO: 13.3 % (ref 5–12)
MONOCYTES NFR BLD AUTO: 3.2 % (ref 5–12)
MONOCYTES NFR BLD AUTO: 4 % (ref 5–12)
MONOCYTES NFR BLD AUTO: 4.3 % (ref 5–12)
MONOCYTES NFR BLD AUTO: 4.4 % (ref 5–12)
MONOCYTES NFR BLD AUTO: 4.6 % (ref 5–12)
MONOCYTES NFR BLD AUTO: 4.8 % (ref 5–12)
MONOCYTES NFR BLD AUTO: 5.1 % (ref 5–12)
MONOCYTES NFR BLD AUTO: 5.1 % (ref 5–12)
MONOCYTES NFR BLD AUTO: 5.3 % (ref 5–12)
MONOCYTES NFR BLD AUTO: 5.4 % (ref 5–12)
MONOCYTES NFR BLD AUTO: 6.3 % (ref 5–12)
MONOCYTES NFR BLD AUTO: 6.4 % (ref 5–12)
MONOCYTES NFR BLD AUTO: 7.5 % (ref 5–12)
MONOCYTES NFR BLD AUTO: 7.5 % (ref 5–12)
MONOCYTES NFR BLD AUTO: 8.2 % (ref 5–12)
MONOCYTES NFR BLD AUTO: 8.2 % (ref 5–12)
MONOCYTES NFR BLD AUTO: 9 % (ref 5–12)
MONOCYTES NFR BLD AUTO: 9.5 % (ref 5–12)
NEUTROPHILS # BLD AUTO: 0.64 10*3/MM3 (ref 1.7–7)
NEUTROPHILS # BLD AUTO: 0.89 10*3/MM3 (ref 1.7–7)
NEUTROPHILS # BLD AUTO: 1.14 10*3/MM3 (ref 1.7–7)
NEUTROPHILS # BLD AUTO: 1.28 10*3/MM3 (ref 1.7–7)
NEUTROPHILS # BLD AUTO: 1.85 10*3/MM3 (ref 1.7–7)
NEUTROPHILS # BLD AUTO: 2.02 10*3/MM3 (ref 1.7–7)
NEUTROPHILS # BLD AUTO: 2.09 10*3/MM3 (ref 1.7–7)
NEUTROPHILS # BLD AUTO: 2.13 10*3/MM3 (ref 1.7–7)
NEUTROPHILS # BLD AUTO: 2.17 10*3/MM3 (ref 1.7–7)
NEUTROPHILS # BLD AUTO: 2.22 10*3/MM3 (ref 1.7–7)
NEUTROPHILS # BLD AUTO: 2.5 10*3/MM3 (ref 1.7–7)
NEUTROPHILS # BLD AUTO: 2.72 10*3/MM3 (ref 1.7–7)
NEUTROPHILS # BLD AUTO: 2.79 10*3/MM3 (ref 1.7–7)
NEUTROPHILS # BLD AUTO: 2.82 10*3/MM3 (ref 1.7–7)
NEUTROPHILS # BLD AUTO: 6.41 10*3/MM3 (ref 1.7–7)
NEUTROPHILS NFR BLD AUTO: 1.27 10*3/MM3 (ref 1.7–7)
NEUTROPHILS NFR BLD AUTO: 1.39 10*3/MM3 (ref 1.7–7)
NEUTROPHILS NFR BLD AUTO: 1.4 10*3/MM3 (ref 1.7–7)
NEUTROPHILS NFR BLD AUTO: 1.86 10*3/MM3 (ref 1.7–7)
NEUTROPHILS NFR BLD AUTO: 2.22 10*3/MM3 (ref 1.7–7)
NEUTROPHILS NFR BLD AUTO: 2.29 10*3/MM3 (ref 1.7–7)
NEUTROPHILS NFR BLD AUTO: 2.49 10*3/MM3 (ref 1.7–7)
NEUTROPHILS NFR BLD AUTO: 3.05 10*3/MM3 (ref 1.7–7)
NEUTROPHILS NFR BLD AUTO: 3.23 10*3/MM3 (ref 1.7–7)
NEUTROPHILS NFR BLD AUTO: 44.7 % (ref 42.7–76)
NEUTROPHILS NFR BLD AUTO: 45.4 % (ref 42.7–76)
NEUTROPHILS NFR BLD AUTO: 52.2 % (ref 42.7–76)
NEUTROPHILS NFR BLD AUTO: 54.1 % (ref 42.7–76)
NEUTROPHILS NFR BLD AUTO: 54.8 % (ref 42.7–76)
NEUTROPHILS NFR BLD AUTO: 55.4 % (ref 42.7–76)
NEUTROPHILS NFR BLD AUTO: 56.3 % (ref 42.7–76)
NEUTROPHILS NFR BLD AUTO: 56.7 % (ref 42.7–76)
NEUTROPHILS NFR BLD AUTO: 58.8 % (ref 42.7–76)
NEUTROPHILS NFR BLD AUTO: 59.7 % (ref 42.7–76)
NEUTROPHILS NFR BLD AUTO: 61.1 % (ref 42.7–76)
NEUTROPHILS NFR BLD AUTO: 61.2 % (ref 42.7–76)
NEUTROPHILS NFR BLD AUTO: 61.6 % (ref 42.7–76)
NEUTROPHILS NFR BLD AUTO: 63.3 % (ref 42.7–76)
NEUTROPHILS NFR BLD AUTO: 63.8 % (ref 42.7–76)
NEUTROPHILS NFR BLD AUTO: 64.6 % (ref 42.7–76)
NEUTROPHILS NFR BLD AUTO: 69.1 % (ref 42.7–76)
NEUTROPHILS NFR BLD AUTO: 69.3 % (ref 42.7–76)
NEUTROPHILS NFR BLD AUTO: 69.4 % (ref 42.7–76)
NEUTROPHILS NFR BLD AUTO: 69.5 % (ref 42.7–76)
NEUTROPHILS NFR BLD AUTO: 70.3 % (ref 42.7–76)
NEUTROPHILS NFR BLD AUTO: 71.6 % (ref 42.7–76)
NEUTROPHILS NFR BLD AUTO: 71.7 % (ref 42.7–76)
NEUTROPHILS NFR BLD AUTO: 88.2 % (ref 42.7–76)
NITRITE UR QL STRIP: NEGATIVE
NRBC BLD AUTO-RTO: 0 /100 WBC (ref 0–0.2)
PH UR STRIP.AUTO: 5.5 [PH] (ref 5–8)
PLAT MORPH BLD: NORMAL
PLATELET # BLD AUTO: 108 10*3/MM3 (ref 140–450)
PLATELET # BLD AUTO: 108 10*3/MM3 (ref 140–450)
PLATELET # BLD AUTO: 109 10*3/MM3 (ref 140–450)
PLATELET # BLD AUTO: 110 10*3/MM3 (ref 140–450)
PLATELET # BLD AUTO: 111 10*3/MM3 (ref 140–450)
PLATELET # BLD AUTO: 116 10*3/MM3 (ref 140–450)
PLATELET # BLD AUTO: 118 10*3/MM3 (ref 140–450)
PLATELET # BLD AUTO: 119 10*3/MM3 (ref 140–450)
PLATELET # BLD AUTO: 119 10*3/MM3 (ref 140–450)
PLATELET # BLD AUTO: 120 10*3/MM3 (ref 140–450)
PLATELET # BLD AUTO: 120 10*3/MM3 (ref 140–450)
PLATELET # BLD AUTO: 122 10*3/MM3 (ref 140–450)
PLATELET # BLD AUTO: 123 10*3/MM3 (ref 140–450)
PLATELET # BLD AUTO: 129 10*3/MM3 (ref 140–450)
PLATELET # BLD AUTO: 130 10*3/MM3 (ref 140–450)
PLATELET # BLD AUTO: 131 10*3/MM3 (ref 140–450)
PLATELET # BLD AUTO: 132 10*3/MM3 (ref 140–450)
PLATELET # BLD AUTO: 139 10*3/MM3 (ref 140–450)
PLATELET # BLD AUTO: 140 10*3/MM3 (ref 140–450)
PLATELET # BLD AUTO: 140 10*3/MM3 (ref 140–450)
PLATELET # BLD AUTO: 142 10*3/MM3 (ref 140–450)
PLATELET # BLD AUTO: 66 10*3/MM3 (ref 140–450)
PMV BLD AUTO: 10.1 FL (ref 6–12)
PMV BLD AUTO: 10.2 FL (ref 6–12)
PMV BLD AUTO: 9.1 FL (ref 6–12)
PMV BLD AUTO: 9.2 FL (ref 6–12)
PMV BLD AUTO: 9.2 FL (ref 6–12)
PMV BLD AUTO: 9.3 FL (ref 6–12)
PMV BLD AUTO: 9.4 FL (ref 6–12)
PMV BLD AUTO: 9.5 FL (ref 6–12)
PMV BLD AUTO: 9.6 FL (ref 6–12)
PMV BLD AUTO: 9.7 FL (ref 6–12)
PMV BLD AUTO: 9.8 FL (ref 6–12)
PMV BLD AUTO: 9.9 FL (ref 6–12)
POTASSIUM BLD-SCNC: 3.3 MMOL/L (ref 3.5–5.2)
POTASSIUM BLD-SCNC: 3.4 MMOL/L (ref 3.5–5.2)
POTASSIUM BLD-SCNC: 3.5 MMOL/L (ref 3.5–5.2)
POTASSIUM BLD-SCNC: 3.6 MMOL/L (ref 3.5–5.2)
POTASSIUM BLD-SCNC: 3.7 MMOL/L (ref 3.5–5.2)
POTASSIUM BLD-SCNC: 3.7 MMOL/L (ref 3.5–5.2)
POTASSIUM BLD-SCNC: 4 MMOL/L (ref 3.5–5.2)
POTASSIUM SERPL-SCNC: 3.2 MMOL/L (ref 3.5–5.2)
POTASSIUM SERPL-SCNC: 3.5 MMOL/L (ref 3.5–5.2)
POTASSIUM SERPL-SCNC: 3.7 MMOL/L (ref 3.5–5.2)
PROT SERPL-MCNC: 6.3 G/DL (ref 6–8.5)
PROT SERPL-MCNC: 6.4 G/DL (ref 6–8.5)
PROT SERPL-MCNC: 6.5 G/DL (ref 6–8.5)
PROT SERPL-MCNC: 6.6 G/DL (ref 6–8.5)
PROT SERPL-MCNC: 6.6 G/DL (ref 6–8.5)
PROT SERPL-MCNC: 6.9 G/DL (ref 6–8.5)
PROT SERPL-MCNC: 6.9 G/DL (ref 6–8.5)
PROT SERPL-MCNC: 7.1 G/DL (ref 6–8.5)
PROT SERPL-MCNC: 7.2 G/DL (ref 6–8.5)
PROT SERPL-MCNC: 7.4 G/DL (ref 6–8.5)
PROT UR QL STRIP: NEGATIVE
RBC # BLD AUTO: 2.73 10*6/MM3 (ref 3.77–5.28)
RBC # BLD AUTO: 2.84 10*6/MM3 (ref 3.77–5.28)
RBC # BLD AUTO: 2.85 10*6/MM3 (ref 3.77–5.28)
RBC # BLD AUTO: 2.86 10*6/MM3 (ref 3.77–5.28)
RBC # BLD AUTO: 2.87 10*6/MM3 (ref 3.77–5.28)
RBC # BLD AUTO: 2.88 10*6/MM3 (ref 3.77–5.28)
RBC # BLD AUTO: 2.88 10*6/MM3 (ref 3.77–5.28)
RBC # BLD AUTO: 2.89 10*6/MM3 (ref 3.77–5.28)
RBC # BLD AUTO: 2.89 10*6/MM3 (ref 3.77–5.28)
RBC # BLD AUTO: 2.93 10*6/MM3 (ref 3.77–5.28)
RBC # BLD AUTO: 2.94 10*6/MM3 (ref 3.77–5.28)
RBC # BLD AUTO: 2.95 10*6/MM3 (ref 3.77–5.28)
RBC # BLD AUTO: 3.02 10*6/MM3 (ref 3.77–5.28)
RBC # BLD AUTO: 3.03 10*6/MM3 (ref 3.77–5.28)
RBC # BLD AUTO: 3.07 10*6/MM3 (ref 3.77–5.28)
RBC # BLD AUTO: 3.1 10*6/MM3 (ref 3.77–5.28)
RBC # BLD AUTO: 3.11 10*6/MM3 (ref 3.77–5.28)
RBC # BLD AUTO: 3.13 10*6/MM3 (ref 3.77–5.28)
RBC # BLD AUTO: 3.2 10*6/MM3 (ref 3.77–5.28)
RBC # BLD AUTO: 3.26 10*6/MM3 (ref 3.77–5.28)
RBC # BLD AUTO: 3.38 10*6/MM3 (ref 3.77–5.28)
RBC # UR: ABNORMAL /HPF
RBC MORPH BLD: NORMAL
REF LAB TEST METHOD: ABNORMAL
SMALL PLATELETS BLD QL SMEAR: NORMAL
SMALL PLATELETS BLD QL SMEAR: NORMAL
SODIUM BLD-SCNC: 135 MMOL/L (ref 136–145)
SODIUM BLD-SCNC: 139 MMOL/L (ref 136–145)
SODIUM BLD-SCNC: 139 MMOL/L (ref 136–145)
SODIUM BLD-SCNC: 140 MMOL/L (ref 136–145)
SODIUM BLD-SCNC: 141 MMOL/L (ref 136–145)
SODIUM SERPL-SCNC: 139 MMOL/L (ref 136–145)
SODIUM SERPL-SCNC: 139 MMOL/L (ref 136–145)
SODIUM SERPL-SCNC: 140 MMOL/L (ref 136–145)
SP GR UR STRIP: 1.02 (ref 1–1.03)
SQUAMOUS #/AREA URNS HPF: ABNORMAL /HPF
T4 FREE SERPL-MCNC: 1.15 NG/DL (ref 0.93–1.7)
T4 FREE SERPL-MCNC: 1.36 NG/DL (ref 0.93–1.7)
TSH SERPL DL<=0.05 MIU/L-ACNC: 1.39 UIU/ML (ref 0.27–4.2)
TSH SERPL DL<=0.05 MIU/L-ACNC: 1.79 UIU/ML (ref 0.27–4.2)
UROBILINOGEN UR QL STRIP: ABNORMAL
WBC # BLD AUTO: 2.34 10*3/MM3 (ref 3.4–10.8)
WBC # BLD AUTO: 2.45 10*3/MM3 (ref 3.4–10.8)
WBC # BLD AUTO: 2.49 10*3/MM3 (ref 3.4–10.8)
WBC # BLD AUTO: 3.1 10*3/MM3 (ref 3.4–10.8)
WBC # BLD AUTO: 3.12 10*3/MM3 (ref 3.4–10.8)
WBC # BLD AUTO: 3.6 10*3/MM3 (ref 3.4–10.8)
WBC # BLD AUTO: 3.61 10*3/MM3 (ref 3.4–10.8)
WBC # BLD AUTO: 4.34 10*3/MM3 (ref 3.4–10.8)
WBC # BLD AUTO: 4.51 10*3/MM3 (ref 3.4–10.8)
WBC MORPH BLD: NORMAL
WBC NRBC COR # BLD: 1.43 10*3/MM3 (ref 3.4–10.8)
WBC NRBC COR # BLD: 1.96 10*3/MM3 (ref 3.4–10.8)
WBC NRBC COR # BLD: 2.18 10*3/MM3 (ref 3.4–10.8)
WBC NRBC COR # BLD: 2.34 10*3/MM3 (ref 3.4–10.8)
WBC NRBC COR # BLD: 3.34 10*3/MM3 (ref 3.4–10.8)
WBC NRBC COR # BLD: 3.34 10*3/MM3 (ref 3.4–10.8)
WBC NRBC COR # BLD: 3.39 10*3/MM3 (ref 3.4–10.8)
WBC NRBC COR # BLD: 3.43 10*3/MM3 (ref 3.4–10.8)
WBC NRBC COR # BLD: 3.44 10*3/MM3 (ref 3.4–10.8)
WBC NRBC COR # BLD: 3.55 10*3/MM3 (ref 3.4–10.8)
WBC NRBC COR # BLD: 3.92 10*3/MM3 (ref 3.4–10.8)
WBC NRBC COR # BLD: 4.02 10*3/MM3 (ref 3.4–10.8)
WBC NRBC COR # BLD: 4.07 10*3/MM3 (ref 3.4–10.8)
WBC NRBC COR # BLD: 4.09 10*3/MM3 (ref 3.4–10.8)
WBC NRBC COR # BLD: 7.27 10*3/MM3 (ref 3.4–10.8)
WBC UR QL AUTO: ABNORMAL /HPF

## 2020-01-01 PROCEDURE — 25010000003 HEPARIN LOCK FLUCH PER 10 UNITS: Performed by: INTERNAL MEDICINE

## 2020-01-01 PROCEDURE — 96375 TX/PRO/DX INJ NEW DRUG ADDON: CPT

## 2020-01-01 PROCEDURE — 25010000003 HEPARIN LOCK FLUSH PER 10 UNITS: Performed by: INTERNAL MEDICINE

## 2020-01-01 PROCEDURE — 99214 OFFICE O/P EST MOD 30 MIN: CPT | Performed by: NURSE PRACTITIONER

## 2020-01-01 PROCEDURE — 74177 CT ABD & PELVIS W/CONTRAST: CPT

## 2020-01-01 PROCEDURE — 25010000002 DEXAMETHASONE PER 1 MG: Performed by: INTERNAL MEDICINE

## 2020-01-01 PROCEDURE — 96376 TX/PRO/DX INJ SAME DRUG ADON: CPT

## 2020-01-01 PROCEDURE — 70553 MRI BRAIN STEM W/O & W/DYE: CPT

## 2020-01-01 PROCEDURE — 96367 TX/PROPH/DG ADDL SEQ IV INF: CPT

## 2020-01-01 PROCEDURE — 80053 COMPREHEN METABOLIC PANEL: CPT

## 2020-01-01 PROCEDURE — 71260 CT THORAX DX C+: CPT

## 2020-01-01 PROCEDURE — 25010000002 PACLITAXEL PER 30 MG: Performed by: INTERNAL MEDICINE

## 2020-01-01 PROCEDURE — 25010000002 DIPHENHYDRAMINE PER 50 MG: Performed by: INTERNAL MEDICINE

## 2020-01-01 PROCEDURE — 77373 STRTCTC BDY RAD THER TX DLVR: CPT | Performed by: RADIOLOGY

## 2020-01-01 PROCEDURE — 36415 COLL VENOUS BLD VENIPUNCTURE: CPT

## 2020-01-01 PROCEDURE — 25010000002 DEXAMETHASONE PER 1 MG: Performed by: NURSE PRACTITIONER

## 2020-01-01 PROCEDURE — 25010000002 PACLITAXEL PER 30 MG: Performed by: NURSE PRACTITIONER

## 2020-01-01 PROCEDURE — 96413 CHEMO IV INFUSION 1 HR: CPT

## 2020-01-01 PROCEDURE — 0 GADOBENATE DIMEGLUMINE 529 MG/ML SOLUTION: Performed by: NURSE PRACTITIONER

## 2020-01-01 PROCEDURE — 99244 OFF/OP CNSLTJ NEW/EST MOD 40: CPT | Performed by: NEUROLOGICAL SURGERY

## 2020-01-01 PROCEDURE — 81001 URINALYSIS AUTO W/SCOPE: CPT

## 2020-01-01 PROCEDURE — 0 GADOBENATE DIMEGLUMINE 529 MG/ML SOLUTION: Performed by: RADIOLOGY

## 2020-01-01 PROCEDURE — 77412 RADIATION TX DELIVERY LVL 3: CPT | Performed by: RADIOLOGY

## 2020-01-01 PROCEDURE — 99214 OFFICE O/P EST MOD 30 MIN: CPT | Performed by: INTERNAL MEDICINE

## 2020-01-01 PROCEDURE — 85007 BL SMEAR W/DIFF WBC COUNT: CPT

## 2020-01-01 PROCEDURE — 85025 COMPLETE CBC W/AUTO DIFF WBC: CPT

## 2020-01-01 PROCEDURE — 36591 DRAW BLOOD OFF VENOUS DEVICE: CPT

## 2020-01-01 PROCEDURE — 25010000003 HEPARIN LOCK FLUCH PER 10 UNITS: Performed by: RADIOLOGY

## 2020-01-01 PROCEDURE — 77334 RADIATION TREATMENT AID(S): CPT | Performed by: RADIOLOGY

## 2020-01-01 PROCEDURE — 77290 THER RAD SIMULAJ FIELD CPLX: CPT | Performed by: RADIOLOGY

## 2020-01-01 PROCEDURE — 25010000002 DIPHENHYDRAMINE PER 50 MG: Performed by: NURSE PRACTITIONER

## 2020-01-01 PROCEDURE — 96366 THER/PROPH/DIAG IV INF ADDON: CPT

## 2020-01-01 PROCEDURE — 25010000002 NIVOLUMAB 100 MG/10ML SOLUTION 10 ML VIAL: Performed by: INTERNAL MEDICINE

## 2020-01-01 PROCEDURE — 25010000002 IPILIMUMAB 50 MG/10ML SOLUTION 10 ML VIAL: Performed by: INTERNAL MEDICINE

## 2020-01-01 PROCEDURE — 96415 CHEMO IV INFUSION ADDL HR: CPT

## 2020-01-01 PROCEDURE — 99213 OFFICE O/P EST LOW 20 MIN: CPT | Performed by: NURSE PRACTITIONER

## 2020-01-01 PROCEDURE — 77295 3-D RADIOTHERAPY PLAN: CPT | Performed by: RADIOLOGY

## 2020-01-01 PROCEDURE — 77336 RADIATION PHYSICS CONSULT: CPT | Performed by: RADIOLOGY

## 2020-01-01 PROCEDURE — 84443 ASSAY THYROID STIM HORMONE: CPT

## 2020-01-01 PROCEDURE — 70491 CT SOFT TISSUE NECK W/DYE: CPT

## 2020-01-01 PROCEDURE — A9577 INJ MULTIHANCE: HCPCS | Performed by: NURSE PRACTITIONER

## 2020-01-01 PROCEDURE — 77300 RADIATION THERAPY DOSE PLAN: CPT | Performed by: RADIOLOGY

## 2020-01-01 PROCEDURE — 96417 CHEMO IV INFUS EACH ADDL SEQ: CPT

## 2020-01-01 PROCEDURE — G0463 HOSPITAL OUTPT CLINIC VISIT: HCPCS

## 2020-01-01 PROCEDURE — 96368 THER/DIAG CONCURRENT INF: CPT

## 2020-01-01 PROCEDURE — 25010000002 IPILIMUMAB 200 MG/40ML SOLUTION 40 ML VIAL: Performed by: INTERNAL MEDICINE

## 2020-01-01 PROCEDURE — A9577 INJ MULTIHANCE: HCPCS | Performed by: RADIOLOGY

## 2020-01-01 PROCEDURE — 25010000002 IPILIMUMAB 200 MG/40ML SOLUTION 40 ML VIAL: Performed by: NURSE PRACTITIONER

## 2020-01-01 PROCEDURE — 85025 COMPLETE CBC W/AUTO DIFF WBC: CPT | Performed by: INTERNAL MEDICINE

## 2020-01-01 PROCEDURE — 72157 MRI CHEST SPINE W/O & W/DYE: CPT

## 2020-01-01 PROCEDURE — 77280 THER RAD SIMULAJ FIELD SMPL: CPT | Performed by: RADIOLOGY

## 2020-01-01 PROCEDURE — 99215 OFFICE O/P EST HI 40 MIN: CPT | Performed by: INTERNAL MEDICINE

## 2020-01-01 PROCEDURE — 25010000002 IPILIMUMAB 50 MG/10ML SOLUTION 10 ML VIAL: Performed by: NURSE PRACTITIONER

## 2020-01-01 PROCEDURE — 72158 MRI LUMBAR SPINE W/O & W/DYE: CPT

## 2020-01-01 PROCEDURE — 99024 POSTOP FOLLOW-UP VISIT: CPT | Performed by: NEUROLOGICAL SURGERY

## 2020-01-01 PROCEDURE — 25010000002 IOPAMIDOL 61 % SOLUTION: Performed by: NURSE PRACTITIONER

## 2020-01-01 PROCEDURE — 77417 THER RADIOLOGY PORT IMAGE(S): CPT | Performed by: RADIOLOGY

## 2020-01-01 PROCEDURE — 80053 COMPREHEN METABOLIC PANEL: CPT | Performed by: INTERNAL MEDICINE

## 2020-01-01 PROCEDURE — 61796 SRS CRANIAL LESION SIMPLE: CPT | Performed by: NEUROLOGICAL SURGERY

## 2020-01-01 PROCEDURE — 84439 ASSAY OF FREE THYROXINE: CPT

## 2020-01-01 PROCEDURE — 61797 SRS CRAN LES SIMPLE ADDL: CPT | Performed by: NEUROLOGICAL SURGERY

## 2020-01-01 PROCEDURE — 25010000003 HEPARIN LOCK FLUSH PER 10 UNITS: Performed by: RADIOLOGY

## 2020-01-01 PROCEDURE — 25010000002 NIVOLUMAB 100 MG/10ML SOLUTION 10 ML VIAL: Performed by: NURSE PRACTITIONER

## 2020-01-01 PROCEDURE — 96523 IRRIG DRUG DELIVERY DEVICE: CPT

## 2020-01-01 PROCEDURE — 25010000003 HEPARIN LOCK FLUSH PER 10 UNITS: Performed by: NURSE PRACTITIONER

## 2020-01-01 RX ORDER — LOSARTAN POTASSIUM 100 MG/1
TABLET ORAL
COMMUNITY
Start: 2020-01-01 | End: 2020-01-01 | Stop reason: ALTCHOICE

## 2020-01-01 RX ORDER — DIPHENHYDRAMINE HYDROCHLORIDE 50 MG/ML
50 INJECTION INTRAMUSCULAR; INTRAVENOUS AS NEEDED
Status: CANCELLED | OUTPATIENT
Start: 2020-01-01

## 2020-01-01 RX ORDER — SODIUM CHLORIDE 9 MG/ML
250 INJECTION, SOLUTION INTRAVENOUS ONCE
Status: DISCONTINUED | OUTPATIENT
Start: 2020-01-01 | End: 2020-01-01 | Stop reason: HOSPADM

## 2020-01-01 RX ORDER — HEPARIN SODIUM (PORCINE) LOCK FLUSH IV SOLN 100 UNIT/ML 100 UNIT/ML
500 SOLUTION INTRAVENOUS AS NEEDED
Status: CANCELLED | OUTPATIENT
Start: 2020-01-01

## 2020-01-01 RX ORDER — SODIUM CHLORIDE 9 MG/ML
250 INJECTION, SOLUTION INTRAVENOUS ONCE
Status: CANCELLED | OUTPATIENT
Start: 2020-01-01

## 2020-01-01 RX ORDER — SODIUM CHLORIDE 0.9 % (FLUSH) 0.9 %
10 SYRINGE (ML) INJECTION AS NEEDED
Status: CANCELLED | OUTPATIENT
Start: 2020-01-01

## 2020-01-01 RX ORDER — MORPHINE SULFATE 20 MG/ML
SOLUTION ORAL
Qty: 60 ML | Refills: 0 | Status: SHIPPED | OUTPATIENT
Start: 2020-01-01

## 2020-01-01 RX ORDER — FAMOTIDINE 10 MG/ML
20 INJECTION, SOLUTION INTRAVENOUS AS NEEDED
Status: CANCELLED | OUTPATIENT
Start: 2020-01-01

## 2020-01-01 RX ORDER — FAMOTIDINE 10 MG/ML
20 INJECTION, SOLUTION INTRAVENOUS ONCE
Status: COMPLETED | OUTPATIENT
Start: 2020-01-01 | End: 2020-01-01

## 2020-01-01 RX ORDER — SODIUM CHLORIDE 0.9 % (FLUSH) 0.9 %
10 SYRINGE (ML) INJECTION AS NEEDED
Status: DISCONTINUED | OUTPATIENT
Start: 2020-01-01 | End: 2020-01-01 | Stop reason: HOSPADM

## 2020-01-01 RX ORDER — HEPARIN SODIUM (PORCINE) LOCK FLUSH IV SOLN 100 UNIT/ML 100 UNIT/ML
500 SOLUTION INTRAVENOUS AS NEEDED
Status: DISCONTINUED | OUTPATIENT
Start: 2020-01-01 | End: 2020-01-01 | Stop reason: HOSPADM

## 2020-01-01 RX ORDER — VALACYCLOVIR HYDROCHLORIDE 1 G/1
1000 TABLET, FILM COATED ORAL 3 TIMES DAILY
Qty: 7 TABLET | Refills: 0 | Status: SHIPPED | OUTPATIENT
Start: 2020-01-01 | End: 2020-01-01

## 2020-01-01 RX ORDER — HYDROCHLOROTHIAZIDE 12.5 MG/1
CAPSULE, GELATIN COATED ORAL
COMMUNITY
Start: 2020-01-01 | End: 2020-01-01

## 2020-01-01 RX ORDER — FAMOTIDINE 10 MG/ML
20 INJECTION, SOLUTION INTRAVENOUS ONCE
Status: CANCELLED | OUTPATIENT
Start: 2020-01-01

## 2020-01-01 RX ORDER — FAMOTIDINE 10 MG/ML
20 INJECTION, SOLUTION INTRAVENOUS AS NEEDED
Status: DISCONTINUED | OUTPATIENT
Start: 2020-01-01 | End: 2020-01-01 | Stop reason: HOSPADM

## 2020-01-01 RX ORDER — HYDROCODONE BITARTRATE AND ACETAMINOPHEN 5; 325 MG/1; MG/1
1 TABLET ORAL EVERY 8 HOURS PRN
Qty: 90 TABLET | Refills: 0 | Status: SHIPPED | OUTPATIENT
Start: 2020-01-01 | End: 2020-01-01 | Stop reason: SDUPTHER

## 2020-01-01 RX ORDER — GABAPENTIN 300 MG/1
300 CAPSULE ORAL
Qty: 30 CAPSULE | Refills: 2 | OUTPATIENT
Start: 2020-01-01

## 2020-01-01 RX ORDER — POTASSIUM CHLORIDE 750 MG/1
20 CAPSULE, EXTENDED RELEASE ORAL DAILY
Qty: 60 CAPSULE | Refills: 2 | Status: SHIPPED | OUTPATIENT
Start: 2020-01-01 | End: 2020-01-01 | Stop reason: ALTCHOICE

## 2020-01-01 RX ORDER — HYDROCHLOROTHIAZIDE 12.5 MG/1
CAPSULE, GELATIN COATED ORAL
COMMUNITY
Start: 2020-01-01

## 2020-01-01 RX ORDER — AZITHROMYCIN 250 MG/1
TABLET, FILM COATED ORAL
Qty: 6 TABLET | Refills: 0 | Status: SHIPPED | OUTPATIENT
Start: 2020-01-01

## 2020-01-01 RX ORDER — AZELASTINE 1 MG/ML
1 SPRAY, METERED NASAL 2 TIMES DAILY PRN
Qty: 1 EACH | Refills: 5 | Status: SHIPPED | OUTPATIENT
Start: 2020-01-01

## 2020-01-01 RX ORDER — NITROFURANTOIN 25; 75 MG/1; MG/1
100 CAPSULE ORAL 2 TIMES DAILY
Qty: 14 CAPSULE | Refills: 0 | Status: SHIPPED | OUTPATIENT
Start: 2020-01-01 | End: 2020-01-01

## 2020-01-01 RX ORDER — HEPARIN SODIUM (PORCINE) LOCK FLUSH IV SOLN 100 UNIT/ML 100 UNIT/ML
500 SOLUTION INTRAVENOUS ONCE
Status: COMPLETED | OUTPATIENT
Start: 2020-01-01 | End: 2020-01-01

## 2020-01-01 RX ORDER — METHYLPREDNISOLONE 4 MG/1
TABLET ORAL
Qty: 1 EACH | Refills: 0 | Status: SHIPPED | OUTPATIENT
Start: 2020-01-01

## 2020-01-01 RX ORDER — POTASSIUM CHLORIDE 750 MG/1
20 TABLET, FILM COATED, EXTENDED RELEASE ORAL DAILY
Qty: 60 TABLET | Refills: 5 | Status: SHIPPED | OUTPATIENT
Start: 2020-01-01

## 2020-01-01 RX ORDER — DIPHENHYDRAMINE HYDROCHLORIDE 50 MG/ML
50 INJECTION INTRAMUSCULAR; INTRAVENOUS AS NEEDED
Status: DISCONTINUED | OUTPATIENT
Start: 2020-01-01 | End: 2020-01-01 | Stop reason: HOSPADM

## 2020-01-01 RX ORDER — POTASSIUM CHLORIDE 750 MG/1
20 CAPSULE, EXTENDED RELEASE ORAL DAILY
Qty: 60 CAPSULE | Refills: 2 | OUTPATIENT
Start: 2020-01-01

## 2020-01-01 RX ORDER — HYDROCODONE BITARTRATE AND ACETAMINOPHEN 5; 325 MG/1; MG/1
1 TABLET ORAL EVERY 8 HOURS PRN
Qty: 90 TABLET | Refills: 0 | Status: SHIPPED | OUTPATIENT
Start: 2020-01-01

## 2020-01-01 RX ORDER — LIDOCAINE AND PRILOCAINE 25; 25 MG/G; MG/G
CREAM TOPICAL AS NEEDED
Qty: 30 G | Refills: 3 | Status: SHIPPED | OUTPATIENT
Start: 2020-01-01

## 2020-01-01 RX ORDER — POTASSIUM CHLORIDE 750 MG/1
20 CAPSULE, EXTENDED RELEASE ORAL DAILY
Qty: 60 CAPSULE | Refills: 2 | Status: SHIPPED | OUTPATIENT
Start: 2020-01-01 | End: 2020-01-01

## 2020-01-01 RX ORDER — GUAIFENESIN 600 MG/1
600 TABLET, EXTENDED RELEASE ORAL 2 TIMES DAILY
Qty: 60 TABLET | Refills: 2 | Status: SHIPPED | OUTPATIENT
Start: 2020-01-01 | End: 2020-01-01

## 2020-01-01 RX ORDER — DEXAMETHASONE 4 MG/1
4 TABLET ORAL
Qty: 30 TABLET | Refills: 0 | Status: SHIPPED | OUTPATIENT
Start: 2020-01-01 | End: 2020-01-01 | Stop reason: SDUPTHER

## 2020-01-01 RX ORDER — DEXAMETHASONE 4 MG/1
4 TABLET ORAL 2 TIMES DAILY
Qty: 60 TABLET | Refills: 2 | Status: SHIPPED | OUTPATIENT
Start: 2020-01-01

## 2020-01-01 RX ORDER — METHYLPREDNISOLONE 4 MG/1
TABLET ORAL
Qty: 21 TABLET | Refills: 0 | Status: SHIPPED | OUTPATIENT
Start: 2020-01-01 | End: 2020-01-01

## 2020-01-01 RX ORDER — LORATADINE 10 MG/1
10 TABLET ORAL DAILY
Qty: 30 TABLET | Refills: 3 | Status: SHIPPED | OUTPATIENT
Start: 2020-01-01

## 2020-01-01 RX ORDER — LOSARTAN POTASSIUM AND HYDROCHLOROTHIAZIDE 12.5; 1 MG/1; MG/1
TABLET ORAL
COMMUNITY
Start: 2020-01-01 | End: 2020-01-01

## 2020-01-01 RX ORDER — TAMOXIFEN CITRATE 20 MG/1
20 TABLET ORAL DAILY
Qty: 90 TABLET | Refills: 3 | Status: SHIPPED | OUTPATIENT
Start: 2020-01-01 | End: 2020-01-01

## 2020-01-01 RX ADMIN — DIPHENHYDRAMINE HYDROCHLORIDE 50 MG: 50 INJECTION INTRAMUSCULAR; INTRAVENOUS at 10:47

## 2020-01-01 RX ADMIN — DIPHENHYDRAMINE HYDROCHLORIDE 25 MG: 50 INJECTION INTRAMUSCULAR; INTRAVENOUS at 09:33

## 2020-01-01 RX ADMIN — PACLITAXEL 145 MG: 6 INJECTION, SOLUTION INTRAVENOUS at 11:24

## 2020-01-01 RX ADMIN — DIPHENHYDRAMINE HYDROCHLORIDE 25 MG: 50 INJECTION INTRAMUSCULAR; INTRAVENOUS at 11:03

## 2020-01-01 RX ADMIN — SODIUM CHLORIDE, PRESERVATIVE FREE 10 ML: 5 INJECTION INTRAVENOUS at 15:05

## 2020-01-01 RX ADMIN — DIPHENHYDRAMINE HYDROCHLORIDE 25 MG: 50 INJECTION INTRAMUSCULAR; INTRAVENOUS at 11:24

## 2020-01-01 RX ADMIN — DEXAMETHASONE SODIUM PHOSPHATE 12 MG: 4 INJECTION, SOLUTION INTRAMUSCULAR; INTRAVENOUS at 10:41

## 2020-01-01 RX ADMIN — SODIUM CHLORIDE, PRESERVATIVE FREE 10 ML: 5 INJECTION INTRAVENOUS at 12:38

## 2020-01-01 RX ADMIN — DEXAMETHASONE SODIUM PHOSPHATE 12 MG: 4 INJECTION, SOLUTION INTRAMUSCULAR; INTRAVENOUS at 09:39

## 2020-01-01 RX ADMIN — Medication 500 UNITS: at 12:28

## 2020-01-01 RX ADMIN — SODIUM CHLORIDE, PRESERVATIVE FREE 10 ML: 5 INJECTION INTRAVENOUS at 12:30

## 2020-01-01 RX ADMIN — SODIUM CHLORIDE, PRESERVATIVE FREE 10 ML: 5 INJECTION INTRAVENOUS at 12:43

## 2020-01-01 RX ADMIN — PACLITAXEL 145 MG: 6 INJECTION, SOLUTION INTRAVENOUS at 11:42

## 2020-01-01 RX ADMIN — FAMOTIDINE 20 MG: 10 INJECTION, SOLUTION INTRAVENOUS at 11:13

## 2020-01-01 RX ADMIN — DIPHENHYDRAMINE HYDROCHLORIDE 25 MG: 50 INJECTION INTRAMUSCULAR; INTRAVENOUS at 10:42

## 2020-01-01 RX ADMIN — SODIUM CHLORIDE, PRESERVATIVE FREE 10 ML: 5 INJECTION INTRAVENOUS at 11:23

## 2020-01-01 RX ADMIN — DEXAMETHASONE SODIUM PHOSPHATE 12 MG: 4 INJECTION, SOLUTION INTRAMUSCULAR; INTRAVENOUS at 10:27

## 2020-01-01 RX ADMIN — SODIUM CHLORIDE, PRESERVATIVE FREE 10 ML: 5 INJECTION INTRAVENOUS at 12:12

## 2020-01-01 RX ADMIN — GADOBENATE DIMEGLUMINE 15 ML: 529 INJECTION, SOLUTION INTRAVENOUS at 15:10

## 2020-01-01 RX ADMIN — DEXAMETHASONE SODIUM PHOSPHATE 12 MG: 4 INJECTION, SOLUTION INTRAMUSCULAR; INTRAVENOUS at 11:07

## 2020-01-01 RX ADMIN — DEXAMETHASONE SODIUM PHOSPHATE 12 MG: 4 INJECTION, SOLUTION INTRAMUSCULAR; INTRAVENOUS at 10:08

## 2020-01-01 RX ADMIN — FAMOTIDINE 20 MG: 10 INJECTION, SOLUTION INTRAVENOUS at 10:59

## 2020-01-01 RX ADMIN — DIPHENHYDRAMINE HYDROCHLORIDE 25 MG: 50 INJECTION INTRAMUSCULAR; INTRAVENOUS at 09:54

## 2020-01-01 RX ADMIN — HEPARIN 500 UNITS: 100 SYRINGE at 11:03

## 2020-01-01 RX ADMIN — FAMOTIDINE 20 MG: 10 INJECTION INTRAVENOUS at 11:02

## 2020-01-01 RX ADMIN — HEPARIN 500 UNITS: 100 SYRINGE at 12:41

## 2020-01-01 RX ADMIN — HEPARIN 500 UNITS: 100 SYRINGE at 14:29

## 2020-01-01 RX ADMIN — DEXAMETHASONE SODIUM PHOSPHATE 12 MG: 4 INJECTION, SOLUTION INTRAMUSCULAR; INTRAVENOUS at 10:47

## 2020-01-01 RX ADMIN — GADOBENATE DIMEGLUMINE 15 ML: 529 INJECTION, SOLUTION INTRAVENOUS at 12:22

## 2020-01-01 RX ADMIN — DEXAMETHASONE SODIUM PHOSPHATE 12 MG: 4 INJECTION, SOLUTION INTRAMUSCULAR; INTRAVENOUS at 10:51

## 2020-01-01 RX ADMIN — DEXAMETHASONE SODIUM PHOSPHATE 12 MG: 4 INJECTION, SOLUTION INTRAMUSCULAR; INTRAVENOUS at 11:26

## 2020-01-01 RX ADMIN — DIPHENHYDRAMINE HYDROCHLORIDE 25 MG: 50 INJECTION INTRAMUSCULAR; INTRAVENOUS at 11:19

## 2020-01-01 RX ADMIN — SODIUM CHLORIDE, PRESERVATIVE FREE 500 UNITS: 5 INJECTION INTRAVENOUS at 09:43

## 2020-01-01 RX ADMIN — HEPARIN 500 UNITS: 100 SYRINGE at 11:54

## 2020-01-01 RX ADMIN — PACLITAXEL 145 MG: 6 INJECTION, SOLUTION INTRAVENOUS at 10:14

## 2020-01-01 RX ADMIN — DIPHENHYDRAMINE HYDROCHLORIDE 25 MG: 50 INJECTION INTRAMUSCULAR; INTRAVENOUS at 10:10

## 2020-01-01 RX ADMIN — HEPARIN 500 UNITS: 100 SYRINGE at 11:09

## 2020-01-01 RX ADMIN — SODIUM CHLORIDE 90 MG: 9 INJECTION, SOLUTION INTRAVENOUS at 12:54

## 2020-01-01 RX ADMIN — SODIUM CHLORIDE, PRESERVATIVE FREE 10 ML: 5 INJECTION INTRAVENOUS at 11:09

## 2020-01-01 RX ADMIN — SODIUM CHLORIDE, PRESERVATIVE FREE 10 ML: 5 INJECTION INTRAVENOUS at 12:41

## 2020-01-01 RX ADMIN — SODIUM CHLORIDE 145 MG: 9 INJECTION, SOLUTION INTRAVENOUS at 11:08

## 2020-01-01 RX ADMIN — FAMOTIDINE 20 MG: 10 INJECTION INTRAVENOUS at 11:20

## 2020-01-01 RX ADMIN — FAMOTIDINE 20 MG: 10 INJECTION, SOLUTION INTRAVENOUS at 10:09

## 2020-01-01 RX ADMIN — FAMOTIDINE 20 MG: 10 INJECTION, SOLUTION INTRAVENOUS at 10:58

## 2020-01-01 RX ADMIN — PACLITAXEL 145 MG: 6 INJECTION, SOLUTION INTRAVENOUS at 11:09

## 2020-01-01 RX ADMIN — DIPHENHYDRAMINE HYDROCHLORIDE 25 MG: 50 INJECTION INTRAMUSCULAR; INTRAVENOUS at 10:48

## 2020-01-01 RX ADMIN — DIPHENHYDRAMINE HYDROCHLORIDE 25 MG: 50 INJECTION INTRAMUSCULAR; INTRAVENOUS at 11:53

## 2020-01-01 RX ADMIN — SODIUM CHLORIDE, PRESERVATIVE FREE 10 ML: 5 INJECTION INTRAVENOUS at 11:03

## 2020-01-01 RX ADMIN — HEPARIN 500 UNITS: 100 SYRINGE at 12:12

## 2020-01-01 RX ADMIN — DIPHENHYDRAMINE HYDROCHLORIDE 25 MG: 50 INJECTION INTRAMUSCULAR; INTRAVENOUS at 09:39

## 2020-01-01 RX ADMIN — HEPARIN 500 UNITS: 100 SYRINGE at 12:16

## 2020-01-01 RX ADMIN — DIPHENHYDRAMINE HYDROCHLORIDE 25 MG: 50 INJECTION INTRAMUSCULAR; INTRAVENOUS at 10:51

## 2020-01-01 RX ADMIN — SODIUM CHLORIDE 90 MG: 9 INJECTION, SOLUTION INTRAVENOUS at 12:27

## 2020-01-01 RX ADMIN — FAMOTIDINE 20 MG: 10 INJECTION INTRAVENOUS at 09:53

## 2020-01-01 RX ADMIN — HEPARIN 500 UNITS: 100 SYRINGE at 12:24

## 2020-01-01 RX ADMIN — SODIUM CHLORIDE, PRESERVATIVE FREE 10 ML: 5 INJECTION INTRAVENOUS at 13:12

## 2020-01-01 RX ADMIN — DEXAMETHASONE SODIUM PHOSPHATE 12 MG: 4 INJECTION, SOLUTION INTRAMUSCULAR; INTRAVENOUS at 11:18

## 2020-01-01 RX ADMIN — HEPARIN 500 UNITS: 100 SYRINGE at 12:47

## 2020-01-01 RX ADMIN — FAMOTIDINE 20 MG: 10 INJECTION, SOLUTION INTRAVENOUS at 10:45

## 2020-01-01 RX ADMIN — SODIUM CHLORIDE 250 MG: 9 INJECTION, SOLUTION INTRAVENOUS at 12:58

## 2020-01-01 RX ADMIN — IOPAMIDOL 100 ML: 612 INJECTION, SOLUTION INTRAVENOUS at 13:03

## 2020-01-01 RX ADMIN — GADOBENATE DIMEGLUMINE 15 ML: 529 INJECTION, SOLUTION INTRAVENOUS at 11:41

## 2020-01-01 RX ADMIN — DIPHENHYDRAMINE HYDROCHLORIDE 25 MG: 50 INJECTION INTRAMUSCULAR; INTRAVENOUS at 11:08

## 2020-01-01 RX ADMIN — IOPAMIDOL 100 ML: 612 INJECTION, SOLUTION INTRAVENOUS at 10:35

## 2020-01-01 RX ADMIN — DIPHENHYDRAMINE HYDROCHLORIDE 25 MG: 50 INJECTION INTRAMUSCULAR; INTRAVENOUS at 11:13

## 2020-01-01 RX ADMIN — DEXAMETHASONE SODIUM PHOSPHATE 12 MG: 4 INJECTION, SOLUTION INTRAMUSCULAR; INTRAVENOUS at 10:57

## 2020-01-01 RX ADMIN — SODIUM CHLORIDE, PRESERVATIVE FREE 10 ML: 5 INJECTION INTRAVENOUS at 12:42

## 2020-01-01 RX ADMIN — DIPHENHYDRAMINE HYDROCHLORIDE 25 MG: 50 INJECTION INTRAMUSCULAR; INTRAVENOUS at 10:59

## 2020-01-01 RX ADMIN — SODIUM CHLORIDE, PRESERVATIVE FREE 10 ML: 5 INJECTION INTRAVENOUS at 12:47

## 2020-01-01 RX ADMIN — HEPARIN 500 UNITS: 100 SYRINGE at 12:06

## 2020-01-01 RX ADMIN — SODIUM CHLORIDE, PRESERVATIVE FREE 10 ML: 5 INJECTION INTRAVENOUS at 11:27

## 2020-01-01 RX ADMIN — PACLITAXEL 145 MG: 6 INJECTION, SOLUTION INTRAVENOUS at 11:44

## 2020-01-01 RX ADMIN — SODIUM CHLORIDE, PRESERVATIVE FREE 10 ML: 5 INJECTION INTRAVENOUS at 12:28

## 2020-01-01 RX ADMIN — FAMOTIDINE 20 MG: 10 INJECTION, SOLUTION INTRAVENOUS at 10:57

## 2020-01-01 RX ADMIN — PACLITAXEL 145 MG: 6 INJECTION, SOLUTION INTRAVENOUS at 10:24

## 2020-01-01 RX ADMIN — DIPHENHYDRAMINE HYDROCHLORIDE 25 MG: 50 INJECTION INTRAMUSCULAR; INTRAVENOUS at 11:15

## 2020-01-01 RX ADMIN — HEPARIN 500 UNITS: 100 SYRINGE at 11:27

## 2020-01-01 RX ADMIN — PACLITAXEL 115 MG: 6 INJECTION, SOLUTION INTRAVENOUS at 11:35

## 2020-01-01 RX ADMIN — FAMOTIDINE 20 MG: 10 INJECTION INTRAVENOUS at 11:34

## 2020-01-01 RX ADMIN — FAMOTIDINE 20 MG: 10 INJECTION INTRAVENOUS at 10:51

## 2020-01-01 RX ADMIN — PACLITAXEL 145 MG: 6 INJECTION, SOLUTION INTRAVENOUS at 11:34

## 2020-01-01 RX ADMIN — DEXAMETHASONE SODIUM PHOSPHATE 12 MG: 4 INJECTION, SOLUTION INTRAMUSCULAR; INTRAVENOUS at 11:15

## 2020-01-01 RX ADMIN — SODIUM CHLORIDE, PRESERVATIVE FREE 500 UNITS: 5 INJECTION INTRAVENOUS at 15:02

## 2020-01-01 RX ADMIN — IOPAMIDOL 50 ML: 612 INJECTION, SOLUTION INTRAVENOUS at 12:03

## 2020-01-01 RX ADMIN — HEPARIN 500 UNITS: 100 SYRINGE at 12:48

## 2020-01-01 RX ADMIN — Medication 500 UNITS: at 13:24

## 2020-01-01 RX ADMIN — PACLITAXEL 145 MG: 6 INJECTION, SOLUTION INTRAVENOUS at 10:03

## 2020-01-01 RX ADMIN — BARIUM SULFATE 900 ML: 21 SUSPENSION ORAL at 13:03

## 2020-01-01 RX ADMIN — HEPARIN 500 UNITS: 100 SYRINGE at 12:26

## 2020-01-01 RX ADMIN — SODIUM CHLORIDE, PRESERVATIVE FREE 10 ML: 5 INJECTION INTRAVENOUS at 12:48

## 2020-01-01 RX ADMIN — DEXAMETHASONE SODIUM PHOSPHATE 12 MG: 4 INJECTION, SOLUTION INTRAMUSCULAR; INTRAVENOUS at 09:52

## 2020-01-01 RX ADMIN — FAMOTIDINE 20 MG: 10 INJECTION INTRAVENOUS at 10:42

## 2020-01-01 RX ADMIN — DEXAMETHASONE SODIUM PHOSPHATE 12 MG: 4 INJECTION, SOLUTION INTRAMUSCULAR; INTRAVENOUS at 11:00

## 2020-01-01 RX ADMIN — Medication 500 UNITS: at 10:45

## 2020-01-01 RX ADMIN — DIPHENHYDRAMINE HYDROCHLORIDE 25 MG: 50 INJECTION INTRAMUSCULAR; INTRAVENOUS at 10:50

## 2020-01-01 RX ADMIN — GADOBENATE DIMEGLUMINE 10 ML: 529 INJECTION, SOLUTION INTRAVENOUS at 09:40

## 2020-01-01 RX ADMIN — DEXAMETHASONE SODIUM PHOSPHATE 12 MG: 4 INJECTION, SOLUTION INTRAMUSCULAR; INTRAVENOUS at 11:34

## 2020-01-01 RX ADMIN — FAMOTIDINE 20 MG: 10 INJECTION INTRAVENOUS at 10:07

## 2020-01-01 RX ADMIN — PACLITAXEL 145 MG: 6 INJECTION, SOLUTION INTRAVENOUS at 11:22

## 2020-01-01 RX ADMIN — HEPARIN 500 UNITS: 100 SYRINGE at 12:28

## 2020-01-01 RX ADMIN — HEPARIN 500 UNITS: 100 SYRINGE at 09:38

## 2020-01-01 RX ADMIN — HEPARIN 500 UNITS: 100 SYRINGE at 12:43

## 2020-01-01 RX ADMIN — HEPARIN 500 UNITS: 100 SYRINGE at 11:47

## 2020-01-01 RX ADMIN — PACLITAXEL 145 MG: 6 INJECTION, SOLUTION INTRAVENOUS at 11:41

## 2020-01-01 RX ADMIN — HEPARIN 500 UNITS: 100 SYRINGE at 15:05

## 2020-01-01 RX ADMIN — HEPARIN 500 UNITS: 100 SYRINGE at 11:23

## 2020-01-01 RX ADMIN — SODIUM CHLORIDE, PRESERVATIVE FREE 10 ML: 5 INJECTION INTRAVENOUS at 11:47

## 2020-01-01 RX ADMIN — IOPAMIDOL 50 ML: 612 INJECTION, SOLUTION INTRAVENOUS at 13:03

## 2020-01-01 RX ADMIN — PACLITAXEL 145 MG: 6 INJECTION, SOLUTION INTRAVENOUS at 12:09

## 2020-01-01 RX ADMIN — HEPARIN 500 UNITS: 100 SYRINGE at 12:50

## 2020-01-01 RX ADMIN — HEPARIN 500 UNITS: 100 SYRINGE at 13:12

## 2020-01-01 RX ADMIN — DIPHENHYDRAMINE HYDROCHLORIDE 25 MG: 50 INJECTION INTRAMUSCULAR; INTRAVENOUS at 11:09

## 2020-01-01 RX ADMIN — PACLITAXEL 145 MG: 6 INJECTION, SOLUTION INTRAVENOUS at 11:14

## 2020-01-01 RX ADMIN — PACLITAXEL 145 MG: 6 INJECTION, SOLUTION INTRAVENOUS at 10:53

## 2020-01-01 RX ADMIN — SODIUM CHLORIDE 250 MG: 9 INJECTION, SOLUTION INTRAVENOUS at 13:36

## 2020-01-01 RX ADMIN — GADOBENATE DIMEGLUMINE 15 ML: 529 INJECTION, SOLUTION INTRAVENOUS at 15:31

## 2020-01-01 RX ADMIN — PACLITAXEL 115 MG: 6 INJECTION, SOLUTION INTRAVENOUS at 11:23

## 2020-01-01 RX ADMIN — IOPAMIDOL 50 ML: 612 INJECTION, SOLUTION INTRAVENOUS at 10:35

## 2020-01-01 RX ADMIN — FAMOTIDINE 20 MG: 10 INJECTION, SOLUTION INTRAVENOUS at 10:48

## 2020-01-01 RX ADMIN — DEXAMETHASONE SODIUM PHOSPHATE 12 MG: 4 INJECTION, SOLUTION INTRAMUSCULAR; INTRAVENOUS at 11:23

## 2020-01-01 RX ADMIN — HEPARIN 500 UNITS: 100 SYRINGE at 12:30

## 2020-01-01 RX ADMIN — HEPARIN 500 UNITS: 100 SYRINGE at 12:38

## 2020-01-01 RX ADMIN — SODIUM CHLORIDE, PRESERVATIVE FREE 10 ML: 5 INJECTION INTRAVENOUS at 14:28

## 2020-01-01 RX ADMIN — DEXAMETHASONE SODIUM PHOSPHATE 12 MG: 4 INJECTION, SOLUTION INTRAMUSCULAR; INTRAVENOUS at 10:10

## 2020-01-01 RX ADMIN — DEXAMETHASONE SODIUM PHOSPHATE 12 MG: 4 INJECTION, SOLUTION INTRAMUSCULAR; INTRAVENOUS at 11:14

## 2020-01-01 RX ADMIN — DEXAMETHASONE SODIUM PHOSPHATE 12 MG: 4 INJECTION, SOLUTION INTRAMUSCULAR; INTRAVENOUS at 11:25

## 2020-01-01 RX ADMIN — FAMOTIDINE 20 MG: 10 INJECTION, SOLUTION INTRAVENOUS at 09:35

## 2020-01-01 RX ADMIN — PACLITAXEL 145 MG: 6 INJECTION, SOLUTION INTRAVENOUS at 10:44

## 2020-01-01 RX ADMIN — FAMOTIDINE 20 MG: 10 INJECTION INTRAVENOUS at 09:33

## 2020-01-01 RX ADMIN — DIPHENHYDRAMINE HYDROCHLORIDE 25 MG: 50 INJECTION INTRAMUSCULAR; INTRAVENOUS at 11:00

## 2020-01-01 RX ADMIN — FAMOTIDINE 20 MG: 10 INJECTION INTRAVENOUS at 10:50

## 2020-01-01 RX ADMIN — FAMOTIDINE 20 MG: 10 INJECTION, SOLUTION INTRAVENOUS at 11:10

## 2020-01-01 RX ADMIN — IOPAMIDOL 100 ML: 612 INJECTION, SOLUTION INTRAVENOUS at 12:03

## 2020-01-01 NOTE — PROGRESS NOTES
12/31/19    Radiation oncology status check    Ms. Horton was admitted to the hospital with symptomatic large bilateral brain metastases.  She is currently undergoing whole brain radiation of 30 Whitt in 10 fractions.  She has received the first 2 treatments and is tolerating treatment with no difficulty.  She has no new complaints today.  We will continue treatment as planned.  She can undergo therapy on an outpatient basis months after discharge from the hospital.

## 2020-01-01 NOTE — OUTREACH NOTE
Prep Survey      Responses   Facility patient discharged from?  [place]   Is patient eligible?  Yes   Discharge diagnosis  extensive stage primary sm cell carcinoma of lung w/ met to brain, HTN, DM2, CHRISTAL   Does the patient have one of the following disease processes/diagnoses(primary or secondary)?  Other   Does the patient have Home health ordered?  No   Is there a DME ordered?  No   Prep survey completed?  Yes          Dorie Garza RN

## 2020-01-04 NOTE — OUTREACH NOTE
Medical Week 1 Survey      Responses   Facility patient discharged from?  [place]   Does the patient have one of the following disease processes/diagnoses(primary or secondary)?  Other   Is there a successful TCM telephone encounter documented?  No   Week 1 attempt successful?  Yes   Call start time  1151   Call end time  1153   Discharge diagnosis  extensive stage primary sm cell carcinoma of lung w/ met to brain, HTN, DM2, CHRISTAL   Is patient permission given to speak with other caregiver?  Yes   Person spoke with today (if not patient) and relationship  Daughter   Meds reviewed with patient/caregiver?  Yes   Is the patient having any side effects they believe may be caused by any medication additions or changes?  No   Does the patient have all medications ordered at discharge?  Yes   Is the patient taking all medications as directed (includes completed medication regime)?  Yes   Does the patient have a primary care provider?   Yes   Does the patient have an appointment with their PCP within 7 days of discharge?  Yes   Has the patient kept scheduled appointments due by today?  Yes   Has home health visited the patient within 72 hours of discharge?  N/A   Psychosocial issues?  No   Did the patient receive a copy of their discharge instructions?  Yes   Nursing interventions  Reviewed instructions with patient   What is the patient's perception of their health status since discharge?  Improving   Is the patient/caregiver able to teach back signs and symptoms related to disease process for when to call PCP?  Yes   Is the patient/caregiver able to teach back signs and symptoms related to disease process for when to call 911?  Yes   Is the patient/caregiver able to teach back the hierarchy of who to call/visit for symptoms/problems? PCP, Specialist, Home health nurse, Urgent Care, ED, 911  Yes   Week 1 call completed?  Yes          Blaze Nguyen RN

## 2020-01-07 NOTE — PROGRESS NOTES
ONC Nutrition    Diagnosis:  Symptomatic, large, bilateral brain metastases in a patient currently undergoing treatment for small cell lung cancer  Chemotherapy:  Chemotherapy treatment plan currently on hold  Radiation:  Palliative whole brain radiation / 10 fractions    Weight 188.5 lbs / 10 lbs weight changed noted    Patient and  confirm that appetite is much better and she is eating well following discharge from the hospital.  She is experiencing taste changes; provided patient education for managing and encouraged use of the baking soda, salt and water mouth rinses; suggested foods that patient may find appealing.    Patient's  states that he is very pleased with how well patient is doing post discharge.

## 2020-01-08 NOTE — TELEPHONE ENCOUNTER
She should follow up next week from Dr. Son's last note during hospital stay.     Thanks,   Yumiko

## 2020-01-13 NOTE — OUTREACH NOTE
Medical Week 2 Survey      Responses   Facility patient discharged from?  [place]   Does the patient have one of the following disease processes/diagnoses(primary or secondary)?  Other   Week 2 attempt successful?  Yes   Call start time  1642   Discharge diagnosis  extensive stage primary sm cell carcinoma of lung w/ met to brain, HTN, DM2, CHRISTAL   Call end time  1644   Meds reviewed with patient/caregiver?  Yes   Is the patient having any side effects they believe may be caused by any medication additions or changes?  No   Does the patient have all medications ordered at discharge?  Yes   Is the patient taking all medications as directed (includes completed medication regime)?  Yes   Does the patient have a primary care provider?   Yes   Does the patient have an appointment with their PCP within 7 days of discharge?  Yes   Has the patient kept scheduled appointments due by today?  Yes   Has home health visited the patient within 72 hours of discharge?  N/A   Psychosocial issues?  No   Did the patient receive a copy of their discharge instructions?  Yes   Nursing interventions  Reviewed instructions with patient   What is the patient's perception of their health status since discharge?  Improving   Is the patient/caregiver able to teach back signs and symptoms related to disease process for when to call PCP?  Yes   Is the patient/caregiver able to teach back signs and symptoms related to disease process for when to call 911?  Yes   Is the patient/caregiver able to teach back the hierarchy of who to call/visit for symptoms/problems? PCP, Specialist, Home health nurse, Urgent Care, ED, 911  Yes   Week 2 Call Completed?  Yes          Cyril Jaeger RN

## 2020-01-15 NOTE — PROGRESS NOTES
DATE OF VISIT: 1/15/2020    REASON FOR VISIT: Followup for small cell lung cancer     HISTORY OF PRESENT ILLNESS: The patient is a very pleasant 64 y.o. female  with past medical history significant for small cell lung cancer diagnosed August 2017.  Repeated scans done on April 21, 2019 revealed progressive disease.  She was started on carboplatin and etoposide and Tecentriq April 30, 2019.  Chemo was stopped after 4 cycles and patient was started on maintenance Tecentriq. Ct scans showed progression. She will start on Yervoy and opdivo 1/13/2019. The patient is here today for scheduled follow up visit with treatment, cycle #3.     SUBJECTIVE: The patient is here today with her .  She has completed her radiation last week.  She is feeling better.  She is down to Decadron 2 mg daily.    PAST MEDICAL HISTORY/SOCIAL HISTORY/FAMILY HISTORY: Reviewed by me and unchanged from my documentation done on 01/15/20.    Review of Systems   Constitutional: Positive for fatigue. Negative for activity change, appetite change, chills, fever and unexpected weight change.   HENT: Negative for hearing loss, mouth sores, nosebleeds, sore throat and trouble swallowing.    Eyes: Negative for visual disturbance.   Respiratory: Negative for cough, chest tightness, shortness of breath and wheezing.    Cardiovascular: Negative for chest pain, palpitations and leg swelling.   Gastrointestinal: Positive for nausea. Negative for abdominal distention, abdominal pain, blood in stool, constipation, diarrhea, rectal pain and vomiting.   Endocrine: Negative for cold intolerance and heat intolerance.   Genitourinary: Negative for difficulty urinating, dysuria, frequency and urgency.   Musculoskeletal: Positive for arthralgias. Negative for back pain, gait problem, joint swelling and myalgias.   Skin: Negative for rash.             Neurological: Negative for dizziness, tremors, syncope, weakness, light-headedness, numbness and headaches.    Hematological: Negative for adenopathy. Does not bruise/bleed easily.   Psychiatric/Behavioral: Negative for confusion, sleep disturbance and suicidal ideas. The patient is not nervous/anxious.          Current Outpatient Medications:   •  albuterol (VENTOLIN HFA) 108 (90 Base) MCG/ACT inhaler, Inhale 2 puffs Every 4 (Four) Hours As Needed for Wheezing or Shortness of Air., Disp: 1 inhaler, Rfl: 5  •  amLODIPine (NORVASC) 5 MG tablet, Take 5 mg by mouth Daily., Disp: , Rfl: 0  •  aspirin 81 MG EC tablet, Take 81 mg by mouth Daily., Disp: , Rfl:   •  azelastine (ASTELIN) 0.1 % nasal spray, 1 spray into the nostril(s) as directed by provider 2 (Two) Times a Day As Needed for Rhinitis or Allergies. Use in each nostril as directed, Disp: 1 each, Rfl: 5  •  benzonatate (TESSALON PERLES) 100 MG capsule, Take 1-2 capsules by mouth 3 (Three) Times a Day As Needed for Cough., Disp: 120 capsule, Rfl: 5  •  Cholecalciferol (VITAMIN D3) 59305 units capsule, Take 50,000 Units by mouth Every 7 (Seven) Days., Disp: , Rfl: 3  •  dexamethasone (DECADRON) 4 MG tablet, Take 1 tablet by mouth 3 (Three) Times a Day For 5days, then twice daily for 5 days, then daily for 5 days., Disp: 30 tablet, Rfl: 0  •  guaifenesin-codeine (GUAIFENESIN AC) 100-10 MG/5ML liquid, Take 5-10 mL by mouth 3 (Three) Times a Day As Needed for Cough., Disp: 480 mL, Rfl: 2  •  hydroCHLOROthiazide (MICROZIDE) 12.5 MG capsule, TK 1 C PO QD PRN, Disp: , Rfl:   •  lidocaine-prilocaine (EMLA) 2.5-2.5 % cream, Apply  topically to the appropriate area as directed As Needed (45-60 minutes prior to port access.  Cover with saran/plastic wrap.)., Disp: 30 g, Rfl: 3  •  losartan-hydrochlorothiazide (HYZAAR) 100-12.5 MG per tablet, Take 1 tablet by mouth Daily., Disp: , Rfl: 0  •  metFORMIN (GLUCOPHAGE) 500 MG tablet, Take 500 mg by mouth 2 (Two) Times a Day With Meals., Disp: , Rfl: 0  •  ondansetron (ZOFRAN) 8 MG tablet, Take 1 tablet by mouth 3 (Three) Times a Day As  Needed for Nausea or Vomiting., Disp: 30 tablet, Rfl: 5  •  pantoprazole (PROTONIX) 40 MG EC tablet, Take 1 tablet by mouth Every 12 (Twelve) Hours., Disp: 60 tablet, Rfl: 0  •  PHARMACY MEDS TO BED CONSULT, Daily., Disp: , Rfl:   •  potassium chloride (K-DUR,KLOR-CON) 20 MEQ tablet controlled-release ER tablet, Take 20 mEq by mouth Daily., Disp: , Rfl:   •  Sulconazole Nitrate (ARTIFICIAL TEARS) ophthalmic ointment, Use as directed., Disp: 1 each, Rfl: 6  •  tamoxifen (NOLVADEX) 20 MG chemo tablet, Take 1 tablet by mouth Daily., Disp: 30 tablet, Rfl: 0  •  triamcinolone (KENALOG) 0.1 % ointment, Apply  topically to the appropriate area as directed 2 (Two) Times a Day. (Patient taking differently: Apply 1 application topically to the appropriate area as directed 2 (Two) Times a Day As Needed.), Disp: 30 g, Rfl: 3  •  umeclidinium-vilanterol (ANORO ELLIPTA) 62.5-25 MCG/INH aerosol powder  inhaler, Inhale 1 puff Daily., Disp: 60 each, Rfl: 11    PHYSICAL EXAMINATION:   /68   Pulse 74   Temp 97.3 °F (36.3 °C)   Wt 85.5 kg (188 lb 6.4 oz)   SpO2 98%   BMI 34.46 kg/m²    ECOG Performance Status: 1 - Symptomatic but completely ambulatory  General Appearance:  alert, cooperative, no apparent distress and appears stated age   Neurologic/Psychiatric: A&O x 3, gait steady, appropriate affect, strength 5/5 in all muscle groups   HEENT:  Normocephalic, without obvious abnormality, mucous membranes moist   Neck: Supple, symmetrical, trachea midline, no adenopathy;  No thyromegaly, masses, or tenderness   Lungs:   Clear to auscultation bilaterally; respirations regular, even, and unlabored bilaterally   Heart:  Regular rate and rhythm, no murmurs appreciated   Abdomen:   Soft, non-tender, non-distended and no organomegaly   Lymph nodes: No cervical, supraclavicular, inguinal or axillary adenopathy noted   Extremities: Normal, atraumatic; no clubbing, cyanosis, or edema    Skin: No rashes, ulcers, or suspicious lesions  noted     No visits with results within 2 Week(s) from this visit.   Latest known visit with results is:   Admission on 12/28/2019, Discharged on 12/31/2019   Component Date Value Ref Range Status   • WBC 12/28/2019 5.48  3.40 - 10.80 10*3/mm3 Final   • RBC 12/28/2019 3.69* 3.77 - 5.28 10*6/mm3 Final   • Hemoglobin 12/28/2019 12.2  12.0 - 15.9 g/dL Final   • Hematocrit 12/28/2019 36.9  34.0 - 46.6 % Final   • MCV 12/28/2019 100.0* 79.0 - 97.0 fL Final   • MCH 12/28/2019 33.1* 26.6 - 33.0 pg Final   • MCHC 12/28/2019 33.1  31.5 - 35.7 g/dL Final   • RDW 12/28/2019 12.4  12.3 - 15.4 % Final   • RDW-SD 12/28/2019 45.5  37.0 - 54.0 fl Final   • MPV 12/28/2019 9.6  6.0 - 12.0 fL Final   • Platelets 12/28/2019 117* 140 - 450 10*3/mm3 Final   • Neutrophil % 12/28/2019 70.2  42.7 - 76.0 % Final   • Lymphocyte % 12/28/2019 17.9* 19.6 - 45.3 % Final   • Monocyte % 12/28/2019 6.0  5.0 - 12.0 % Final   • Eosinophil % 12/28/2019 5.5  0.3 - 6.2 % Final   • Basophil % 12/28/2019 0.2  0.0 - 1.5 % Final   • Immature Grans % 12/28/2019 0.2  0.0 - 0.5 % Final   • Neutrophils, Absolute 12/28/2019 3.85  1.70 - 7.00 10*3/mm3 Final   • Lymphocytes, Absolute 12/28/2019 0.98  0.70 - 3.10 10*3/mm3 Final   • Monocytes, Absolute 12/28/2019 0.33  0.10 - 0.90 10*3/mm3 Final   • Eosinophils, Absolute 12/28/2019 0.30  0.00 - 0.40 10*3/mm3 Final   • Basophils, Absolute 12/28/2019 0.01  0.00 - 0.20 10*3/mm3 Final   • Immature Grans, Absolute 12/28/2019 0.01  0.00 - 0.05 10*3/mm3 Final   • nRBC 12/28/2019 0.0  0.0 - 0.2 /100 WBC Final   • Glucose 12/28/2019 92  70 - 130 mg/dL Final   • BUN 12/28/2019 18  8 - 26 mg/dL Final   • Creatinine 12/28/2019 1.10  0.60 - 1.30 mg/dL Final   • Sodium 12/28/2019 141  138 - 146 mmol/L Final   • POC Potassium 12/28/2019 3.4* 3.5 - 4.9 mmol/L Final   • Chloride 12/28/2019 103  98 - 109 mmol/L Final   • Total CO2 12/28/2019 27  24 - 29 mmol/L Final   • Hemoglobin 12/28/2019 12.2  12.0 - 17.0 g/dL Final    Serial  Number: [id number]:  [id number]   • Hematocrit 12/28/2019 36* 38 - 51 % Final   • Ionized Calcium 12/28/2019 1.35* 1.20 - 1.32 mmol/L Final   • Glucose 12/29/2019 155* 65 - 99 mg/dL Final   • BUN 12/29/2019 16  8 - 23 mg/dL Final   • Creatinine 12/29/2019 0.96  0.57 - 1.00 mg/dL Final   • Sodium 12/29/2019 138  136 - 145 mmol/L Final   • Potassium 12/29/2019 3.5  3.5 - 5.2 mmol/L Final   • Chloride 12/29/2019 103  98 - 107 mmol/L Final   • CO2 12/29/2019 23.0  22.0 - 29.0 mmol/L Final   • Calcium 12/29/2019 10.4  8.6 - 10.5 mg/dL Final   • Total Protein 12/29/2019 7.3  6.0 - 8.5 g/dL Final   • Albumin 12/29/2019 3.50  3.50 - 5.20 g/dL Final   • ALT (SGPT) 12/29/2019 84* 1 - 33 U/L Final   • AST (SGOT) 12/29/2019 49* 1 - 32 U/L Final   • Alkaline Phosphatase 12/29/2019 124* 39 - 117 U/L Final   • Total Bilirubin 12/29/2019 0.5  0.2 - 1.2 mg/dL Final   • eGFR Non African Amer 12/29/2019 59* >60 mL/min/1.73 Final   • Globulin 12/29/2019 3.8  gm/dL Final   • A/G Ratio 12/29/2019 0.9  g/dL Final   • BUN/Creatinine Ratio 12/29/2019 16.7  7.0 - 25.0 Final   • Anion Gap 12/29/2019 12.0  5.0 - 15.0 mmol/L Final   • Hemoglobin A1C 12/29/2019 5.60  4.80 - 5.60 % Final   • WBC 12/29/2019 3.41  3.40 - 10.80 10*3/mm3 Final   • RBC 12/29/2019 3.74* 3.77 - 5.28 10*6/mm3 Final   • Hemoglobin 12/29/2019 12.3  12.0 - 15.9 g/dL Final   • Hematocrit 12/29/2019 37.0  34.0 - 46.6 % Final   • MCV 12/29/2019 98.9* 79.0 - 97.0 fL Final   • MCH 12/29/2019 32.9  26.6 - 33.0 pg Final   • MCHC 12/29/2019 33.2  31.5 - 35.7 g/dL Final   • RDW 12/29/2019 12.3  12.3 - 15.4 % Final   • RDW-SD 12/29/2019 44.7  37.0 - 54.0 fl Final   • MPV 12/29/2019 9.5  6.0 - 12.0 fL Final   • Platelets 12/29/2019 119* 140 - 450 10*3/mm3 Final   • Neutrophil % 12/29/2019 78.0* 42.7 - 76.0 % Final   • Lymphocyte % 12/29/2019 18.5* 19.6 - 45.3 % Final   • Monocyte % 12/29/2019 2.6* 5.0 - 12.0 % Final   • Eosinophil % 12/29/2019 0.3  0.3 - 6.2 % Final   • Basophil %  12/29/2019 0.3  0.0 - 1.5 % Final   • Immature Grans % 12/29/2019 0.3  0.0 - 0.5 % Final   • Neutrophils, Absolute 12/29/2019 2.66  1.70 - 7.00 10*3/mm3 Final   • Lymphocytes, Absolute 12/29/2019 0.63* 0.70 - 3.10 10*3/mm3 Final   • Monocytes, Absolute 12/29/2019 0.09* 0.10 - 0.90 10*3/mm3 Final   • Eosinophils, Absolute 12/29/2019 0.01  0.00 - 0.40 10*3/mm3 Final   • Basophils, Absolute 12/29/2019 0.01  0.00 - 0.20 10*3/mm3 Final   • Immature Grans, Absolute 12/29/2019 0.01  0.00 - 0.05 10*3/mm3 Final   • nRBC 12/29/2019 0.0  0.0 - 0.2 /100 WBC Final   • Glucose 12/29/2019 134* 70 - 130 mg/dL Final   • Glucose 12/29/2019 215* 70 - 130 mg/dL Final   • Glucose 12/29/2019 125  70 - 130 mg/dL Final   • Potassium 12/29/2019 3.8  3.5 - 5.2 mmol/L Final   • Glucose 12/29/2019 178* 70 - 130 mg/dL Final   • Magnesium 12/30/2019 2.2  1.6 - 2.4 mg/dL Final   • Potassium 12/30/2019 4.0  3.5 - 5.2 mmol/L Final   • Glucose 12/30/2019 144* 70 - 130 mg/dL Final   • Glucose 12/30/2019 196* 70 - 130 mg/dL Final   • Glucose 12/30/2019 166* 70 - 130 mg/dL Final   • Glucose 12/30/2019 115  70 - 130 mg/dL Final   • Glucose 12/31/2019 171* 65 - 99 mg/dL Final   • BUN 12/31/2019 25* 8 - 23 mg/dL Final   • Creatinine 12/31/2019 0.84  0.57 - 1.00 mg/dL Final   • Sodium 12/31/2019 139  136 - 145 mmol/L Final   • Potassium 12/31/2019 4.1  3.5 - 5.2 mmol/L Final   • Chloride 12/31/2019 106  98 - 107 mmol/L Final   • CO2 12/31/2019 20.0* 22.0 - 29.0 mmol/L Final   • Calcium 12/31/2019 10.4  8.6 - 10.5 mg/dL Final   • eGFR Non African Amer 12/31/2019 69  >60 mL/min/1.73 Final   • BUN/Creatinine Ratio 12/31/2019 29.8* 7.0 - 25.0 Final   • Anion Gap 12/31/2019 13.0  5.0 - 15.0 mmol/L Final   • Glucose 12/31/2019 112  70 - 130 mg/dL Final        Mri Brain With & Without Contrast    Result Date: 12/28/2019  Narrative: MRI Brain WO W INDICATION: Lung cancer with possible seizure. Left facial droop. Generalized headache beginning last night. Visual  disturbances. Patient currently undergoing chemotherapy. TECHNIQUE: MRI of the brain with and without 18 cc of MultiHance IV contrast. COMPARISON:  None available. FINDINGS: Multiple mass lesions are seen in the brain consistent with metastatic disease. The largest mass is in the right posterior temporal lobe measuring 3.8 x 5.1 x 3.7 cm in diameter. There is a mass in the left occipital lobe that measures 3.4 x 3 x 3.1 cm. There is a smaller mass in the right basal ganglia measuring 5 mm in diameter. There is extensive vasogenic edema especially around the largest mass in the right posterior temporal lobe with mass effect and compression of the right lateral ventricle. Midline structures are shifted approximately 4 to 5 mm from right to left. No evidence of hemorrhage within these masses either acutely or chronically. Major dural venous sinuses are patent. No evidence of infarction on the diffusion-weighted images. Extra-axial structures are unremarkable.     Impression: There are 3 mass lesions in the brain strongly suspicious for metastatic disease given the patient's history. There is intense vasogenic edema around the 2 largest masses especially the right posterior temporal mass. There is generalized mass effect in the right cerebral hemisphere with 4 to 5 mm of midline shift from right to left. No evidence of herniation or hemorrhage. Signer Name: Marcello Stewart MD  Signed: 12/28/2019 7:36 PM  Workstation Name: [facility]  [facility]    Chemotherapy Regimen:   Treatment Plans     Name Type Plan dates Plan Provider         Active    OP lung nivolumab/Ipilimumab ONCOLOGY TREATMENT  Present Dr. Josh Son                    TOPICS EDUCATION PROVIDED COMMENTS   ANEMIA:  role of RBC, cause, s/s, ways to manage, role of transfusion [x]    THROMBOCYTOPENIA:  role of platelet, cause, s/s, ways to prevent bleeding, things to avoid, when to seek help [x]    NEUTROPENIA:  role of WBC, cause,  infection precautions, s/s of infection, when to call MD [x]    NUTRITION & APPETITE CHANGES:  importance of maintaining healthy diet & weight, ways to manage to improve intake, dietary consult, exercise regimen [x]    DIARRHEA:  causes, s/s of dehydration, ways to manage, dietary changes, when to call MD [x]    CONSTIPATION:  causes, ways to manage, dietary changes, when to call MD [x]    NAUSEA & VOMITING:  cause, use of antiemetics, dietary changes, when to call MD [x]    MOUTH SORES:  causes, oral care, ways to manage [x]    ALOPECIA:  cause, ways to manage, resources [x]    INFERTILITY & SEXUALITY:  causes, fertility preservation options, sexuality changes, ways to manage, importance of birth control [x]    NERVOUS SYSTEM CHANGES:  causes, s/s, neuropathies, cognitive changes, ways to manage [x]    PAIN:  causes, ways to manage [x] ????   SKIN & NAIL CHANGES:  cause, s/s, ways to manage [x]    ORGAN TOXICITIES:  cause, s/s, need for diagnostic tests, labs, when to notify MD [x]    SURVIVORSHIP:  distress, distress assessment, secondary malignancies, early/late effects, follow-up, social issues, social support [x]    HOME CARE:  use of spill kits, storing of PO chemo, how to manage bodily fluids []    MISCELLANEOUS:  drug interactions, administration, vesicant, et [x]        ASSESSMENT: The patient is a very pleasant 64 y.o. female  with small cell lung cancer    PROBLEM LIST:  1. Small cell lung cancer from the left upper lobe.  Limited stage disease, recurrent in a mediastinal node  A.  Treated  initially with etoposide and carboplatin starting 9/13/27.  She responded to the single modality that was started because of symptoms including airway compromise.  B.  Completed combined modality chemoradiation with etoposide and carboplatin . (Chemotherapy completed 12/7/2017).  C.  Recurrence in left paratracheal node.  This was seen on CT scan from 6/19/18 and hypermetabolic on PET scan to confirm.  She's been  treated with weekly taxol starting 7/17/2018 along with involved field radiation.  D. Left pleural effusion seen on CT of 11/23/18, loculated and inaccessible for thoracentesis.  E.  Progressive disease documented on scans done April 22, 2019 with increase in size of left paratracheal lymph node and right hepatic lobe mass.  F. Started carboplatin and etoposide with Tecentriq May 1, 2019, status post 8 cycles.     G.  Started Opdivo plus Yervoy November 13, 2019, status post 2 cycles  2.  Multiple brain metastases:  A.  Completed whole brain radiation January 2020  3.  History of left breast cancer.  A.  Left mastectomy 7/22/2008 for stage IIIA, T3 N1 M0 breast cancer  B.  ER weakly positive, OK negative and HER-2 positive  C.  Adjuvant chemotherapy with Herceptin, Taxotere and carboplatin  D.  Postmastectomy radiation done at [facility]  E.  Adjuvant tamoxifen started 12/12/2008 and continued as extended adjuvant therapy  4.  COPD.  5. Diabetes  6. HTN  7.  Cough  8.  Treatment induced itching 1    PLAN:  1. I will proceed with treatment as scheduled today with Opdivo Yervoy #3.   2. The patient will follow up in 3 weeks with cycle #4.   3.  I will continue port maintenance.   4.  I will continue to monitor the patient blood work while she is on treatment including blood counts kidney function liver function and electrolytes.  5. We discussed potential side effects of immunotherapy including but not limited to immune mediated reactions with thyroiditis, pneumonitis, hepatitis, colitis, rash, and electrolytes abnormalities, fatigue, multiorgan failure, and possibly death.  6. She has completed 10 years of Tamoxifen at this point.   7. Will continue inhalers as needed for COPD  8. We will continue Metformin for type 2 DM.    9. Will continue Hyzaar for HTN.  We may have to adjust her antihypertensive agent while she is in treatment.  10.  I will be the patient scans after cycle #4.  11.  I will  continue the patient on Robitussin with codeine as needed for cough.  12.  I will continue the patient on Benadryl as needed for itching.  13. If Hgb is less than 8 we will plan on giving 2 units of PRBC.   14.  We will continue potassium oral placement.  We will follow-up on potassium level today.  Josh Son MD  1/15/2020

## 2020-01-22 NOTE — OUTREACH NOTE
"Medical Week 3 Survey      Responses   Facility patient discharged from?  [facility]   Does the patient have one of the following disease processes/diagnoses(primary or secondary)?  Other   Week 3 attempt successful?  Yes   Call start time  1502   Call end time  1504   Discharge diagnosis  extensive stage primary sm cell carcinoma of lung w/ met to brain, HTN, DM2, CHRISTAL   Person spoke with today (if not patient) and relationship  Fiona-daughter   Meds reviewed with patient/caregiver?  Yes   Is the patient taking all medications as directed (includes completed medication regime)?  Yes   Has the patient kept scheduled appointments due by today?  Yes   Comments  Pt is having bloodwork drawn today r/t her \"hands are cramping and drawing up on her\".    What is the patient's perception of their health status since discharge?  Improving   If the patient is a current smoker, are they able to teach back resources for cessation?  -- [Pt is a nonsmoker]   Week 3 Call Completed?  Yes   Wrap up additional comments  Daughter reported pt's BP has been running low. Pt doesn't drink enough. Advised daughter to call PCP and get a clear plan as to when to take patient in for fluids or other treatment. Daughter verbalized understanding.           Rena Goff RN  "

## 2020-01-22 NOTE — TELEPHONE ENCOUNTER
Received call from pt through triage line. She reports that her hands are cramping and drawing in. This began 2 hours ago. Per pt, this has happened in the past when her potassium was outside normal range. Pt has been off K supplement since being d/c'd from hospital. She explained it was put on hold during admission due to concurrent use of steroids making serum value increase. She took her last steroid pill today.     Discussed with AQUILES Choi. Asked pt to stop by [facility] to have CMP drawn so we can check all electrolyte levels. Order placed for CMP. Advised that either myself will call her today, or Dr Son's primary nurse will call her tomorrow with lab results and further instructions on whether she should resume potassium supplement.

## 2020-01-23 NOTE — RADIATION COMPLETION NOTES
RADIATION ONCOLOGY COMPLETION NOTE    PATIENT:   Sanjana Horton  MEDICAL RECORD:  [id number]  :    1955  COMPLETION DATE: 2020  DIAGNOSIS:   brain metastses      BRIEF HISTORY:  This 64 y.o. patient completed radiotherapy.  She has small cell lung cancer with symptomatic brain metastases.  Palliative radiotherapy was delivered.       TREATMENT COURSE:  The whole brain received 30 Gy in 10 daily fractions of 3 Gy using 10 MV photons.     DATES OF TREATMENT:  2019 thru 2020    TOLERANCE:   no unexpected difficulties     STATUS:  partial response    DISPOSITION:  Follow up in Radiation Oncology in approximately 1 month.        Joshua Matos MD

## 2020-01-31 NOTE — OUTREACH NOTE
Medical Week 4 Survey      Responses   Facility patient discharged from?  [facility]   Does the patient have one of the following disease processes/diagnoses(primary or secondary)?  Other   Week 4 attempt successful?  Yes   Call start time  1159   Call end time  1201   Discharge diagnosis  extensive stage primary sm cell carcinoma of lung w/ met to brain, HTN, DM2, CHRISTAL   Meds reviewed with patient/caregiver?  Yes   Is the patient having any side effects they believe may be caused by any medication additions or changes?  No   Is the patient taking all medications as directed (includes completed medication regime)?  Yes   Has the patient kept scheduled appointments due by today?  Yes   Is the patient still receiving Home Health Services?  N/A   Psychosocial issues?  No   What is the patient's perception of their health status since discharge?  Improving   Is the patient/caregiver able to teach back signs and symptoms related to disease process for when to call PCP?  Yes   Is the patient/caregiver able to teach back signs and symptoms related to disease process for when to call 911?  Yes   Is the patient/caregiver able to teach back the hierarchy of who to call/visit for symptoms/problems? PCP, Specialist, Home health nurse, Urgent Care, ED, 911  Yes   Week 4 Call Completed?  Yes          Blaze Nguyen RN

## 2020-02-07 NOTE — PROGRESS NOTES
DATE OF VISIT: 2/7/2020    REASON FOR VISIT: Followup for small cell lung cancer     HISTORY OF PRESENT ILLNESS: The patient is a very pleasant 64 y.o. female  with past medical history significant for small cell lung cancer diagnosed August 2017.  Repeated scans done on April 21, 2019 revealed progressive disease.  She was started on carboplatin and etoposide and Tecentriq April 30, 2019.  Chemo was stopped after 4 cycles and patient was started on maintenance Tecentriq. Ct scans showed progression. She will start on Yervoy and opdivo 1/13/2019. The patient is here today for scheduled follow up visit with treatment, cycle #4.     SUBJECTIVE: The patient is here today with her .  She is feeling a little better.  She called triage line with complaints of cramping and started on potassium. She reports that cramping is better. Denies headaches and confusion. She has noticed a new mass in the center of her neck. She is concerned that it is her cancer worsening.         PAST MEDICAL HISTORY/SOCIAL HISTORY/FAMILY HISTORY: Reviewed by me and unchanged from my documentation done on 02/07/20.    Review of Systems   Constitutional: Positive for fatigue. Negative for activity change, appetite change, chills, fever and unexpected weight change.   HENT: Negative for hearing loss, mouth sores, nosebleeds, sore throat and trouble swallowing.    Eyes: Negative for visual disturbance.   Respiratory: Negative for cough, chest tightness, shortness of breath and wheezing.    Cardiovascular: Negative for chest pain, palpitations and leg swelling.   Gastrointestinal: Positive for nausea. Negative for abdominal distention, abdominal pain, blood in stool, constipation, diarrhea, rectal pain and vomiting.   Endocrine: Negative for cold intolerance and heat intolerance.   Genitourinary: Negative for difficulty urinating, dysuria, frequency and urgency.   Musculoskeletal: Positive for arthralgias. Negative for back pain, gait problem,  joint swelling and myalgias.   Skin: Negative for rash.             Neurological: Negative for dizziness, tremors, syncope, weakness, light-headedness, numbness and headaches.   Hematological: Negative for adenopathy. Does not bruise/bleed easily.   Psychiatric/Behavioral: Negative for confusion, sleep disturbance and suicidal ideas. The patient is not nervous/anxious.          Current Outpatient Medications:   •  albuterol (VENTOLIN HFA) 108 (90 Base) MCG/ACT inhaler, Inhale 2 puffs Every 4 (Four) Hours As Needed for Wheezing or Shortness of Air., Disp: 1 inhaler, Rfl: 5  •  amLODIPine (NORVASC) 5 MG tablet, Take 5 mg by mouth Daily., Disp: , Rfl: 0  •  aspirin 81 MG EC tablet, Take 81 mg by mouth Daily., Disp: , Rfl:   •  azelastine (ASTELIN) 0.1 % nasal spray, 1 spray into the nostril(s) as directed by provider 2 (Two) Times a Day As Needed for Rhinitis or Allergies. Use in each nostril as directed, Disp: 1 each, Rfl: 5  •  benzonatate (TESSALON PERLES) 100 MG capsule, Take 1-2 capsules by mouth 3 (Three) Times a Day As Needed for Cough., Disp: 120 capsule, Rfl: 5  •  Cholecalciferol (VITAMIN D3) 49569 units capsule, Take 50,000 Units by mouth Every 7 (Seven) Days., Disp: , Rfl: 3  •  guaifenesin-codeine (GUAIFENESIN AC) 100-10 MG/5ML liquid, Take 5-10 mL by mouth 3 (Three) Times a Day As Needed for Cough., Disp: 480 mL, Rfl: 2  •  lidocaine-prilocaine (EMLA) 2.5-2.5 % cream, Apply  topically to the appropriate area as directed As Needed (45-60 minutes prior to port access.  Cover with saran/plastic wrap.)., Disp: 30 g, Rfl: 3  •  losartan-hydrochlorothiazide (HYZAAR) 100-12.5 MG per tablet, Take 1 tablet by mouth Daily., Disp: , Rfl: 0  •  metFORMIN (GLUCOPHAGE) 500 MG tablet, Take 500 mg by mouth 2 (Two) Times a Day With Meals., Disp: , Rfl: 0  •  ondansetron (ZOFRAN) 8 MG tablet, Take 1 tablet by mouth 3 (Three) Times a Day As Needed for Nausea or Vomiting., Disp: 30 tablet, Rfl: 5  •  pantoprazole (PROTONIX)  "40 MG EC tablet, Take 1 tablet by mouth Every 12 (Twelve) Hours., Disp: 60 tablet, Rfl: 0  •  potassium chloride (K-DUR,KLOR-CON) 20 MEQ tablet controlled-release ER tablet, Take 20 mEq by mouth Daily., Disp: , Rfl:   •  Sulconazole Nitrate (ARTIFICIAL TEARS) ophthalmic ointment, Use as directed., Disp: 1 each, Rfl: 6  •  tamoxifen (NOLVADEX) 20 MG chemo tablet, Take 1 tablet by mouth Daily., Disp: 30 tablet, Rfl: 0  •  triamcinolone (KENALOG) 0.1 % ointment, Apply  topically to the appropriate area as directed 2 (Two) Times a Day. (Patient taking differently: Apply 1 application topically to the appropriate area as directed 2 (Two) Times a Day As Needed.), Disp: 30 g, Rfl: 3  •  umeclidinium-vilanterol (ANORO ELLIPTA) 62.5-25 MCG/INH aerosol powder  inhaler, Inhale 1 puff Daily., Disp: 60 each, Rfl: 11    PHYSICAL EXAMINATION:   /72   Pulse 119   Temp 97.3 °F (36.3 °C) (Temporal)   Resp 16   Ht 157.5 cm (62\")   Wt 82.6 kg (182 lb)   SpO2 96%   BMI 33.29 kg/m²    ECOG Performance Status: 1 - Symptomatic but completely ambulatory  General Appearance:  alert, cooperative, no apparent distress and appears stated age   Neurologic/Psychiatric: A&O x 3, gait steady, appropriate affect, strength 5/5 in all muscle groups   HEENT:  Normocephalic, without obvious abnormality, mucous membranes moist   Neck: Supple, symmetrical, trachea midline, no adenopathy;  No thyromegaly or tenderness. 3cm soft tissue mass noted midline lower neck   Lungs:   Clear to auscultation bilaterally; respirations regular, even, and unlabored bilaterally   Heart:  Regular rate and rhythm, no murmurs appreciated   Abdomen:   Soft, non-tender, non-distended and no organomegaly   Lymph nodes: No cervical, supraclavicular, inguinal or axillary adenopathy noted   Extremities: Normal, atraumatic; no clubbing, cyanosis, or edema    Skin: No rashes, ulcers, or suspicious lesions noted     No visits with results within 2 Week(s) from this " visit.   Latest known visit with results is:   Infusion on 01/22/2020   Component Date Value Ref Range Status   • Glucose 01/22/2020 124* 65 - 99 mg/dL Final   • BUN 01/22/2020 21  8 - 23 mg/dL Final   • Creatinine 01/22/2020 1.08* 0.57 - 1.00 mg/dL Final   • Sodium 01/22/2020 141  136 - 145 mmol/L Final   • Potassium 01/22/2020 3.4* 3.5 - 5.2 mmol/L Final   • Chloride 01/22/2020 102  98 - 107 mmol/L Final   • CO2 01/22/2020 25.4  22.0 - 29.0 mmol/L Final   • Calcium 01/22/2020 9.4  8.6 - 10.5 mg/dL Final   • Total Protein 01/22/2020 6.5  6.0 - 8.5 g/dL Final   • Albumin 01/22/2020 3.00* 3.50 - 5.20 g/dL Final   • ALT (SGPT) 01/22/2020 58* 1 - 33 U/L Final   • AST (SGOT) 01/22/2020 40* 1 - 32 U/L Final   • Alkaline Phosphatase 01/22/2020 104  39 - 117 U/L Final   • Total Bilirubin 01/22/2020 0.5  0.2 - 1.2 mg/dL Final   • eGFR Non African Amer 01/22/2020 51* >60 mL/min/1.73 Final   • Globulin 01/22/2020 3.5  gm/dL Final   • A/G Ratio 01/22/2020 0.9  g/dL Final   • BUN/Creatinine Ratio 01/22/2020 19.4  7.0 - 25.0 Final   • Anion Gap 01/22/2020 13.6  5.0 - 15.0 mmol/L Final      ASSESSMENT: The patient is a very pleasant 64 y.o. female  with small cell lung cancer    PROBLEM LIST:  1. Small cell lung cancer from the left upper lobe.  Limited stage disease, recurrent in a mediastinal node  A.  Treated  initially with etoposide and carboplatin starting 9/13/27.  She responded to the single modality that was started because of symptoms including airway compromise.  B.  Completed combined modality chemoradiation with etoposide and carboplatin . (Chemotherapy completed 12/7/2017).  C.  Recurrence in left paratracheal node.  This was seen on CT scan from 6/19/18 and hypermetabolic on PET scan to confirm.  She's been treated with weekly taxol starting 7/17/2018 along with involved field radiation.  D. Left pleural effusion seen on CT of 11/23/18, loculated and inaccessible for thoracentesis.  E.  Progressive disease  documented on scans done April 22, 2019 with increase in size of left paratracheal lymph node and right hepatic lobe mass.  F. Started carboplatin and etoposide with Tecentriq May 1, 2019, status post 8 cycles.     G.  Started Opdivo plus Yervoy November 13, 2019, status post 3 cycles  2.  Multiple brain metastases:  A.  Completed whole brain radiation January 2020  3.  History of left breast cancer.  A.  Left mastectomy 7/22/2008 for stage IIIA, T3 N1 M0 breast cancer  B.  ER weakly positive, [place] negative and HER-2 positive  C.  Adjuvant chemotherapy with Herceptin, Taxotere and carboplatin  D.  Postmastectomy radiation done at [facility]  E.  Adjuvant tamoxifen started 12/12/2008 and continued as extended adjuvant therapy  4.  COPD.  5. Diabetes  6. HTN  7.  Cough  8.  Treatment induced itching 1    PLAN:  1. I will proceed with treatment as scheduled today with Opdivo Yervoy #4.   2. The patient will follow up in 3 weeks with cycle #5 and start of maintenance opdivo.   3.  I will continue port maintenance.   4.  I will continue to monitor the patient blood work while she is on treatment including blood counts kidney function liver function and electrolytes.  5. We discussed potential side effects of immunotherapy including but not limited to immune mediated reactions with thyroiditis, pneumonitis, hepatitis, colitis, rash, and electrolytes abnormalities, fatigue, multiorgan failure, and possibly death.  6. She has completed 10 years of Tamoxifen at this point.   7. Will continue inhalers as needed for COPD  8. We will continue Metformin for type 2 DM.    9. Will continue Hyzaar for HTN.  We may have to adjust her antihypertensive agent while she is in treatment.  10.  I will repeat the patient scans after cycle #4. I will order prior to return. I will add a Ct neck to evaluate new mass.   11.  I will continue the patient on Robitussin with codeine as needed for cough.  12.  I will continue the  patient on Benadryl as needed for itching.  13. If Hgb is less than 8 we will plan on giving 2 units of PRBC.   14.  We will continue potassium oral placement.  We will follow-up on potassium level today.    Belkis Nayak, APRN  2/7/2020

## 2020-02-10 NOTE — PROGRESS NOTES
FOLLOW UP NOTE    PATIENT:                                                      Sanjana Horton  MEDICAL RECORD #:                        [id number]  :                                                          1955  COMPLETION DATE:    2020  DIAGNOSIS:     Small cell lung cancer with brain metastases    Malignant neoplasm of overlapping sites of left lung (CMS/HCC)  - Stage IIIB (T2b, N3, M0)      BRIEF HISTORY:  Mrs. Horton is a pleasant 64 y.o. female presenting today for initial follow-up.  She has a history of metastatic small cell lung cancer diagnosed 2017.    She has since been treated with 2 courses of chest irradiation to the mediastinum and left hilar regions in , as well as left upper mediastinal recurrent adenopathy in 2018.    She has a more remote history of left breast cancer, in remission.  Left chest wall irradiation was part of that treatment course.    She was found to have progressive disease on scans 2019 and started on chemotherapy and immunotherapy.    She presented to [facility] ED 2019 with neurologic symptoms.  MRI brain confirmed multiple mass lesions consistent with metastatic disease.  Urgent whole brain radiation to a dose of 30 Gy in 10 fractions was completed.  She tolerated treatment well.  Excellent clinical response with nearly complete resolution of neurologic symptoms.  She is no longer on steroids.  She denies headache, vision or speech changes, paresthesia, nausea, vomiting, balance changes, or seizures.  Left facial droop has almost entirely resolved.  She did not notice any change to baseline mild fatigue during or after radiotherapy.  She continues to remain fairly active and is looking forward to gardening this spring.  Immunotherapy with Opdivo Yervoy continues.  Remains on adjuvant Tamoxifen daily, tolerating well.   She denies constitutional symptoms or other complaints today.      MEDICATIONS: Medication reconciliation for the patient was  "reviewed and confirmed in the electronic medical record.    Review of Systems   Constitutional: Positive for fatigue.   Respiratory: Positive for cough.    Musculoskeletal: Positive for arthralgias.   All other systems reviewed and are negative.      KPS 80%    Physical Exam   Constitutional: She is oriented to person, place, and time. She appears well-developed and well-nourished. No distress.   HENT:   Head: Normocephalic and atraumatic.   Positive for alopecia.   Eyes: Pupils are equal, round, and reactive to light. Conjunctivae and EOM are normal.   Neck: Normal range of motion. Neck supple.   Cardiovascular: Normal rate and regular rhythm.   No murmur heard.  Pulmonary/Chest: Effort normal and breath sounds normal. She has no wheezes.   Musculoskeletal: Normal range of motion. She exhibits no edema.   Lymphadenopathy:     She has no cervical adenopathy.        Right: No supraclavicular adenopathy present.        Left: No supraclavicular adenopathy present.   Neurological: She is alert and oriented to person, place, and time. She has normal reflexes. No cranial nerve deficit or sensory deficit. She exhibits normal muscle tone. Coordination normal.   Slight left facial droop.  No evidence of long track symptoms.  Bilateral upper and lower extremity strength 5/5.   Skin: Skin is warm and dry.   Psychiatric: She has a normal mood and affect. Her behavior is normal. Judgment and thought content normal.   Nursing note and vitals reviewed.      VITAL SIGNS:   Vitals:    02/10/20 0824   BP: 158/77   Pulse: 101   Resp: 18   Temp: 98.4 °F (36.9 °C)   TempSrc: Temporal   SpO2: 93%  Comment: RA   Weight: 83.5 kg (184 lb 1.6 oz)   Height: 157.5 cm (62\")   PainSc: 0-No pain       The following portions of the patient's history were reviewed and updated as appropriate: allergies, current medications, past family history, past medical history, past social history, past surgical history and problem list.       Sanjana was seen " today for brain metastases.    Diagnoses and all orders for this visit:    Brain metastases (CMS/HCC)    Malignant neoplasm of overlapping sites of left lung (CMS/HCC)        IMPRESSION:  Mrs. Horton is a 64 y.o. female who underwent palliative whole brain radiation approximately 1 month ago for symptomatic bilateral brain metastases.    She has a history of extensive stage small cell lung cancer, originally diagnosed 2017.    Clinically, she has had excellent response to whole brain radiotherapy with near resolution of previously present neurologic symptoms.  Her only subjective/objective complaint is slight facial asymmetry which has much improved.  No evidence of acute radiation-related toxicities.  Systemic therapy managed per Dr. Son for progressive disease.  She is scheduled for maintenance immunotherapy in 2 weeks.   Surveillance imaging per medical oncology.  Repeat CT scans are scheduled.    Recommend repeat MRI brain per NCCN guidelines.   We discussed follow-up intervals, surveillance imaging, and expectations for response to treatment.  She is scheduled for follow-up with Dr. Son at the end of this month.      RECOMMENDATIONS: At this point, we are happy to follow Ms. Horton on an as-needed basis for any questions, concerns, or if further radiotherapy is indicated.    Return if symptoms worsen or fail to improve, for Office Visit.    Richard Webb, APRN

## 2020-02-26 NOTE — PROGRESS NOTES
DATE OF VISIT: 2/26/2020    REASON FOR VISIT: Followup for small cell lung cancer     HISTORY OF PRESENT ILLNESS: The patient is a very pleasant 64 y.o. female  with past medical history significant for small cell lung cancer diagnosed August 2017.  Repeated scans done on April 21, 2019 revealed progressive disease.  She was started on carboplatin and etoposide and Tecentriq April 30, 2019.  Chemo was stopped after 4 cycles and patient was started on maintenance Tecentriq. Ct scans showed progression. She will start on Yervoy and opdivo 1/13/2019.  He completed 4 cycles on February 7, 2020.  Repeated scans done February 21, 2020 revealed progressive disease.  The patient is here today for scheduled follow up visit.     SUBJECTIVE: The patient is here today with her .  She is doing fairly well.  She is been having leg cramps.  Denies any headaches.  She has mild nausea.    PAST MEDICAL HISTORY/SOCIAL HISTORY/FAMILY HISTORY: Reviewed by me and unchanged from my documentation done on 02/26/20.    Review of Systems   Constitutional: Positive for fatigue. Negative for activity change, appetite change, chills, fever and unexpected weight change.   HENT: Negative for hearing loss, mouth sores, nosebleeds, sore throat and trouble swallowing.    Eyes: Negative for visual disturbance.   Respiratory: Negative for cough, chest tightness, shortness of breath and wheezing.    Cardiovascular: Negative for chest pain, palpitations and leg swelling.   Gastrointestinal: Positive for nausea. Negative for abdominal distention, abdominal pain, blood in stool, constipation, diarrhea, rectal pain and vomiting.   Endocrine: Negative for cold intolerance and heat intolerance.   Genitourinary: Negative for difficulty urinating, dysuria, frequency and urgency.   Musculoskeletal: Positive for arthralgias and myalgias. Negative for back pain, gait problem and joint swelling.   Skin: Negative for rash.             Neurological: Negative  for dizziness, tremors, syncope, weakness, light-headedness, numbness and headaches.   Hematological: Negative for adenopathy. Does not bruise/bleed easily.   Psychiatric/Behavioral: Negative for confusion, sleep disturbance and suicidal ideas. The patient is not nervous/anxious.          Current Outpatient Medications:   •  albuterol (VENTOLIN HFA) 108 (90 Base) MCG/ACT inhaler, Inhale 2 puffs Every 4 (Four) Hours As Needed for Wheezing or Shortness of Air., Disp: 1 inhaler, Rfl: 5  •  amLODIPine (NORVASC) 5 MG tablet, Take 5 mg by mouth Daily., Disp: , Rfl: 0  •  aspirin 81 MG EC tablet, Take 81 mg by mouth Daily., Disp: , Rfl:   •  azelastine (ASTELIN) 0.1 % nasal spray, 1 spray into the nostril(s) as directed by provider 2 (Two) Times a Day As Needed for Rhinitis or Allergies. Use in each nostril as directed, Disp: 1 each, Rfl: 5  •  benzonatate (TESSALON PERLES) 100 MG capsule, Take 1-2 capsules by mouth 3 (Three) Times a Day As Needed for Cough., Disp: 120 capsule, Rfl: 5  •  Cholecalciferol (VITAMIN D3) 79721 units capsule, Take 50,000 Units by mouth Every 7 (Seven) Days., Disp: , Rfl: 3  •  hydroCHLOROthiazide (MICROZIDE) 12.5 MG capsule, TK 1 C PO QD PRN, Disp: , Rfl:   •  lidocaine-prilocaine (EMLA) 2.5-2.5 % cream, Apply  topically to the appropriate area as directed As Needed (45-60 minutes prior to port access.  Cover with saran/plastic wrap.)., Disp: 30 g, Rfl: 3  •  metFORMIN (GLUCOPHAGE) 500 MG tablet, Take 500 mg by mouth 2 (Two) Times a Day With Meals., Disp: , Rfl: 0  •  ondansetron (ZOFRAN) 8 MG tablet, Take 1 tablet by mouth 3 (Three) Times a Day As Needed for Nausea or Vomiting., Disp: 30 tablet, Rfl: 5  •  pantoprazole (PROTONIX) 40 MG EC tablet, Take 1 tablet by mouth Every 12 (Twelve) Hours., Disp: 60 tablet, Rfl: 0  •  potassium chloride (K-DUR,KLOR-CON) 20 MEQ tablet controlled-release ER tablet, Take 20 mEq by mouth Daily., Disp: , Rfl:   •  Sulconazole Nitrate (ARTIFICIAL TEARS)  "ophthalmic ointment, Use as directed., Disp: 1 each, Rfl: 6  •  tamoxifen (NOLVADEX) 20 MG chemo tablet, Take 1 tablet by mouth Daily., Disp: 30 tablet, Rfl: 0  •  triamcinolone (KENALOG) 0.1 % ointment, Apply  topically to the appropriate area as directed 2 (Two) Times a Day. (Patient taking differently: Apply 1 application topically to the appropriate area as directed 2 (Two) Times a Day As Needed.), Disp: 30 g, Rfl: 3  •  umeclidinium-vilanterol (ANORO ELLIPTA) 62.5-25 MCG/INH aerosol powder  inhaler, Inhale 1 puff Daily., Disp: 60 each, Rfl: 11    PHYSICAL EXAMINATION:   BP (!) 190/80   Pulse 88   Temp 97.9 °F (36.6 °C) (Temporal)   Resp 12   Ht 157.5 cm (62\")   Wt 82.1 kg (181 lb)   SpO2 98%   BMI 33.11 kg/m²    ECOG Performance Status: 1 - Symptomatic but completely ambulatory  General Appearance:  alert, cooperative, no apparent distress and appears stated age   Neurologic/Psychiatric: A&O x 3, gait steady, appropriate affect, strength 5/5 in all muscle groups   HEENT:  Normocephalic, without obvious abnormality, mucous membranes moist   Neck: Supple, symmetrical, trachea midline, no adenopathy;  No thyromegaly or tenderness. 3cm soft tissue mass noted midline lower neck   Lungs:   Clear to auscultation bilaterally; respirations regular, even, and unlabored bilaterally   Heart:  Regular rate and rhythm, no murmurs appreciated   Abdomen:   Soft, non-tender, non-distended and no organomegaly   Lymph nodes: No cervical, supraclavicular, inguinal or axillary adenopathy noted   Extremities: Normal, atraumatic; no clubbing, cyanosis, or edema    Skin: No rashes, ulcers, or suspicious lesions noted     No visits with results within 2 Week(s) from this visit.   Latest known visit with results is:   Infusion on 02/07/2020   Component Date Value Ref Range Status   • Glucose 02/07/2020 129* 65 - 99 mg/dL Final   • BUN 02/07/2020 14  8 - 23 mg/dL Final   • Creatinine 02/07/2020 1.11* 0.57 - 1.00 mg/dL Final   • " Sodium 02/07/2020 135* 136 - 145 mmol/L Final   • Potassium 02/07/2020 3.7  3.5 - 5.2 mmol/L Final   • Chloride 02/07/2020 99  98 - 107 mmol/L Final   • CO2 02/07/2020 24.3  22.0 - 29.0 mmol/L Final   • Calcium 02/07/2020 10.5  8.6 - 10.5 mg/dL Final   • Total Protein 02/07/2020 7.4  6.0 - 8.5 g/dL Final   • Albumin 02/07/2020 3.50  3.50 - 5.20 g/dL Final   • ALT (SGPT) 02/07/2020 26  1 - 33 U/L Final   • AST (SGOT) 02/07/2020 30  1 - 32 U/L Final   • Alkaline Phosphatase 02/07/2020 82  39 - 117 U/L Final   • Total Bilirubin 02/07/2020 0.5  0.2 - 1.2 mg/dL Final   • eGFR Non African Amer 02/07/2020 50* >60 mL/min/1.73 Final   • Globulin 02/07/2020 3.9  gm/dL Final   • A/G Ratio 02/07/2020 0.9  g/dL Final   • BUN/Creatinine Ratio 02/07/2020 12.6  7.0 - 25.0 Final   • Anion Gap 02/07/2020 11.7  5.0 - 15.0 mmol/L Final   • TSH 02/07/2020 1.790  0.270 - 4.200 uIU/mL Final   • Free T4 02/07/2020 1.36  0.93 - 1.70 ng/dL Final   • WBC 02/07/2020 4.09  3.40 - 10.80 10*3/mm3 Final   • RBC 02/07/2020 2.89* 3.77 - 5.28 10*6/mm3 Final   • Hemoglobin 02/07/2020 9.5* 12.0 - 15.9 g/dL Final   • Hematocrit 02/07/2020 29.2* 34.0 - 46.6 % Final   • MCV 02/07/2020 101.0* 79.0 - 97.0 fL Final   • MCH 02/07/2020 32.9  26.6 - 33.0 pg Final   • MCHC 02/07/2020 32.5  31.5 - 35.7 g/dL Final   • RDW 02/07/2020 15.9* 12.3 - 15.4 % Final   • RDW-SD 02/07/2020 56.1* 37.0 - 54.0 fl Final   • MPV 02/07/2020 9.2  6.0 - 12.0 fL Final   • Platelets 02/07/2020 140  140 - 450 10*3/mm3 Final   • Neutrophil % 02/07/2020 61.2  42.7 - 76.0 % Final   • Lymphocyte % 02/07/2020 26.4  19.6 - 45.3 % Final   • Monocyte % 02/07/2020 9.5  5.0 - 12.0 % Final   • Eosinophil % 02/07/2020 1.7  0.3 - 6.2 % Final   • Basophil % 02/07/2020 0.5  0.0 - 1.5 % Final   • Immature Grans % 02/07/2020 0.7* 0.0 - 0.5 % Final   • Neutrophils, Absolute 02/07/2020 2.50  1.70 - 7.00 10*3/mm3 Final   • Lymphocytes, Absolute 02/07/2020 1.08  0.70 - 3.10 10*3/mm3 Final   • Monocytes,  Absolute 02/07/2020 0.39  0.10 - 0.90 10*3/mm3 Final   • Eosinophils, Absolute 02/07/2020 0.07  0.00 - 0.40 10*3/mm3 Final   • Basophils, Absolute 02/07/2020 0.02  0.00 - 0.20 10*3/mm3 Final   • Immature Grans, Absolute 02/07/2020 0.03  0.00 - 0.05 10*3/mm3 Final   • nRBC 02/07/2020 0.0  0.0 - 0.2 /100 WBC Final      Ct Soft Tissue Neck With Contrast    Result Date: 2/21/2020  Narrative: PROCEDURE: CT ABDOMEN PELVIS W CONTRAST-, CT SOFT TISSUE NECK W CONTRAST-, CT CHEST W CONTRAST-  HISTORY: R/S Lung Cancer; C34.82-Malignant neoplasm of overlapping sites of left bronchus and lung; R22.1-Localized swelling, mass and lump, neck  COMPARISON: CT of the neck dated 01/14/2016 and CT of the chest, abdomen and pelvis dated 10/21/2019.  PROCEDURE: Axial images were obtained from the skull base through the pubic symphysis following the administration of Isovue-300 contrast.  FINDINGS:  NECK: There is no evidence of a mucosal lesion within the nasopharynx, oropharynx or hypopharynx. The trachea is midline. The thyroid gland is unremarkable. There is a borderline enlarged 9 mm left level two jugular chain lymph node. The major neck vasculature is patent.  CHEST: There has been interval increase in size of a mass within the middle mediastinum just within the thoracic inlet adjacent to the esophagus which measures 3.2 x 2.8 x 3.9 cm. There is an adjacent enlarged lymph node which has increased in size measuring 1.6 cm on today's exam. There are other shotty appearing mediastinal lymph nodes which have not changed significantly in size. There is a small pericardial effusion and trace bilateral pleural effusions. Lung windows reveal scarring and fibrosis in the left upper lobe. There is a small, 4 mm nodule in the periphery of the left upper lobe. There is patchy airspace disease in the right middle lobe which is felt to be infectious or inflammatory in nature. There are no suspicious pulmonary nodules. Bone windows reveal no lytic  or destructive lesions.  ABDOMEN: There are hypodense lesions in the right lobe of the liver which have increased significantly in size compared to the prior exam. For example, a lesion in the right lower lobe of the liver measures 5.3 cm on today's exam and previously measured 1 cm. The adrenals, kidneys, spleen and pancreas are unremarkable. The abdominal portions of the GI tract are within normal limits. There is no free fluid or adenopathy..  PELVIS: The pelvic viscera and pelvic GI tract is unremarkable. The appendix is normal. There is no free fluid or adenopathy.      Impression: Significant interval increase in size of an upper mediastinal mass adjacent to the esophagus and hepatic lesions consistent with metastatic disease.         This study was performed with techniques to keep radiation doses as low as reasonably achievable (ALARA). Individualized dose reduction techniques using automated exposure control or adjustment of mA and/or kV according to the patient size were employed.      This report was finalized on 2/21/2020 1:34 PM by Jyoti Jensen M.D..    Ct Chest With Contrast    Result Date: 2/21/2020  Narrative: PROCEDURE: CT ABDOMEN PELVIS W CONTRAST-, CT SOFT TISSUE NECK W CONTRAST-, CT CHEST W CONTRAST-  HISTORY: R/S Lung Cancer; C34.82-Malignant neoplasm of overlapping sites of left bronchus and lung; R22.1-Localized swelling, mass and lump, neck  COMPARISON: CT of the neck dated 01/14/2016 and CT of the chest, abdomen and pelvis dated 10/21/2019.  PROCEDURE: Axial images were obtained from the skull base through the pubic symphysis following the administration of Isovue-300 contrast.  FINDINGS:  NECK: There is no evidence of a mucosal lesion within the nasopharynx, oropharynx or hypopharynx. The trachea is midline. The thyroid gland is unremarkable. There is a borderline enlarged 9 mm left level two jugular chain lymph node. The major neck vasculature is patent.  CHEST: There has been  interval increase in size of a mass within the middle mediastinum just within the thoracic inlet adjacent to the esophagus which measures 3.2 x 2.8 x 3.9 cm. There is an adjacent enlarged lymph node which has increased in size measuring 1.6 cm on today's exam. There are other shotty appearing mediastinal lymph nodes which have not changed significantly in size. There is a small pericardial effusion and trace bilateral pleural effusions. Lung windows reveal scarring and fibrosis in the left upper lobe. There is a small, 4 mm nodule in the periphery of the left upper lobe. There is patchy airspace disease in the right middle lobe which is felt to be infectious or inflammatory in nature. There are no suspicious pulmonary nodules. Bone windows reveal no lytic or destructive lesions.  ABDOMEN: There are hypodense lesions in the right lobe of the liver which have increased significantly in size compared to the prior exam. For example, a lesion in the right lower lobe of the liver measures 5.3 cm on today's exam and previously measured 1 cm. The adrenals, kidneys, spleen and pancreas are unremarkable. The abdominal portions of the GI tract are within normal limits. There is no free fluid or adenopathy..  PELVIS: The pelvic viscera and pelvic GI tract is unremarkable. The appendix is normal. There is no free fluid or adenopathy.      Impression: Significant interval increase in size of an upper mediastinal mass adjacent to the esophagus and hepatic lesions consistent with metastatic disease.         This study was performed with techniques to keep radiation doses as low as reasonably achievable (ALARA). Individualized dose reduction techniques using automated exposure control or adjustment of mA and/or kV according to the patient size were employed.      This report was finalized on 2/21/2020 1:34 PM by Jyoti Jensen M.D..    Mri Brain With & Without Contrast    Result Date: 2/24/2020  Narrative: PROCEDURE: MRI BRAIN W  WO CONTRAST-  HISTORY: Follow up brain metastasis; C34.82-Malignant neoplasm of overlapping sites of left bronchus and lung; C79.31-Secondary malignant neoplasm of brain  PROCEDURE: Multiplanar multisequence imaging of the brain was performed both before and following the administration of 15 mL MultiHance intravenous contrast.  COMPARISON: 07/23/2018.  FINDINGS: There is generalized cerebral volume loss. There are FLAIR hyperintensities in the periventricular white matter consistent with chronic small vessel ischemic change. There is an approximately 1.4 cm enhancing lesion in the posterior right temporal lobe consistent with a metastatic lesion. There is an 8 mm enhancing lesion in the left occipital lobe also consistent with metastatic disease. There is no significant mass effect or midline shift. There is no hydrocephalus. There are no areas of restricted diffusion. No other areas of pathologic contrast enhancement are identified  The midbrain, milagro, cerebellum and craniocervical junction are unremarkable. The sella and pituitary gland are within normal limits. The major intracranial vasculature demonstrates the expected flow related signal. The paranasal sinuses are clear.      Impression: Findings consistent with metastatic disease involving the posterior right temporal and left occipital lobes.    This report was finalized on 2/24/2020 10:28 AM by Jyoti Jensen M.D..    Ct Abdomen Pelvis With Contrast    Result Date: 2/21/2020  Narrative: PROCEDURE: CT ABDOMEN PELVIS W CONTRAST-, CT SOFT TISSUE NECK W CONTRAST-, CT CHEST W CONTRAST-  HISTORY: R/S Lung Cancer; C34.82-Malignant neoplasm of overlapping sites of left bronchus and lung; R22.1-Localized swelling, mass and lump, neck  COMPARISON: CT of the neck dated 01/14/2016 and CT of the chest, abdomen and pelvis dated 10/21/2019.  PROCEDURE: Axial images were obtained from the skull base through the pubic symphysis following the administration of  Isovue-300 contrast.  FINDINGS:  NECK: There is no evidence of a mucosal lesion within the nasopharynx, oropharynx or hypopharynx. The trachea is midline. The thyroid gland is unremarkable. There is a borderline enlarged 9 mm left level two jugular chain lymph node. The major neck vasculature is patent.  CHEST: There has been interval increase in size of a mass within the middle mediastinum just within the thoracic inlet adjacent to the esophagus which measures 3.2 x 2.8 x 3.9 cm. There is an adjacent enlarged lymph node which has increased in size measuring 1.6 cm on today's exam. There are other shotty appearing mediastinal lymph nodes which have not changed significantly in size. There is a small pericardial effusion and trace bilateral pleural effusions. Lung windows reveal scarring and fibrosis in the left upper lobe. There is a small, 4 mm nodule in the periphery of the left upper lobe. There is patchy airspace disease in the right middle lobe which is felt to be infectious or inflammatory in nature. There are no suspicious pulmonary nodules. Bone windows reveal no lytic or destructive lesions.  ABDOMEN: There are hypodense lesions in the right lobe of the liver which have increased significantly in size compared to the prior exam. For example, a lesion in the right lower lobe of the liver measures 5.3 cm on today's exam and previously measured 1 cm. The adrenals, kidneys, spleen and pancreas are unremarkable. The abdominal portions of the GI tract are within normal limits. There is no free fluid or adenopathy..  PELVIS: The pelvic viscera and pelvic GI tract is unremarkable. The appendix is normal. There is no free fluid or adenopathy.      Impression: Significant interval increase in size of an upper mediastinal mass adjacent to the esophagus and hepatic lesions consistent with metastatic disease.         This study was performed with techniques to keep radiation doses as low as reasonably achievable  (ALARA). Individualized dose reduction techniques using automated exposure control or adjustment of mA and/or kV according to the patient size were employed.      This report was finalized on 2/21/2020 1:34 PM by Jyoti Jensen M.D..        ASSESSMENT: The patient is a very pleasant 64 y.o. female  with small cell lung cancer    PROBLEM LIST:  1. Small cell lung cancer from the left upper lobe.  Limited stage disease, recurrent in a mediastinal node  A.  Treated  initially with etoposide and carboplatin starting 9/13/27.  She responded to the single modality that was started because of symptoms including airway compromise.  B.  Completed combined modality chemoradiation with etoposide and carboplatin . (Chemotherapy completed 12/7/2017).  C.  Recurrence in left paratracheal node.  This was seen on CT scan from 6/19/18 and hypermetabolic on PET scan to confirm.  She's been treated with weekly taxol starting 7/17/2018 along with involved field radiation.  D. Left pleural effusion seen on CT of 11/23/18, loculated and inaccessible for thoracentesis.  E.  Progressive disease documented on scans done April 22, 2019 with increase in size of left paratracheal lymph node and right hepatic lobe mass.  F. Started carboplatin and etoposide with Tecentriq May 1, 2019, status post 8 cycles.     G.  Started Opdivo plus Yervoy November 13, 2019, status post 4 cycles  H.  Repeat scans done on February 21, 2020 revealed progressive disease  IV.  Will start Taxol 3 weeks on 1 week off March 3, 2020  2.  Multiple brain metastases:  A.  Completed whole brain radiation January 2020  3.  History of left breast cancer.  A.  Left mastectomy 7/22/2008 for stage IIIA, T3 N1 M0 breast cancer  B.  ER weakly positive, DE negative and HER-2 positive  C.  Adjuvant chemotherapy with Herceptin, Taxotere and carboplatin  D.  Postmastectomy radiation done at [facility]  E.  Adjuvant tamoxifen started 12/12/2008 and continued as  extended adjuvant therapy  4.  COPD.  5. Diabetes  6. HTN  7.  Cough  8.  Treatment induced itching 1    PLAN:  1. I did go over the scan results with the patient unfortunate she has evidence of progressive disease.  Her MRI brain is stable.  2.  This point we discussed the best appropriate next step I do recommend best supportive care with or without hospice help versus Taxol or to petechia and.  The patient is still interested in active cancer treatment.  We will do Taxol weekly 3 weeks on 1 week off.  3.  I will continue port maintenance.   4.  I will continue to monitor the patient blood work while she is on treatment including blood counts kidney function liver function and electrolytes.  5. We discussed the side effects of this regimen including alopecia, nausea, fatigue, myelosuppression, infusion reaction, neuropathy, and diarrhea and possibly death.  6. She has completed 10 years of Tamoxifen at this point.   7. Will continue inhalers as needed for COPD  8. We will continue Metformin for type 2 DM.    9. Will continue Hyzaar for HTN.  We may have to adjust her antihypertensive agent while she is in treatment.  10.  I will repeat the patient scans cycle #3.   11.  I will continue the patient on Robitussin with codeine as needed for cough.  12.  I will continue the patient on Benadryl as needed for itching.  13. If Hgb is less than 8 we will plan on giving 2 units of PRBC.   14.  We will continue potassium oral placement.  I will refill it today.    Josh Son MD  2/26/2020

## 2020-03-06 NOTE — PROGRESS NOTES
Nutrition Services    Patient Name:  Sanjana Horton  YOB: 1955  MRN: [id number]  Admit Date:  (Not on file)    RD spoke with pt. During oncology visit today. Pt. State she is not currently experiencing any nutrition issues. She state she has been seen by another RD in [place] and does not request any additional nutrition information at this time. RD did provide a recipe book from [employer]. Pt was instructed to let nursing know if she experiences any nutrition issues in the future and we can be of any assistance.     Electronically signed by:  Elham Avelar RD  03/06/20 2:51 PM

## 2020-03-06 NOTE — PROGRESS NOTES
DATE OF VISIT: 3/6/2020    REASON FOR VISIT: Followup for small cell lung cancer     HISTORY OF PRESENT ILLNESS: The patient is a very pleasant 64 y.o. female  with past medical history significant for small cell lung cancer diagnosed August 2017.  Repeated scans done on April 21, 2019 revealed progressive disease.  She was started on carboplatin and etoposide and Tecentriq April 30, 2019.  Chemo was stopped after 4 cycles and patient was started on maintenance Tecentriq. Ct scans showed progression. She will start on Yervoy and opdivo 1/13/2019.  He completed 4 cycles on February 7, 2020.  Repeated scans done February 21, 2020 revealed progressive disease.  The patient is here today for scheduled follow up visit.     SUBJECTIVE: The patient is here today with her .  She is doing fairly well.  Denies any headaches. Nausea has resolved. Denies pain. Overall, she says that she has been doing pretty good. Denies nausea and vomiting.         PAST MEDICAL HISTORY/SOCIAL HISTORY/FAMILY HISTORY: Reviewed by me and unchanged from my documentation done on 03/06/20.    Review of Systems   Constitutional: Positive for fatigue. Negative for activity change, appetite change, chills, fever and unexpected weight change.   HENT: Negative for hearing loss, mouth sores, nosebleeds, sore throat and trouble swallowing.    Eyes: Negative for visual disturbance.   Respiratory: Negative for cough, chest tightness, shortness of breath and wheezing.    Cardiovascular: Negative for chest pain, palpitations and leg swelling.   Gastrointestinal: Positive for nausea. Negative for abdominal distention, abdominal pain, blood in stool, constipation, diarrhea, rectal pain and vomiting.   Endocrine: Negative for cold intolerance and heat intolerance.   Genitourinary: Negative for difficulty urinating, dysuria, frequency and urgency.   Musculoskeletal: Positive for arthralgias and myalgias. Negative for back pain, gait problem and joint  swelling.   Skin: Negative for rash.             Neurological: Negative for dizziness, tremors, syncope, weakness, light-headedness, numbness and headaches.   Hematological: Negative for adenopathy. Does not bruise/bleed easily.   Psychiatric/Behavioral: Negative for confusion, sleep disturbance and suicidal ideas. The patient is not nervous/anxious.          Current Outpatient Medications:   •  albuterol (VENTOLIN HFA) 108 (90 Base) MCG/ACT inhaler, Inhale 2 puffs Every 4 (Four) Hours As Needed for Wheezing or Shortness of Air., Disp: 1 inhaler, Rfl: 5  •  amLODIPine (NORVASC) 5 MG tablet, Take 5 mg by mouth Daily., Disp: , Rfl: 0  •  aspirin 81 MG EC tablet, Take 81 mg by mouth Daily., Disp: , Rfl:   •  azelastine (ASTELIN) 0.1 % nasal spray, 1 spray into the nostril(s) as directed by provider 2 (Two) Times a Day As Needed for Rhinitis or Allergies. Use in each nostril as directed, Disp: 1 each, Rfl: 5  •  benzonatate (TESSALON PERLES) 100 MG capsule, Take 1-2 capsules by mouth 3 (Three) Times a Day As Needed for Cough., Disp: 120 capsule, Rfl: 5  •  Cholecalciferol (VITAMIN D3) 21433 units capsule, Take 50,000 Units by mouth Every 7 (Seven) Days., Disp: , Rfl: 3  •  hydroCHLOROthiazide (MICROZIDE) 12.5 MG capsule, TK 1 C PO QD PRN, Disp: , Rfl:   •  lidocaine-prilocaine (EMLA) 2.5-2.5 % cream, Apply  topically to the appropriate area as directed As Needed (45-60 minutes prior to port access.  Cover with saran/plastic wrap.)., Disp: 30 g, Rfl: 3  •  metFORMIN (GLUCOPHAGE) 500 MG tablet, Take 500 mg by mouth 2 (Two) Times a Day With Meals., Disp: , Rfl: 0  •  ondansetron (ZOFRAN) 8 MG tablet, Take 1 tablet by mouth 3 (Three) Times a Day As Needed for Nausea or Vomiting., Disp: 30 tablet, Rfl: 5  •  pantoprazole (PROTONIX) 40 MG EC tablet, Take 1 tablet by mouth Every 12 (Twelve) Hours., Disp: 60 tablet, Rfl: 0  •  potassium chloride (MICRO-K) 10 MEQ CR capsule, Take 2 capsules by mouth Daily., Disp: 60 capsule,  "Rfl: 2  •  Sulconazole Nitrate (ARTIFICIAL TEARS) ophthalmic ointment, Use as directed., Disp: 1 each, Rfl: 6  •  tamoxifen (NOLVADEX) 20 MG chemo tablet, Take 1 tablet by mouth Daily., Disp: 30 tablet, Rfl: 0  •  triamcinolone (KENALOG) 0.1 % ointment, Apply  topically to the appropriate area as directed 2 (Two) Times a Day. (Patient taking differently: Apply 1 application topically to the appropriate area as directed 2 (Two) Times a Day As Needed.), Disp: 30 g, Rfl: 3  •  umeclidinium-vilanterol (ANORO ELLIPTA) 62.5-25 MCG/INH aerosol powder  inhaler, Inhale 1 puff Daily., Disp: 60 each, Rfl: 11    PHYSICAL EXAMINATION:   /67   Pulse 88   Temp 98.4 °F (36.9 °C) (Temporal)   Resp 12   Ht 157.5 cm (62\")   Wt 81.2 kg (179 lb)   SpO2 98%   BMI 32.74 kg/m²    ECOG Performance Status: 1 - Symptomatic but completely ambulatory  General Appearance:  alert, cooperative, no apparent distress and appears stated age   Neurologic/Psychiatric: A&O x 3, gait steady, appropriate affect, strength 5/5 in all muscle groups   HEENT:  Normocephalic, without obvious abnormality, mucous membranes moist   Neck: Supple, symmetrical, trachea midline, no adenopathy;  No thyromegaly or tenderness. 3cm soft tissue mass noted midline lower neck   Lungs:   Clear to auscultation bilaterally; respirations regular, even, and unlabored bilaterally   Heart:  Regular rate and rhythm, no murmurs appreciated   Abdomen:   Soft, non-tender, non-distended and no organomegaly   Lymph nodes: No cervical, supraclavicular, inguinal or axillary adenopathy noted   Extremities: Normal, atraumatic; no clubbing, cyanosis, or edema    Skin: No rashes, ulcers, or suspicious lesions noted     Infusion on 03/06/2020   Component Date Value Ref Range Status   • Glucose 03/06/2020 96  65 - 99 mg/dL Final   • BUN 03/06/2020 9  8 - 23 mg/dL Final   • Creatinine 03/06/2020 0.93  0.57 - 1.00 mg/dL Final   • Sodium 03/06/2020 140  136 - 145 mmol/L Final   • " Potassium 03/06/2020 3.4* 3.5 - 5.2 mmol/L Final   • Chloride 03/06/2020 103  98 - 107 mmol/L Final   • CO2 03/06/2020 24.2  22.0 - 29.0 mmol/L Final   • Calcium 03/06/2020 10.0  8.6 - 10.5 mg/dL Final   • Total Protein 03/06/2020 7.1  6.0 - 8.5 g/dL Final   • Albumin 03/06/2020 3.80  3.50 - 5.20 g/dL Final   • ALT (SGPT) 03/06/2020 24  1 - 33 U/L Final   • AST (SGOT) 03/06/2020 35* 1 - 32 U/L Final   • Alkaline Phosphatase 03/06/2020 64  39 - 117 U/L Final   • Total Bilirubin 03/06/2020 0.6  0.2 - 1.2 mg/dL Final   • eGFR Non African Amer 03/06/2020 61  >60 mL/min/1.73 Final   • Globulin 03/06/2020 3.3  gm/dL Final    Unable to calculate Globulin   • A/G Ratio 03/06/2020 1.2  g/dL Final    Unable to calculate A/G ratio   • BUN/Creatinine Ratio 03/06/2020 9.7  7.0 - 25.0 Final   • Anion Gap 03/06/2020 12.8  5.0 - 15.0 mmol/L Final   • WBC 03/06/2020 3.34* 3.40 - 10.80 10*3/mm3 Final   • RBC 03/06/2020 3.11* 3.77 - 5.28 10*6/mm3 Final   • Hemoglobin 03/06/2020 10.3* 12.0 - 15.9 g/dL Final   • Hematocrit 03/06/2020 30.6* 34.0 - 46.6 % Final   • MCV 03/06/2020 98.4* 79.0 - 97.0 fL Final   • MCH 03/06/2020 33.1* 26.6 - 33.0 pg Final   • MCHC 03/06/2020 33.7  31.5 - 35.7 g/dL Final   • RDW 03/06/2020 13.6  12.3 - 15.4 % Final   • RDW-SD 03/06/2020 49.0  37.0 - 54.0 fl Final   • MPV 03/06/2020 9.5  6.0 - 12.0 fL Final   • Platelets 03/06/2020 108* 140 - 450 10*3/mm3 Final   • Neutrophil % 03/06/2020 55.4  42.7 - 76.0 % Final   • Lymphocyte % 03/06/2020 27.5  19.6 - 45.3 % Final   • Monocyte % 03/06/2020 9.0  5.0 - 12.0 % Final   • Eosinophil % 03/06/2020 7.2* 0.3 - 6.2 % Final   • Basophil % 03/06/2020 0.6  0.0 - 1.5 % Final   • Immature Grans % 03/06/2020 0.3  0.0 - 0.5 % Final   • Neutrophils, Absolute 03/06/2020 1.85  1.70 - 7.00 10*3/mm3 Final   • Lymphocytes, Absolute 03/06/2020 0.92  0.70 - 3.10 10*3/mm3 Final   • Monocytes, Absolute 03/06/2020 0.30  0.10 - 0.90 10*3/mm3 Final   • Eosinophils, Absolute 03/06/2020  0.24  0.00 - 0.40 10*3/mm3 Final   • Basophils, Absolute 03/06/2020 0.02  0.00 - 0.20 10*3/mm3 Final   • Immature Grans, Absolute 03/06/2020 0.01  0.00 - 0.05 10*3/mm3 Final   • nRBC 03/06/2020 0.0  0.0 - 0.2 /100 WBC Final      Ct Soft Tissue Neck With Contrast    Result Date: 2/21/2020  Narrative: PROCEDURE: CT ABDOMEN PELVIS W CONTRAST-, CT SOFT TISSUE NECK W CONTRAST-, CT CHEST W CONTRAST-  HISTORY: R/S Lung Cancer; C34.82-Malignant neoplasm of overlapping sites of left bronchus and lung; R22.1-Localized swelling, mass and lump, neck  COMPARISON: CT of the neck dated 01/14/2016 and CT of the chest, abdomen and pelvis dated 10/21/2019.  PROCEDURE: Axial images were obtained from the skull base through the pubic symphysis following the administration of Isovue-300 contrast.  FINDINGS:  NECK: There is no evidence of a mucosal lesion within the nasopharynx, oropharynx or hypopharynx. The trachea is midline. The thyroid gland is unremarkable. There is a borderline enlarged 9 mm left level two jugular chain lymph node. The major neck vasculature is patent.  CHEST: There has been interval increase in size of a mass within the middle mediastinum just within the thoracic inlet adjacent to the esophagus which measures 3.2 x 2.8 x 3.9 cm. There is an adjacent enlarged lymph node which has increased in size measuring 1.6 cm on today's exam. There are other shotty appearing mediastinal lymph nodes which have not changed significantly in size. There is a small pericardial effusion and trace bilateral pleural effusions. Lung windows reveal scarring and fibrosis in the left upper lobe. There is a small, 4 mm nodule in the periphery of the left upper lobe. There is patchy airspace disease in the right middle lobe which is felt to be infectious or inflammatory in nature. There are no suspicious pulmonary nodules. Bone windows reveal no lytic or destructive lesions.  ABDOMEN: There are hypodense lesions in the right lobe of the  liver which have increased significantly in size compared to the prior exam. For example, a lesion in the right lower lobe of the liver measures 5.3 cm on today's exam and previously measured 1 cm. The adrenals, kidneys, spleen and pancreas are unremarkable. The abdominal portions of the GI tract are within normal limits. There is no free fluid or adenopathy..  PELVIS: The pelvic viscera and pelvic GI tract is unremarkable. The appendix is normal. There is no free fluid or adenopathy.      Impression: Significant interval increase in size of an upper mediastinal mass adjacent to the esophagus and hepatic lesions consistent with metastatic disease.         This study was performed with techniques to keep radiation doses as low as reasonably achievable (ALARA). Individualized dose reduction techniques using automated exposure control or adjustment of mA and/or kV according to the patient size were employed.      This report was finalized on 2/21/2020 1:34 PM by Jyoti Jensen M.D..    Ct Chest With Contrast    Result Date: 2/21/2020  Narrative: PROCEDURE: CT ABDOMEN PELVIS W CONTRAST-, CT SOFT TISSUE NECK W CONTRAST-, CT CHEST W CONTRAST-  HISTORY: R/S Lung Cancer; C34.82-Malignant neoplasm of overlapping sites of left bronchus and lung; R22.1-Localized swelling, mass and lump, neck  COMPARISON: CT of the neck dated 01/14/2016 and CT of the chest, abdomen and pelvis dated 10/21/2019.  PROCEDURE: Axial images were obtained from the skull base through the pubic symphysis following the administration of Isovue-300 contrast.  FINDINGS:  NECK: There is no evidence of a mucosal lesion within the nasopharynx, oropharynx or hypopharynx. The trachea is midline. The thyroid gland is unremarkable. There is a borderline enlarged 9 mm left level two jugular chain lymph node. The major neck vasculature is patent.  CHEST: There has been interval increase in size of a mass within the middle mediastinum just within the thoracic  inlet adjacent to the esophagus which measures 3.2 x 2.8 x 3.9 cm. There is an adjacent enlarged lymph node which has increased in size measuring 1.6 cm on today's exam. There are other shotty appearing mediastinal lymph nodes which have not changed significantly in size. There is a small pericardial effusion and trace bilateral pleural effusions. Lung windows reveal scarring and fibrosis in the left upper lobe. There is a small, 4 mm nodule in the periphery of the left upper lobe. There is patchy airspace disease in the right middle lobe which is felt to be infectious or inflammatory in nature. There are no suspicious pulmonary nodules. Bone windows reveal no lytic or destructive lesions.  ABDOMEN: There are hypodense lesions in the right lobe of the liver which have increased significantly in size compared to the prior exam. For example, a lesion in the right lower lobe of the liver measures 5.3 cm on today's exam and previously measured 1 cm. The adrenals, kidneys, spleen and pancreas are unremarkable. The abdominal portions of the GI tract are within normal limits. There is no free fluid or adenopathy..  PELVIS: The pelvic viscera and pelvic GI tract is unremarkable. The appendix is normal. There is no free fluid or adenopathy.      Impression: Significant interval increase in size of an upper mediastinal mass adjacent to the esophagus and hepatic lesions consistent with metastatic disease.         This study was performed with techniques to keep radiation doses as low as reasonably achievable (ALARA). Individualized dose reduction techniques using automated exposure control or adjustment of mA and/or kV according to the patient size were employed.      This report was finalized on 2/21/2020 1:34 PM by Jyoti Jensen M.D..    Mri Brain With & Without Contrast    Result Date: 2/24/2020  Narrative: PROCEDURE: MRI BRAIN W WO CONTRAST-  HISTORY: Follow up brain metastasis; C34.82-Malignant neoplasm of  overlapping sites of left bronchus and lung; C79.31-Secondary malignant neoplasm of brain  PROCEDURE: Multiplanar multisequence imaging of the brain was performed both before and following the administration of 15 mL MultiHance intravenous contrast.  COMPARISON: 07/23/2018.  FINDINGS: There is generalized cerebral volume loss. There are FLAIR hyperintensities in the periventricular white matter consistent with chronic small vessel ischemic change. There is an approximately 1.4 cm enhancing lesion in the posterior right temporal lobe consistent with a metastatic lesion. There is an 8 mm enhancing lesion in the left occipital lobe also consistent with metastatic disease. There is no significant mass effect or midline shift. There is no hydrocephalus. There are no areas of restricted diffusion. No other areas of pathologic contrast enhancement are identified  The midbrain, milagro, cerebellum and craniocervical junction are unremarkable. The sella and pituitary gland are within normal limits. The major intracranial vasculature demonstrates the expected flow related signal. The paranasal sinuses are clear.      Impression: Findings consistent with metastatic disease involving the posterior right temporal and left occipital lobes.    This report was finalized on 2/24/2020 10:28 AM by Jyoti Jensen M.D..    Ct Abdomen Pelvis With Contrast    Result Date: 2/21/2020  Narrative: PROCEDURE: CT ABDOMEN PELVIS W CONTRAST-, CT SOFT TISSUE NECK W CONTRAST-, CT CHEST W CONTRAST-  HISTORY: R/S Lung Cancer; C34.82-Malignant neoplasm of overlapping sites of left bronchus and lung; R22.1-Localized swelling, mass and lump, neck  COMPARISON: CT of the neck dated 01/14/2016 and CT of the chest, abdomen and pelvis dated 10/21/2019.  PROCEDURE: Axial images were obtained from the skull base through the pubic symphysis following the administration of Isovue-300 contrast.  FINDINGS:  NECK: There is no evidence of a mucosal lesion within  the nasopharynx, oropharynx or hypopharynx. The trachea is midline. The thyroid gland is unremarkable. There is a borderline enlarged 9 mm left level two jugular chain lymph node. The major neck vasculature is patent.  CHEST: There has been interval increase in size of a mass within the middle mediastinum just within the thoracic inlet adjacent to the esophagus which measures 3.2 x 2.8 x 3.9 cm. There is an adjacent enlarged lymph node which has increased in size measuring 1.6 cm on today's exam. There are other shotty appearing mediastinal lymph nodes which have not changed significantly in size. There is a small pericardial effusion and trace bilateral pleural effusions. Lung windows reveal scarring and fibrosis in the left upper lobe. There is a small, 4 mm nodule in the periphery of the left upper lobe. There is patchy airspace disease in the right middle lobe which is felt to be infectious or inflammatory in nature. There are no suspicious pulmonary nodules. Bone windows reveal no lytic or destructive lesions.  ABDOMEN: There are hypodense lesions in the right lobe of the liver which have increased significantly in size compared to the prior exam. For example, a lesion in the right lower lobe of the liver measures 5.3 cm on today's exam and previously measured 1 cm. The adrenals, kidneys, spleen and pancreas are unremarkable. The abdominal portions of the GI tract are within normal limits. There is no free fluid or adenopathy..  PELVIS: The pelvic viscera and pelvic GI tract is unremarkable. The appendix is normal. There is no free fluid or adenopathy.      Impression: Significant interval increase in size of an upper mediastinal mass adjacent to the esophagus and hepatic lesions consistent with metastatic disease.         This study was performed with techniques to keep radiation doses as low as reasonably achievable (ALARA). Individualized dose reduction techniques using automated exposure control or  adjustment of mA and/or kV according to the patient size were employed.      This report was finalized on 2/21/2020 1:34 PM by Jyoti Jensen M.D..    CHEMOTHERAPY EDUCATION        Chemotherapy Regimen:   Treatment Plans     Name Type Plan dates Plan Provider         Active     ONCOLOGY TREATMENT  Present                     TOPICS EDUCATION PROVIDED COMMENTS   ANEMIA:  role of RBC, cause, s/s, ways to manage, role of transfusion [x]    THROMBOCYTOPENIA:  role of platelet, cause, s/s, ways to prevent bleeding, things to avoid, when to seek help [x]    NEUTROPENIA:  role of WBC, cause, infection precautions, s/s of infection, when to call MD [x]    NUTRITION & APPETITE CHANGES:  importance of maintaining healthy diet & weight, ways to manage to improve intake, dietary consult, exercise regimen [x]    DIARRHEA:  causes, s/s of dehydration, ways to manage, dietary changes, when to call MD [x]    CONSTIPATION:  causes, ways to manage, dietary changes, when to call MD [x]    NAUSEA & VOMITING:  cause, use of antiemetics, dietary changes, when to call MD [x]    MOUTH SORES:  causes, oral care, ways to manage [x]    ALOPECIA:  cause, ways to manage, resources [x]    INFERTILITY & SEXUALITY:  causes, fertility preservation options, sexuality changes, ways to manage, importance of birth control [x]    NERVOUS SYSTEM CHANGES:  causes, s/s, neuropathies, cognitive changes, ways to manage [x]    PAIN:  causes, ways to manage [x] ????   SKIN & NAIL CHANGES:  cause, s/s, ways to manage [x]    ORGAN TOXICITIES:  cause, s/s, need for diagnostic tests, labs, when to notify MD [x]    SURVIVORSHIP:  distress, distress assessment, secondary malignancies, early/late effects, follow-up, social issues, social support [x]    HOME CARE:  use of spill kits, storing of PO chemo, how to manage bodily fluids [x]    MISCELLANEOUS:  drug interactions, administration, vesicant, et [x]      0/10. Advance care plan was discussed with patient and  patient did not wish to name a surrogate decision maker or provide an advance care plan.     ASSESSMENT: The patient is a very pleasant 64 y.o. female  with small cell lung cancer    PROBLEM LIST:  1. Small cell lung cancer from the left upper lobe.  Limited stage disease, recurrent in a mediastinal node  A.  Treated  initially with etoposide and carboplatin starting 9/13/27.  She responded to the single modality that was started because of symptoms including airway compromise.  B.  Completed combined modality chemoradiation with etoposide and carboplatin . (Chemotherapy completed 12/7/2017).  C.  Recurrence in left paratracheal node.  This was seen on CT scan from 6/19/18 and hypermetabolic on PET scan to confirm.  She's been treated with weekly taxol starting 7/17/2018 along with involved field radiation.  D. Left pleural effusion seen on CT of 11/23/18, loculated and inaccessible for thoracentesis.  E.  Progressive disease documented on scans done April 22, 2019 with increase in size of left paratracheal lymph node and right hepatic lobe mass.  F. Started carboplatin and etoposide with Tecentriq May 1, 2019, status post 8 cycles.     G.  Started Opdivo plus Yervoy November 13, 2019, status post 4 cycles  H.  Repeat scans done on February 21, 2020 revealed progressive disease  I.  Started Taxol 3 weeks on 1 week off March 6, 2020  2.  Multiple brain metastases:  A.  Completed whole brain radiation January 2020  3.  History of left breast cancer.  A.  Left mastectomy 7/22/2008 for stage IIIA, T3 N1 M0 breast cancer  B.  ER weakly positive, KS negative and HER-2 positive  C.  Adjuvant chemotherapy with Herceptin, Taxotere and carboplatin  D.  Postmastectomy radiation done at [facility]  E.  Adjuvant tamoxifen started 12/12/2008 and continued as extended adjuvant therapy  4.  COPD.  5. Diabetes  6. HTN  7.  Cough  8.  Treatment induced itching 1    PLAN:  1. We again reviewed results from her scans.  We discussed that the scan results with the patient unfortunate she has evidence of progressive disease.  Her MRI brain is stable.  2.  We will proceed with Taxol today. The patient and her  is still interested in active cancer treatment.  We will do Taxol weekly 3 weeks on 1 week off.  3.  I will continue port maintenance.   4.  I will continue to monitor the patient blood work while she is on treatment including blood counts kidney function liver function and electrolytes.  5. We reviewed the side effects of this regimen including alopecia, nausea, fatigue, myelosuppression, infusion reaction, neuropathy, and diarrhea and possibly death.  6. She has completed 10 years of Tamoxifen at this point.   7. Will continue inhalers as needed for COPD  8. We will continue Metformin for type 2 DM.    9. Will continue Hyzaar for HTN.  We may have to adjust her antihypertensive agent while she is in treatment.  10.  I will repeat the patient scans cycle #3.   11.  I will continue the patient on Robitussin with codeine as needed for cough.  12.  I will continue the patient on Benadryl as needed for itching.  13. If Hgb is less than 8 we will plan on giving 2 units of PRBC.   14.  We will continue potassium oral placement.       Belkis Nayak, APRN  3/6/2020

## 2020-03-25 NOTE — PROGRESS NOTES
DATE OF VISIT: 3/25/2020    REASON FOR VISIT: Followup for small cell lung cancer     HISTORY OF PRESENT ILLNESS: The patient is a very pleasant 64 y.o. female  with past medical history significant for small cell lung cancer diagnosed August 2017.  Repeated scans done on April 21, 2019 revealed progressive disease.  She was started on carboplatin and etoposide and Tecentriq April 30, 2019.  Chemo was stopped after 4 cycles and patient was started on maintenance Tecentriq. Ct scans showed progression. She will start on Yervoy and opdivo 1/13/2019.  He completed 4 cycles on February 7, 2020.  Repeated scans done February 21, 2020 revealed progressive disease.  The patient is here today for scheduled follow up visit.     SUBJECTIVE: The patient is here today by herself.  She has been able to tolerate treatment fairly well.  She is having nausea but no vomiting.  She is complaining of cough with difficulty breathing.  No fever or chills.      PAST MEDICAL HISTORY/SOCIAL HISTORY/FAMILY HISTORY: Reviewed by me and unchanged from my documentation done on 03/25/20.    Review of Systems   Constitutional: Positive for fatigue. Negative for activity change, appetite change, chills, fever and unexpected weight change.   HENT: Negative for hearing loss, mouth sores, nosebleeds, sore throat and trouble swallowing.    Eyes: Negative for visual disturbance.   Respiratory: Positive for cough and shortness of breath. Negative for chest tightness and wheezing.    Cardiovascular: Negative for chest pain, palpitations and leg swelling.   Gastrointestinal: Positive for nausea. Negative for abdominal distention, abdominal pain, blood in stool, constipation, diarrhea, rectal pain and vomiting.   Endocrine: Negative for cold intolerance and heat intolerance.   Genitourinary: Negative for difficulty urinating, dysuria, frequency and urgency.   Musculoskeletal: Positive for arthralgias and myalgias. Negative for back pain, gait problem and  joint swelling.   Skin: Negative for rash.             Neurological: Negative for dizziness, tremors, syncope, weakness, light-headedness, numbness and headaches.   Hematological: Negative for adenopathy. Does not bruise/bleed easily.   Psychiatric/Behavioral: Negative for confusion, sleep disturbance and suicidal ideas. The patient is not nervous/anxious.          Current Outpatient Medications:   •  albuterol (VENTOLIN HFA) 108 (90 Base) MCG/ACT inhaler, Inhale 2 puffs Every 4 (Four) Hours As Needed for Wheezing or Shortness of Air., Disp: 1 inhaler, Rfl: 5  •  amLODIPine (NORVASC) 5 MG tablet, Take 5 mg by mouth Daily., Disp: , Rfl: 0  •  aspirin 81 MG EC tablet, Take 81 mg by mouth Daily., Disp: , Rfl:   •  azelastine (ASTELIN) 0.1 % nasal spray, 1 spray into the nostril(s) as directed by provider 2 (Two) Times a Day As Needed for Rhinitis or Allergies. Use in each nostril as directed, Disp: 1 each, Rfl: 5  •  benzonatate (TESSALON PERLES) 100 MG capsule, Take 1-2 capsules by mouth 3 (Three) Times a Day As Needed for Cough., Disp: 120 capsule, Rfl: 5  •  Cholecalciferol (VITAMIN D3) 43574 units capsule, Take 50,000 Units by mouth Every 7 (Seven) Days., Disp: , Rfl: 3  •  guaiFENesin (Mucinex) 600 MG 12 hr tablet, Take 1 tablet by mouth 2 (Two) Times a Day., Disp: 60 tablet, Rfl: 2  •  hydroCHLOROthiazide (MICROZIDE) 12.5 MG capsule, TK 1 C PO QD PRN, Disp: , Rfl:   •  lidocaine-prilocaine (EMLA) 2.5-2.5 % cream, Apply  topically to the appropriate area as directed As Needed (45-60 minutes prior to port access.  Cover with saran/plastic wrap.)., Disp: 30 g, Rfl: 3  •  metFORMIN (GLUCOPHAGE) 500 MG tablet, Take 500 mg by mouth 2 (Two) Times a Day With Meals., Disp: , Rfl: 0  •  ondansetron (ZOFRAN) 8 MG tablet, Take 1 tablet by mouth 3 (Three) Times a Day As Needed for Nausea or Vomiting., Disp: 30 tablet, Rfl: 5  •  pantoprazole (PROTONIX) 40 MG EC tablet, Take 1 tablet by mouth Every 12 (Twelve) Hours., Disp: 60  "tablet, Rfl: 0  •  potassium chloride (MICRO-K) 10 MEQ CR capsule, Take 2 capsules by mouth Daily., Disp: 60 capsule, Rfl: 2  •  Sulconazole Nitrate (ARTIFICIAL TEARS) ophthalmic ointment, Use as directed., Disp: 1 each, Rfl: 6  •  tamoxifen (NOLVADEX) 20 MG chemo tablet, Take 1 tablet by mouth Daily., Disp: 30 tablet, Rfl: 0  •  triamcinolone (KENALOG) 0.1 % ointment, Apply  topically to the appropriate area as directed 2 (Two) Times a Day. (Patient taking differently: Apply 1 application topically to the appropriate area as directed 2 (Two) Times a Day As Needed.), Disp: 30 g, Rfl: 3  •  umeclidinium-vilanterol (ANORO ELLIPTA) 62.5-25 MCG/INH aerosol powder  inhaler, Inhale 1 puff Daily., Disp: 60 each, Rfl: 11    PHYSICAL EXAMINATION:   /63   Pulse 90   Temp 97.6 °F (36.4 °C) (Temporal)   Resp 12   Ht 157.5 cm (62\")   Wt 79.8 kg (176 lb)   SpO2 95%   BMI 32.19 kg/m²    ECOG Performance Status: 1 - Symptomatic but completely ambulatory  General Appearance:  alert, cooperative, no apparent distress and appears stated age   Neurologic/Psychiatric: A&O x 3, gait steady, appropriate affect, strength 5/5 in all muscle groups   HEENT:  Normocephalic, without obvious abnormality, mucous membranes moist   Neck: Supple, symmetrical, trachea midline, no adenopathy;  No thyromegaly or tenderness. 3cm soft tissue mass noted midline lower neck   Lungs:   Clear to auscultation bilaterally; respirations regular, even, and unlabored bilaterally   Heart:  Regular rate and rhythm, no murmurs appreciated   Abdomen:   Soft, non-tender, non-distended and no organomegaly   Lymph nodes: No cervical, supraclavicular, inguinal or axillary adenopathy noted   Extremities: Normal, atraumatic; no clubbing, cyanosis, or edema    Skin: No rashes, ulcers, or suspicious lesions noted     Infusion on 03/20/2020   Component Date Value Ref Range Status   • WBC 03/20/2020 1.43* 3.40 - 10.80 10*3/mm3 Final   • RBC 03/20/2020 2.73* 3.77 - " 5.28 10*6/mm3 Final   • Hemoglobin 03/20/2020 9.1* 12.0 - 15.9 g/dL Final   • Hematocrit 03/20/2020 26.5* 34.0 - 46.6 % Final   • MCV 03/20/2020 97.1* 79.0 - 97.0 fL Final   • MCH 03/20/2020 33.3* 26.6 - 33.0 pg Final   • MCHC 03/20/2020 34.3  31.5 - 35.7 g/dL Final   • RDW 03/20/2020 12.8  12.3 - 15.4 % Final   • RDW-SD 03/20/2020 44.8  37.0 - 54.0 fl Final   • MPV 03/20/2020 10.2  6.0 - 12.0 fL Final   • Platelets 03/20/2020 122* 140 - 450 10*3/mm3 Final   • Neutrophil % 03/20/2020 44.7  42.7 - 76.0 % Final   • Lymphocyte % 03/20/2020 39.9  19.6 - 45.3 % Final   • Monocyte % 03/20/2020 6.3  5.0 - 12.0 % Final   • Eosinophil % 03/20/2020 7.0* 0.3 - 6.2 % Final   • Basophil % 03/20/2020 1.4  0.0 - 1.5 % Final   • Immature Grans % 03/20/2020 0.7* 0.0 - 0.5 % Final   • Neutrophils, Absolute 03/20/2020 0.64* 1.70 - 7.00 10*3/mm3 Final   • Lymphocytes, Absolute 03/20/2020 0.57* 0.70 - 3.10 10*3/mm3 Final   • Monocytes, Absolute 03/20/2020 0.09* 0.10 - 0.90 10*3/mm3 Final   • Eosinophils, Absolute 03/20/2020 0.10  0.00 - 0.40 10*3/mm3 Final   • Basophils, Absolute 03/20/2020 0.02  0.00 - 0.20 10*3/mm3 Final   • Immature Grans, Absolute 03/20/2020 0.01  0.00 - 0.05 10*3/mm3 Final   • nRBC 03/20/2020 0.0  0.0 - 0.2 /100 WBC Final   Infusion on 03/13/2020   Component Date Value Ref Range Status   • WBC 03/13/2020 2.34* 3.40 - 10.80 10*3/mm3 Final   • RBC 03/13/2020 2.85* 3.77 - 5.28 10*6/mm3 Final   • Hemoglobin 03/13/2020 9.4* 12.0 - 15.9 g/dL Final   • Hematocrit 03/13/2020 27.7* 34.0 - 46.6 % Final   • MCV 03/13/2020 97.2* 79.0 - 97.0 fL Final   • MCH 03/13/2020 33.0  26.6 - 33.0 pg Final   • MCHC 03/13/2020 33.9  31.5 - 35.7 g/dL Final   • RDW 03/13/2020 12.8  12.3 - 15.4 % Final   • RDW-SD 03/13/2020 45.5  37.0 - 54.0 fl Final   • MPV 03/13/2020 9.9  6.0 - 12.0 fL Final   • Platelets 03/13/2020 109* 140 - 450 10*3/mm3 Final   • Neutrophil % 03/13/2020 54.8  42.7 - 76.0 % Final   • Lymphocyte % 03/13/2020 34.2  19.6 -  45.3 % Final   • Monocyte % 03/13/2020 5.1  5.0 - 12.0 % Final   • Eosinophil % 03/13/2020 5.1  0.3 - 6.2 % Final   • Basophil % 03/13/2020 0.4  0.0 - 1.5 % Final   • Immature Grans % 03/13/2020 0.4  0.0 - 0.5 % Final   • Neutrophils, Absolute 03/13/2020 1.28* 1.70 - 7.00 10*3/mm3 Final   • Lymphocytes, Absolute 03/13/2020 0.80  0.70 - 3.10 10*3/mm3 Final   • Monocytes, Absolute 03/13/2020 0.12  0.10 - 0.90 10*3/mm3 Final   • Eosinophils, Absolute 03/13/2020 0.12  0.00 - 0.40 10*3/mm3 Final   • Basophils, Absolute 03/13/2020 0.01  0.00 - 0.20 10*3/mm3 Final   • Immature Grans, Absolute 03/13/2020 0.01  0.00 - 0.05 10*3/mm3 Final   • nRBC 03/13/2020 0.0  0.0 - 0.2 /100 WBC Final      No results found.     Advance care plan was discussed with patient and patient did not wish to name a surrogate decision maker or provide an advance care plan.     ASSESSMENT: The patient is a very pleasant 64 y.o. female  with small cell lung cancer    PROBLEM LIST:  1. Small cell lung cancer from the left upper lobe.  Limited stage disease, recurrent in a mediastinal node  A.  Treated  initially with etoposide and carboplatin starting 9/13/27.  She responded to the single modality that was started because of symptoms including airway compromise.  B.  Completed combined modality chemoradiation with etoposide and carboplatin . (Chemotherapy completed 12/7/2017).  C.  Recurrence in left paratracheal node.  This was seen on CT scan from 6/19/18 and hypermetabolic on PET scan to confirm.  She's been treated with weekly taxol starting 7/17/2018 along with involved field radiation.  D. Left pleural effusion seen on CT of 11/23/18, loculated and inaccessible for thoracentesis.  E.  Progressive disease documented on scans done April 22, 2019 with increase in size of left paratracheal lymph node and right hepatic lobe mass.  F. Started carboplatin and etoposide with Tecentriq May 1, 2019, status post 8 cycles.     G.  Started Opdivo plus Yervoy  November 13, 2019, status post 4 cycles  H.  Repeat scans done on February 21, 2020 revealed progressive disease  I.  Started Taxol 3 weeks on 1 week off March 6, 2020, status post 1 cycle  2.  Multiple brain metastases:  A.  Completed whole brain radiation January 2020  3.  History of left breast cancer.  A.  Left mastectomy 7/22/2008 for stage IIIA, T3 N1 M0 breast cancer  B.  ER weakly positive, MI negative and HER-2 positive  C.  Adjuvant chemotherapy with Herceptin, Taxotere and carboplatin  D.  Postmastectomy [facility] done at [facility]  E.  Adjuvant tamoxifen started 12/12/2008 and continued as extended adjuvant therapy  4.  COPD.  5. Diabetes  6. HTN  7.  Cough  8.  Treatment induced itching 1    PLAN:  1.  I will proceed with treatment today using Taxol weekly 3 weeks on 1 week off, cycle #2.  2.  Also the patient Mucinex twice a day for cough.  3.  I will continue port maintenance.   4.  I will continue to monitor the patient blood work while she is on treatment including blood counts kidney function liver function and electrolytes.  5. We reviewed the side effects of this regimen including alopecia, nausea, fatigue, myelosuppression, infusion reaction, neuropathy, and diarrhea and possibly death.  6. She has completed 10 years of Tamoxifen at this point.   7. Will continue inhalers as needed for COPD  8. We will continue Metformin for type 2 DM.    9. Will continue Hyzaar for HTN.  We may have to adjust her antihypertensive agent while she is in treatment.  10.  I will repeat the patient scans prior to cycle #3.   11.  I will continue the patient on Robitussin with codeine as needed for cough.  12.  I will continue the patient on Benadryl as needed for itching.  13. If Hgb is less than 8 we will plan on giving 2 units of PRBC.   14.  We will continue potassium oral placement.       Josh Son MD  3/25/2020

## 2020-04-08 NOTE — PROGRESS NOTES
DATE OF VISIT: 4/8/2020    REASON FOR VISIT: Followup for small cell lung cancer     HISTORY OF PRESENT ILLNESS: The patient is a very pleasant 64 y.o. female  with past medical history significant for small cell lung cancer diagnosed August 2017.  Repeated scans done on April 21, 2019 revealed progressive disease.  She was started on carboplatin and etoposide and Tecentriq April 30, 2019.  Chemo was stopped after 4 cycles and patient was started on maintenance Tecentriq. Ct scans showed progression. She will start on Yervoy and opdivo 1/13/2019.  He completed 4 cycles on February 7, 2020.  Repeated scans done February 21, 2020 revealed progressive disease.  The patient is here today for scheduled follow up visit.     SUBJECTIVE: The patient is here today by herself.  She has been able to tolerate treatment fairly well.  Nausea is controlled.  She is complaining of cough with difficulty breathing at times, but she feels she is getting better. Appetite is better.  No fever or chills.      PAST MEDICAL HISTORY/SOCIAL HISTORY/FAMILY HISTORY: Reviewed by me and unchanged from my documentation done on 04/08/20.    Review of Systems   Constitutional: Positive for fatigue. Negative for activity change, appetite change, chills, fever and unexpected weight change.   HENT: Negative for hearing loss, mouth sores, nosebleeds, sore throat and trouble swallowing.    Eyes: Negative for visual disturbance.   Respiratory: Positive for cough and shortness of breath. Negative for chest tightness and wheezing.    Cardiovascular: Negative for chest pain, palpitations and leg swelling.   Gastrointestinal: Positive for nausea. Negative for abdominal distention, abdominal pain, blood in stool, constipation, diarrhea, rectal pain and vomiting.   Endocrine: Negative for cold intolerance and heat intolerance.   Genitourinary: Negative for difficulty urinating, dysuria, frequency and urgency.   Musculoskeletal: Positive for arthralgias and  myalgias. Negative for back pain, gait problem and joint swelling.   Skin: Negative for rash.             Neurological: Negative for dizziness, tremors, syncope, weakness, light-headedness, numbness and headaches.   Hematological: Negative for adenopathy. Does not bruise/bleed easily.   Psychiatric/Behavioral: Negative for confusion, sleep disturbance and suicidal ideas. The patient is not nervous/anxious.          Current Outpatient Medications:   •  albuterol (VENTOLIN HFA) 108 (90 Base) MCG/ACT inhaler, Inhale 2 puffs Every 4 (Four) Hours As Needed for Wheezing or Shortness of Air., Disp: 1 inhaler, Rfl: 5  •  amLODIPine (NORVASC) 5 MG tablet, Take 5 mg by mouth Daily., Disp: , Rfl: 0  •  aspirin 81 MG EC tablet, Take 81 mg by mouth Daily., Disp: , Rfl:   •  azelastine (ASTELIN) 0.1 % nasal spray, 1 spray into the nostril(s) as directed by provider 2 (Two) Times a Day As Needed for Rhinitis or Allergies. Use in each nostril as directed, Disp: 1 each, Rfl: 5  •  benzonatate (TESSALON PERLES) 100 MG capsule, Take 1-2 capsules by mouth 3 (Three) Times a Day As Needed for Cough., Disp: 120 capsule, Rfl: 5  •  Cholecalciferol (VITAMIN D3) 76798 units capsule, Take 50,000 Units by mouth Every 7 (Seven) Days., Disp: , Rfl: 3  •  guaiFENesin (Mucinex) 600 MG 12 hr tablet, Take 1 tablet by mouth 2 (Two) Times a Day., Disp: 60 tablet, Rfl: 2  •  hydroCHLOROthiazide (MICROZIDE) 12.5 MG capsule, TK 1 C PO QD PRN, Disp: , Rfl:   •  lidocaine-prilocaine (EMLA) 2.5-2.5 % cream, Apply  topically to the appropriate area as directed As Needed (45-60 minutes prior to port access.  Cover with saran/plastic wrap.)., Disp: 30 g, Rfl: 3  •  metFORMIN (GLUCOPHAGE) 500 MG tablet, Take 500 mg by mouth 2 (Two) Times a Day With Meals., Disp: , Rfl: 0  •  ondansetron (ZOFRAN) 8 MG tablet, Take 1 tablet by mouth 3 (Three) Times a Day As Needed for Nausea or Vomiting., Disp: 30 tablet, Rfl: 5  •  pantoprazole (PROTONIX) 40 MG EC tablet, Take 1  "tablet by mouth Every 12 (Twelve) Hours., Disp: 60 tablet, Rfl: 0  •  potassium chloride (K-DUR) 10 MEQ CR tablet, Take 2 tablets by mouth Daily., Disp: 60 tablet, Rfl: 5  •  Sulconazole Nitrate (ARTIFICIAL TEARS) ophthalmic ointment, Use as directed., Disp: 1 each, Rfl: 6  •  tamoxifen (NOLVADEX) 20 MG chemo tablet, Take 1 tablet by mouth Daily., Disp: 30 tablet, Rfl: 0  •  triamcinolone (KENALOG) 0.1 % ointment, Apply  topically to the appropriate area as directed 2 (Two) Times a Day. (Patient taking differently: Apply 1 application topically to the appropriate area as directed 2 (Two) Times a Day As Needed.), Disp: 30 g, Rfl: 3  •  umeclidinium-vilanterol (ANORO ELLIPTA) 62.5-25 MCG/INH aerosol powder  inhaler, Inhale 1 puff Daily., Disp: 60 each, Rfl: 11    PHYSICAL EXAMINATION:   /64   Pulse 93   Temp 97.4 °F (36.3 °C) (Temporal)   Resp 12   Ht 157.5 cm (62\")   Wt 78.9 kg (174 lb)   SpO2 99%   BMI 31.83 kg/m²    ECOG Performance Status: 1 - Symptomatic but completely ambulatory  General Appearance:  alert, cooperative, no apparent distress and appears stated age   Neurologic/Psychiatric: A&O x 3, gait steady, appropriate affect, strength 5/5 in all muscle groups   HEENT:  Normocephalic, without obvious abnormality, mucous membranes moist   Neck: Supple, symmetrical, trachea midline, no adenopathy;  No thyromegaly or tenderness. 3cm soft tissue mass noted midline lower neck   Lungs:   Clear to auscultation bilaterally; respirations regular, even, and unlabored bilaterally   Heart:  Regular rate and rhythm, no murmurs appreciated   Abdomen:   Soft, non-tender, non-distended and no organomegaly   Lymph nodes: No cervical, supraclavicular, inguinal or axillary adenopathy noted   Extremities: Normal, atraumatic; no clubbing, cyanosis, or edema    Skin: No rashes, ulcers, or suspicious lesions noted     Infusion on 04/03/2020   Component Date Value Ref Range Status   • Glucose 04/03/2020 98  65 - 99 " mg/dL Final   • BUN 04/03/2020 11  8 - 23 mg/dL Final   • Creatinine 04/03/2020 0.74  0.57 - 1.00 mg/dL Final   • Sodium 04/03/2020 141  136 - 145 mmol/L Final   • Potassium 04/03/2020 4.0  3.5 - 5.2 mmol/L Final   • Chloride 04/03/2020 106  98 - 107 mmol/L Final   • CO2 04/03/2020 24.3  22.0 - 29.0 mmol/L Final   • Calcium 04/03/2020 9.9  8.6 - 10.5 mg/dL Final   • Total Protein 04/03/2020 6.9  6.0 - 8.5 g/dL Final   • Albumin 04/03/2020 3.70  3.50 - 5.20 g/dL Final   • ALT (SGPT) 04/03/2020 28  1 - 33 U/L Final   • AST (SGOT) 04/03/2020 27  1 - 32 U/L Final   • Alkaline Phosphatase 04/03/2020 58  39 - 117 U/L Final   • Total Bilirubin 04/03/2020 0.3  0.2 - 1.2 mg/dL Final   • eGFR Non African Amer 04/03/2020 80  >60 mL/min/1.73 Final   • Globulin 04/03/2020 3.2  gm/dL Final   • A/G Ratio 04/03/2020 1.2  g/dL Final   • BUN/Creatinine Ratio 04/03/2020 14.9  7.0 - 25.0 Final   • Anion Gap 04/03/2020 10.7  5.0 - 15.0 mmol/L Final   • WBC 04/03/2020 3.34* 3.40 - 10.80 10*3/mm3 Final   • RBC 04/03/2020 2.85* 3.77 - 5.28 10*6/mm3 Final   • Hemoglobin 04/03/2020 9.3* 12.0 - 15.9 g/dL Final   • Hematocrit 04/03/2020 28.3* 34.0 - 46.6 % Final   • MCV 04/03/2020 99.3* 79.0 - 97.0 fL Final   • MCH 04/03/2020 32.6  26.6 - 33.0 pg Final   • MCHC 04/03/2020 32.9  31.5 - 35.7 g/dL Final   • RDW 04/03/2020 13.0  12.3 - 15.4 % Final   • RDW-SD 04/03/2020 46.1  37.0 - 54.0 fl Final   • MPV 04/03/2020 9.7  6.0 - 12.0 fL Final   • Platelets 04/03/2020 116* 140 - 450 10*3/mm3 Final   • Neutrophil % 04/03/2020 63.8  42.7 - 76.0 % Final   • Lymphocyte % 04/03/2020 27.8  19.6 - 45.3 % Final   • Monocyte % 04/03/2020 4.8* 5.0 - 12.0 % Final   • Eosinophil % 04/03/2020 2.4  0.3 - 6.2 % Final   • Basophil % 04/03/2020 0.6  0.0 - 1.5 % Final   • Immature Grans % 04/03/2020 0.6* 0.0 - 0.5 % Final   • Neutrophils, Absolute 04/03/2020 2.13  1.70 - 7.00 10*3/mm3 Final   • Lymphocytes, Absolute 04/03/2020 0.93  0.70 - 3.10 10*3/mm3 Final   •  Monocytes, Absolute 04/03/2020 0.16  0.10 - 0.90 10*3/mm3 Final   • Eosinophils, Absolute 04/03/2020 0.08  0.00 - 0.40 10*3/mm3 Final   • Basophils, Absolute 04/03/2020 0.02  0.00 - 0.20 10*3/mm3 Final   • Immature Grans, Absolute 04/03/2020 0.02  0.00 - 0.05 10*3/mm3 Final   • nRBC 04/03/2020 0.0  0.0 - 0.2 /100 WBC Final   Infusion on 03/27/2020   Component Date Value Ref Range Status   • Glucose 03/27/2020 103* 65 - 99 mg/dL Final   • BUN 03/27/2020 14  8 - 23 mg/dL Final   • Creatinine 03/27/2020 0.90  0.57 - 1.00 mg/dL Final   • Sodium 03/27/2020 139  136 - 145 mmol/L Final   • Potassium 03/27/2020 3.7  3.5 - 5.2 mmol/L Final   • Chloride 03/27/2020 103  98 - 107 mmol/L Final   • CO2 03/27/2020 24.2  22.0 - 29.0 mmol/L Final   • Calcium 03/27/2020 10.1  8.6 - 10.5 mg/dL Final   • Total Protein 03/27/2020 7.2  6.0 - 8.5 g/dL Final   • Albumin 03/27/2020 3.90  3.50 - 5.20 g/dL Final   • ALT (SGPT) 03/27/2020 30  1 - 33 U/L Final   • AST (SGOT) 03/27/2020 35* 1 - 32 U/L Final   • Alkaline Phosphatase 03/27/2020 59  39 - 117 U/L Final   • Total Bilirubin 03/27/2020 0.6  0.2 - 1.2 mg/dL Final   • eGFR Non African Amer 03/27/2020 63  >60 mL/min/1.73 Final   • Globulin 03/27/2020 3.3  gm/dL Final   • A/G Ratio 03/27/2020 1.2  g/dL Final   • BUN/Creatinine Ratio 03/27/2020 15.6  7.0 - 25.0 Final   • Anion Gap 03/27/2020 11.8  5.0 - 15.0 mmol/L Final   • WBC 03/27/2020 3.44  3.40 - 10.80 10*3/mm3 Final   • RBC 03/27/2020 2.95* 3.77 - 5.28 10*6/mm3 Final   • Hemoglobin 03/27/2020 9.7* 12.0 - 15.9 g/dL Final   • Hematocrit 03/27/2020 29.1* 34.0 - 46.6 % Final   • MCV 03/27/2020 98.6* 79.0 - 97.0 fL Final   • MCH 03/27/2020 32.9  26.6 - 33.0 pg Final   • MCHC 03/27/2020 33.3  31.5 - 35.7 g/dL Final   • RDW 03/27/2020 13.2  12.3 - 15.4 % Final   • RDW-SD 03/27/2020 47.1  37.0 - 54.0 fl Final   • MPV 03/27/2020 9.4  6.0 - 12.0 fL Final   • Platelets 03/27/2020 119* 140 - 450 10*3/mm3 Final   • Neutrophil % 03/27/2020 64.6   42.7 - 76.0 % Final   • Lymphocyte % 03/27/2020 22.1  19.6 - 45.3 % Final   • Monocyte % 03/27/2020 11.0  5.0 - 12.0 % Final   • Eosinophil % 03/27/2020 1.7  0.3 - 6.2 % Final   • Basophil % 03/27/2020 0.3  0.0 - 1.5 % Final   • Immature Grans % 03/27/2020 0.3  0.0 - 0.5 % Final   • Neutrophils, Absolute 03/27/2020 2.22  1.70 - 7.00 10*3/mm3 Final   • Lymphocytes, Absolute 03/27/2020 0.76  0.70 - 3.10 10*3/mm3 Final   • Monocytes, Absolute 03/27/2020 0.38  0.10 - 0.90 10*3/mm3 Final   • Eosinophils, Absolute 03/27/2020 0.06  0.00 - 0.40 10*3/mm3 Final   • Basophils, Absolute 03/27/2020 0.01  0.00 - 0.20 10*3/mm3 Final   • Immature Grans, Absolute 03/27/2020 0.01  0.00 - 0.05 10*3/mm3 Final   • nRBC 03/27/2020 0.0  0.0 - 0.2 /100 WBC Final      No results found.     Advance care plan was discussed with patient and patient did not wish to name a surrogate decision maker or provide an advance care plan.     ASSESSMENT: The patient is a very pleasant 64 y.o. female  with small cell lung cancer    PROBLEM LIST:  1. Small cell lung cancer from the left upper lobe.  Limited stage disease, recurrent in a mediastinal node  A.  Treated  initially with etoposide and carboplatin starting 9/13/27.  She responded to the single modality that was started because of symptoms including airway compromise.  B.  Completed combined modality chemoradiation with etoposide and carboplatin . (Chemotherapy completed 12/7/2017).  C.  Recurrence in left paratracheal node.  This was seen on CT scan from 6/19/18 and hypermetabolic on PET scan to confirm.  She's been treated with weekly taxol starting 7/17/2018 along with involved field radiation.  D. Left pleural effusion seen on CT of 11/23/18, loculated and inaccessible for thoracentesis.  E.  Progressive disease documented on scans done April 22, 2019 with increase in size of left paratracheal lymph node and right hepatic lobe mass.  F. Started carboplatin and etoposide with Tecentriq May 1,  2019, status post 8 cycles.     G.  Started Opdivo plus Yervoy November 13, 2019, status post 4 cycles  H.  Repeat scans done on February 21, 2020 revealed progressive disease  I.  Started Taxol 3 weeks on 1 week off March 6, 2020, status post 1 cycle  2.  Multiple brain metastases:  A.  Completed whole brain radiation January 2020  3.  History of left breast cancer.  A.  Left mastectomy 7/22/2008 for stage IIIA, T3 N1 M0 breast cancer  B.  ER weakly positive, AL negative and HER-2 positive  C.  Adjuvant chemotherapy with Herceptin, Taxotere and carboplatin  D.  Postmastectomy radiation done at [facility]  E.  Adjuvant tamoxifen started 12/12/2008 and continued as extended adjuvant therapy  4.  COPD.  5. Diabetes  6. HTN  7.  Cough  8.  Treatment induced itching 1    PLAN:  1.  I will proceed with treatment today using Taxol weekly 3 weeks on 1 week off, cycle #2 day 15 on Friday.  2.  The patient will continue Mucinex twice a day for cough.  3.  I will continue port maintenance.   4.  I will continue to monitor the patient blood work while she is on treatment including blood counts kidney function liver function and electrolytes.  5. We reviewed the side effects of this regimen including alopecia, nausea, fatigue, myelosuppression, infusion reaction, neuropathy, and diarrhea and possibly death.  6. She has completed 10 years of Tamoxifen at this point.   7. Will continue inhalers as needed for COPD  8. We will continue Metformin for type 2 DM.    9. Will continue Hyzaar for HTN.  We may have to adjust her antihypertensive agent while she is in treatment.  10.  I will repeat the patient scans prior to cycle #3.  I will order prior to return.    11.  I will continue the patient on Robitussin with codeine as needed for cough.  12.  I will continue the patient on Benadryl as needed for itching.  13. If Hgb is less than 8 we will plan on giving 2 units of PRBC.   14.  We will continue potassium oral  placement.       Belkis Nayak, APRN  4/8/2020

## 2020-04-22 NOTE — PROGRESS NOTES
DATE OF VISIT: 4/22/2020    REASON FOR VISIT: Followup for small cell lung cancer     HISTORY OF PRESENT ILLNESS: The patient is a very pleasant 64 y.o. female  with past medical history significant for small cell lung cancer diagnosed August 2017.  Repeated scans done on April 21, 2019 revealed progressive disease.  She was started on carboplatin and etoposide and Tecentriq April 30, 2019.  Chemo was stopped after 4 cycles and patient was started on maintenance Tecentriq. Ct scans showed progression. She will start on Yervoy and opdivo 1/13/2019.  He completed 4 cycles on February 7, 2020.  Repeated scans done February 21, 2020 revealed progressive disease.  She was started on weekly Taxol 3 weeks on 1 week off March 4, 2020.  The patient is here today for scheduled follow up visit with treatment cycle #3, day 1.     SUBJECTIVE: The patient is here today by herself.  All in all she has been doing fairly well.  She had no neuropathy.  She has been compliant with her treatment.  She is anxious about the scan results.    PAST MEDICAL HISTORY/SOCIAL HISTORY/FAMILY HISTORY: Reviewed by me and unchanged from my documentation done on 04/22/20.    Review of Systems   Constitutional: Positive for fatigue. Negative for activity change, appetite change, chills, fever and unexpected weight change.   HENT: Negative for hearing loss, mouth sores, nosebleeds, sore throat and trouble swallowing.    Eyes: Negative for visual disturbance.   Respiratory: Positive for cough and shortness of breath. Negative for chest tightness and wheezing.    Cardiovascular: Negative for chest pain, palpitations and leg swelling.   Gastrointestinal: Positive for nausea. Negative for abdominal distention, abdominal pain, blood in stool, constipation, diarrhea, rectal pain and vomiting.   Endocrine: Negative for cold intolerance and heat intolerance.   Genitourinary: Negative for difficulty urinating, dysuria, frequency and urgency.   Musculoskeletal:  Positive for arthralgias and myalgias. Negative for back pain, gait problem and joint swelling.   Skin: Negative for rash.             Neurological: Negative for dizziness, tremors, syncope, weakness, light-headedness, numbness and headaches.   Hematological: Negative for adenopathy. Does not bruise/bleed easily.   Psychiatric/Behavioral: Negative for confusion, sleep disturbance and suicidal ideas. The patient is not nervous/anxious.          Current Outpatient Medications:   •  albuterol (VENTOLIN HFA) 108 (90 Base) MCG/ACT inhaler, Inhale 2 puffs Every 4 (Four) Hours As Needed for Wheezing or Shortness of Air., Disp: 1 inhaler, Rfl: 5  •  amLODIPine (NORVASC) 5 MG tablet, Take 5 mg by mouth Daily., Disp: , Rfl: 0  •  aspirin 81 MG EC tablet, Take 81 mg by mouth Daily., Disp: , Rfl:   •  azelastine (ASTELIN) 0.1 % nasal spray, 1 spray into the nostril(s) as directed by provider 2 (Two) Times a Day As Needed for Rhinitis or Allergies. Use in each nostril as directed, Disp: 1 each, Rfl: 5  •  benzonatate (TESSALON PERLES) 100 MG capsule, Take 1-2 capsules by mouth 3 (Three) Times a Day As Needed for Cough., Disp: 120 capsule, Rfl: 5  •  Cholecalciferol (VITAMIN D3) 33024 units capsule, Take 50,000 Units by mouth Every 7 (Seven) Days., Disp: , Rfl: 3  •  guaiFENesin (Mucinex) 600 MG 12 hr tablet, Take 1 tablet by mouth 2 (Two) Times a Day., Disp: 60 tablet, Rfl: 2  •  hydroCHLOROthiazide (MICROZIDE) 12.5 MG capsule, TK 1 C PO QD PRN, Disp: , Rfl:   •  lidocaine-prilocaine (EMLA) 2.5-2.5 % cream, Apply  topically to the appropriate area as directed As Needed (45-60 minutes prior to port access.  Cover with saran/plastic wrap.)., Disp: 30 g, Rfl: 3  •  metFORMIN (GLUCOPHAGE) 500 MG tablet, Take 500 mg by mouth 2 (Two) Times a Day With Meals., Disp: , Rfl: 0  •  ondansetron (ZOFRAN) 8 MG tablet, Take 1 tablet by mouth 3 (Three) Times a Day As Needed for Nausea or Vomiting., Disp: 30 tablet, Rfl: 5  •  pantoprazole  "(PROTONIX) 40 MG EC tablet, Take 1 tablet by mouth Every 12 (Twelve) Hours., Disp: 60 tablet, Rfl: 0  •  potassium chloride (K-DUR) 10 MEQ CR tablet, Take 2 tablets by mouth Daily., Disp: 60 tablet, Rfl: 5  •  Sulconazole Nitrate (ARTIFICIAL TEARS) ophthalmic ointment, Use as directed., Disp: 1 each, Rfl: 6  •  tamoxifen (NOLVADEX) 20 MG chemo tablet, Take 1 tablet by mouth Daily., Disp: 30 tablet, Rfl: 0  •  triamcinolone (KENALOG) 0.1 % ointment, Apply  topically to the appropriate area as directed 2 (Two) Times a Day. (Patient taking differently: Apply 1 application topically to the appropriate area as directed 2 (Two) Times a Day As Needed.), Disp: 30 g, Rfl: 3  •  umeclidinium-vilanterol (ANORO ELLIPTA) 62.5-25 MCG/INH aerosol powder  inhaler, Inhale 1 puff Daily., Disp: 60 each, Rfl: 11    PHYSICAL EXAMINATION:   /68   Pulse 87   Temp 97.5 °F (36.4 °C) (Temporal)   Resp 18   Ht 157.5 cm (62\")   Wt 77.2 kg (170 lb 1.6 oz)   SpO2 99%   BMI 31.11 kg/m²    ECOG Performance Status: 1 - Symptomatic but completely ambulatory  General Appearance:  alert, cooperative, no apparent distress and appears stated age   Neurologic/Psychiatric: A&O x 3, gait steady, appropriate affect, strength 5/5 in all muscle groups   HEENT:  Normocephalic, without obvious abnormality, mucous membranes moist   Neck: Supple, symmetrical, trachea midline, no adenopathy;  No thyromegaly or tenderness. 3cm soft tissue mass noted midline lower neck   Lungs:   Clear to auscultation bilaterally; respirations regular, even, and unlabored bilaterally   Heart:  Regular rate and rhythm, no murmurs appreciated   Abdomen:   Soft, non-tender, non-distended and no organomegaly   Lymph nodes: No cervical, supraclavicular, inguinal or axillary adenopathy noted   Extremities: Normal, atraumatic; no clubbing, cyanosis, or edema    Skin: No rashes, ulcers, or suspicious lesions noted     Infusion on 04/10/2020   Component Date Value Ref Range " Status   • WBC 04/10/2020 1.96* 3.40 - 10.80 10*3/mm3 Final   • RBC 04/10/2020 2.85* 3.77 - 5.28 10*6/mm3 Final   • Hemoglobin 04/10/2020 9.2* 12.0 - 15.9 g/dL Final   • Hematocrit 04/10/2020 28.0* 34.0 - 46.6 % Final   • MCV 04/10/2020 98.2* 79.0 - 97.0 fL Final   • MCH 04/10/2020 32.3  26.6 - 33.0 pg Final   • MCHC 04/10/2020 32.9  31.5 - 35.7 g/dL Final   • RDW 04/10/2020 13.2  12.3 - 15.4 % Final   • RDW-SD 04/10/2020 46.6  37.0 - 54.0 fl Final   • MPV 04/10/2020 9.7  6.0 - 12.0 fL Final   • Platelets 04/10/2020 130* 140 - 450 10*3/mm3 Final   • Neutrophil % 04/10/2020 45.4  42.7 - 76.0 % Final   • Lymphocyte % 04/10/2020 40.3  19.6 - 45.3 % Final   • Monocyte % 04/10/2020 8.2  5.0 - 12.0 % Final   • Eosinophil % 04/10/2020 4.1  0.3 - 6.2 % Final   • Basophil % 04/10/2020 1.0  0.0 - 1.5 % Final   • Immature Grans % 04/10/2020 1.0* 0.0 - 0.5 % Final   • Neutrophils, Absolute 04/10/2020 0.89* 1.70 - 7.00 10*3/mm3 Final   • Lymphocytes, Absolute 04/10/2020 0.79  0.70 - 3.10 10*3/mm3 Final   • Monocytes, Absolute 04/10/2020 0.16  0.10 - 0.90 10*3/mm3 Final   • Eosinophils, Absolute 04/10/2020 0.08  0.00 - 0.40 10*3/mm3 Final   • Basophils, Absolute 04/10/2020 0.02  0.00 - 0.20 10*3/mm3 Final   • Immature Grans, Absolute 04/10/2020 0.02  0.00 - 0.05 10*3/mm3 Final   • nRBC 04/10/2020 0.0  0.0 - 0.2 /100 WBC Final      Ct Soft Tissue Neck With Contrast    Result Date: 4/20/2020  Narrative: PROCEDURE: CT ABDOMEN PELVIS W CONTRAST-, CT SOFT TISSUE NECK W CONTRAST-, CT CHEST W CONTRAST-  HISTORY: Follow up Small cell lung cancer from the left upper lobe; C22.7-Other specified carcinomas of liver; C77.1-Secondary and unspecified malignant neoplasm of intrathoracic lymph nodes; C34.82-Malignant neoplasm of overlapping sites of left bronchus and lung; C34.90-Malignant neoplasm of unspecified part of unspecified bronchus or lung  COMPARISON: 02/21/2020.  PROCEDURE: Axial images were obtained from the neck through the pubic  symphysis following the administration of Isovue 300  contrast.   FINDINGS:  NECK: There are borderline enlarged jugular chain lymph nodes which are unchanged from the prior exam. There is a small hypodense lesion in the right lobe of the thyroid.  CHEST: There has been interval decrease in size of a middle mediastinal mass adjacent to the esophagus and posterior to the trachea when compared to the prior exam. There is an adjacent enlarged 13 mm lymph node which has slightly decreased in size. Heart size is normal. There is a small pericardial effusion. There is persistent scarring and fibrosis in the left upper lobe. There is no significant change in a 4 mm noncalcified peripheral nodule in the left upper lobe. No new or enlarging pulmonary nodules are identified.  Bone windows reveal no lytic or destructive lesions.  ABDOMEN: There is a 3.8 cm hypodense lesion in the right lobe of the liver consistent with metastatic disease. This has decreased in size since the prior exam. There are no new or enlarging hepatic lesions. The spleen is unremarkable. No adrenal mass is present.  The pancreas is normal. The kidneys are unremarkable. The aorta is normal in caliber. There is no free fluid or adenopathy. The abdominal portions of the GI tract are unremarkable.  PELVIS: The appendix is normal. The urinary bladder is unremarkable. There is a small amount of free fluid in the pelvis. Bone windows reveal no lytic or destructive lesions.      Impression: Overall, interval improvement in multiple sites of metastatic disease as described consistent with treatment response.  This study was performed with techniques to keep radiation doses as low as reasonably achievable (ALARA). Individualized dose reduction techniques using automated exposure control or adjustment of mA and/or kV according to the patient size were employed.     Images were reviewed, interpreted, and dictated by Dr. Jyoti Jensen M.D. Transcribed by Felicity  JUAN Monsivais.  This report was finalized on 4/20/2020 1:46 PM by Jyoti Jensen M.D..    Ct Chest With Contrast    Result Date: 4/20/2020  Narrative: PROCEDURE: CT ABDOMEN PELVIS W CONTRAST-, CT SOFT TISSUE NECK W CONTRAST-, CT CHEST W CONTRAST-  HISTORY: Follow up Small cell lung cancer from the left upper lobe; C22.7-Other specified carcinomas of liver; C77.1-Secondary and unspecified malignant neoplasm of intrathoracic lymph nodes; C34.82-Malignant neoplasm of overlapping sites of left bronchus and lung; C34.90-Malignant neoplasm of unspecified part of unspecified bronchus or lung  COMPARISON: 02/21/2020.  PROCEDURE: Axial images were obtained from the neck through the pubic symphysis following the administration of Isovue 300  contrast.   FINDINGS:  NECK: There are borderline enlarged jugular chain lymph nodes which are unchanged from the prior exam. There is a small hypodense lesion in the right lobe of the thyroid.  CHEST: There has been interval decrease in size of a middle mediastinal mass adjacent to the esophagus and posterior to the trachea when compared to the prior exam. There is an adjacent enlarged 13 mm lymph node which has slightly decreased in size. Heart size is normal. There is a small pericardial effusion. There is persistent scarring and fibrosis in the left upper lobe. There is no significant change in a 4 mm noncalcified peripheral nodule in the left upper lobe. No new or enlarging pulmonary nodules are identified.  Bone windows reveal no lytic or destructive lesions.  ABDOMEN: There is a 3.8 cm hypodense lesion in the right lobe of the liver consistent with metastatic disease. This has decreased in size since the prior exam. There are no new or enlarging hepatic lesions. The spleen is unremarkable. No adrenal mass is present.  The pancreas is normal. The kidneys are unremarkable. The aorta is normal in caliber. There is no free fluid or adenopathy. The abdominal portions of the GI tract  are unremarkable.  PELVIS: The appendix is normal. The urinary bladder is unremarkable. There is a small amount of free fluid in the pelvis. Bone windows reveal no lytic or destructive lesions.      Impression: Overall, interval improvement in multiple sites of metastatic disease as described consistent with treatment response.  This study was performed with techniques to keep radiation doses as low as reasonably achievable (ALARA). Individualized dose reduction techniques using automated exposure control or adjustment of mA and/or kV according to the patient size were employed.     Images were reviewed, interpreted, and dictated by Dr. Jyoti Jensen M.D. Transcribed by Felicity Monsivais PA-C.  This report was finalized on 4/20/2020 1:46 PM by Jyoti Jensen M.D..    Mri Brain With & Without Contrast    Result Date: 4/20/2020  Narrative: PROCEDURE: MRI BRAIN W WO CONTRAST-  HISTORY: Follow up metastatic small cell lung cancer; C22.7-Other specified carcinomas of liver; C77.1-Secondary and unspecified malignant neoplasm of intrathoracic lymph nodes; C34.82-Malignant neoplasm of overlapping sites of left bronchus and lung; C34.90-Malignant neoplasm of unspecified part of unspecified bronchus or lung; C79.31-Secondary malignant neoplasm of brain  PROCEDURE: Multiplanar multisequence imaging of the brain was performed without and with the use of intravenous contrast.  COMPARISON: None.  FINDINGS: The previously noted area of enhancement in the left occipital lobe is no longer identified. There is no change in an enhancing lesion in the medial right temporal lobe. No new enhancing lesions are identified.  There are chronic small vessel ischemic changes. There is no mass, mass effect or midline shift. There is no hydrocephalus. There are no areas of restricted diffusion.  The midbrain, milagro, cerebellum and craniocervical junction are unremarkable. The sella and pituitary gland are within normal limits. The major  intracranial vasculature demonstrates the expected flow related signal. The paranasal sinuses are clear.      Impression: 1. Previously noted area of enhancement in the left occipital lobe is no longer identified. 2. Stable enhancing lesion in the medial right temporal lobe. 3. No new enhancing lesion identified.   Images were reviewed, interpreted, and dictated by Dr. Jyoti Jensen M.D. Transcribed by Felicity Monsivais PA-C.  This report was finalized on 4/20/2020 1:14 PM by Jyoti Jensen M.D..    Ct Abdomen Pelvis With Contrast    Result Date: 4/20/2020  Narrative: PROCEDURE: CT ABDOMEN PELVIS W CONTRAST-, CT SOFT TISSUE NECK W CONTRAST-, CT CHEST W CONTRAST-  HISTORY: Follow up Small cell lung cancer from the left upper lobe; C22.7-Other specified carcinomas of liver; C77.1-Secondary and unspecified malignant neoplasm of intrathoracic lymph nodes; C34.82-Malignant neoplasm of overlapping sites of left bronchus and lung; C34.90-Malignant neoplasm of unspecified part of unspecified bronchus or lung  COMPARISON: 02/21/2020.  PROCEDURE: Axial images were obtained from the neck through the pubic symphysis following the administration of Isovue 300  contrast.   FINDINGS:  NECK: There are borderline enlarged jugular chain lymph nodes which are unchanged from the prior exam. There is a small hypodense lesion in the right lobe of the thyroid.  CHEST: There has been interval decrease in size of a middle mediastinal mass adjacent to the esophagus and posterior to the trachea when compared to the prior exam. There is an adjacent enlarged 13 mm lymph node which has slightly decreased in size. Heart size is normal. There is a small pericardial effusion. There is persistent scarring and fibrosis in the left upper lobe. There is no significant change in a 4 mm noncalcified peripheral nodule in the left upper lobe. No new or enlarging pulmonary nodules are identified.  Bone windows reveal no lytic or destructive lesions.   ABDOMEN: There is a 3.8 cm hypodense lesion in the right lobe of the liver consistent with metastatic disease. This has decreased in size since the prior exam. There are no new or enlarging hepatic lesions. The spleen is unremarkable. No adrenal mass is present.  The pancreas is normal. The kidneys are unremarkable. The aorta is normal in caliber. There is no free fluid or adenopathy. The abdominal portions of the GI tract are unremarkable.  PELVIS: The appendix is normal. The urinary bladder is unremarkable. There is a small amount of free fluid in the pelvis. Bone windows reveal no lytic or destructive lesions.      Impression: Overall, interval improvement in multiple sites of metastatic disease as described consistent with treatment response.  This study was performed with techniques to keep radiation doses as low as reasonably achievable (ALARA). Individualized dose reduction techniques using automated exposure control or adjustment of mA and/or kV according to the patient size were employed.     Images were reviewed, interpreted, and dictated by Dr. Jyoti Jensen M.D. Transcribed by Felicity Monsivais PA-C.  This report was finalized on 4/20/2020 1:46 PM by Jyoti Jensen M.D..       Advance care plan was discussed with patient and patient did not wish to name a surrogate decision maker or provide an advance care plan.     ASSESSMENT: The patient is a very pleasant 64 y.o. female  with small cell lung cancer    PROBLEM LIST:  1. Small cell lung cancer from the left upper lobe.  Limited stage disease, recurrent in a mediastinal node  A.  Treated  initially with etoposide and carboplatin starting 9/13/27.  She responded to the single modality that was started because of symptoms including airway compromise.  B.  Completed combined modality chemoradiation with etoposide and carboplatin . (Chemotherapy completed 12/7/2017).  C.  Recurrence in left paratracheal node.  This was seen on CT scan from 6/19/18  and hypermetabolic on PET scan to confirm.  She's been treated with weekly taxol starting 7/17/2018 along with involved field radiation.  D. Left pleural effusion seen on CT of 11/23/18, loculated and inaccessible for thoracentesis.  E.  Progressive disease documented on scans done April 22, 2019 with increase in size of left paratracheal lymph node and right hepatic lobe mass.  F. Started carboplatin and etoposide with Tecentriq May 1, 2019, status post 8 cycles.     G.  Started Opdivo plus Yervoy November 13, 2019, status post 4 cycles  H.  Repeat scans done on February 21, 2020 revealed progressive disease  I.  Started Taxol 3 weeks on 1 week off March 6, 2020, status post 2 cycles  2.  Multiple brain metastases:  A.  Completed whole brain radiation January 2020  3.  History of left breast cancer.  A.  Left mastectomy 7/22/2008 for stage IIIA, T3 N1 M0 breast cancer  B.  ER weakly positive, NJ negative and HER-2 positive  C.  Adjuvant chemotherapy with Herceptin, Taxotere and carboplatin  D.  Postmastectomy radiation done at [facility]  E.  Adjuvant tamoxifen started 12/12/2008 and continued as extended adjuvant therapy  4.  COPD.  5. Diabetes  6. HTN  7.  Cough  8.  Treatment induced itching 1    PLAN:  1.  I will proceed with treatment today using Taxol weekly 3 weeks on 1 week off, cycle #3 day 1 on Friday.  2.  The patient will continue Mucinex twice a day for cough.  3.  I will continue port maintenance.   4.  I will continue to monitor the patient blood work while she is on treatment including blood counts kidney function liver function and electrolytes.  5. We reviewed the side effects of this regimen including alopecia, nausea, fatigue, myelosuppression, infusion reaction, neuropathy, and diarrhea and possibly death.  6. She has completed 10 years of Tamoxifen at this point.   7. Will continue inhalers as needed for COPD  8. We will continue Metformin for type 2 DM.    9. Will continue  Hyzaar for HTN.  We may have to adjust her antihypertensive agent while she is in treatment.  10.  I did go over the scan results with the patient reassured she had good response to treatment.  No evidence of new O disease.  We will do 3 months follow-up study which will be due July 2020 or after cycle #5.    11.  I will continue the patient on Robitussin with codeine as needed for cough.  12.  I will continue the patient on Benadryl as needed for itching.  13. If Hgb is less than 8 we will plan on giving 2 units of PRBC.   14.  We will continue potassium oral placement.       Josh Son MD  4/22/2020

## 2020-05-20 NOTE — PROGRESS NOTES
DATE OF VISIT: 5/20/2020    REASON FOR VISIT: Followup for small cell lung cancer     HISTORY OF PRESENT ILLNESS: The patient is a very pleasant 64 y.o. female  with past medical history significant for small cell lung cancer diagnosed August 2017.  Repeated scans done on April 21, 2019 revealed progressive disease.  She was started on carboplatin and etoposide and Tecentriq April 30, 2019.  Chemo was stopped after 4 cycles and patient was started on maintenance Tecentriq. Ct scans showed progression. She will start on Yervoy and opdivo 1/13/2019.  He completed 4 cycles on February 7, 2020.  Repeated scans done February 21, 2020 revealed progressive disease.  She was started on weekly Taxol 3 weeks on 1 week off March 4, 2020.  The patient is here today for scheduled follow up visit with treatment cycle #3, day 1.     SUBJECTIVE: The patient is here today by herself.  All in all she has been doing fairly well.  Denies neuropathy.  She has been compliant with her treatment. There was an error with scheduling and missed day15 of last cycle.  She is sad today. She just found our her sister is going home with hospice. Denies fevers and headaches.      PAST MEDICAL HISTORY/SOCIAL HISTORY/FAMILY HISTORY: Reviewed by me and unchanged from my documentation done on 05/20/20.    Review of Systems   Constitutional: Positive for fatigue. Negative for activity change, appetite change, chills, fever and unexpected weight change.   HENT: Negative for hearing loss, mouth sores, nosebleeds, sore throat and trouble swallowing.    Eyes: Negative for visual disturbance.   Respiratory: Positive for cough and shortness of breath. Negative for chest tightness and wheezing.    Cardiovascular: Negative for chest pain, palpitations and leg swelling.   Gastrointestinal: Positive for nausea. Negative for abdominal distention, abdominal pain, blood in stool, constipation, diarrhea, rectal pain and vomiting.   Endocrine: Negative for cold  intolerance and heat intolerance.   Genitourinary: Negative for difficulty urinating, dysuria, frequency and urgency.   Musculoskeletal: Positive for arthralgias and myalgias. Negative for back pain, gait problem and joint swelling.   Skin: Negative for rash.             Neurological: Negative for dizziness, tremors, syncope, weakness, light-headedness, numbness and headaches.   Hematological: Negative for adenopathy. Does not bruise/bleed easily.   Psychiatric/Behavioral: Negative for confusion, sleep disturbance and suicidal ideas. The patient is not nervous/anxious.          Current Outpatient Medications:   •  albuterol (VENTOLIN HFA) 108 (90 Base) MCG/ACT inhaler, Inhale 2 puffs Every 4 (Four) Hours As Needed for Wheezing or Shortness of Air., Disp: 1 inhaler, Rfl: 5  •  amLODIPine (NORVASC) 5 MG tablet, Take 5 mg by mouth Daily., Disp: , Rfl: 0  •  aspirin 81 MG EC tablet, Take 81 mg by mouth Daily., Disp: , Rfl:   •  azelastine (ASTELIN) 0.1 % nasal spray, 1 spray into the nostril(s) as directed by provider 2 (Two) Times a Day As Needed for Rhinitis or Allergies. Use in each nostril as directed, Disp: 1 each, Rfl: 5  •  benzonatate (TESSALON PERLES) 100 MG capsule, Take 1-2 capsules by mouth 3 (Three) Times a Day As Needed for Cough., Disp: 120 capsule, Rfl: 5  •  Cholecalciferol (VITAMIN D3) 74515 units capsule, Take 50,000 Units by mouth Every 7 (Seven) Days., Disp: , Rfl: 3  •  guaiFENesin (Mucinex) 600 MG 12 hr tablet, Take 1 tablet by mouth 2 (Two) Times a Day., Disp: 60 tablet, Rfl: 2  •  hydroCHLOROthiazide (MICROZIDE) 12.5 MG capsule, TK 1 C PO QD PRN, Disp: , Rfl:   •  lidocaine-prilocaine (EMLA) 2.5-2.5 % cream, Apply  topically to the appropriate area as directed As Needed (45-60 minutes prior to port access.  Cover with saran/plastic wrap.)., Disp: 30 g, Rfl: 3  •  metFORMIN (GLUCOPHAGE) 500 MG tablet, Take 500 mg by mouth 2 (Two) Times a Day With Meals., Disp: , Rfl: 0  •  ondansetron (ZOFRAN) 8  MG tablet, Take 1 tablet by mouth 3 (Three) Times a Day As Needed for Nausea or Vomiting., Disp: 30 tablet, Rfl: 5  •  pantoprazole (PROTONIX) 40 MG EC tablet, Take 1 tablet by mouth Every 12 (Twelve) Hours., Disp: 60 tablet, Rfl: 0  •  potassium chloride (K-DUR) 10 MEQ CR tablet, Take 2 tablets by mouth Daily., Disp: 60 tablet, Rfl: 5  •  Sulconazole Nitrate (ARTIFICIAL TEARS) ophthalmic ointment, Use as directed., Disp: 1 each, Rfl: 6  •  tamoxifen (NOLVADEX) 20 MG chemo tablet, Take 1 tablet by mouth Daily., Disp: 30 tablet, Rfl: 0  •  triamcinolone (KENALOG) 0.1 % ointment, Apply  topically to the appropriate area as directed 2 (Two) Times a Day. (Patient taking differently: Apply 1 application topically to the appropriate area as directed 2 (Two) Times a Day As Needed.), Disp: 30 g, Rfl: 3  •  umeclidinium-vilanterol (ANORO ELLIPTA) 62.5-25 MCG/INH aerosol powder  inhaler, Inhale 1 puff Daily., Disp: 60 each, Rfl: 11    PHYSICAL EXAMINATION:   BP (!) 187/86   Pulse 94   Temp 97 °F (36.1 °C)   Wt 79.1 kg (174 lb 6.4 oz)   SpO2 98%   BMI 31.90 kg/m²    ECOG Performance Status: 1 - Symptomatic but completely ambulatory  General Appearance:  alert, cooperative, no apparent distress and appears stated age   Neurologic/Psychiatric: A&O x 3, gait steady, appropriate affect, strength 5/5 in all muscle groups   HEENT:  Normocephalic, without obvious abnormality, mucous membranes moist   Neck: Supple, symmetrical, trachea midline, no adenopathy;  No thyromegaly or tenderness. 3cm soft tissue mass noted midline lower neck   Lungs:   Clear to auscultation bilaterally; respirations regular, even, and unlabored bilaterally   Heart:  Regular rate and rhythm, no murmurs appreciated   Abdomen:   Soft, non-tender, non-distended and no organomegaly   Lymph nodes: No cervical, supraclavicular, inguinal or axillary adenopathy noted   Extremities: Normal, atraumatic; no clubbing, cyanosis, or edema    Skin: No rashes, ulcers,  or suspicious lesions noted     No visits with results within 2 Week(s) from this visit.   Latest known visit with results is:   Infusion on 05/01/2020   Component Date Value Ref Range Status   • WBC 05/01/2020 3.92  3.40 - 10.80 10*3/mm3 Final   • RBC 05/01/2020 2.87* 3.77 - 5.28 10*6/mm3 Final   • Hemoglobin 05/01/2020 9.3* 12.0 - 15.9 g/dL Final   • Hematocrit 05/01/2020 28.1* 34.0 - 46.6 % Final   • MCV 05/01/2020 97.9* 79.0 - 97.0 fL Final   • MCH 05/01/2020 32.4  26.6 - 33.0 pg Final   • MCHC 05/01/2020 33.1  31.5 - 35.7 g/dL Final   • RDW 05/01/2020 13.8  12.3 - 15.4 % Final   • RDW-SD 05/01/2020 49.0  37.0 - 54.0 fl Final   • MPV 05/01/2020 9.8  6.0 - 12.0 fL Final   • Platelets 05/01/2020 122* 140 - 450 10*3/mm3 Final   • Neutrophil % 05/01/2020 69.4  42.7 - 76.0 % Final   • Lymphocyte % 05/01/2020 23.2  19.6 - 45.3 % Final   • Monocyte % 05/01/2020 5.1  5.0 - 12.0 % Final   • Eosinophil % 05/01/2020 1.3  0.3 - 6.2 % Final   • Basophil % 05/01/2020 0.5  0.0 - 1.5 % Final   • Immature Grans % 05/01/2020 0.5  0.0 - 0.5 % Final   • Neutrophils, Absolute 05/01/2020 2.72  1.70 - 7.00 10*3/mm3 Final   • Lymphocytes, Absolute 05/01/2020 0.91  0.70 - 3.10 10*3/mm3 Final   • Monocytes, Absolute 05/01/2020 0.20  0.10 - 0.90 10*3/mm3 Final   • Eosinophils, Absolute 05/01/2020 0.05  0.00 - 0.40 10*3/mm3 Final   • Basophils, Absolute 05/01/2020 0.02  0.00 - 0.20 10*3/mm3 Final   • Immature Grans, Absolute 05/01/2020 0.02  0.00 - 0.05 10*3/mm3 Final   • nRBC 05/01/2020 0.0  0.0 - 0.2 /100 WBC Final      No results found.     Advance care plan was discussed with patient and patient did not wish to name a surrogate decision maker or provide an advance care plan.     ASSESSMENT: The patient is a very pleasant 64 y.o. female  with small cell lung cancer    PROBLEM LIST:  1. Small cell lung cancer from the left upper lobe.  Limited stage disease, recurrent in a mediastinal node  A.  Treated  initially with etoposide and  carboplatin starting 9/13/27.  She responded to the single modality that was started because of symptoms including airway compromise.  B.  Completed combined modality chemoradiation with etoposide and carboplatin . (Chemotherapy completed 12/7/2017).  C.  Recurrence in left paratracheal node.  This was seen on CT scan from 6/19/18 and hypermetabolic on PET scan to confirm.  She's been treated with weekly taxol starting 7/17/2018 along with involved field radiation.  D. Left pleural effusion seen on CT of 11/23/18, loculated and inaccessible for thoracentesis.  E.  Progressive disease documented on scans done April 22, 2019 with increase in size of left paratracheal lymph node and right hepatic lobe mass.  F. Started carboplatin and etoposide with Tecentriq May 1, 2019, status post 8 cycles.     G.  Started Opdivo plus Yervoy November 13, 2019, status post 4 cycles  H.  Repeat scans done on February 21, 2020 revealed progressive disease  I.  Started Taxol 3 weeks on 1 week off March 6, 2020, status post 2 cycles  2.  Multiple brain metastases:  A.  Completed whole brain radiation January 2020  3.  History of left breast cancer.  A.  Left mastectomy 7/22/2008 for stage IIIA, T3 N1 M0 breast cancer  B.  ER weakly positive, RI negative and HER-2 positive  C.  Adjuvant chemotherapy with Herceptin, Taxotere and carboplatin  D.  Postmastectomy radiation done at [facility]  E.  Adjuvant tamoxifen started 12/12/2008 and continued as extended adjuvant therapy  4.  COPD.  5. Diabetes  6. HTN  7.  Cough  8.  Treatment induced itching 1    PLAN:  1.  I will proceed with treatment on Friday with Taxol weekly 3 weeks on 1 week off, cycle #4 day 1 on Friday. Day 15 was missed on cycle 3 for Cycle #3 due to scheduling error. We will move forward with cycle #4 day 1.  2.  The patient will continue Mucinex twice a day for cough.  3.  I will continue port maintenance.   4.  I will continue to monitor the patient blood  work while she is on treatment including blood counts kidney function liver function and electrolytes.  5. We reviewed the side effects of this regimen including alopecia, nausea, fatigue, myelosuppression, infusion reaction, neuropathy, and diarrhea and possibly death.  6. She has completed 10 years of Tamoxifen at this point.   7. Will continue inhalers as needed for COPD  8. We will continue Metformin for type 2 DM.    9. Will continue Hyzaar for HTN.  We may have to adjust her antihypertensive agent while she is in treatment.  10.   We will do 3 months follow-up study which will be due July 2020 or after cycle #5.    11.  I will continue the patient on Robitussin with codeine as needed for cough.  12.  I will continue the patient on Benadryl as needed for itching.  13. If Hgb is less than 8 we will plan on giving 2 units of PRBC.   14.  We will continue potassium oral placement.       Belkis Nayak, APRN  5/20/2020

## 2020-05-22 NOTE — CODE DOCUMENTATION
Pt BP is elevated today.  States her PCP took her off all her BP meds because she was running so low.  Pt is however still on HCTZ but did not take it this morning because she was coming here.  Instructed pt to take as soon as possible and monitor BP and contact her PCP.

## 2020-06-17 NOTE — PROGRESS NOTES
DATE OF VISIT: 6/17/2020    REASON FOR VISIT: Followup for small cell lung cancer     HISTORY OF PRESENT ILLNESS: The patient is a very pleasant 64 y.o. female  with past medical history significant for small cell lung cancer diagnosed August 2017.  Repeated scans done on April 21, 2019 revealed progressive disease.  She was started on carboplatin and etoposide and Tecentriq April 30, 2019.  Chemo was stopped after 4 cycles and patient was started on maintenance Tecentriq. Ct scans showed progression. She will start on Yervoy and opdivo 1/13/2019.  He completed 4 cycles on February 7, 2020.  Repeated scans done February 21, 2020 revealed progressive disease.  She was started on weekly Taxol 3 weeks on 1 week off March 4, 2020.  The patient is here today for scheduled follow up visit with treatment cycle #5, day 1.     SUBJECTIVE: The patient is here today by herself.  She has been able to tolerate treatment without any serious side effects.  She denies any numbness or tingling.  She is complaining of mild fatigue.  Her nausea is under control.    PAST MEDICAL HISTORY/SOCIAL HISTORY/FAMILY HISTORY: Reviewed by me and unchanged from my documentation done on 06/17/20.    Review of Systems   Constitutional: Positive for fatigue. Negative for activity change, appetite change, chills, fever and unexpected weight change.   HENT: Negative for hearing loss, mouth sores, nosebleeds, sore throat and trouble swallowing.    Eyes: Negative for visual disturbance.   Respiratory: Positive for cough and shortness of breath. Negative for chest tightness and wheezing.    Cardiovascular: Negative for chest pain, palpitations and leg swelling.   Gastrointestinal: Positive for nausea. Negative for abdominal distention, abdominal pain, blood in stool, constipation, diarrhea, rectal pain and vomiting.   Endocrine: Negative for cold intolerance and heat intolerance.   Genitourinary: Negative for difficulty urinating, dysuria, frequency and  urgency.   Musculoskeletal: Positive for arthralgias and myalgias. Negative for back pain, gait problem and joint swelling.   Skin: Negative for rash.             Neurological: Negative for dizziness, tremors, syncope, weakness, light-headedness, numbness and headaches.   Hematological: Negative for adenopathy. Does not bruise/bleed easily.   Psychiatric/Behavioral: Negative for confusion, sleep disturbance and suicidal ideas. The patient is not nervous/anxious.          Current Outpatient Medications:   •  albuterol (VENTOLIN HFA) 108 (90 Base) MCG/ACT inhaler, Inhale 2 puffs Every 4 (Four) Hours As Needed for Wheezing or Shortness of Air., Disp: 1 inhaler, Rfl: 5  •  amLODIPine (NORVASC) 5 MG tablet, Take 5 mg by mouth Daily., Disp: , Rfl: 0  •  aspirin 81 MG EC tablet, Take 81 mg by mouth Daily., Disp: , Rfl:   •  azelastine (ASTELIN) 0.1 % nasal spray, 1 spray into the nostril(s) as directed by provider 2 (Two) Times a Day As Needed for Rhinitis or Allergies. Use in each nostril as directed, Disp: 1 each, Rfl: 5  •  benzonatate (TESSALON PERLES) 100 MG capsule, Take 1-2 capsules by mouth 3 (Three) Times a Day As Needed for Cough., Disp: 120 capsule, Rfl: 5  •  Cholecalciferol (VITAMIN D3) 80699 units capsule, Take 50,000 Units by mouth Every 7 (Seven) Days., Disp: , Rfl: 3  •  guaiFENesin (Mucinex) 600 MG 12 hr tablet, Take 1 tablet by mouth 2 (Two) Times a Day., Disp: 60 tablet, Rfl: 2  •  lidocaine-prilocaine (EMLA) 2.5-2.5 % cream, Apply  topically to the appropriate area as directed As Needed (45-60 minutes prior to port access.  Cover with saran/plastic wrap.)., Disp: 30 g, Rfl: 3  •  losartan-hydrochlorothiazide (HYZAAR) 100-12.5 MG per tablet, , Disp: , Rfl:   •  metFORMIN (GLUCOPHAGE) 500 MG tablet, Take 500 mg by mouth 2 (Two) Times a Day With Meals., Disp: , Rfl: 0  •  ondansetron (ZOFRAN) 8 MG tablet, Take 1 tablet by mouth 3 (Three) Times a Day As Needed for Nausea or Vomiting., Disp: 30 tablet, Rfl:  "5  •  pantoprazole (PROTONIX) 40 MG EC tablet, Take 1 tablet by mouth Every 12 (Twelve) Hours., Disp: 60 tablet, Rfl: 0  •  potassium chloride (K-DUR) 10 MEQ CR tablet, Take 2 tablets by mouth Daily., Disp: 60 tablet, Rfl: 5  •  Sulconazole Nitrate (ARTIFICIAL TEARS) ophthalmic ointment, Use as directed., Disp: 1 each, Rfl: 6  •  tamoxifen (NOLVADEX) 20 MG chemo tablet, Take 1 tablet by mouth Daily., Disp: 30 tablet, Rfl: 0  •  triamcinolone (KENALOG) 0.1 % ointment, Apply  topically to the appropriate area as directed 2 (Two) Times a Day. (Patient taking differently: Apply 1 application topically to the appropriate area as directed 2 (Two) Times a Day As Needed.), Disp: 30 g, Rfl: 3  •  umeclidinium-vilanterol (ANORO ELLIPTA) 62.5-25 MCG/INH aerosol powder  inhaler, Inhale 1 puff Daily., Disp: 60 each, Rfl: 11    PHYSICAL EXAMINATION:   /70   Pulse 89   Temp 98.4 °F (36.9 °C) (Temporal)   Resp 16   Ht 157.5 cm (62\")   Wt 79.4 kg (175 lb)   SpO2 97%   BMI 32.01 kg/m²    ECOG Performance Status: 1 - Symptomatic but completely ambulatory  General Appearance:  alert, cooperative, no apparent distress and appears stated age   Neurologic/Psychiatric: A&O x 3, gait steady, appropriate affect, strength 5/5 in all muscle groups   HEENT:  Normocephalic, without obvious abnormality, mucous membranes moist   Neck: Supple, symmetrical, trachea midline, no adenopathy;  No thyromegaly or tenderness. 3cm soft tissue mass noted midline lower neck   Lungs:   Clear to auscultation bilaterally; respirations regular, even, and unlabored bilaterally   Heart:  Regular rate and rhythm, no murmurs appreciated   Abdomen:   Soft, non-tender, non-distended and no organomegaly   Lymph nodes: No cervical, supraclavicular, inguinal or axillary adenopathy noted   Extremities: Normal, atraumatic; no clubbing, cyanosis, or edema    Skin: No rashes, ulcers, or suspicious lesions noted     Infusion on 06/05/2020   Component Date Value " Ref Range Status   • WBC 06/05/2020 2.18* 3.40 - 10.80 10*3/mm3 Final   • RBC 06/05/2020 2.89* 3.77 - 5.28 10*6/mm3 Final   • Hemoglobin 06/05/2020 9.3* 12.0 - 15.9 g/dL Final   • Hematocrit 06/05/2020 28.2* 34.0 - 46.6 % Final   • MCV 06/05/2020 97.6* 79.0 - 97.0 fL Final   • MCH 06/05/2020 32.2  26.6 - 33.0 pg Final   • MCHC 06/05/2020 33.0  31.5 - 35.7 g/dL Final   • RDW 06/05/2020 13.8  12.3 - 15.4 % Final   • RDW-SD 06/05/2020 48.6  37.0 - 54.0 fl Final   • MPV 06/05/2020 9.9  6.0 - 12.0 fL Final   • Platelets 06/05/2020 120* 140 - 450 10*3/mm3 Final   • Neutrophil % 06/05/2020 52.2  42.7 - 76.0 % Final   • Lymphocyte % 06/05/2020 39.0  19.6 - 45.3 % Final   • Monocyte % 06/05/2020 4.6* 5.0 - 12.0 % Final   • Eosinophil % 06/05/2020 3.2  0.3 - 6.2 % Final   • Basophil % 06/05/2020 0.5  0.0 - 1.5 % Final   • Immature Grans % 06/05/2020 0.5  0.0 - 0.5 % Final   • Neutrophils, Absolute 06/05/2020 1.14* 1.70 - 7.00 10*3/mm3 Final   • Lymphocytes, Absolute 06/05/2020 0.85  0.70 - 3.10 10*3/mm3 Final   • Monocytes, Absolute 06/05/2020 0.10  0.10 - 0.90 10*3/mm3 Final   • Eosinophils, Absolute 06/05/2020 0.07  0.00 - 0.40 10*3/mm3 Final   • Basophils, Absolute 06/05/2020 0.01  0.00 - 0.20 10*3/mm3 Final   • Immature Grans, Absolute 06/05/2020 0.01  0.00 - 0.05 10*3/mm3 Final   • nRBC 06/05/2020 0.0  0.0 - 0.2 /100 WBC Final      No results found.     Advance care plan was discussed with patient and patient did not wish to name a surrogate decision maker or provide an advance care plan.     ASSESSMENT: The patient is a very pleasant 64 y.o. female  with small cell lung cancer    PROBLEM LIST:  1. Small cell lung cancer from the left upper lobe.  Limited stage disease, recurrent in a mediastinal node  A.  Treated  initially with etoposide and carboplatin starting 9/13/27.  She responded to the single modality that was started because of symptoms including airway compromise.  B.  Completed combined modality  chemoradiation with etoposide and carboplatin . (Chemotherapy completed 12/7/2017).  C.  Recurrence in left paratracheal node.  This was seen on CT scan from 6/19/18 and hypermetabolic on PET scan to confirm.  She's been treated with weekly taxol starting 7/17/2018 along with involved field radiation.  D. Left pleural effusion seen on CT of 11/23/18, loculated and inaccessible for thoracentesis.  E.  Progressive disease documented on scans done April 22, 2019 with increase in size of left paratracheal lymph node and right hepatic lobe mass.  F. Started carboplatin and etoposide with Tecentriq May 1, 2019, status post 8 cycles.     G.  Started Opdivo plus Yervoy November 13, 2019, status post 4 cycles  H.  Repeat scans done on February 21, 2020 revealed progressive disease  I.  Started Taxol 3 weeks on 1 week off March 6, 2020, status post 4 cycles  2.  Multiple brain metastases:  A.  Completed whole brain radiation January 2020  3.  History of left breast cancer.  A.  Left mastectomy 7/22/2008 for stage IIIA, T3 N1 M0 breast cancer  B.  ER weakly positive, MI negative and HER-2 positive  C.  Adjuvant chemotherapy with Herceptin, Taxotere and carboplatin  D.  Postmastectomy radiation done at [facility]  E.  Adjuvant tamoxifen started 12/12/2008 and continued as extended adjuvant therapy  4.  COPD.  5. Diabetes  6. HTN  7.  Cough  8.  Treatment induced itching 1    PLAN:  1.  I will proceed with treatment on Friday with Taxol weekly 3 weeks on 1 week off, cycle #5 day 1 on Friday.   2.  The patient will continue Mucinex twice a day for cough.  3.  I will continue port maintenance.   4.  I will continue to monitor the patient blood work while she is on treatment including blood counts kidney function liver function and electrolytes.  5. We reviewed the side effects of this regimen including alopecia, nausea, fatigue, myelosuppression, infusion reaction, neuropathy, and diarrhea and possibly death.  6.  She has completed 10 years of Tamoxifen at this point.   7. Will continue inhalers as needed for COPD  8. We will continue Metformin for type 2 DM.    9. Will continue Hyzaar for HTN.  We may have to adjust her antihypertensive agent while she is in treatment.  10.   We will do 3 months follow-up study which will be due July 2020 or after cycle #6.    11.  I will continue the patient on Robitussin with codeine as needed for cough.  12.  I will continue the patient on Benadryl as needed for itching.  13. If Hgb is less than 8 we will plan on giving 2 units of PRBC.   14.  We will continue potassium oral placement.       Josh Son MD  6/17/2020

## 2020-06-26 NOTE — PROGRESS NOTES
ONCOLOGY URGENT CARE CLINIC VISIT    Sanjana Horton  [id number]  1955    Chief Complaint:  Rash    History of present illness:  Sanjana Horton is a 64 y.o. year old female who is currently undergoing treatment for metastatic small cell lung cancer .     She presents today with complaints of rash on the lower left flank. This started about 2 days ago and is getting worse.  It does itch. No pain. Another area has recently started to form on her left abdomen. She says the rash has gotten worse over the past 24 hours and nothing really has helped. She has tried lotion and she thinks a steroid cream.     Oncology History:       Malignant neoplasm of overlapping sites of left lung (CMS/HCC)    10/26/2017 Initial Diagnosis     Malignant neoplasm of overlapping sites of left lung      11/13/2017 - 12/26/2017 Radiation     Radiation OncologyTreatment Course:  Sanjana Horton received 6000 cGy in 30 fractions to LEFT lung via External Beam Radiation - EBRT.      8/13/2018 - 8/20/2018 Radiation     Radiation OncologyTreatment Course:  Sanjana Horton received 2500 cGy in 5 fractions to lung mass and nodes via Stereotactic Radiation Therapy - SRT.      4/30/2019 -  Chemotherapy     OP LUNG Atezolizumab / CARBOplatin AUC=5 / Etoposide (SCLC)  Chemotherapy education visit 04/30/2019  Infusion to start 05/01/2019      3/6/2020 -  Chemotherapy     OP LUNG PACLitaxel 80 mg/m2 D1,8,15        Brain metastases (CMS/HCC)    12/28/2019 Initial Diagnosis     Metastatic cancer to brain (CMS/HCC)      12/30/2019 - 1/13/2020 Radiation     Radiation OncologyTreatment Course:  Sanjana Horton received 3000 cGy in 10 fractions to whole brain via External Beam Radiation - EBRT.        Extensive stage primary small cell carcinoma of lung (CMS/HCC)    12/28/2019 Initial Diagnosis     Extensive stage primary small cell carcinoma of lung (CMS/HCC)      3/6/2020 -  Chemotherapy     OP LUNG PACLitaxel 80 mg/m2 D1,8,15         Past Medical  History:   Diagnosis Date   • Anemia    • Arthritis    • Breast cancer (CMS/HCC) 2008    LEFT BREAST - (NO STICKS/BP'S LEFT ARM PLEASE)   • Cancer (CMS/HCC)     lung also   • Diabetes mellitus (CMS/HCC)    • History of bronchitis     REPORTS HAD BRONCHITIS AROUND JUNE 2017   • History of radiation therapy 01/13/2020    Whole brain (+ remote history mediastinum, left hilum, left chest wall)   • Hot flashes     Tolerating medication well, does have occasional hot flashes but denies night sweats.   • Hypertension    • Wears partial dentures     UPPER       Past Surgical History:   Procedure Laterality Date   • BRONCHOSCOPY N/A 8/31/2017    Procedure: BRONCHOSCOPY  WITH BRUSHINGS AND BXS, ENDOBRONCHIAL ULTRASOUND , FINE NEEDLE ASPIRATION, FLUOROSCOPY;  Surgeon: Holly Granados MD;  Location: [facility] OR;  Service:    • COLONOSCOPY     • DILATATION AND CURETTAGE     • ENDOSCOPY     • MASTECTOMY Left 07/22/2008   • PORTACATH PLACEMENT N/A 4/29/2019    Procedure: INSERTION OF PORTACATH;  Surgeon: Toño Willis MD;  Location: [facility] OR;  Service: General   • TUBAL ABDOMINAL LIGATION         Family History   Problem Relation Age of Onset   • Heart disease Father    • Breast cancer Sister        Past medical history, social history and family history was reviewed and unchanged from prior visit.    Medications: The current medication list was reviewed and reconciled.     Allergies:  is allergic to codeine.    Review of Systems:    Review of Systems   Constitutional: Positive for fatigue. Negative for activity change, appetite change, diaphoresis and unexpected weight change.   HENT: Negative.    Eyes: Negative.    Respiratory: Positive for shortness of breath. Negative for cough and choking.    Cardiovascular: Negative.    Gastrointestinal: Negative for abdominal distention, abdominal pain, blood in stool, constipation, diarrhea and vomiting.   Endocrine: Negative for cold intolerance and heat intolerance.   Genitourinary:  Negative for difficulty urinating, frequency and urgency.   Musculoskeletal: Negative for arthralgias, back pain and myalgias.   Skin: Positive for rash. Negative for color change and pallor.   Allergic/Immunologic: Negative for environmental allergies and food allergies.   Neurological: Positive for weakness. Negative for dizziness, tremors, syncope, numbness and headaches.   Hematological: Negative for adenopathy. Does not bruise/bleed easily.   Psychiatric/Behavioral: Negative for agitation, behavioral problems and confusion. The patient is not nervous/anxious.        PHYSICAL EXAM:    Vitals:    06/26/20 1213   BP: 145/74   Pulse: 87   Resp: 16   Temp: 98.1 °F (36.7 °C)   Weight: 76.2 kg (168 lb)       ECOG: (1) Restricted in Physically Strenuous Activity, Ambulatory & Able to Do Work of Light Nature  General: well appearing female in no acute distress  HEENT: sclerae anicteric, oropharynx clear  Lymphatics: no cervical, supraclavicular, inguinal, or axillary adenopathy  Neck: Supple. No thyromegaly.  Cardiovascular: regular rate and rhythm, no murmurs  Lungs: clear to auscultation bilaterally. No respiratory distress  Abdomen: soft, nontender, nondistended.  No palpable organomegaly  Extremities: no lower extremity edema, cyanosis, or clubbing  Skin: Rash with single blister noted at left flank and left abdomen. no  lesions, bruising, or petechiae  Neuro: Alert and oriented x3. Moves all extremities.    Assessment and Plan:    Diagnoses and all orders for this visit:    Herpes zoster without complication    Malignant neoplasm of nipple of left breast in female, unspecified estrogen receptor status (CMS/HCC)    Other specified carcinomas of liver (CMS/HCC)    Brain metastases (CMS/HCC)    Other orders  -     Discontinue: tamoxifen (NOLVADEX) 20 MG chemo tablet; Take 1 tablet by mouth Daily.  -     valACYclovir (VALTREX) 1000 MG tablet; Take 1 tablet by mouth 3 (Three) Times a Day.        Discussion:    Ms.  Clifford is a 64 y.o. who is undergoing treatment with weekly taxol for metastatic lung cancer.     Shingles. I will order valacyclovir for shingles. We discussed that she should take three times a day for seven days. If shingles does not improve she will contact office for further evaluation.     Breast cancer. She needs  a refill on tamoxifen. I discussed with the patient that she has completed 10 years of treatment at this point and will not need to continue tamoxifen.        Belkis Nayak, APRN    6/26/2020      I spent 20 minutes with the patient. I spent > 50% percent of this time counseling and discussing symptoms, diagnostic testing, evaluation, current status and management.

## 2020-07-17 NOTE — PROGRESS NOTES
DATE OF VISIT: 7/17/2020    REASON FOR VISIT: Followup for small cell lung cancer     HISTORY OF PRESENT ILLNESS: The patient is a very pleasant 64 y.o. female  with past medical history significant for small cell lung cancer diagnosed August 2017.  Repeated scans done on April 21, 2019 revealed progressive disease.  She was started on carboplatin and etoposide and Tecentriq April 30, 2019.  Chemo was stopped after 4 cycles and patient was started on maintenance Tecentriq. Ct scans showed progression. She will start on Yervoy and opdivo 1/13/2019.  He completed 4 cycles on February 7, 2020.  Repeated scans done February 21, 2020 revealed progressive disease.  She was started on weekly Taxol 3 weeks on 1 week off March 4, 2020.  The patient is here today for scheduled follow up visit with treatment cycle #6, day 1.     SUBJECTIVE: The patient is here today by herself.  She has been able to tolerate treatment fairly well.  She has developed a rash on bilateral arms, chest, and back. At [facility] it intially improved, but has worsened with each treatment. She has used hydrocortisone cream and benadryl to rash areas. She has also used a prescription cream. None of the products helped. Nausea stable. She denies any numbness or tingling.  She is complaining of mild fatigue.  No fevers.     PAST MEDICAL HISTORY/SOCIAL HISTORY/FAMILY HISTORY: Reviewed by me and unchanged from my documentation done on 07/17/20.    Review of Systems   Constitutional: Positive for fatigue. Negative for activity change, appetite change, chills, fever and unexpected weight change.   HENT: Negative for hearing loss, mouth sores, nosebleeds, sore throat and trouble swallowing.    Eyes: Negative for visual disturbance.   Respiratory: Positive for cough and shortness of breath. Negative for chest tightness and wheezing.    Cardiovascular: Negative for chest pain, palpitations and leg swelling.   Gastrointestinal: Positive for nausea. Negative for  abdominal distention, abdominal pain, blood in stool, constipation, diarrhea, rectal pain and vomiting.   Endocrine: Negative for cold intolerance and heat intolerance.   Genitourinary: Negative for difficulty urinating, dysuria, frequency and urgency.   Musculoskeletal: Positive for arthralgias and myalgias. Negative for back pain, gait problem and joint swelling.   Skin: Positive for rash.             Neurological: Negative for dizziness, tremors, syncope, weakness, light-headedness, numbness and headaches.   Hematological: Negative for adenopathy. Does not bruise/bleed easily.   Psychiatric/Behavioral: Negative for confusion, sleep disturbance and suicidal ideas. The patient is not nervous/anxious.          Current Outpatient Medications:   •  albuterol (VENTOLIN HFA) 108 (90 Base) MCG/ACT inhaler, Inhale 2 puffs Every 4 (Four) Hours As Needed for Wheezing or Shortness of Air., Disp: 1 inhaler, Rfl: 5  •  amLODIPine (NORVASC) 5 MG tablet, Take 5 mg by mouth Daily., Disp: , Rfl: 0  •  aspirin 81 MG EC tablet, Take 81 mg by mouth Daily., Disp: , Rfl:   •  benzonatate (TESSALON PERLES) 100 MG capsule, Take 1-2 capsules by mouth 3 (Three) Times a Day As Needed for Cough., Disp: 120 capsule, Rfl: 5  •  Cholecalciferol (VITAMIN D3) 05326 units capsule, Take 50,000 Units by mouth Every 7 (Seven) Days., Disp: , Rfl: 3  •  hydroCHLOROthiazide (MICROZIDE) 12.5 MG capsule, TK 1 C PO QD PRN, Disp: , Rfl:   •  lidocaine-prilocaine (EMLA) 2.5-2.5 % cream, Apply  topically to the appropriate area as directed As Needed (45-60 minutes prior to port access.  Cover with saran/plastic wrap.)., Disp: 30 g, Rfl: 3  •  loratadine (CLARITIN) 10 MG tablet, Take 1 tablet by mouth Daily., Disp: 30 tablet, Rfl: 3  •  metFORMIN (GLUCOPHAGE) 500 MG tablet, Take 500 mg by mouth 2 (Two) Times a Day With Meals., Disp: , Rfl: 0  •  methylPREDNISolone (MEDROL, DEANNA,) 4 MG tablet, Take as directed on package instructions., Disp: 21 tablet, Rfl: 0  •  " ondansetron (ZOFRAN) 8 MG tablet, Take 1 tablet by mouth 3 (Three) Times a Day As Needed for Nausea or Vomiting., Disp: 30 tablet, Rfl: 5  •  pantoprazole (PROTONIX) 40 MG EC tablet, Take 1 tablet by mouth Every 12 (Twelve) Hours., Disp: 60 tablet, Rfl: 0  •  potassium chloride (K-DUR) 10 MEQ CR tablet, Take 2 tablets by mouth Daily., Disp: 60 tablet, Rfl: 5  •  Sulconazole Nitrate (ARTIFICIAL TEARS) ophthalmic ointment, Use as directed., Disp: 1 each, Rfl: 6  •  triamcinolone (KENALOG) 0.1 % ointment, Apply  topically to the appropriate area as directed 2 (Two) Times a Day. (Patient taking differently: Apply 1 application topically to the appropriate area as directed 2 (Two) Times a Day As Needed.), Disp: 30 g, Rfl: 3  •  umeclidinium-vilanterol (ANORO ELLIPTA) 62.5-25 MCG/INH aerosol powder  inhaler, Inhale 1 puff Daily., Disp: 60 each, Rfl: 11  No current facility-administered medications for this visit.     Facility-Administered Medications Ordered in Other Visits:   •  heparin injection 500 Units, 500 Units, Intravenous, PRN, Josh Son MD  •  sodium chloride 0.9 % flush 10 mL, 10 mL, Intravenous, PRN, Josh Son MD    PHYSICAL EXAMINATION:   /77   Pulse 92   Temp 97.8 °F (36.6 °C) (Temporal)   Resp 16   Ht 157.5 cm (62\")   Wt 78 kg (172 lb)   SpO2 98%   BMI 31.46 kg/m²    ECOG Performance Status: 1 - Symptomatic but completely ambulatory  General Appearance:  alert, cooperative, no apparent distress and appears stated age   Neurologic/Psychiatric: A&O x 3, gait steady, appropriate affect, strength 5/5 in all muscle groups   HEENT:  Normocephalic, without obvious abnormality, mucous membranes moist   Neck: Supple, symmetrical, trachea midline, no adenopathy;  No thyromegaly or tenderness. 3cm soft tissue mass noted midline lower neck   Lungs:   Clear to auscultation bilaterally; respirations regular, even, and unlabored bilaterally   Heart:  Regular rate and rhythm, no murmurs " appreciated   Abdomen:   Soft, non-tender, non-distended and no organomegaly   Lymph nodes: No cervical, supraclavicular, inguinal or axillary adenopathy noted   Extremities: Normal, atraumatic; no clubbing, cyanosis, or edema    Skin: No ulcers, or suspicious lesions noted. Rash noted on abdomen, chest, bilateral arms, right flank     No visits with results within 2 Week(s) from this visit.   Latest known visit with results is:   Infusion on 07/02/2020   Component Date Value Ref Range Status   • WBC 07/02/2020 2.34* 3.40 - 10.80 10*3/mm3 Final   • RBC 07/02/2020 2.88* 3.77 - 5.28 10*6/mm3 Final   • Hemoglobin 07/02/2020 9.2* 12.0 - 15.9 g/dL Final   • Hematocrit 07/02/2020 27.3* 34.0 - 46.6 % Final   • MCV 07/02/2020 94.8  79.0 - 97.0 fL Final   • MCH 07/02/2020 31.9  26.6 - 33.0 pg Final   • MCHC 07/02/2020 33.7  31.5 - 35.7 g/dL Final   • RDW 07/02/2020 13.7  12.3 - 15.4 % Final   • RDW-SD 07/02/2020 46.9  37.0 - 54.0 fl Final   • MPV 07/02/2020 10.1  6.0 - 12.0 fL Final   • Platelets 07/02/2020 118* 140 - 450 10*3/mm3 Final   • Neutrophil % 07/02/2020 54.1  42.7 - 76.0 % Final   • Lymphocyte % 07/02/2020 38.5  19.6 - 45.3 % Final   • Monocyte % 07/02/2020 4.3* 5.0 - 12.0 % Final   • Eosinophil % 07/02/2020 1.3  0.3 - 6.2 % Final   • Basophil % 07/02/2020 0.9  0.0 - 1.5 % Final   • Immature Grans % 07/02/2020 0.9* 0.0 - 0.5 % Final   • Neutrophils, Absolute 07/02/2020 1.27* 1.70 - 7.00 10*3/mm3 Final   • Lymphocytes, Absolute 07/02/2020 0.90  0.70 - 3.10 10*3/mm3 Final   • Monocytes, Absolute 07/02/2020 0.10  0.10 - 0.90 10*3/mm3 Final   • Eosinophils, Absolute 07/02/2020 0.03  0.00 - 0.40 10*3/mm3 Final   • Basophils, Absolute 07/02/2020 0.02  0.00 - 0.20 10*3/mm3 Final   • Immature Grans, Absolute 07/02/2020 0.02  0.00 - 0.05 10*3/mm3 Final   • nRBC 07/02/2020 0.0  0.0 - 0.2 /100 WBC Final   • Color,  07/02/2020 Yellow  Yellow, Straw Final   • Appearance,  07/02/2020 Clear  Clear Final   • pH,  07/02/2020  5.5  5.0 - 8.0 Final   • Specific Gravity, UA 07/02/2020 1.020  1.005 - 1.030 Final   • Glucose, UA 07/02/2020 Negative  Negative Final   • Ketones, UA 07/02/2020 Negative  Negative Final   • Bilirubin, UA 07/02/2020 Negative  Negative Final   • Blood, UA 07/02/2020 Small (1+)* Negative Final   • Protein, UA 07/02/2020 Negative  Negative Final   • Leuk Esterase, UA 07/02/2020 Small (1+)* Negative Final   • Nitrite, UA 07/02/2020 Negative  Negative Final   • Urobilinogen, UA 07/02/2020 0.2 E.U./dL  0.2 - 1.0 E.U./dL Final   • RBC, UA 07/02/2020 0-2* None Seen /HPF Final   • WBC, UA 07/02/2020 0-2* None Seen /HPF Final   • Bacteria, UA 07/02/2020 None Seen  None Seen /HPF Final   • Squamous Epithelial Cells, UA 07/02/2020 3-6* None Seen, 0-2 /HPF Final   • Hyaline Casts, UA 07/02/2020 21-30  None Seen /LPF Final   • Methodology 07/02/2020 Manual Light Microscopy   Final      No results found.         ASSESSMENT: The patient is a very pleasant 64 y.o. female  with small cell lung cancer    PROBLEM LIST:  1. Small cell lung cancer from the left upper lobe.  Limited stage disease, recurrent in a mediastinal node  A.  Treated  initially with etoposide and carboplatin starting 9/13/27.  She responded to the single modality that was started because of symptoms including airway compromise.  B.  Completed combined modality chemoradiation with etoposide and carboplatin . (Chemotherapy completed 12/7/2017).  C.  Recurrence in left paratracheal node.  This was seen on CT scan from 6/19/18 and hypermetabolic on PET scan to confirm.  She's been treated with weekly taxol starting 7/17/2018 along with involved field radiation.  D. Left pleural effusion seen on CT of 11/23/18, loculated and inaccessible for thoracentesis.  E.  Progressive disease documented on scans done April 22, 2019 with increase in size of left paratracheal lymph node and right hepatic lobe mass.  F. Started carboplatin and etoposide with Tecentriq May 1, 2019,  status post 8 cycles.     G.  Started Opdivo plus Yervoy November 13, 2019, status post 4 cycles  H.  Repeat scans done on February 21, 2020 revealed progressive disease  I.  Started Taxol 3 weeks on 1 week off March 6, 2020, status post 5 cycles  2.  Multiple brain metastases:  A.  Completed whole brain radiation January 2020  3.  History of left breast cancer.  A.  Left mastectomy 7/22/2008 for stage IIIA, T3 N1 M0 breast cancer  B.  ER weakly positive, IA negative and HER-2 positive  C.  Adjuvant chemotherapy with Herceptin, Taxotere and carboplatin  D.  Postmastectomy radiation done at [facility]  E.  Adjuvant tamoxifen started 12/12/2008 and continued as extended adjuvant therapy  4.  COPD.  5. Diabetes  6. HTN  7.  Cough  8.  Treatment induced Rash    PLAN:  1.  I will proceed with treatment  with Taxol weekly 3 weeks on 1 week off, cycle #6 day 1 on Friday.   2.  The patient will continue Mucinex twice a day for cough.  3.  I will continue port maintenance.   4.  I will continue to monitor the patient blood work while she is on treatment including blood counts kidney function liver function and electrolytes.  5. We reviewed the side effects of this regimen including alopecia, nausea, fatigue, myelosuppression, infusion reaction, neuropathy, and diarrhea and possibly death.  6. She has completed 10 years of Tamoxifen at this point.   7. Will continue inhalers as needed for COPD  8. We will continue Metformin for type 2 DM.    9. Will continue Hyzaar for HTN.  We may have to adjust her antihypertensive agent while she is in treatment.  10.   We will do 3 months follow-up study which will be due July 2020 or after cycle #6.  I will order prior to return  11.  I will continue the patient on Robitussin with codeine as needed for cough.  12.  I will continue the patient on Benadryl as needed for itching.  13. If Hgb is less than 8 we will plan on giving 2 units of PRBC.   14.  We will continue  potassium oral placement.   15. I will send in a prednisone pack for treatment of rash.       Belkis Nayak, APRN  7/17/2020

## 2020-08-12 NOTE — PROGRESS NOTES
DATE OF VISIT: 8/12/2020    REASON FOR VISIT: Followup for small cell lung cancer     HISTORY OF PRESENT ILLNESS: The patient is a very pleasant 64 y.o. female  with past medical history significant for small cell lung cancer diagnosed August 2017.  Repeated scans done on April 21, 2019 revealed progressive disease.  She was started on carboplatin and etoposide and Tecentriq April 30, 2019.  Chemo was stopped after 4 cycles and patient was started on maintenance Tecentriq. Ct scans showed progression. She will start on Yervoy and opdivo 1/13/2019.  He completed 4 cycles on February 7, 2020.  Repeated scans done February 21, 2020 revealed progressive disease.  She was started on weekly Taxol 3 weeks on 1 week off March 4, 2020.  The patient is here today for scheduled follow up visit with treatment cycle #7, day 1.     SUBJECTIVE: The patient is here today with her .  She has been able to tolerate treatment fairly well. Rash is improved, but still there. She has not been using cream.  She is nervous about scans. No headaches, no nausea, no vision problems.       PAST MEDICAL HISTORY/SOCIAL HISTORY/FAMILY HISTORY: Reviewed by me and unchanged from my documentation done on 08/12/20.    Review of Systems   Constitutional: Positive for fatigue. Negative for activity change, appetite change, chills, fever and unexpected weight change.   HENT: Negative for hearing loss, mouth sores, nosebleeds, sore throat and trouble swallowing.    Eyes: Negative for visual disturbance.   Respiratory: Positive for cough and shortness of breath. Negative for chest tightness and wheezing.    Cardiovascular: Negative for chest pain, palpitations and leg swelling.   Gastrointestinal: Positive for nausea. Negative for abdominal distention, abdominal pain, blood in stool, constipation, diarrhea, rectal pain and vomiting.   Endocrine: Negative for cold intolerance and heat intolerance.   Genitourinary: Negative for difficulty urinating,  dysuria, frequency and urgency.   Musculoskeletal: Positive for arthralgias and myalgias. Negative for back pain, gait problem and joint swelling.   Skin: Positive for rash.             Neurological: Negative for dizziness, tremors, syncope, weakness, light-headedness, numbness and headaches.   Hematological: Negative for adenopathy. Does not bruise/bleed easily.   Psychiatric/Behavioral: Negative for confusion, sleep disturbance and suicidal ideas. The patient is not nervous/anxious.          Current Outpatient Medications:   •  albuterol (VENTOLIN HFA) 108 (90 Base) MCG/ACT inhaler, Inhale 2 puffs Every 4 (Four) Hours As Needed for Wheezing or Shortness of Air., Disp: 1 inhaler, Rfl: 5  •  amLODIPine (NORVASC) 5 MG tablet, Take 5 mg by mouth Daily., Disp: , Rfl: 0  •  aspirin 81 MG EC tablet, Take 81 mg by mouth Daily., Disp: , Rfl:   •  benzonatate (TESSALON PERLES) 100 MG capsule, Take 1-2 capsules by mouth 3 (Three) Times a Day As Needed for Cough., Disp: 120 capsule, Rfl: 5  •  Cholecalciferol (VITAMIN D3) 57268 units capsule, Take 50,000 Units by mouth Every 7 (Seven) Days., Disp: , Rfl: 3  •  hydroCHLOROthiazide (MICROZIDE) 12.5 MG capsule, TK 1 C PO QD PRN, Disp: , Rfl:   •  lidocaine-prilocaine (EMLA) 2.5-2.5 % cream, Apply  topically to the appropriate area as directed As Needed (45-60 minutes prior to port access.  Cover with saran/plastic wrap.)., Disp: 30 g, Rfl: 3  •  loratadine (CLARITIN) 10 MG tablet, Take 1 tablet by mouth Daily., Disp: 30 tablet, Rfl: 3  •  metFORMIN (GLUCOPHAGE) 500 MG tablet, Take 500 mg by mouth 2 (Two) Times a Day With Meals., Disp: , Rfl: 0  •  ondansetron (ZOFRAN) 8 MG tablet, Take 1 tablet by mouth 3 (Three) Times a Day As Needed for Nausea or Vomiting., Disp: 30 tablet, Rfl: 5  •  pantoprazole (PROTONIX) 40 MG EC tablet, Take 1 tablet by mouth Every 12 (Twelve) Hours., Disp: 60 tablet, Rfl: 0  •  potassium chloride (K-DUR) 10 MEQ CR tablet, Take 2 tablets by mouth Daily.,  "Disp: 60 tablet, Rfl: 5  •  Sulconazole Nitrate (ARTIFICIAL TEARS) ophthalmic ointment, Use as directed., Disp: 1 each, Rfl: 6  •  triamcinolone (KENALOG) 0.1 % ointment, Apply  topically to the appropriate area as directed 2 (Two) Times a Day. (Patient taking differently: Apply 1 application topically to the appropriate area as directed 2 (Two) Times a Day As Needed.), Disp: 30 g, Rfl: 3  •  umeclidinium-vilanterol (ANORO ELLIPTA) 62.5-25 MCG/INH aerosol powder  inhaler, Inhale 1 puff Daily., Disp: 60 each, Rfl: 11    PHYSICAL EXAMINATION:   /60   Pulse 102   Temp 97.7 °F (36.5 °C) (Temporal)   Resp 16   Ht 157.5 cm (62\")   Wt 76.2 kg (168 lb)   SpO2 98%   BMI 30.73 kg/m²    ECOG Performance Status: 1 - Symptomatic but completely ambulatory  General Appearance:  alert, cooperative, no apparent distress and appears stated age   Neurologic/Psychiatric: A&O x 3, gait steady, appropriate affect, strength 5/5 in all muscle groups   HEENT:  Normocephalic, without obvious abnormality, mucous membranes moist   Neck: Supple, symmetrical, trachea midline, no adenopathy;  No thyromegaly or tenderness. 3cm soft tissue mass noted midline lower neck   Lungs:   Clear to auscultation bilaterally; respirations regular, even, and unlabored bilaterally   Heart:  Regular rate and rhythm, no murmurs appreciated   Abdomen:   Soft, non-tender, non-distended and no organomegaly   Lymph nodes: No cervical, supraclavicular, inguinal or axillary adenopathy noted   Extremities: Normal, atraumatic; no clubbing, cyanosis, or edema    Skin: No ulcers, or suspicious lesions noted. Rash noted on bilateral arms     Infusion on 07/31/2020   Component Date Value Ref Range Status   • WBC 07/31/2020 2.49* 3.40 - 10.80 10*3/mm3 Final   • RBC 07/31/2020 3.02* 3.77 - 5.28 10*6/mm3 Final   • Hemoglobin 07/31/2020 9.7* 12.0 - 15.9 g/dL Final   • Hematocrit 07/31/2020 28.4* 34.0 - 46.6 % Final   • MCV 07/31/2020 94.0  79.0 - 97.0 fL Final   • " MCH 07/31/2020 32.1  26.6 - 33.0 pg Final   • MCHC 07/31/2020 34.2  31.5 - 35.7 g/dL Final   • RDW 07/31/2020 14.4  12.3 - 15.4 % Final   • RDW-SD 07/31/2020 48.3  37.0 - 54.0 fl Final   • MPV 07/31/2020 9.6  6.0 - 12.0 fL Final   • Platelets 07/31/2020 119* 140 - 450 10*3/mm3 Final   • Neutrophil % 07/31/2020 56.3  42.7 - 76.0 % Final   • Lymphocyte % 07/31/2020 33.7  19.6 - 45.3 % Final   • Monocyte % 07/31/2020 6.4  5.0 - 12.0 % Final   • Eosinophil % 07/31/2020 0.8  0.3 - 6.2 % Final   • Basophil % 07/31/2020 0.8  0.0 - 1.5 % Final   • Immature Grans % 07/31/2020 2.0* 0.0 - 0.5 % Final   • Neutrophils, Absolute 07/31/2020 1.40* 1.70 - 7.00 10*3/mm3 Final   • Lymphocytes, Absolute 07/31/2020 0.84  0.70 - 3.10 10*3/mm3 Final   • Monocytes, Absolute 07/31/2020 0.16  0.10 - 0.90 10*3/mm3 Final   • Eosinophils, Absolute 07/31/2020 0.02  0.00 - 0.40 10*3/mm3 Final   • Basophils, Absolute 07/31/2020 0.02  0.00 - 0.20 10*3/mm3 Final   • Immature Grans, Absolute 07/31/2020 0.05  0.00 - 0.05 10*3/mm3 Final   • nRBC 07/31/2020 0.0  0.0 - 0.2 /100 WBC Final   • RBC Morphology 07/31/2020 Normal  Normal Final   • WBC Morphology 07/31/2020 Normal  Normal Final   • Platelet Estimate 07/31/2020 Decreased  Normal Final      Ct Soft Tissue Neck With Contrast    Result Date: 8/10/2020  Narrative: PROCEDURE: CT SOFT TISSUE NECK W CONTRAST-, CT ABDOMEN PELVIS W CONTRAST-, CT CHEST W CONTRAST-  HISTORY: Follow up metstatic small cell lung cancer; C34.90-Malignant neoplasm of unspecified part of unspecified bronchus or lung; C34.82-Malignant neoplasm of overlapping sites of left bronchus and lung; C79.31-Secondary malignant neoplasm of brain  COMPARISON: 04/20/2020.  PROCEDURE: Axial images were obtained from the skull base through the pubic symphysis following the administration of Isovue 300  contrast.   FINDINGS:  NECK: Soft tissue windows redemonstrate borderline enlarged jugular chain lymph nodes bilaterally which are unchanged.  No new or enlarging adenopathy is seen in the neck. There is no evidence of a mucosal lesion in the nasopharynx, oropharynx or hypopharynx. The trachea is midline. A small hypodense lesion in the right lobe of the thyroid gland is unchanged. The major neck vasculature is patent. Bone windows reveal no lytic or destructive lesions.  CHEST: Soft tissue windows reveal a persistent mass within the upper mediastinum which is unchanged. Enlarged oval lymph node measures 15 mm and is essentially unchanged. No new or enlarging adenopathy is seen within the chest. Heart size is normal. There is a small pericardial effusion which is unchanged. No significant pleural effusions are evident. Lung windows redemonstrate fibrotic changes in the left upper lobe. A 4 mm subpleural nodule in the anterior left upper lobe is unchanged. A calcified granuloma in the left lower lobe is noted. There are no new or enlarging pulmonary nodules. Bone windows reveal no lytic or destructive lesions.  ABDOMEN: There is a 3.2 cm mass in the right lobe of the liver which has decreased in size compared to the prior exam. No new hepatic lesions are identified. The spleen is unremarkable. No adrenal mass is present.  The pancreas is normal. The kidneys are unremarkable. The aorta is normal in caliber. There is no free fluid or adenopathy. The abdominal portions of the GI tract are unremarkable.  PELVIS: The appendix is normal. The urinary bladder is unremarkable. There is no significant free fluid or adenopathy. Bone windows reveal no lytic or destructive lesions.      Impression: Stable borderline adenopathy within the neck, mediastinal mass/lymph node and subpleural nodule in the left upper lobe. The previously noted hepatic mass has decreased compared to the prior exam measuring 3.2 cm on today's exam and 3.8 cm on the prior.  This study was performed with techniques to keep radiation doses as low as reasonably achievable (ALARA). Individualized  dose reduction techniques using automated exposure control or adjustment of mA and/or kV according to the patient size were employed.  This report was finalized on 8/10/2020 1:11 PM by Jyoti Jensen M.D..    Ct Chest With Contrast    Result Date: 8/10/2020  Narrative: PROCEDURE: CT SOFT TISSUE NECK W CONTRAST-, CT ABDOMEN PELVIS W CONTRAST-, CT CHEST W CONTRAST-  HISTORY: Follow up metstatic small cell lung cancer; C34.90-Malignant neoplasm of unspecified part of unspecified bronchus or lung; C34.82-Malignant neoplasm of overlapping sites of left bronchus and lung; C79.31-Secondary malignant neoplasm of brain  COMPARISON: 04/20/2020.  PROCEDURE: Axial images were obtained from the skull base through the pubic symphysis following the administration of Isovue 300  contrast.   FINDINGS:  NECK: Soft tissue windows redemonstrate borderline enlarged jugular chain lymph nodes bilaterally which are unchanged. No new or enlarging adenopathy is seen in the neck. There is no evidence of a mucosal lesion in the nasopharynx, oropharynx or hypopharynx. The trachea is midline. A small hypodense lesion in the right lobe of the thyroid gland is unchanged. The major neck vasculature is patent. Bone windows reveal no lytic or destructive lesions.  CHEST: Soft tissue windows reveal a persistent mass within the upper mediastinum which is unchanged. Enlarged oval lymph node measures 15 mm and is essentially unchanged. No new or enlarging adenopathy is seen within the chest. Heart size is normal. There is a small pericardial effusion which is unchanged. No significant pleural effusions are evident. Lung windows redemonstrate fibrotic changes in the left upper lobe. A 4 mm subpleural nodule in the anterior left upper lobe is unchanged. A calcified granuloma in the left lower lobe is noted. There are no new or enlarging pulmonary nodules. Bone windows reveal no lytic or destructive lesions.  ABDOMEN: There is a 3.2 cm mass in the right  lobe of the liver which has decreased in size compared to the prior exam. No new hepatic lesions are identified. The spleen is unremarkable. No adrenal mass is present.  The pancreas is normal. The kidneys are unremarkable. The aorta is normal in caliber. There is no free fluid or adenopathy. The abdominal portions of the GI tract are unremarkable.  PELVIS: The appendix is normal. The urinary bladder is unremarkable. There is no significant free fluid or adenopathy. Bone windows reveal no lytic or destructive lesions.      Impression: Stable borderline adenopathy within the neck, mediastinal mass/lymph node and subpleural nodule in the left upper lobe. The previously noted hepatic mass has decreased compared to the prior exam measuring 3.2 cm on today's exam and 3.8 cm on the prior.  This study was performed with techniques to keep radiation doses as low as reasonably achievable (ALARA). Individualized dose reduction techniques using automated exposure control or adjustment of mA and/or kV according to the patient size were employed.  This report was finalized on 8/10/2020 1:11 PM by Jyoti Jensen M.D..    Mri Brain With & Without Contrast    Result Date: 8/10/2020  Narrative: PROCEDURE: MRI BRAIN W WO CONTRAST-  HISTORY: Follow up metstatic small cell lung cancer with previous brain mets; C34.90-Malignant neoplasm of unspecified part of unspecified bronchus or lung; C34.82-Malignant neoplasm of overlapping sites of left bronchus and lung; C79.31-Secondary malignant neoplasm of brain  PROCEDURE: Multiplanar multisequence imaging of the brain was performed both before and following the administration of 15 mL MultiHance intravenous contrast.  COMPARISON: 04/20/2020.  FINDINGS: There is generalized cerebral Loss. FLAIR hyperintensities in the periventricular white matter are consistent with chronic small vessel ischemic change. There is an enhancing mass in the posterior right temporal lobe which measures 1.6 x  1.0 x 1.0 cm which has increased in size compared to the prior exam and demonstrates more adjacent vasogenic edema. No other areas of pathologic contrast enhancement are identified. There is no hydrocephalus. There are no areas of restricted diffusion.  The midbrain, milagro, cerebellum and craniocervical junction are unremarkable. The sella and pituitary gland are within normal limits. The major intracranial vasculature demonstrates the expected flow related signal. The paranasal sinuses are clear.      Impression: Interval increase in size of an enhancing mass in the posterior right temporal lobe consistent with metastatic disease.    This report was finalized on 8/10/2020 12:20 PM by Jyoti Jensen M.D..    Ct Abdomen Pelvis With Contrast    Result Date: 8/10/2020  Narrative: PROCEDURE: CT SOFT TISSUE NECK W CONTRAST-, CT ABDOMEN PELVIS W CONTRAST-, CT CHEST W CONTRAST-  HISTORY: Follow up metstatic small cell lung cancer; C34.90-Malignant neoplasm of unspecified part of unspecified bronchus or lung; C34.82-Malignant neoplasm of overlapping sites of left bronchus and lung; C79.31-Secondary malignant neoplasm of brain  COMPARISON: 04/20/2020.  PROCEDURE: Axial images were obtained from the skull base through the pubic symphysis following the administration of Isovue 300  contrast.   FINDINGS:  NECK: Soft tissue windows redemonstrate borderline enlarged jugular chain lymph nodes bilaterally which are unchanged. No new or enlarging adenopathy is seen in the neck. There is no evidence of a mucosal lesion in the nasopharynx, oropharynx or hypopharynx. The trachea is midline. A small hypodense lesion in the right lobe of the thyroid gland is unchanged. The major neck vasculature is patent. Bone windows reveal no lytic or destructive lesions.  CHEST: Soft tissue windows reveal a persistent mass within the upper mediastinum which is unchanged. Enlarged oval lymph node measures 15 mm and is essentially unchanged. No new  or enlarging adenopathy is seen within the chest. Heart size is normal. There is a small pericardial effusion which is unchanged. No significant pleural effusions are evident. Lung windows redemonstrate fibrotic changes in the left upper lobe. A 4 mm subpleural nodule in the anterior left upper lobe is unchanged. A calcified granuloma in the left lower lobe is noted. There are no new or enlarging pulmonary nodules. Bone windows reveal no lytic or destructive lesions.  ABDOMEN: There is a 3.2 cm mass in the right lobe of the liver which has decreased in size compared to the prior exam. No new hepatic lesions are identified. The spleen is unremarkable. No adrenal mass is present.  The pancreas is normal. The kidneys are unremarkable. The aorta is normal in caliber. There is no free fluid or adenopathy. The abdominal portions of the GI tract are unremarkable.  PELVIS: The appendix is normal. The urinary bladder is unremarkable. There is no significant free fluid or adenopathy. Bone windows reveal no lytic or destructive lesions.      Impression: Stable borderline adenopathy within the neck, mediastinal mass/lymph node and subpleural nodule in the left upper lobe. The previously noted hepatic mass has decreased compared to the prior exam measuring 3.2 cm on today's exam and 3.8 cm on the prior.  This study was performed with techniques to keep radiation doses as low as reasonably achievable (ALARA). Individualized dose reduction techniques using automated exposure control or adjustment of mA and/or kV according to the patient size were employed.  This report was finalized on 8/10/2020 1:11 PM by Jyoti Jensen M.D..           ASSESSMENT: The patient is a very pleasant 64 y.o. female  with small cell lung cancer    PROBLEM LIST:  1. Small cell lung cancer from the left upper lobe.  Limited stage disease, recurrent in a mediastinal node  A.  Treated  initially with etoposide and carboplatin starting 9/13/27.  She  responded to the single modality that was started because of symptoms including airway compromise.  B.  Completed combined modality chemoradiation with etoposide and carboplatin . (Chemotherapy completed 12/7/2017).  C.  Recurrence in left paratracheal node.  This was seen on CT scan from 6/19/18 and hypermetabolic on PET scan to confirm.  She's been treated with weekly taxol starting 7/17/2018 along with involved field radiation.  D. Left pleural effusion seen on CT of 11/23/18, loculated and inaccessible for thoracentesis.  E.  Progressive disease documented on scans done April 22, 2019 with increase in size of left paratracheal lymph node and right hepatic lobe mass.  F. Started carboplatin and etoposide with Tecentriq May 1, 2019, status post 8 cycles.     G.  Started Opdivo plus Yervoy November 13, 2019, status post 4 cycles  H.  Repeat scans done on February 21, 2020 revealed progressive disease  I.  Started Taxol 3 weeks on 1 week off March 6, 2020, status post 5 cycles  2.  Multiple brain metastases:  A.  Completed whole brain radiation January 2020  3.  History of left breast cancer.  A.  Left mastectomy 7/22/2008 for stage IIIA, T3 N1 M0 breast cancer  B.  ER weakly positive, NJ negative and HER-2 positive  C.  Adjuvant chemotherapy with Herceptin, Taxotere and carboplatin  D.  Postmastectomy radiation done at [facility]  E.  Adjuvant tamoxifen started 12/12/2008 and continued as extended adjuvant therapy  4.  COPD.  5. Diabetes  6. HTN  7.  Cough  8.  Treatment induced Rash    PLAN:  1.  I will proceed with treatment  with Taxol weekly 3 weeks on 1 week off, cycle #7 day 1 on Friday.   2.  The patient will continue Mucinex twice a day for cough.  3.  I will continue port maintenance.   4.  I will continue to monitor the patient blood work while she is on treatment including blood counts kidney function liver function and electrolytes.  5. We reviewed the side effects of this regimen  including alopecia, nausea, fatigue, myelosuppression, infusion reaction, neuropathy, and diarrhea and possibly death.  6. She has completed 10 years of Tamoxifen at this point.   7. Will continue inhalers as needed for COPD  8. We will continue Metformin for type 2 DM.    9. Will continue Hyzaar for HTN.  We may have to adjust her antihypertensive agent while she is in treatment.  10.   We discussed scans showed stable borderline adenopathy within the neck, mediastinal mass/lymph node and subpleural nodule in the left upper lobe. The previously noted hepatic mass has decreased compared to the prior exam. We will do 3 months follow-up study which will be due Novemeber 2020.  11. We discussed MRI showed 2mm change in size of an enhancing mass in the posterior right temporal lobe. I discussed the case with Dr. Son . I will plan to consult with Dr. Sanchez as well.  We will continue to monitor. We will plan to check MRI in 3 months. This would be due November 2020.  11.  I will continue the patient on Robitussin with codeine as needed for cough.  12.  I will continue the patient on Benadryl as needed for itching.  13. If Hgb is less than 8 we will plan on giving 2 units of PRBC.   14.  We will continue potassium oral placement.         Belkis Nayak, APRN  8/12/2020

## 2020-08-13 NOTE — TELEPHONE ENCOUNTER
Nurse practitioner Kourtney preston called me regarding Ms. Horton.  Recent MRI showed increase in size of a medial right temporal brain metastasis.  Ms. Horton completed whole brain radiotherapy of 30 Gray in 10 fractions on 1/13/2020.  After reviewing the films I told Belkis we could treat with CyberKnife stereotactic radiosurgery.  She is going to arrange for Ms. Horton to see Dr. Devin Arenas regarding treatment.  We will have Ms. Teixeira return to our department for consult regarding CyberKnife SRS.  I tried to call Ms. Horton but there was no answer.

## 2020-08-17 NOTE — PROGRESS NOTES
CONSULTATION NOTE    NAME:      Sanjana Horton  :                                                          1955  DATE OF CONSULTATION:                       20  REQUESTING PHYSICIAN:                   AQUILES Mcdaniels  REASON FOR CONSULTATION:           Cancer Staging  Breast cancer, left (CMS/HCC)  Staging form: Breast, AJCC V7  - Pathologic stage from 2008: Stage IIIA (T3, N1a, cM0) - Signed by Jac Rader MD on 10/4/2016  Malignant neoplasm of overlapping sites of left lung (CMS/HCC)  Staging form: Lung, AJCC V7  - Clinical stage from 10/26/2017: Stage IIIB (T2b, N3, M0) - Signed by Jenny Sanchez MD on 10/26/2017  2020  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 female  with progression of brain metastasis from small cell lung cancer.  2017 she started etoposide and carboplatin due to airway compromise  2017 she completed 60 Gray in 30 fractions of radiation with concurrent chemotherapy to right lung mass and mediastinum.  2018 she received CyberKnife stereotactic body radiotherapy of 25 Gray in 5 fractions for progression of disease in the the right lung mass   2019 started carboplatin and etoposide with Tecentriq for progression  2019 started Yervoy and Opdivo  2020 she completed whole brain radiotherapy of 30 Gray in 10 fractions for brain metastases  3/2020 started Taxol  8/10/2020 she underwent MRI of the brain that revealed interval increase in size of an enhancing mass in the posterior right temporal lobe consistent with metastatic disease.  I have been asked to see her regarding CyberKnife stereotactic radiosurgery to the brain metastasis.  She is asymptomatic.          Allergies   Allergen Reactions   • Codeine Irritability       Social History     Tobacco Use   • Smoking status: Former Smoker     Packs/day: 1.00     Years: 40.00     Pack years: 40.00     Types:  "Cigarettes     Last attempt to quit: 2013     Years since quittin.6   • Smokeless tobacco: Never Used   • Tobacco comment: Smoked less than 1 PPD for past 40 years.   Substance Use Topics   • Alcohol use: No   • Drug use: No         Past Medical History:   Diagnosis Date   • Anemia    • Arthritis    • Breast cancer (CMS/HCC)     LEFT BREAST - (NO STICKS/BP'S LEFT ARM PLEASE)   • Cancer (CMS/HCC)     lung also   • Diabetes mellitus (CMS/HCC)    • History of bronchitis     REPORTS HAD BRONCHITIS AROUND 2017   • History of radiation therapy 2020    Whole brain (+ remote history mediastinum, left hilum, left chest wall)   • Hot flashes     Tolerating medication well, does have occasional hot flashes but denies night sweats.   • Hypertension    • Wears partial dentures     UPPER       family history includes Breast cancer in her sister; Heart disease in her father.     Past Surgical History:   Procedure Laterality Date   • BRONCHOSCOPY N/A 2017    Procedure: BRONCHOSCOPY  WITH BRUSHINGS AND BXS, ENDOBRONCHIAL ULTRASOUND , FINE NEEDLE ASPIRATION, FLUOROSCOPY;  Surgeon: Holly Granados MD;  Location: [facility] OR;  Service:    • COLONOSCOPY     • DILATATION AND CURETTAGE     • ENDOSCOPY     • MASTECTOMY Left 2008   • PORTACATH PLACEMENT N/A 2019    Procedure: INSERTION OF PORTACATH;  Surgeon: Toño Willis MD;  Location: [facility] OR;  Service: General   • TUBAL ABDOMINAL LIGATION          Review of Systems - Oncology        Objective   VITAL SIGNS:   Vitals:    20 1414   BP: 149/69   Pulse: 100   Resp: 20   Temp: 98.6 °F (37 °C)   Weight: 75.7 kg (166 lb 12.8 oz)   Height: 157.5 cm (62\")   PainSc: 0-No pain        KPS       80%    Physical Exam   Constitutional: She is oriented to person, place, and time. She appears well-developed and well-nourished. No distress.   HENT:   Head: Normocephalic and atraumatic.   Eyes: EOM are normal. No scleral icterus.   Neck: Neck supple. "   Cardiovascular: Normal rate and regular rhythm.   Pulmonary/Chest: Effort normal and breath sounds normal.   Lymphadenopathy:     She has no cervical adenopathy.   Neurological: She is alert and oriented to person, place, and time. No cranial nerve deficit.   Nursing note and vitals reviewed.  Muscle strength plus 5 out of 5 equal and symmetric         The following portions of the patient's history were reviewed and updated as appropriate: allergies, current medications, past family history, past medical history, past social history, past surgical history and problem list.    Assessment      IMPRESSION:    Sanjana Horton  is a very pleasant 65 y.o. female  with progression of brain metastasis from small cell lung cancer. 1/13/2020 she completed whole brain radiotherapy of 30 Gray in 10 fractions .  8/10/2020 she underwent MRI of the brain that revealed interval increase in size of an enhancing mass in the posterior right temporal lobe consistent with metastatic disease.  I have been asked to see her regarding CyberKnife stereotactic radiosurgery to the brain metastasis.  She is asymptomatic.          RECOMMENDATIONS: Ms. Nguyen is a good candidate for CyberKnife stereotactic radiosurgery for brain metastasis.  The pros and cons, risks and benefits of treatment were discussed and informed consent obtained.  She saw our CyberKnife educational video.  We will have her see a neurosurgeon and will get a CyberKnife planning MRI.  She understands this might show additional lesions which we could treat as well.  I anticipate 18 Gray x1 fraction or 9 Gray x3.  It is a pleasure to see Ms. Horton again.               Jenny Sanchez MD      Errors in dictation may reflect use of voice recognition software and not all errors in transcription may have been detected prior to signing.

## 2020-08-21 NOTE — PROGRESS NOTES
Patient: Sanjana Horton  : 1955    Primary Care Provider: Abiel Keller MD    Requesting Provider: As above        History    Chief Complaint: Recurrent or progressive brain metastases.    History of Present Illness: Ms. 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  Prior to that she had been diagnosed with breast cancer.  In 2019 she experienced severe headache and was noted to have multiple brain metastases for which she underwent fractionated radiotherapy to the brain.  With further imaging she is noted to have residual or recurrent lesions and is referred for CyberKnife radiosurgery.  Currently she denies headache, nausea, vomiting.  She has been undergoing chemotherapy.    Review of Systems   Constitutional: Negative for activity change, appetite change, chills, diaphoresis, fatigue, fever and unexpected weight change.   HENT: Negative for congestion, dental problem, drooling, ear discharge, ear pain, facial swelling, hearing loss, mouth sores, nosebleeds, postnasal drip, rhinorrhea, sinus pressure, sinus pain, sneezing, sore throat, tinnitus, trouble swallowing and voice change.    Eyes: Negative for photophobia, pain, discharge, redness, itching and visual disturbance.   Respiratory: Negative for apnea, cough, choking, chest tightness, shortness of breath, wheezing and stridor.    Cardiovascular: Negative for chest pain, palpitations and leg swelling.   Gastrointestinal: Negative for abdominal distention, abdominal pain, anal bleeding, blood in stool, constipation, diarrhea, nausea, rectal pain and vomiting.   Endocrine: Negative for cold intolerance, heat intolerance, polydipsia, polyphagia and polyuria.   Genitourinary: Negative for decreased urine volume, difficulty urinating, dyspareunia, dysuria, enuresis, flank pain, frequency, genital sores, hematuria, menstrual problem, pelvic pain, urgency, vaginal  "bleeding, vaginal discharge and vaginal pain.   Musculoskeletal: Negative for arthralgias, back pain, gait problem, joint swelling, myalgias, neck pain and neck stiffness.   Skin: Negative for color change, pallor, rash and wound.   Allergic/Immunologic: Negative for environmental allergies, food allergies and immunocompromised state.   Neurological: Negative for dizziness, tremors, seizures, syncope, facial asymmetry, speech difficulty, weakness, light-headedness, numbness and headaches.   Hematological: Negative for adenopathy. Does not bruise/bleed easily.   Psychiatric/Behavioral: Negative for agitation, behavioral problems, confusion, decreased concentration, dysphoric mood, hallucinations, self-injury, sleep disturbance and suicidal ideas. The patient is not nervous/anxious and is not hyperactive.        The patient's past medical history, past surgical history, family history, and social history have been reviewed at length in the electronic medical record.    Physical Exam:   Temp 96.9 °F (36.1 °C)   Ht 157.5 cm (62\")   Wt 76.3 kg (168 lb 3.2 oz)   BMI 30.76 kg/m²   CONSTITUTIONAL: Patient is well-nourished, pleasant and appears a little older than her stated age.  CV: Heart regular rate and rhythm without murmur, rub, or gallop.  PULMONARY: Lungs are clear to ascultation.  MUSCULOSKELETAL:  Neck tenderness to palpation is not observed.   ROM in neck is normal.  NEUROLOGICAL:  Orientation, memory, attention span, language function, and cognition have been examined and are intact.  Strength is intact in the upper and lower extremities to direct testing.  Muscle tone is normal throughout.  Station and gait are unsteady.  Sensation is intact to light touch testing throughout.  Deep tendon reflexes are 1+ and symmetrical.  Sonali's Sign is negative bilaterally. No clonus is elicited.  Coordination is intact.  CRANIAL NERVES:  Cranial Nerve II: Visual fields are full to confrontation.  Cranial Nerve III, IV, " and VI: PERRLADC. Extraocular movements are intact.  Nystagmus is not present.  The left palpebral fissure somewhat more narrow than the 1 on the right.  Cranial Nerve V: Facial sensation is intact to light touch.  Cranial Nerve VII: Muscles of facial expression demonstate no weakness or asymmetry.  Cranial Nerve VIII: Hearing is intact to finger rub bilaterally.  Cranial Nerve IX and X: Palate elevates symmetrically.  Cranial Nerve XI: Shoulder shrug is intact bilaterally.  Cranial Nerve XII: Tongue is midline without evidence of atrophy or fasciculation.  Medical Decision Making    Data Review:   MRI of the brain demonstrates a small nidus of enhancement in the left occipital region.  There is a larger area of enhancement in the right parieto-occipital region that appears to be based on the upper surface of the tentorium.  The larger mass measures 2.2 x 1.5 x 1.5 cm.  The left occipital lesion measures 7 mm.    Diagnosis:   1.  Brain metastases.  2.  History of small cell lung cancer.  3.  History of breast cancer.    Treatment Options:   I would agree with Dr. Sanchez that is reasonable to pursue CyberKnife radiosurgery to try and palliate her ongoing CNS disease.       Diagnosis Plan   1. Metastatic small cell carcinoma to brain (CMS/HCC)  MRI Brain With & Without Contrast       Scribed for Choco Arenas MD by Manuel Linda CMA. 8/21/2020 15:59    I, Dr. Arenas, personally performed the services described in the documentation, as scribed in my presence, and it is both accurate and complete.

## 2020-08-28 NOTE — PROGRESS NOTES
Patient has complaints of neuropathy in her hands and feet. She has had trouble walking and in her fingers. We will dose reduce 20% today.

## 2020-08-31 NOTE — PROGRESS NOTES
"Chief Complaint   Patient presents with   • Follow-up   • Shortness of Breath       Subjective   Sanjana Horton is a 65 y.o. female.     History of Present Illness   Patient was evaluated today for follow up of shortness of breath, allergic rhinitis, SCLC and COPD.     Patient says that her symptoms have been stable since the last clinic visit. she reports no recent exacerbations.     Patient is using medications, as prescribed. Exercise tolerance has also remained stable.     Patient says that she has been using her nasal sprays on a seasonal basis and describes no significant ongoing issues other than occasional congestion.     She is being followed by Dr. Son for SCLC.       The following portions of the patient's history were reviewed and updated as appropriate: allergies, current medications, past family history, past medical history, past social history and past surgical history.    Review of Systems   Constitutional: Negative for chills and fever.   HENT: Positive for rhinorrhea and sinus pressure. Negative for sneezing and sore throat.    Respiratory: Negative for cough, chest tightness, shortness of breath and wheezing.    Psychiatric/Behavioral: Negative for sleep disturbance.       Objective   Visit Vitals  /78   Pulse 99   Temp 97.3 °F (36.3 °C)   Resp 18   Ht 157.5 cm (62\")   Wt 73.9 kg (163 lb)   SpO2 96%   BMI 29.81 kg/m²       Physical Exam   Constitutional: She is oriented to person, place, and time. She appears well-developed and well-nourished.   HENT:   Head: Atraumatic.   Dentures.   Eyes:   PERRL. EOMI   Neck: Neck supple.   Cardiovascular: Normal rate and regular rhythm.   Right sided port in place.    Pulmonary/Chest: Effort normal. No respiratory distress.   Somewhat decreased A/E with out wheezing noted.   Musculoskeletal: She exhibits no edema.   Gait was normal.   Neurological: She is alert and oriented to person, place, and time.   Skin: Skin is warm and dry.   Psychiatric: She " has a normal mood and affect.   Vitals reviewed.      Assessment/Plan   Sanjana was seen today for follow-up and shortness of breath.    Diagnoses and all orders for this visit:    Shortness of breath    Chronic obstructive pulmonary disease, unspecified COPD type (CMS/HCC)    Small cell lung cancer, left (CMS/HCC)    Other allergic rhinitis    Other orders  -     umeclidinium-vilanterol (Anoro Ellipta) 62.5-25 MCG/INH aerosol powder  inhaler; Inhale 1 puff Daily.  -     azelastine (ASTELIN) 0.1 % nasal spray; 1 spray into the nostril(s) as directed by provider 2 (Two) Times a Day As Needed for Rhinitis or Allergies. Use in each nostril as directed           Return in about 9 months (around 5/31/2021) for Recheck.    DISCUSSION (if any):  MRI report shared with the patient. She is aware of the new lesions.     Last CT scan was reviewed in great detail with the patient. Images reviewed personally.   Results for orders placed during the hospital encounter of 08/10/20   CT Chest With Contrast    Narrative PROCEDURE: CT SOFT TISSUE NECK W CONTRAST-, CT ABDOMEN PELVIS W  CONTRAST-, CT CHEST W CONTRAST-     HISTORY: Follow up metstatic small cell lung cancer; C34.90-Malignant  neoplasm of unspecified part of unspecified bronchus or lung;  C34.82-Malignant neoplasm of overlapping sites of left bronchus and  lung; C79.31-Secondary malignant neoplasm of brain     COMPARISON: 04/20/2020.     PROCEDURE: Axial images were obtained from the skull base through the  pubic symphysis following the administration of Isovue 300  contrast.       FINDINGS:      NECK: Soft tissue windows redemonstrate borderline enlarged jugular  chain lymph nodes bilaterally which are unchanged. No new or enlarging  adenopathy is seen in the neck. There is no evidence of a mucosal lesion  in the nasopharynx, oropharynx or hypopharynx. The trachea is midline. A  small hypodense lesion in the right lobe of the thyroid gland is  unchanged. The major neck  vasculature is patent. Bone windows reveal no  lytic or destructive lesions.     CHEST: Soft tissue windows reveal a persistent mass within the upper  mediastinum which is unchanged. Enlarged oval lymph node measures 15 mm  and is essentially unchanged. No new or enlarging adenopathy is seen  within the chest. Heart size is normal. There is a small pericardial  effusion which is unchanged. No significant pleural effusions are  evident. Lung windows redemonstrate fibrotic changes in the left upper  lobe. A 4 mm subpleural nodule in the anterior left upper lobe is  unchanged. A calcified granuloma in the left lower lobe is noted. There  are no new or enlarging pulmonary nodules. Bone windows reveal no lytic  or destructive lesions.     ABDOMEN: There is a 3.2 cm mass in the right lobe of the liver which has  decreased in size compared to the prior exam. No new hepatic lesions are  identified. The spleen is unremarkable. No adrenal mass is present.  The  pancreas is normal. The kidneys are unremarkable. The aorta is normal in  caliber. There is no free fluid or adenopathy. The abdominal portions of  the GI tract are unremarkable.     PELVIS: The appendix is normal. The urinary bladder is unremarkable.  There is no significant free fluid or adenopathy. Bone windows reveal no  lytic or destructive lesions.       Impression Stable borderline adenopathy within the neck, mediastinal  mass/lymph node and subpleural nodule in the left upper lobe. The  previously noted hepatic mass has decreased compared to the prior exam  measuring 3.2 cm on today's exam and 3.8 cm on the prior.     This study was performed with techniques to keep radiation doses as low  as reasonably achievable (ALARA). Individualized dose reduction  techniques using automated exposure control or adjustment of mA and/or  kV according to the patient size were employed.      This report was finalized on 8/10/2020 1:11 PM by Jyoti Jensen M.D..     Last  PFTs showed no obstruction.  These were performed in 2019.    We have reviewed her pulmonary medications in great detail.    Any needed adjustments to her pulmonary medications, either for clinical or insurance coverage reasons, have been made and are reflected in the orders.    Compliance with medications stressed.     Side effects of prescribed medications discussed with the patient    Patient was advised to use her nasal spray, as needed but more regularly during weather changes.    She will need to follow with Dr. Son and Radiation, as indicated.           Dictated utilizing Dragon dictation.    This document was electronically signed by Holly Granados MD on 08/31/20 at 10:04

## 2020-09-03 NOTE — PROGRESS NOTES
NEUROSURGICAL OPERATIVE NOTE        PREOPERATIVE DIAGNOSIS:    Brain metastases  Metastatic lung cancer      POSTOPERATIVE DIAGNOSIS:  Same      PROCEDURE:  CyberKnife radiosurgery for 2 brain metastases      SURGEON:  Choco Arenas M.D.      ASSISTANT: Leida London PA-C      RADIATION ONCOLOGIST: Jenny Sanchez M.D.      ANESTHESIA: None      ESTIMATED BLOOD LOSS: None      SPECIMEN: None      DRAINS: None      COMPLICATIONS:  None      CLINICAL NOTE:  The patient is a 62-year-old woman with known metastatic lung cancer.  She has previously undergone whole head radiotherapy for brain metastases.  Studies have demonstrated recurrence/progression of her intracranial metastatic disease.  She has 2 lesions one being a 7 mm left occipital lesion and another being a right parieto-occipital 2.2 x 1.5 cm metastasis.  As such, she presents at this time for CyberKnife radiosurgery to palliate her recurrent brain metastases.      TECHNICAL NOTE:  Prior to treatment the patient underwent the appropriate MRI and CT imaging studies and these data sets were downloaded into the CyberKnife planning station. The images from the data sets were fused. The lesions and adjacent critical structures were contoured. A plan was then developed in conjunction with the radiation physicist and radiation oncologist to deliver 3 fractions of 900 Gy to each of the lesions.     On the day of treatment the patient was taken to the CyberKnife suite and placed on the treatment table. Her previously fashioned Aquaplast mask was applied to her calvarium. Biplanar orthogonal skull x-rays were obtained and these were registered with the digitally derived images from the CT data set. Good registration was achieved. She then underwent her 1st fraction of radiosurgery without difficulty. she is to follow up in subsequent days for her additional 2 fractions.               Choco Arenas M.D.

## 2020-09-09 NOTE — PROGRESS NOTES
DATE OF VISIT: 9/9/2020    REASON FOR VISIT: Followup for small cell lung cancer     HISTORY OF PRESENT ILLNESS: The patient is a very pleasant 65 y.o. female  with past medical history significant for small cell lung cancer diagnosed August 2017.  Repeated scans done on April 21, 2019 revealed progressive disease.  She was started on carboplatin and etoposide and Tecentriq April 30, 2019.  Chemo was stopped after 4 cycles and patient was started on maintenance Tecentriq. Ct scans showed progression. She will start on Yervoy and opdivo 1/13/2019.  He completed 4 cycles on February 7, 2020.  Repeated scans done February 21, 2020 revealed progressive disease.  She was started on weekly Taxol 3 weeks on 1 week off March 4, 2020.  The patient is here today for scheduled follow up visit with treatment cycle #8, day 1.     SUBJECTIVE: The patient is here today with her . She is complaining of numbness in her legs with pain. Not affecting her walking. She tolerated cyberkneif treatments well.        PAST MEDICAL HISTORY/SOCIAL HISTORY/FAMILY HISTORY: Reviewed by me and unchanged from my documentation done on 09/09/20.    Review of Systems   Constitutional: Positive for fatigue. Negative for activity change, appetite change, chills, fever and unexpected weight change.   HENT: Negative for hearing loss, mouth sores, nosebleeds, sore throat and trouble swallowing.    Eyes: Negative for visual disturbance.   Respiratory: Positive for cough and shortness of breath. Negative for chest tightness and wheezing.    Cardiovascular: Negative for chest pain, palpitations and leg swelling.   Gastrointestinal: Positive for nausea. Negative for abdominal distention, abdominal pain, blood in stool, constipation, diarrhea, rectal pain and vomiting.   Endocrine: Negative for cold intolerance and heat intolerance.   Genitourinary: Negative for difficulty urinating, dysuria, frequency and urgency.   Musculoskeletal: Positive for  arthralgias and myalgias. Negative for back pain, gait problem and joint swelling.   Skin: Positive for rash.             Neurological: Negative for dizziness, tremors, syncope, weakness, light-headedness, numbness and headaches.   Hematological: Negative for adenopathy. Does not bruise/bleed easily.   Psychiatric/Behavioral: Negative for confusion, sleep disturbance and suicidal ideas. The patient is not nervous/anxious.          Current Outpatient Medications:   •  albuterol (VENTOLIN HFA) 108 (90 Base) MCG/ACT inhaler, Inhale 2 puffs Every 4 (Four) Hours As Needed for Wheezing or Shortness of Air., Disp: 1 inhaler, Rfl: 5  •  amLODIPine (NORVASC) 5 MG tablet, Take 5 mg by mouth Daily., Disp: , Rfl: 0  •  aspirin 81 MG EC tablet, Take 81 mg by mouth Daily., Disp: , Rfl:   •  azelastine (ASTELIN) 0.1 % nasal spray, 1 spray into the nostril(s) as directed by provider 2 (Two) Times a Day As Needed for Rhinitis or Allergies. Use in each nostril as directed, Disp: 1 each, Rfl: 5  •  benzonatate (TESSALON PERLES) 100 MG capsule, Take 1-2 capsules by mouth 3 (Three) Times a Day As Needed for Cough., Disp: 120 capsule, Rfl: 5  •  Cholecalciferol (VITAMIN D3) 47579 units capsule, Take 50,000 Units by mouth Every 7 (Seven) Days., Disp: , Rfl: 3  •  gabapentin (NEURONTIN) 300 MG capsule, Take 1 capsule by mouth every night at bedtime., Disp: 30 capsule, Rfl: 2  •  hydroCHLOROthiazide (MICROZIDE) 12.5 MG capsule, TK 1 C PO QD PRN, Disp: , Rfl:   •  lidocaine-prilocaine (EMLA) 2.5-2.5 % cream, Apply  topically to the appropriate area as directed As Needed (45-60 minutes prior to port access.  Cover with saran/plastic wrap.)., Disp: 30 g, Rfl: 3  •  loratadine (CLARITIN) 10 MG tablet, Take 1 tablet by mouth Daily., Disp: 30 tablet, Rfl: 3  •  metFORMIN (GLUCOPHAGE) 500 MG tablet, Take 500 mg by mouth 2 (Two) Times a Day With Meals., Disp: , Rfl: 0  •  ondansetron (ZOFRAN) 8 MG tablet, Take 1 tablet by mouth 3 (Three) Times a  "Day As Needed for Nausea or Vomiting., Disp: 30 tablet, Rfl: 5  •  pantoprazole (PROTONIX) 40 MG EC tablet, Take 1 tablet by mouth Every 12 (Twelve) Hours., Disp: 60 tablet, Rfl: 0  •  potassium chloride (K-DUR) 10 MEQ CR tablet, Take 2 tablets by mouth Daily., Disp: 60 tablet, Rfl: 5  •  Sulconazole Nitrate (ARTIFICIAL TEARS) ophthalmic ointment, Use as directed., Disp: 1 each, Rfl: 6  •  triamcinolone (KENALOG) 0.1 % ointment, Apply  topically to the appropriate area as directed 2 (Two) Times a Day., Disp: 30 g, Rfl: 3  •  umeclidinium-vilanterol (Anoro Ellipta) 62.5-25 MCG/INH aerosol powder  inhaler, Inhale 1 puff Daily., Disp: 60 each, Rfl: 11    PHYSICAL EXAMINATION:   /70   Pulse 87   Temp 98 °F (36.7 °C) (Temporal)   Resp 12   Ht 157.5 cm (62\")   Wt 73.5 kg (162 lb)   SpO2 98%   BMI 29.63 kg/m²    ECOG Performance Status: 1 - Symptomatic but completely ambulatory  General Appearance:  alert, cooperative, no apparent distress and appears stated age   Neurologic/Psychiatric: A&O x 3, gait steady, appropriate affect, strength 5/5 in all muscle groups   HEENT:  Normocephalic, without obvious abnormality, mucous membranes moist   Neck: Supple, symmetrical, trachea midline, no adenopathy;  No thyromegaly or tenderness. 3cm soft tissue mass noted midline lower neck   Lungs:   Clear to auscultation bilaterally; respirations regular, even, and unlabored bilaterally   Heart:  Regular rate and rhythm, no murmurs appreciated   Abdomen:   Soft, non-tender, non-distended and no organomegaly   Lymph nodes: No cervical, supraclavicular, inguinal or axillary adenopathy noted   Extremities: Normal, atraumatic; no clubbing, cyanosis, or edema    Skin: No ulcers, or suspicious lesions noted. Rash noted on bilateral arms     Infusion on 08/28/2020   Component Date Value Ref Range Status   • WBC 08/28/2020 2.45* 3.40 - 10.80 10*3/mm3 Final   • RBC 08/28/2020 2.88* 3.77 - 5.28 10*6/mm3 Final   • Hemoglobin 08/28/2020 " 9.1* 12.0 - 15.9 g/dL Final   • Hematocrit 08/28/2020 26.6* 34.0 - 46.6 % Final   • MCV 08/28/2020 92.4  79.0 - 97.0 fL Final   • MCH 08/28/2020 31.6  26.6 - 33.0 pg Final   • MCHC 08/28/2020 34.2  31.5 - 35.7 g/dL Final   • RDW 08/28/2020 14.8  12.3 - 15.4 % Final   • RDW-SD 08/28/2020 49.5  37.0 - 54.0 fl Final   • MPV 08/28/2020 9.9  6.0 - 12.0 fL Final   • Platelets 08/28/2020 142  140 - 450 10*3/mm3 Final   • Neutrophil % 08/28/2020 56.7  42.7 - 76.0 % Final   • Lymphocyte % 08/28/2020 33.1  19.6 - 45.3 % Final   • Monocyte % 08/28/2020 8.2  5.0 - 12.0 % Final   • Eosinophil % 08/28/2020 1.2  0.3 - 6.2 % Final   • Basophil % 08/28/2020 0.4  0.0 - 1.5 % Final   • Immature Grans % 08/28/2020 0.4  0.0 - 0.5 % Final   • Neutrophils, Absolute 08/28/2020 1.39* 1.70 - 7.00 10*3/mm3 Final   • Lymphocytes, Absolute 08/28/2020 0.81  0.70 - 3.10 10*3/mm3 Final   • Monocytes, Absolute 08/28/2020 0.20  0.10 - 0.90 10*3/mm3 Final   • Eosinophils, Absolute 08/28/2020 0.03  0.00 - 0.40 10*3/mm3 Final   • Basophils, Absolute 08/28/2020 0.01  0.00 - 0.20 10*3/mm3 Final   • Immature Grans, Absolute 08/28/2020 0.01  0.00 - 0.05 10*3/mm3 Final   • nRBC 08/28/2020 0.0  0.0 - 0.2 /100 WBC Final      Mri Brain With & Without Contrast    Result Date: 8/23/2020  Narrative: EXAMINATION: MRI BRAIN W WO CONTRAST- 08/19/2020  INDICATION: Neoplasm: head, CNS, recurrence, suspected/known; C79.31-Secondary malignant neoplasm of brain  TECHNIQUE: Sagittal and axial T1, axial T2 FLAIR diffusion-weighted images of the brain, post gadolinium contrast axial, sagittal and coronal T1-weighted images, thin section CyberKnife postcontrast T1-weighted images of the brain.  COMPARISON: 12/28/2019 brain MRI.  FINDINGS: Patient history indicates left breast cancer, left lung cancer.  Small cell lung cancer metastatic to brain. History of radiotherapy and chemotherapy right lung mass and mediastinum increasing size of right posterior temporal lobe mass on  outside MRI 08/10/2020.  The earlier 12/28/2019 study shows several brain masses, including a very large, approximately 5 cm posterior right temporal lobe mass. In this location today, there is a lobular, approximately 2.2 x 1.5 x 1.5 cm temporal occipital mass, lying along the superior margin of the tentorium, possibly barely invading the tentorium as seen on sagittal postcontrast image 7 series 10. The patient's previously large left occipital lesion has been reduced to a small enhancing focus 7 mm in diameter. I do not identify an area of pathologic brain or meningeal enhancement elsewhere.  Remainder of the study shows no evidence of restricted diffusion, no evidence of hemorrhage, hydrocephalus or abnormal extra-axial collection. There is expected, fairly extensive temporooccipital edema associated with the apparently recurrent mass, and much milder focal edema of the medial left occipital lobe.      Impression: Approximately 2.2 cm right temporooccipital mass, and 7 millimeter left occipital mass, suggested by history to be recurrence of previous metastatic disease. No evidence of metastatic disease is identified elsewhere.   D:  08/20/2020 E:  08/21/2020  This report was finalized on 8/23/2020 9:53 AM by Dr. Jluis Oakes MD.             ASSESSMENT: The patient is a very pleasant 65 y.o. female  with small cell lung cancer    PROBLEM LIST:  1. Small cell lung cancer from the left upper lobe.  Limited stage disease, recurrent in a mediastinal node  A.  Treated  initially with etoposide and carboplatin starting 9/13/27.  She responded to the single modality that was started because of symptoms including airway compromise.  B.  Completed combined modality chemoradiation with etoposide and carboplatin . (Chemotherapy completed 12/7/2017).  C.  Recurrence in left paratracheal node.  This was seen on CT scan from 6/19/18 and hypermetabolic on PET scan to confirm.  She's been treated with weekly taxol  starting 7/17/2018 along with involved field radiation.  D. Left pleural effusion seen on CT of 11/23/18, loculated and inaccessible for thoracentesis.  E.  Progressive disease documented on scans done April 22, 2019 with increase in size of left paratracheal lymph node and right hepatic lobe mass.  F. Started carboplatin and etoposide with Tecentriq May 1, 2019, status post 8 cycles.     G.  Started Opdivo plus Yervoy November 13, 2019, status post 4 cycles  H.  Repeat scans done on February 21, 2020 revealed progressive disease  I.  Started Taxol 3 weeks on 1 week off March 6, 2020, status post 5 cycles  2.  Multiple brain metastases:  A.  Completed whole brain radiation January 2020  3.  History of left breast cancer.  A.  Left mastectomy 7/22/2008 for stage IIIA, T3 N1 M0 breast cancer  B.  ER weakly positive, [place] negative and HER-2 positive  C.  Adjuvant chemotherapy with Herceptin, Taxotere and carboplatin  D.  Postmastectomy radiation done at [facility]  E.  Adjuvant tamoxifen started 12/12/2008 and continued as extended adjuvant therapy  4.  COPD.  5. Diabetes  6. HTN  7.  Cough  8.  Treatment induced Rash    PLAN:  1.  I will proceed with treatment  with Taxol weekly , cycle #8 day 1 on Friday. I will drop day 8 secondary to neuropathy. I will continue 24% dose reduction on day 1 and 15.  2.  The patient will continue Mucinex twice a day for cough.  3.  I will continue port maintenance.   4.  I will continue to monitor the patient blood work while she is on treatment including blood counts kidney function liver function and electrolytes.  5. We reviewed the side effects of this regimen including alopecia, nausea, fatigue, myelosuppression, infusion reaction, neuropathy, and diarrhea and possibly death.  6. She has completed 10 years of Tamoxifen at this point.   7. Will continue inhalers as needed for COPD  8. We will continue Metformin for type 2 DM.    9. Will continue Hyzaar for HTN.  We  may have to adjust her antihypertensive agent while she is in treatment.  10.   We will do 3 months follow-up study which will be due Novemeber 2020.  11. She will follow up with Dr. Sanchez for brain metastases.   11.  I will continue the patient on Robitussin with codeine as needed for cough.  12.  I will continue the patient on Benadryl as needed for itching.  13. If Hgb is less than 8 we will plan on giving 2 units of PRBC.   14.  We will continue potassium oral placement.     Josh Son MD  9/9/2020

## 2020-09-30 NOTE — PROGRESS NOTES
DATE OF VISIT: 9/30/2020    REASON FOR VISIT: Followup for small cell lung cancer     HISTORY OF PRESENT ILLNESS: The patient is a very pleasant 65 y.o. female  with past medical history significant for small cell lung cancer diagnosed August 2017.  Repeated scans done on April 21, 2019 revealed progressive disease.  She was started on carboplatin and etoposide and Tecentriq April 30, 2019.  Chemo was stopped after 4 cycles and patient was started on maintenance Tecentriq. Ct scans showed progression. She will start on Yervoy and opdivo 1/13/2019.  He completed 4 cycles on February 7, 2020.  Repeated scans done February 21, 2020 revealed progressive disease.  She was started on weekly Taxol 3 weeks on 1 week off March 4, 2020.  The patient is here today for scheduled follow up visit with treatment cycle #8, day 1.     SUBJECTIVE: The patient is here today with her .  She is unable to walk is been having bilateral lower extremities weakness.    PAST MEDICAL HISTORY/SOCIAL HISTORY/FAMILY HISTORY: Reviewed by me and unchanged from my documentation done on 09/30/20.    Review of Systems   Constitutional: Positive for fatigue. Negative for activity change, appetite change, chills, fever and unexpected weight change.   HENT: Negative for hearing loss, mouth sores, nosebleeds, sore throat and trouble swallowing.    Eyes: Negative for visual disturbance.   Respiratory: Positive for cough and shortness of breath. Negative for chest tightness and wheezing.    Cardiovascular: Negative for chest pain, palpitations and leg swelling.   Gastrointestinal: Positive for nausea. Negative for abdominal distention, abdominal pain, blood in stool, constipation, diarrhea, rectal pain and vomiting.   Endocrine: Negative for cold intolerance and heat intolerance.   Genitourinary: Negative for difficulty urinating, dysuria, frequency and urgency.   Musculoskeletal: Positive for arthralgias and myalgias. Negative for back pain, gait  problem and joint swelling.   Skin: Positive for rash.             Neurological: Negative for dizziness, tremors, syncope, weakness, light-headedness, numbness and headaches.   Hematological: Negative for adenopathy. Does not bruise/bleed easily.   Psychiatric/Behavioral: Negative for confusion, sleep disturbance and suicidal ideas. The patient is not nervous/anxious.          Current Outpatient Medications:   •  albuterol (VENTOLIN HFA) 108 (90 Base) MCG/ACT inhaler, Inhale 2 puffs Every 4 (Four) Hours As Needed for Wheezing or Shortness of Air., Disp: 1 inhaler, Rfl: 5  •  amLODIPine (NORVASC) 5 MG tablet, Take 5 mg by mouth Daily., Disp: , Rfl: 0  •  aspirin 81 MG EC tablet, Take 81 mg by mouth Daily., Disp: , Rfl:   •  azelastine (ASTELIN) 0.1 % nasal spray, 1 spray into the nostril(s) as directed by provider 2 (Two) Times a Day As Needed for Rhinitis or Allergies. Use in each nostril as directed, Disp: 1 each, Rfl: 5  •  benzonatate (TESSALON PERLES) 100 MG capsule, Take 1-2 capsules by mouth 3 (Three) Times a Day As Needed for Cough., Disp: 120 capsule, Rfl: 5  •  Cholecalciferol (VITAMIN D3) 82871 units capsule, Take 50,000 Units by mouth Every 7 (Seven) Days., Disp: , Rfl: 3  •  dexamethasone (DECADRON) 4 MG tablet, Take 1 tablet by mouth Daily With Breakfast., Disp: 30 tablet, Rfl: 0  •  gabapentin (NEURONTIN) 300 MG capsule, Take 1 capsule by mouth every night at bedtime., Disp: 30 capsule, Rfl: 2  •  hydroCHLOROthiazide (MICROZIDE) 12.5 MG capsule, TK 1 C PO QD PRN, Disp: , Rfl:   •  lidocaine-prilocaine (EMLA) 2.5-2.5 % cream, Apply  topically to the appropriate area as directed As Needed (45-60 minutes prior to port access.  Cover with saran/plastic wrap.)., Disp: 30 g, Rfl: 3  •  loratadine (CLARITIN) 10 MG tablet, Take 1 tablet by mouth Daily., Disp: 30 tablet, Rfl: 3  •  metFORMIN (GLUCOPHAGE) 500 MG tablet, Take 500 mg by mouth 2 (Two) Times a Day With Meals., Disp: , Rfl: 0  •  ondansetron (ZOFRAN)  "8 MG tablet, Take 1 tablet by mouth 3 (Three) Times a Day As Needed for Nausea or Vomiting., Disp: 30 tablet, Rfl: 5  •  pantoprazole (PROTONIX) 40 MG EC tablet, Take 1 tablet by mouth Every 12 (Twelve) Hours., Disp: 60 tablet, Rfl: 0  •  potassium chloride (K-DUR) 10 MEQ CR tablet, Take 2 tablets by mouth Daily., Disp: 60 tablet, Rfl: 5  •  Sulconazole Nitrate (ARTIFICIAL TEARS) ophthalmic ointment, Use as directed., Disp: 1 each, Rfl: 6  •  triamcinolone (KENALOG) 0.1 % ointment, Apply  topically to the appropriate area as directed 2 (Two) Times a Day., Disp: 30 g, Rfl: 3  •  umeclidinium-vilanterol (Anoro Ellipta) 62.5-25 MCG/INH aerosol powder  inhaler, Inhale 1 puff Daily., Disp: 60 each, Rfl: 11    PHYSICAL EXAMINATION:   /67   Pulse 75   Temp 97.3 °F (36.3 °C) (Temporal)   Resp 12   Ht 157.5 cm (62\")   Wt 74.4 kg (164 lb)   SpO2 98%   BMI 30.00 kg/m²    ECOG Performance Status: 1 - Symptomatic but completely ambulatory  General Appearance:  alert, cooperative, no apparent distress and appears stated age   Neurologic/Psychiatric: A&O x 3, gait steady, appropriate affect, strength 5/5 in all muscle groups   HEENT:  Normocephalic, without obvious abnormality, mucous membranes moist   Neck: Supple, symmetrical, trachea midline, no adenopathy;  No thyromegaly or tenderness. 3cm soft tissue mass noted midline lower neck   Lungs:   Clear to auscultation bilaterally; respirations regular, even, and unlabored bilaterally   Heart:  Regular rate and rhythm, no murmurs appreciated   Abdomen:   Soft, non-tender, non-distended and no organomegaly   Lymph nodes: No cervical, supraclavicular, inguinal or axillary adenopathy noted   Extremities: Normal, atraumatic; no clubbing, cyanosis, or edema    Skin: No ulcers, or suspicious lesions noted. Rash noted on bilateral arms     Infusion on 09/25/2020   Component Date Value Ref Range Status   • WBC 09/25/2020 3.60  3.40 - 10.80 10*3/mm3 Final   • RBC 09/25/2020 " 3.26* 3.77 - 5.28 10*6/mm3 Final   • Hemoglobin 09/25/2020 10.2* 12.0 - 15.9 g/dL Final   • Hematocrit 09/25/2020 30.8* 34.0 - 46.6 % Final   • MCV 09/25/2020 94.5  79.0 - 97.0 fL Final   • MCH 09/25/2020 31.3  26.6 - 33.0 pg Final   • MCHC 09/25/2020 33.1  31.5 - 35.7 g/dL Final   • RDW 09/25/2020 15.3  12.3 - 15.4 % Final   • RDW-SD 09/25/2020 53.0  37.0 - 54.0 fl Final   • MPV 09/25/2020 9.5  6.0 - 12.0 fL Final   • Platelets 09/25/2020 132* 140 - 450 10*3/mm3 Final   • Neutrophil % 09/25/2020 69.1  42.7 - 76.0 % Final   • Lymphocyte % 09/25/2020 19.4* 19.6 - 45.3 % Final   • Monocyte % 09/25/2020 7.5  5.0 - 12.0 % Final   • Eosinophil % 09/25/2020 3.1  0.3 - 6.2 % Final   • Basophil % 09/25/2020 0.6  0.0 - 1.5 % Final   • Immature Grans % 09/25/2020 0.3  0.0 - 0.5 % Final   • Neutrophils, Absolute 09/25/2020 2.49  1.70 - 7.00 10*3/mm3 Final   • Lymphocytes, Absolute 09/25/2020 0.70  0.70 - 3.10 10*3/mm3 Final   • Monocytes, Absolute 09/25/2020 0.27  0.10 - 0.90 10*3/mm3 Final   • Eosinophils, Absolute 09/25/2020 0.11  0.00 - 0.40 10*3/mm3 Final   • Basophils, Absolute 09/25/2020 0.02  0.00 - 0.20 10*3/mm3 Final   • Immature Grans, Absolute 09/25/2020 0.01  0.00 - 0.05 10*3/mm3 Final   • nRBC 09/25/2020 0.0  0.0 - 0.2 /100 WBC Final   • RBC Morphology 09/25/2020 Normal  Normal Final   • WBC Morphology 09/25/2020 Normal  Normal Final   • Platelet Morphology 09/25/2020 Normal  Normal Final      No results found.         ASSESSMENT: The patient is a very pleasant 65 y.o. female  with small cell lung cancer    PROBLEM LIST:  1. Small cell lung cancer from the left upper lobe.  Limited stage disease, recurrent in a mediastinal node  A.  Treated  initially with etoposide and carboplatin starting 9/13/27.  She responded to the single modality that was started because of symptoms including airway compromise.  B.  Completed combined modality chemoradiation with etoposide and carboplatin . (Chemotherapy completed  12/7/2017).  C.  Recurrence in left paratracheal node.  This was seen on CT scan from 6/19/18 and hypermetabolic on PET scan to confirm.  She's been treated with weekly taxol starting 7/17/2018 along with involved field radiation.  D. Left pleural effusion seen on CT of 11/23/18, loculated and inaccessible for thoracentesis.  E.  Progressive disease documented on scans done April 22, 2019 with increase in size of left paratracheal lymph node and right hepatic lobe mass.  F. Started carboplatin and etoposide with Tecentriq May 1, 2019, status post 8 cycles.     G.  Started Opdivo plus Yervoy November 13, 2019, status post 4 cycles  H.  Repeat scans done on February 21, 2020 revealed progressive disease  I.  Started Taxol 3 weeks on 1 week off March 6, 2020, status post 5 cycles  2.  Multiple brain metastases:  A.  Completed whole brain radiation January 2020  3.  History of left breast cancer.  A.  Left mastectomy 7/22/2008 for stage IIIA, T3 N1 M0 breast cancer  B.  ER weakly positive, [place] negative and HER-2 positive  C.  Adjuvant chemotherapy with Herceptin, Taxotere and carboplatin  D.  Postmastectomy radiation done at [facility]  E.  Adjuvant tamoxifen started 12/12/2008 and continued as extended adjuvant therapy  4.  COPD.  5. Diabetes  6. HTN  7.  Cough  8.  Treatment induced Rash    PLAN:  1.  I will hold chemotherapy today cycle #8 secondary to progressive lower extremity weakness which I am suspecting is multifactorial induced by previous brain metastases, persistent brain disease, followed by radiation, and neuropathy from Taxol.  2.  The patient will continue Mucinex twice a day for cough.  3.  I will continue port maintenance.  Will have port flush on return.  4.  I will continue to monitor the patient blood work while she is on treatment including blood counts kidney function liver function and electrolytes.  5. We reviewed the side effects of this regimen including alopecia, nausea,  fatigue, myelosuppression, infusion reaction, neuropathy, and diarrhea and possibly death.  6. She has completed 10 years of Tamoxifen at this point.   7. Will continue inhalers as needed for COPD  8. We will continue Metformin for type 2 DM.    9. Will continue Hyzaar for HTN.  We may have to adjust her antihypertensive agent while she is in treatment.  10.   We will do 3 months follow-up study which will be due Novemeber 2020.  11. She will follow up with Dr. Sanchez for brain metastases.   11.  I will continue the patient on Robitussin with codeine as needed for cough.  12.  I will continue the patient on Benadryl as needed for itching.  13. If Hgb is less than 8 we will plan on giving 2 units of PRBC.   14.  We will continue potassium oral placement.   15.  I will arrange for home physical therapy for lower extremity weakness.  6.  Patient will follow-up with me in 4 weeks if she is not doing clinically better we will arrange for hospice care.  Josh Son MD  9/30/2020

## 2020-10-01 NOTE — TELEPHONE ENCOUNTER
Patient calling to get update on home health therapy. Wants to know if she is supposed to call or if someone is going to call her.    Callback# [phone]

## 2020-10-01 NOTE — TELEPHONE ENCOUNTER
LILIA([facility])  IS CALLING  STATES THEY CAN NOT ADMIT HER FOR HOME HEALTH DUE TO STAFFING ISSUES     PLEASE CALL [phone] WITH ANY QUESTIONS

## 2020-10-01 NOTE — TELEPHONE ENCOUNTER
VM left for home health checking on the status of referral. Also left details regarding other message received today from their central intake who stated that they cannot accept her due to staffing issues. Requested a call back from Brigida with  for clarification

## 2020-10-02 NOTE — TELEPHONE ENCOUNTER
Received call back from Brigida, who states that she will work on getting patient admitted to their services, and will call me back with an update today.    Attempted to return call to patient with update, but phone rang with no answer and no VM x2

## 2020-10-06 NOTE — TELEPHONE ENCOUNTER
VM left giving VO for PT. Also requested addtl information regarding the request for a lidia lift, as pt was not bed-bound when she was seen in the office last week and did not require this.

## 2020-10-06 NOTE — TELEPHONE ENCOUNTER
Per Leida, over the past week, patient has been unable to walk or take steps, and is a dependent transfer. Per dr faye, will order Keon lift. Order faxed to [facility] @ [phone]

## 2020-10-06 NOTE — TELEPHONE ENCOUNTER
Leida with [facility] called back she wants to know if a verbal order can be put in for a . Please call.

## 2020-10-06 NOTE — TELEPHONE ENCOUNTER
Leida with [facility] called she saw the patient today and need verbal orders for PT 2 time weekly for 4 weeks, and the family wants to know if an order can be put in for a Keon Lift this would have to be a written order that would have to go to a "[employer]. Please call.

## 2020-10-07 NOTE — PROGRESS NOTES
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    Reason for Visit:  I personally contacted Sanjana Horton today in an effort to screen for coronavirus symptoms and also to perform her scheduled follow-up visit remotely in light of the coronavirus pandemic.  Visit was attempted with Zoom audio/video, but patient and daughter were unable to successfully operate the yeni.  Visit was converted to [facility].  With respect to her specific risks for coronavirus, her screen is as follows:    COVID-19 RISK SCREEN     1. Has the patient had close contact without PPE with a lab confirmed COVID-19 (+) person or a person under investigation (PUI) for COVID-19 infection?  -- No     2. Has the patient had respiratory symptoms, worsened/new cough and/or SOA, unexplained fever, or sudden loss of smell and/or taste in the past 7 days? --  No    3. Does the patient have baseline higher exposure risk such as working in healthcare field or currently residing in healthcare facility?  --  No     She also denied any new respiratory symptoms or fever within the past 14 days.    I counseled her regarding safe practices for minimizing risk for infection including  hand-washing, self-isolation, limiting contacts, and we also discussed what to do should she develop symptoms including fever, chills, new cough, shortness of breath, or new body aches.        BRIEF HISTORY:   Mrs. Horton is a 65 y.o. female with Stage IV small cell lung cancer, diagnosed in 2017 and status post definitive chemoradiation.  She has a more remote history of left breast cancer, in remission.  She underwent whole brain radiation for symptomatic brain metastases in January 2020.  She had a good response initially, and has since continued systemic treatment with Taxol chemotherapy.  More recently, surveillance MRI brain revealed 2 lesions within the left occipital region and right parieto-occipital region, consistent with metastatic disease.  She underwent CyberKnife stereotactic radiosurgery to each of the two lesions, to a dose of 27 Gy in 3 fractions.  She tolerated treatment well.  However, she notes a 1 month history of progressive bilateral lower extremity weakness and numbness, worse on the left.  She is now unable to walk and is dependent with transfers.  Home PT has been arranged and a Keon lift has been ordered.  She has fallen several times without identifiable injury but notes mid-thoracic back pain that hurts a bit more than usual.  She has tried prn Aleve and heating pad with some relief.  She notes incontinence of bowel and bladder x 1.5 weeks, which is new.  She continues on Decadron 4 mg daily.  Peripheral neuropathy in her hands and feet is stable, and she remains on Gabapentin.  She otherwise denies headache, confusion, seizure, vision changes, nausea, vomiting, or saddle anesthesia.  No new or progressive respiratory symptoms.  Of note, cycle 8 of chemotherapy was held last week per Dr. Son.      MEDICATIONS: Medication reconciliation for the patient was reviewed and confirmed in the electronic medical record.    Review of Systems   Constitutional: Positive for fatigue.    Respiratory: Positive for cough and shortness of breath.    Genitourinary: Positive for bladder incontinence.    Musculoskeletal: Positive for arthralgias, back pain, gait problem (d/t BLE weakness) and myalgias.   Skin: Positive for itching and rash (back).   Neurological: Positive for extremity weakness (BLE), gait problem (d/t BLE weakness) and numbness.   All other systems reviewed and are negative.      KPS 60%    Physical Exam  Unable to perform as visit was conducted via telemedicine with audio only.  Patient and family requested virtual f/u given patient's progressive condition and in light of the COVID-19 pandemic.    The following portions of the patient's history were reviewed and updated as appropriate: allergies, current medications, past family history, past medical history, past social history, past surgical history and problem list.         Sanjana was seen today for brain metastases.    Diagnoses and all orders for this visit:    Brain metastases (CMS/HCC)  -     dexamethasone (DECADRON) 4 MG tablet; Take 1 tablet by mouth 2 (Two) Times a Day. Do not discontinue abruptly.    Extensive stage primary small cell carcinoma of lung (CMS/HCC)  -     MRI Thoracic Spine With & Without Contrast; Future  -     MRI Lumbar Spine With & Without Contrast; Future         IMPRESSION:  Mrs. Horton is a 65 y.o. female with metastatic small cell lung cancer to the brain.  She has been previously treated with whole brain radiation, and more recently was found to have progressive intracranial disease.  She is now 1 month status post CyberKnife SBRT to the two discrete brain metastases.  She had quite a bit of edema from the known metastases on her MRI brain from August.  I will increase steroid dose to 4 mg BID.  Taper per recommendations of neurosurgery/medical oncology.  Hopefully her symptoms are not reflective of CNS progression.  She is scheduled for repeat MRI brain and follow-up with Dr. Arenas.  I will also  "order MRI T-spine and L-spine to assess for cauda equina or cord compression, given her pain and progressive symptomology.  I offered a referral to Hospice, which I believe is very appropriate at this time.  Ms. Horton and her daughter are \"not there yet\" and declined referral.  She is motivated to walk again and wishes to continue systemic treatment when she is able.  We discussed follow-up intervals, surveillance imaging, and expectations for response to treatment.    RECOMMENDATIONS:  Mrs. Horton is scheduled for follow-up with Dawson Son and Dagoberto later this month.  I will order MRI scans and contact her with the results.  She verbalizes to call for persistent/worsening symptoms.    Return in about 6 weeks (around 11/17/2020) for Office Visit.    Richard Webb, AQUILES      "

## 2020-10-14 NOTE — TELEPHONE ENCOUNTER
Provider:  Dagoberto  Caller: patient  Time of call:1:28     Phone #: [phone]   Surgery:  No-brain metastases  Surgery Date:    Last visit:   08/21/20  Next visit: 10/20/20    HAMMAD:         Reason for call:     Pt called to get the results of her MRI of the lumbar and thoracic spine.

## 2020-10-14 NOTE — TELEPHONE ENCOUNTER
Spoke with pt's daughter- she verbalized understanding. She wants to know if upcoming visit can be changed to televisit. Pt is having a hard time getting up and walking.

## 2020-10-14 NOTE — TELEPHONE ENCOUNTER
Spoke to patient's daughter. She voiced understanding, but stated it is difficult getting patient in and out of a vehicle as well as to appointments due to her not walking.

## 2020-10-19 NOTE — TELEPHONE ENCOUNTER
PT DAUGHTER (JUANITA) IS CALLING TO SEE IF WE CAN CHANGE APPOINTMENT ON 10/20/20 11:00 TO A TELEPHONE APPT.  HER MOM HAS A COLD AND IS HAVING TROUBLE GETTING HER INTO A CAR RIGHT NOW.    [phone]  PLEASE ADVISE  THANK YOU

## 2020-10-19 NOTE — TELEPHONE ENCOUNTER
I have spoken with [facility] and this has to be done in person. Given her current state of health we need to do a physical exam on her to be sure.    I called and verbalized this to pt.'s daughter.  They understand and will be there tomorrow

## 2020-10-19 NOTE — TELEPHONE ENCOUNTER
I called and talked with the patients daughter Fiona. Fiona stated there is no way she can get the patient in the car for an appointment tomorrow.   Stated the patient is unable to move from her waist down, and has been having urinary incontinence, that has gotten worse. Daughter also stated the patient is now sick with a sore throat, and a cough, and they are unable to understand her at times.     Daughter is wanting to know what they can do, in regards to the appointment tomorrow. Daughter stated she was just under the impression this was for MRI results.      Fiona can be reached at [phone].

## 2020-10-20 NOTE — PROGRESS NOTES
Patient: Sanjana Horton  : 1955    Primary Care Provider: Abiel Keller MD    Requesting Provider: As above        History    Chief Complaint:   1.  Paraplegia.  2.  Brain metastases.  3.  Metastatic lung cancer.    History of Present Illness: Ms. Horton is a 65-year-old woman with metastatic lung cancer.  She had previously undergone whole head irradiation for brain metastases.  Recurrence/progression of her intracranial disease was observed and on 9/3/2020 she underwent CyberKnife radiosurgery to 2 lesions, one in the left occiput and the other in the right parieto-occipital region.  3 fractions of 900 cGy were administered to each of the lesions.  Over the last 2 weeks she has developed progressive weakness in her legs with numbness from the mid abdomen down.  She has also had some numbness in her hands.  In the morning she has some low back pain but that goes away throughout the day.  She has been incontinent of bowel and bladder.    Review of Systems   Constitutional: Positive for activity change. Negative for appetite change, chills, diaphoresis, fatigue, fever and unexpected weight change.   HENT: Positive for voice change. Negative for congestion, dental problem, drooling, ear discharge, ear pain, facial swelling, hearing loss, mouth sores, nosebleeds, postnasal drip, rhinorrhea, sinus pressure, sinus pain, sneezing, sore throat, tinnitus and trouble swallowing.    Eyes: Positive for itching. Negative for photophobia, pain, discharge, redness and visual disturbance.   Respiratory: Negative for apnea, cough, choking, chest tightness, shortness of breath, wheezing and stridor.    Cardiovascular: Negative for chest pain, palpitations and leg swelling.   Gastrointestinal: Negative for abdominal distention, abdominal pain, anal bleeding, blood in stool, constipation, diarrhea, nausea, rectal pain and vomiting.   Endocrine: Negative for cold intolerance, heat intolerance, polydipsia,  "polyphagia and polyuria.   Genitourinary: Negative for decreased urine volume, difficulty urinating, dyspareunia, dysuria, enuresis, flank pain, frequency, genital sores, hematuria, menstrual problem, pelvic pain, urgency, vaginal bleeding, vaginal discharge and vaginal pain.   Musculoskeletal: Positive for back pain. Negative for arthralgias, gait problem, joint swelling, myalgias, neck pain and neck stiffness.   Skin: Negative for color change, pallor, rash and wound.   Allergic/Immunologic: Negative for environmental allergies, food allergies and immunocompromised state.   Neurological: Positive for weakness and numbness. Negative for dizziness, tremors, seizures, syncope, facial asymmetry, speech difficulty, light-headedness and headaches.   Hematological: Negative for adenopathy. Does not bruise/bleed easily.   Psychiatric/Behavioral: Negative for agitation, behavioral problems, confusion, decreased concentration, dysphoric mood, hallucinations, self-injury, sleep disturbance and suicidal ideas. The patient is not nervous/anxious and is not hyperactive.        The patient's past medical history, past surgical history, family history, and social history have been reviewed at length in the electronic medical record.    Physical Exam:   Temp 96.8 °F (36 °C)   Ht 157.5 cm (62\")   Wt 74.4 kg (164 lb)   BMI 30.00 kg/m²   The patient is wheelchair-bound.  There is a mid thoracic pin level to light touch testing.  Strength in her legs is 0-1/5.    Medical Decision Making    Data Review:   MRIs of the thoracic and lumbar spine demonstrate extensive disease within her canal throughout the lumbar spine and intrinsically within the spinal cord at multiple levels.  The localization feature for the Choi study is quite poor.  However at one of the thoracic levels the entire spinal cord is filled with tumor.    Diagnosis:   1.  Spinal cord and intraspinal metastases.  2.  Brain metastases.  3.  Progressive metastatic " lung cancer.    Treatment Options:   The patient is not having significant pain.  Treatment for her spinal disease is unlikely to reverse her current neurologic deficit.  I have discussed hospice with the patient and her .  He wishes to take her home and is not interested in hospice at this time.  Her prognosis is quite grim.  She will follow-up as needed.       Diagnosis Plan   1. Brain metastases (CMS/HCC)     2. 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 Arenas, personally performed the services described in the documentation, as scribed in my presence, and it is both accurate and complete.

## 2020-10-27 NOTE — TELEPHONE ENCOUNTER
Called Roshni back with [facility].  She advised that there is nothing else they can do for the patient at this point and will be d/c patient from  services at this time.  If needed they can always come back out but she felt that the patient was more of a candidate for hospice but that the patient and family are in denial and still think patient will be able to walk again sometime.  I just wanted to let you know because patient is seeing you tomorrow.  Let me know if you have any questions.

## 2020-10-27 NOTE — TELEPHONE ENCOUNTER
Roshni with [facility] needs a call back today if possible she is on her way to see her and has some questions.

## 2020-10-28 NOTE — TELEPHONE ENCOUNTER
Reji with [facility] called he wants to know if the signed order can be sent over and needs some clarification on the notes for the hospital bed. Please call.

## 2020-10-28 NOTE — PROGRESS NOTES
DATE OF VISIT: 10/28/2020    REASON FOR VISIT: Followup for small cell lung cancer     HISTORY OF PRESENT ILLNESS: The patient is a very pleasant 65 y.o. female  with past medical history significant for small cell lung cancer diagnosed August 2017.  Repeated scans done on April 21, 2019 revealed progressive disease.  She was started on carboplatin and etoposide and Tecentriq April 30, 2019.  Chemo was stopped after 4 cycles and patient was started on maintenance Tecentriq. Ct scans showed progression. She will start on Yervoy and opdivo 1/13/2019.  He completed 4 cycles on February 7, 2020.  Repeated scans done February 21, 2020 revealed progressive disease.  She was started on weekly Taxol 3 weeks on 1 week off March 4, 2020.  Repeated MRI spine done 10/12/2020. showed progressive disease. The patient is here today for scheduled follow up visit.     SUBJECTIVE: The patient is here today with her .  She is unable to walk. She is complaining of pain in her shoulders.     PAST MEDICAL HISTORY/SOCIAL HISTORY/FAMILY HISTORY: Reviewed by me and unchanged from my documentation done on 10/28/20.    Review of Systems   Constitutional: Positive for fatigue. Negative for activity change, appetite change, chills, fever and unexpected weight change.   HENT: Negative for hearing loss, mouth sores, nosebleeds, sore throat and trouble swallowing.    Eyes: Negative for visual disturbance.   Respiratory: Positive for cough and shortness of breath. Negative for chest tightness and wheezing.    Cardiovascular: Negative for chest pain, palpitations and leg swelling.   Gastrointestinal: Positive for nausea. Negative for abdominal distention, abdominal pain, blood in stool, constipation, diarrhea, rectal pain and vomiting.   Endocrine: Negative for cold intolerance and heat intolerance.   Genitourinary: Negative for difficulty urinating, dysuria, frequency and urgency.   Musculoskeletal: Positive for arthralgias and myalgias.  Negative for back pain, gait problem and joint swelling.   Skin: Positive for rash.             Neurological: Negative for dizziness, tremors, syncope, weakness, light-headedness, numbness and headaches.   Hematological: Negative for adenopathy. Does not bruise/bleed easily.   Psychiatric/Behavioral: Negative for confusion, sleep disturbance and suicidal ideas. The patient is not nervous/anxious.          Current Outpatient Medications:   •  albuterol (VENTOLIN HFA) 108 (90 Base) MCG/ACT inhaler, Inhale 2 puffs Every 4 (Four) Hours As Needed for Wheezing or Shortness of Air., Disp: 1 inhaler, Rfl: 5  •  amLODIPine (NORVASC) 5 MG tablet, Take 5 mg by mouth Daily., Disp: , Rfl: 0  •  aspirin 81 MG EC tablet, Take 81 mg by mouth Daily., Disp: , Rfl:   •  azelastine (ASTELIN) 0.1 % nasal spray, 1 spray into the nostril(s) as directed by provider 2 (Two) Times a Day As Needed for Rhinitis or Allergies. Use in each nostril as directed, Disp: 1 each, Rfl: 5  •  benzonatate (TESSALON PERLES) 100 MG capsule, Take 1-2 capsules by mouth 3 (Three) Times a Day As Needed for Cough., Disp: 120 capsule, Rfl: 5  •  Cholecalciferol (VITAMIN D3) 39887 units capsule, Take 50,000 Units by mouth Every 7 (Seven) Days., Disp: , Rfl: 3  •  dexamethasone (DECADRON) 4 MG tablet, Take 1 tablet by mouth 2 (Two) Times a Day. Do not discontinue abruptly., Disp: 60 tablet, Rfl: 2  •  gabapentin (NEURONTIN) 300 MG capsule, Take 1 capsule by mouth every night at bedtime., Disp: 30 capsule, Rfl: 2  •  hydroCHLOROthiazide (MICROZIDE) 12.5 MG capsule, TK 1 C PO QD PRN, Disp: , Rfl:   •  lidocaine-prilocaine (EMLA) 2.5-2.5 % cream, Apply  topically to the appropriate area as directed As Needed (45-60 minutes prior to port access.  Cover with saran/plastic wrap.)., Disp: 30 g, Rfl: 3  •  loratadine (CLARITIN) 10 MG tablet, Take 1 tablet by mouth Daily., Disp: 30 tablet, Rfl: 3  •  metFORMIN (GLUCOPHAGE) 500 MG tablet, Take 500 mg by mouth 2 (Two) Times a  "Day With Meals., Disp: , Rfl: 0  •  ondansetron (ZOFRAN) 8 MG tablet, Take 1 tablet by mouth 3 (Three) Times a Day As Needed for Nausea or Vomiting., Disp: 30 tablet, Rfl: 5  •  pantoprazole (PROTONIX) 40 MG EC tablet, Take 1 tablet by mouth Every 12 (Twelve) Hours., Disp: 60 tablet, Rfl: 0  •  potassium chloride (K-DUR) 10 MEQ CR tablet, Take 2 tablets by mouth Daily., Disp: 60 tablet, Rfl: 5  •  Sulconazole Nitrate (ARTIFICIAL TEARS) ophthalmic ointment, Use as directed., Disp: 1 each, Rfl: 6  •  triamcinolone (KENALOG) 0.1 % ointment, Apply  topically to the appropriate area as directed 2 (Two) Times a Day., Disp: 30 g, Rfl: 3  •  umeclidinium-vilanterol (Anoro Ellipta) 62.5-25 MCG/INH aerosol powder  inhaler, Inhale 1 puff Daily., Disp: 60 each, Rfl: 11    PHYSICAL EXAMINATION:   /61   Pulse 75   Temp 97.3 °F (36.3 °C) (Temporal)   Resp 12   Ht 157.5 cm (62\")   Wt 74.4 kg (164 lb) Comment: Per Patient  SpO2 99%   BMI 30.00 kg/m²    ECOG Performance Status: 1 - Symptomatic but completely ambulatory  General Appearance:  alert, cooperative, no apparent distress and appears stated age   Neurologic/Psychiatric: A&O x 3, gait steady, appropriate affect, strength 5/5 in all muscle groups   HEENT:  Normocephalic, without obvious abnormality, mucous membranes moist   Neck: Supple, symmetrical, trachea midline, no adenopathy;  No thyromegaly or tenderness. 3cm soft tissue mass noted midline lower neck   Lungs:   Clear to auscultation bilaterally; respirations regular, even, and unlabored bilaterally   Heart:  Regular rate and rhythm, no murmurs appreciated   Abdomen:   Soft, non-tender, non-distended and no organomegaly   Lymph nodes: No cervical, supraclavicular, inguinal or axillary adenopathy noted   Extremities: Normal, atraumatic; no clubbing, cyanosis, or edema    Skin: No ulcers, or suspicious lesions noted. Rash noted on bilateral arms     No visits with results within 2 Week(s) from this visit. "   Latest known visit with results is:   Infusion on 09/25/2020   Component Date Value Ref Range Status   • WBC 09/25/2020 3.60  3.40 - 10.80 10*3/mm3 Final   • RBC 09/25/2020 3.26* 3.77 - 5.28 10*6/mm3 Final   • Hemoglobin 09/25/2020 10.2* 12.0 - 15.9 g/dL Final   • Hematocrit 09/25/2020 30.8* 34.0 - 46.6 % Final   • MCV 09/25/2020 94.5  79.0 - 97.0 fL Final   • MCH 09/25/2020 31.3  26.6 - 33.0 pg Final   • MCHC 09/25/2020 33.1  31.5 - 35.7 g/dL Final   • RDW 09/25/2020 15.3  12.3 - 15.4 % Final   • RDW-SD 09/25/2020 53.0  37.0 - 54.0 fl Final   • MPV 09/25/2020 9.5  6.0 - 12.0 fL Final   • Platelets 09/25/2020 132* 140 - 450 10*3/mm3 Final   • Neutrophil % 09/25/2020 69.1  42.7 - 76.0 % Final   • Lymphocyte % 09/25/2020 19.4* 19.6 - 45.3 % Final   • Monocyte % 09/25/2020 7.5  5.0 - 12.0 % Final   • Eosinophil % 09/25/2020 3.1  0.3 - 6.2 % Final   • Basophil % 09/25/2020 0.6  0.0 - 1.5 % Final   • Immature Grans % 09/25/2020 0.3  0.0 - 0.5 % Final   • Neutrophils, Absolute 09/25/2020 2.49  1.70 - 7.00 10*3/mm3 Final   • Lymphocytes, Absolute 09/25/2020 0.70  0.70 - 3.10 10*3/mm3 Final   • Monocytes, Absolute 09/25/2020 0.27  0.10 - 0.90 10*3/mm3 Final   • Eosinophils, Absolute 09/25/2020 0.11  0.00 - 0.40 10*3/mm3 Final   • Basophils, Absolute 09/25/2020 0.02  0.00 - 0.20 10*3/mm3 Final   • Immature Grans, Absolute 09/25/2020 0.01  0.00 - 0.05 10*3/mm3 Final   • nRBC 09/25/2020 0.0  0.0 - 0.2 /100 WBC Final   • RBC Morphology 09/25/2020 Normal  Normal Final   • WBC Morphology 09/25/2020 Normal  Normal Final   • Platelet Morphology 09/25/2020 Normal  Normal Final      Mri Thoracic Spine With & Without Contrast    Result Date: 10/12/2020  Narrative: PROCEDURE: MRI THORACIC SPINE W WO CONTRAST-  HISTORY: Stage IV SCLC on chemo, now with progressive back pain, BLE weakness, and incontinence; C34.90-Malignant neoplasm of unspecified part of unspecified bronchus or lung  COMPARISON: None.  TECHNIQUE: Multiplanar  multisequence imaging of the thoracic spine was performed both before and following the administration of 15 mL MultiHance intravenous contrast.  FINDINGS: The thoracic vertebral bodies maintain a normal height, alignment and signal intensity. The posterior elements are intact. There is loss of disc space height and anterior osteophyte formation in the midthoracic spine. There is no spinal stenosis or neural foraminal narrowing. Evaluation of the spinal cord demonstrates multiple expansile T2 hyperintense lesions throughout the thoracic spinal cord, all of which demonstrate homogeneous postcontrast enhancement. The largest of these lesions is at the T7/8 level measuring 3.2 cm in length. The paraspinal soft tissues are unremarkable.      Impression: Numerous enhancing lesions within the spinal cord consistent with metastatic disease.  This report was finalized on 10/12/2020 3:18 PM by Jyoti Jensen M.D..    Mri Lumbar Spine With & Without Contrast    Result Date: 10/12/2020  Narrative: PROCEDURE: MRI LUMBAR SPINE W WO CONTRAST-  HISTORY: stage IV SCLC on chemo, now with progressive back pain, BLE weakness, incontinence; C34.90-Malignant neoplasm of unspecified part of unspecified bronchus or lung  TECHNIQUE: Multiplanar multisequence imaging of the lumbar spine was performed both before and following the administration of 15 mL MultiHance intravenous contrast.  FINDINGS: The lumbar vertebral bodies maintain a normal height, alignment and signal intensity. The posterior elements are intact throughout. The intervertebral discs are maintained. There is no spinal or neural foraminal stenosis. The spinal cord terminates at the L1/2 level. Enhancing lesions are noted within the lower thoracic spinal cord. There are also multiple enhancing lesions within the thecal sac along the nerve roots, the largest of which is at the L1/2 level measuring 1.6 cm best appreciated on sagittal image eight of the postcontrast images.  The paraspinal soft tissues are unremarkable.      Impression: Multiple enhancing masses within the thecal sac along the nerve roots consistent with metastatic disease.  This report was finalized on 10/12/2020 3:20 PM by Jyoti Jensen M.D..           ASSESSMENT: The patient is a very pleasant 65 y.o. female  with small cell lung cancer    PROBLEM LIST:  1. Small cell lung cancer from the left upper lobe.  Limited stage disease, recurrent in a mediastinal node  A.  Treated  initially with etoposide and carboplatin starting 9/13/27.  She responded to the single modality that was started because of symptoms including airway compromise.  B.  Completed combined modality chemoradiation with etoposide and carboplatin . (Chemotherapy completed 12/7/2017).  C.  Recurrence in left paratracheal node.  This was seen on CT scan from 6/19/18 and hypermetabolic on PET scan to confirm.  She's been treated with weekly taxol starting 7/17/2018 along with involved field radiation.  D. Left pleural effusion seen on CT of 11/23/18, loculated and inaccessible for thoracentesis.  E.  Progressive disease documented on scans done April 22, 2019 with increase in size of left paratracheal lymph node and right hepatic lobe mass.  F. Started carboplatin and etoposide with Tecentriq May 1, 2019, status post 8 cycles.     G.  Started Opdivo plus Yervoy November 13, 2019, status post 4 cycles  H.  Repeat scans done on February 21, 2020 revealed progressive disease  I.  Started Taxol 3 weeks on 1 week off March 6, 2020, status post 5 cycles  2.  Multiple brain metastases:  A.  Completed whole brain radiation January 2020  3.  History of left breast cancer.  A.  Left mastectomy 7/22/2008 for stage IIIA, T3 N1 M0 breast cancer  B.  ER weakly positive, MI negative and HER-2 positive  C.  Adjuvant chemotherapy with Herceptin, Taxotere and carboplatin  D.  Postmastectomy radiation done at [facility]  E.  Adjuvant tamoxifen started  12/12/2008 and continued as extended adjuvant therapy  4.  COPD.  5. Diabetes  6. HTN  7.  Cough  8.  Treatment induced Rash    PLAN:  1.  I had long discussion with the patient and her . I did go over the MRI spine results, I reviewed the films myself and I went over the pictures with them. Unfortunately she has extensive intra canal mets.  2.  I recommend hospice care but the patient is not interested. Her  had bad experience with hospice in the past.  3. I will arrange for hospital bed.  The patient need assistance moving in bed as well as she needs to keep her head elevated 45 degrees given her high risk of aspiration.  4. I will start Norco 5/325 mg every 8 hours as needed.  5. She will follow up with me on as needed bases.  6. Will continue inhalers as needed for COPD  7. We will continue Metformin for type 2 DM.    8. Will continue Hyzaar for HTN.    9.  I will continue the patient on Robitussin with codeine as needed for cough.  10.  I will continue the patient on Benadryl as needed for itching.  11. If Hgb is less than 8 we will plan on giving 2 units of PRBC.   12.  We will continue potassium oral placement.    Josh Son MD  10/28/2020

## 2020-10-29 NOTE — TELEPHONE ENCOUNTER
Called and spoke with [facility] and spoke with Krissy the pharmacist.  She looked and patients insurance would not pay for this so she advised they did not need a script but that the family could just come in and  those supplies with them.  I called and spoke with Stacy Stearns father and advised him of my conversation with the pharmacy.  He advised he would let Fiona know.    
PATIENT'S DAUGHTER JUANITA (NO BH VERBAL) CALLING, NEEDS SCRIPTS FOR BED PADS AND LARGE DIAPERS, SENT TO [facility] #[phone], PLEASE ADVISE?    CALL BACK # [phone]  
stated

## 2020-10-29 NOTE — TELEPHONE ENCOUNTER
Pt needs a nurse to contact the pharmacy to confirm diagnosis and other clinical details.    According to pharmacist, her insurance wont pay for more than  7 day supply without clinical information and she'll lose the rest unless confirmed or PA is completed.    Prescription is for: Hydrocodone Acetaminophen (NORCO.    [facility] #[id number] [phone] PH

## 2020-10-30 NOTE — TELEPHONE ENCOUNTER
"Spoke with [facility] and they advised that I just needed to fax an order over that states \"Provide incontinence supplies\".  They said at that point they would send a fax back to us for us to fill out the diagnosis codes and items that patient needs.\"  Advised Fiona of order being faxed over.    "

## 2020-10-30 NOTE — TELEPHONE ENCOUNTER
Patients daughter Fiona called, she has found a store that will accept the patients insurance for supplies-diapers and pads  Need prescription sent to    [place] [address] [facility] [facility]  [phone]    Best call back [phone]

## 2020-10-30 NOTE — TELEPHONE ENCOUNTER
I called [facility] and talked to PA dept.  The script was approved this morning for the hydrocodone/apap and approval number is [id number].  The MD office will have to send a new script for a 30 day supply on Wednesday, Nov. 4 to the retail pharmacy.

## 2020-10-30 NOTE — TELEPHONE ENCOUNTER
Called and LM with [facility] asking for call back to see what they need my order to say and to confirm that it would be covered.  Awaiting call back.

## 2020-11-04 NOTE — TELEPHONE ENCOUNTER
Called [facility] to check status of order for hospital bed @ [phone].  Spoke with Anjelica.  States they received authorization approval yesterday.  They are in the process of reaching out to patient to set up delivery.  She thinks they will likely be able to deliver it tomorrow.  She states they do not need anything further from our office.  She will let us know if they encounter any issus.

## 2020-11-09 NOTE — TELEPHONE ENCOUNTER
Discussed with Dr. Son who advised he will follow patient.  Called Krystina back and let her know and she advised that the nurse would reach out to us if/when they need anything.

## 2020-11-09 NOTE — TELEPHONE ENCOUNTER
Krystina calling [facility]    Patient needs a comfort kit.  Needs verbal order.    [phone]

## 2020-11-09 NOTE — TELEPHONE ENCOUNTER
Krystina with hospice called she has is going to admit her today. She wants to know if Dr. Son will follow her? Please call.

## 2021-04-03 NOTE — PROGRESS NOTES
PROBLEM LIST:  1.  Small cell lung cancer from left upper lobe.  Staging is incomplete, but favored to be limited to the chest.  2.  History of left breast cancer.  A.  Left mastectomy 7/22/2008 for stage IIIA, T3 N1 M0 breast cancer  B.  ER weakly positive, UT negative and HER-2 positive  C.  Adjuvant chemotherapy with Herceptin, Taxotere and carboplatin  D.  Postmastectomy radiation done at [facility]  E.  Adjuvant tamoxifen started 12/12/2008 and continued as extended adjuvant therapy  3.  Diabetes       Chief complaint: Here about treatment of newly diagnosed lung cancer.    Subjective     HISTORY OF PRESENT ILLNESS:   Mrs. Horton is here for follow up evaluation of small cell lung cancer management. She tolerated cycle 1 of etoposide/carboplatin well.  She denies any fevers, chills or infections. She still has a cough with some mild dyspnea on exertion.  She denies any headaches or diplopia, bone pain, or other new symptoms. She complains of mild fatigue.     Past Medical History, Past Surgical History, Social History, Family History have been reviewed and are without significant changes except as mentioned.    Review of Systems   A comprehensive 14 point review of systems was performed and was negative except as mentioned.    Medications:  The current medication list was reviewed in the EMR    ALLERGIES:    Allergies   Allergen Reactions   • Codeine Irritability       Objective      /72  Pulse 74  Temp 97.1 °F (36.2 °C) (Temporal Artery )   Resp 16  Wt 196 lb (88.9 kg)  BMI 35.85 kg/m2         General: well appearing, in no acute distress   HEENT: sclera anicteric, oropharynx clear, alopecia noted   Lymphatics: no cervical, supraclavicular, or axillary adenopathy  Cardiovascular: regular rate and rhythm, no murmurs  Lungs: clear to auscultation bilaterally  Abdomen: soft, nontender, nondistended.  No palpable masses or organomegaly  Extremeties: no lower extremity edema, cords  or calf tenderness  Skin: no rashes, lesions, bruising, or petechiae    RECENT LABS:  Hematology WBC   Date Value Ref Range Status   09/13/2017 6.11 4.80 - 10.80 10*3/mm3 Final     Hemoglobin   Date Value Ref Range Status   09/13/2017 12.5 12.0 - 16.0 g/dL Final     Hematocrit   Date Value Ref Range Status   09/13/2017 36.6 (L) 37.0 - 47.0 % Final     MCV   Date Value Ref Range Status   09/13/2017 88.8 81.0 - 99.0 fL Final     RDW   Date Value Ref Range Status   09/13/2017 12.3 11.5 - 14.5 % Final     MPV   Date Value Ref Range Status   09/13/2017 10.4 6.0 - 12.0 fL Final     Platelets   Date Value Ref Range Status   09/13/2017 179 130 - 400 10*3/mm3 Final     Immature Grans %   Date Value Ref Range Status   09/13/2017 0.3 0.0 - 0.6 % Final     Neutrophils, Absolute   Date Value Ref Range Status   09/13/2017 3.32 2.00 - 6.90 10*3/mm3 Final     Lymphocytes, Absolute   Date Value Ref Range Status   09/13/2017 2.19 0.60 - 3.40 10*3/mm3 Final     Monocytes, Absolute   Date Value Ref Range Status   09/13/2017 0.39 0.00 - 0.90 10*3/mm3 Final     Eosinophils, Absolute   Date Value Ref Range Status   09/13/2017 0.17 0.00 - 0.70 10*3/mm3 Final     Basophils, Absolute   Date Value Ref Range Status   09/13/2017 0.02 0.00 - 0.20 10*3/mm3 Final     Immature Grans, Absolute   Date Value Ref Range Status   09/13/2017 0.02 0.00 - 0.06 10*3/mm3 Final     nRBC   Date Value Ref Range Status   09/13/2017 0.0 0.0 - 0.0 /100 WBC Final       Glucose   Date Value Ref Range Status   09/13/2017 198 (H) 74 - 98 mg/dL Final     Comment:     Glucose >180, Hemoglobin A1C recommended.     Sodium   Date Value Ref Range Status   09/13/2017 140 137 - 145 mmol/L Final     Potassium   Date Value Ref Range Status   09/13/2017 3.0 (L) 3.5 - 5.1 mmol/L Final     CO2   Date Value Ref Range Status   09/13/2017 25.0 (L) 26.0 - 30.0 mmol/L Final     Chloride   Date Value Ref Range Status   09/13/2017 104 98 - 107 mmol/L Final     Anion Gap   Date Value Ref  Range Status   09/13/2017 14.0 mmol/L Final     Creatinine   Date Value Ref Range Status   09/13/2017 0.90 0.60 - 1.30 mg/dL Final     BUN   Date Value Ref Range Status   09/13/2017 13 7 - 20 mg/dL Final     BUN/Creatinine Ratio   Date Value Ref Range Status   09/13/2017 14.4 7.1 - 23.5 Final     Calcium   Date Value Ref Range Status   09/13/2017 10.1 8.4 - 10.2 mg/dL Final     eGFR Non  Amer   Date Value Ref Range Status   09/13/2017 64 >60 mL/min/1.73 Final     Alkaline Phosphatase   Date Value Ref Range Status   09/13/2017 109 38 - 126 U/L Final     Total Protein   Date Value Ref Range Status   09/13/2017 7.6 6.3 - 8.2 g/dL Final     ALT (SGPT)   Date Value Ref Range Status   09/13/2017 58 13 - 69 U/L Final     AST (SGOT)   Date Value Ref Range Status   09/13/2017 43 15 - 46 U/L Final     Total Bilirubin   Date Value Ref Range Status   09/13/2017 0.5 0.2 - 1.3 mg/dL Final     Albumin   Date Value Ref Range Status   09/13/2017 4.10 3.50 - 5.00 g/dL Final     Globulin   Date Value Ref Range Status   09/13/2017 3.5 gm/dL Final     A/G Ratio   Date Value Ref Range Status   09/13/2017 1.2 1.0 - 2.0 g/dL Final       LDH   Date Value Ref Range Status   08/16/2017 216 120 - 246 U/L Final      Study Result   PROCEDURE: MRI BRAIN W WO CONTRAST-      HISTORY: Small cell lung cancer, evaluate for brain metastasis;  C34.92-Malignant neoplasm of unspecified part of left bronchus or lung      PROCEDURE: Multiplanar multisequence imaging of the brain was performed  both before and following the administration of 15 mL MultiHance  intravenous contrast.      COMPARISON: None.      FINDINGS: There are a few scattered FLAIR hyperintensities in the  periventricular and subcortical white matter consistent with chronic  small vessel ischemic change. There is no mass, mass effect or midline  shift. There is no hydrocephalus. There are no areas of restricted  diffusion. There is no pathologic contrast enhancement.      The  midbrain, milagro, cerebellum and craniocervical junction are  unremarkable. The sella and pituitary gland are within normal limits.  The major intracranial vasculature demonstrates the expected flow  related signal. The paranasal sinuses are clear.      IMPRESSION:  No evidence of metastatic disease within the brain.              This report was finalized on 9/28/2017 10:58 AM by Jyoti Jensen M.D..     Study Result   PROCEDURE: NM BONE SCAN WHOLE BODY-      HISTORY: Small cell lung cancer, evaluate for bone metastasis;  C34.92-Malignant neoplasm of unspecified part of left bronchus or lung      PROCEDURE: The patient was injected with 27 mCi of technetium 99m MDP.   Three hour delayed images were obtained.      COMPARISON: CT of the chest dated August 13, 2017 and CT of the abdomen  and pelvis dated October 18, 2016.      FINDINGS:  There is likely dental uptake in the left mandible.  Degenerative type uptake is seen in the lower thoracic and mid and lower  lumbar spine. There is also degenerative uptake seen in the shoulders  and knees. There is no focal area of activity to suggest metastatic  disease. Soft tissue uptake is normal.           IMPRESSION:  No scintigraphic evidence of metastatic disease.          This report was finalized on 9/28/2017 2:11 PM by Jyoti Jensen M.D..         Assessment/Plan    Impression:   1.  Small cell lung cancer.  She appears to have chest limited disease noting that virtually all of the liver was included on the CT angiogram. She has no evidence of metastatic disease per the MRI of the brain or the bone scan. We will repeat chest xray today to see response to cycle 1 of etoposide/carboplatin. If she has shown a response then we should treat her as limited stage small cell lung cancer and consider radiation to the chest if possible.   2.  History of breast cancer.  She is still on adjuvant tamoxifen after adjuvant chemotherapy and postmastectomy radiation.  Her previous  Support Present radiation may prevent us from giving any radiation for her lung cancer although this will need to be considered by the radiation oncologists.     Plan:   1. We will check a chest x-ray today to be sure she is not in a small group of patients refractory to chemotherapy.  2. Etoposide/carboplatin cycle 2 today.   3. CBC and CMP today. We will make adjustments as needed to her treatment plan.   4. She'll return in 3 weeks to repeat her chemotherapy.  We can adjust doses depending on her tolerance and response.              Елена Haile APRMAGGY  [facility]    10/3/2017          CC:

## 2021-05-06 NOTE — PROGRESS NOTES
PROBLEM LIST:  1.  Small cell lung cancer from left upper lobe.  Staging is incomplete, but favored to be limited to the chest.  2.  History of left breast cancer.  A.  Left mastectomy 7/22/2008 for stage IIIA, T3 N1 M0 breast cancer  B.  ER weakly positive, MT negative and HER-2 positive  C.  Adjuvant chemotherapy with Herceptin, Taxotere and carboplatin  D.  Postmastectomy radiation done at [facility]  E.  Adjuvant tamoxifen started 12/12/2008 and continued as extended adjuvant therapy  3.  Diabetes    Subjective     HISTORY OF PRESENT ILLNESS:   Chief complaint: Here about treatment of newly diagnosed lung cancer.  Mrs. Horton comes in after her bronchoscopy showed allergies in the left upper lobe and biopsies showed small cell lung cancer.  She still has a significant cough with some mild dyspnea on exertion.  She hasn't noted any headache or diplopia, bone pain, or other new symptoms.    Past Medical History, Past Surgical History, Social History, Family History have been reviewed and are without significant changes except as mentioned.    Review of Systems   A comprehensive 14 point review of systems was performed and was negative except as mentioned.    Medications:  The current medication list was reviewed in the EMR    ALLERGIES:  Allergies not on file    Objective      /77  Pulse 73  Temp 97.6 °F (36.4 °C) (Temporal Artery )   Resp 19  Wt 199 lb (90.3 kg)  BMI 36.4 kg/m2     Performance Status: ECOG 1    General: well appearing, in no acute distress  HEENT: sclera anicteric, oropharynx clear  Lymphatics: no cervical, supraclavicular, or axillary adenopathy  Cardiovascular: regular rate and rhythm, no murmurs  Lungs: clear to auscultation bilaterally  Abdomen: soft, nontender, nondistended.  No palpable organomegaly  Extremeties: no lower extremity edema  Skin: no rashes, lesions, bruising, or petechiae    RECENT LABS:   WBC   Date Value Ref Range Status   08/31/2017 7.69  4.80 - 10.80 10*3/mm3 Final     Hemoglobin   Date Value Ref Range Status   08/31/2017 13.1 12.0 - 16.0 g/dL Final     Hematocrit   Date Value Ref Range Status   08/31/2017 38.4 37.0 - 47.0 % Final     MCV   Date Value Ref Range Status   08/31/2017 88.9 81.0 - 99.0 fL Final     RDW   Date Value Ref Range Status   08/31/2017 11.9 11.5 - 14.5 % Final     MPV   Date Value Ref Range Status   08/31/2017 9.5 6.0 - 12.0 fL Final     Platelets   Date Value Ref Range Status   08/31/2017 211 130 - 400 10*3/mm3 Final     Immature Grans %   Date Value Ref Range Status   08/13/2017 0.5 0.0 - 0.6 % Final     Neutrophils, Absolute   Date Value Ref Range Status   08/13/2017 3.85 2.00 - 6.90 10*3/mm3 Final     Lymphocytes, Absolute   Date Value Ref Range Status   08/13/2017 3.10 0.60 - 3.40 10*3/mm3 Final     Monocytes, Absolute   Date Value Ref Range Status   08/13/2017 0.47 0.00 - 0.90 10*3/mm3 Final     Eosinophils, Absolute   Date Value Ref Range Status   08/13/2017 0.27 0.00 - 0.70 10*3/mm3 Final     Basophils, Absolute   Date Value Ref Range Status   08/13/2017 0.03 0.00 - 0.20 10*3/mm3 Final     Immature Grans, Absolute   Date Value Ref Range Status   08/13/2017 0.04 0.00 - 0.06 10*3/mm3 Final     nRBC   Date Value Ref Range Status   08/13/2017 0.0 0.0 - 0.0 /100 WBC Final       Glucose   Date Value Ref Range Status   08/16/2017 118 (H) 70 - 100 mg/dL Final     Sodium   Date Value Ref Range Status   08/16/2017 138 132 - 146 mmol/L Final     Potassium   Date Value Ref Range Status   08/16/2017 3.5 3.5 - 5.5 mmol/L Final     CO2   Date Value Ref Range Status   08/16/2017 33.0 (H) 20.0 - 31.0 mmol/L Final     Chloride   Date Value Ref Range Status   08/16/2017 102 99 - 109 mmol/L Final     Anion Gap   Date Value Ref Range Status   08/16/2017 3.0 3.0 - 11.0 mmol/L Final     Creatinine   Date Value Ref Range Status   08/16/2017 1.00 0.60 - 1.30 mg/dL Final     BUN   Date Value Ref Range Status   08/16/2017 12 9 - 23 mg/dL Final      BUN/Creatinine Ratio   Date Value Ref Range Status   08/16/2017 12.0 7.0 - 25.0 Final     Calcium   Date Value Ref Range Status   08/16/2017 10.4 8.7 - 10.4 mg/dL Final     eGFR Non  Amer   Date Value Ref Range Status   08/16/2017 57 (L) >60 mL/min/1.73 Final     Alkaline Phosphatase   Date Value Ref Range Status   08/16/2017 150 (H) 25 - 100 U/L Final     Total Protein   Date Value Ref Range Status   08/16/2017 7.8 5.7 - 8.2 g/dL Final     ALT (SGPT)   Date Value Ref Range Status   08/16/2017 65 (H) 7 - 40 U/L Final     AST (SGOT)   Date Value Ref Range Status   08/16/2017 52 (H) 0 - 33 U/L Final     Total Bilirubin   Date Value Ref Range Status   08/16/2017 0.8 0.3 - 1.2 mg/dL Final     Albumin   Date Value Ref Range Status   08/16/2017 4.40 3.20 - 4.80 g/dL Final     Globulin   Date Value Ref Range Status   08/16/2017 3.4 gm/dL Final     A/G Ratio   Date Value Ref Range Status   08/16/2017 1.3 (L) 1.5 - 2.5 g/dL Final       LDH   Date Value Ref Range Status   08/16/2017 216 120 - 246 U/L Final       No results found for: MSPIKE, KAPPALAMB, IGLFLC, FREEKAPPAL      Pathology report shows small cell lung cancer.    Assessment/Plan   1.  Small cell lung cancer.  She appears to have chest limited disease noting that virtually all of the liver was included on the CT angiogram.  We will do additional imaging including an MRI of the brain and a bone scan to look for any occult metastatic disease.  2.  History of breast cancer.  She is still on adjuvant tamoxifen after adjuvant chemotherapy and postmastectomy radiation.  Her previous radiation may prevent us from giving any radiation for her lung cancer although this will need to be considered by the radiation oncologists.    Plan: 1.  I reviewed with Mrs. Horton and her  the pathology results of small cell lung cancer.  We discussed further evaluation and treatment options.  Since she does have developing symptoms and evidence of pulmonary artery  constriction, I'll begin her immediately on etoposide and carboplatin.  I reviewed the drugs with them including the potential risks and benefits and most common side effects as well as differences in this combination compared to her prior chemotherapy and she seems to understand this well and agrees with this plan.  She was given an additional instruction by the nurses and given some written information.  2.  I have ordered an MRI of the brain and bone scan to look for any occult metastatic disease elsewhere.  If these are negative, we should treat her as limited stage small cell lung cancer and consider radiation to the chest if possible.  Her prior radiation may obviate this, noting that while adding radiation is beneficial but that the chemotherapy is certainly the most important portion of her treatment in this disease.  3.  We will check a chest x-ray just before her next treatment to be sure she is not in a small group of patients refractory to chemotherapy.  4.  She'll return in 3 weeks to repeat her chemotherapy.  We can adjust doses depending on her tolerance and response.    Jac Rader MD  [facility]ology [facility] Oncology    09/12/17           CC:    Terbinafine Pregnancy And Lactation Text: This medication is Pregnancy Category B and is considered safe during pregnancy. It is also excreted in breast milk and breast feeding isn't recommended.

## 2021-10-19 NOTE — PROGRESS NOTES
DATE OF VISIT: 12/4/2019    REASON FOR VISIT: Followup for small cell lung cancer     HISTORY OF PRESENT ILLNESS: The patient is a very pleasant 64 y.o. female  with past medical history significant for small cell lung cancer diagnosed August 2017.  Repeated scans done on April 21, 2019 revealed progressive disease.  She was started on carboplatin and etoposide and Tecentriq April 30, 2019.  Chemo was stopped after 4 cycles and patient was started on maintenance Tecentriq. Ct scans showed progression. She will start on Yervoy and opdivo 1/13/2019. The patient is here today for scheduled follow up visit with treatment, cycle #2.     SUBJECTIVE: The patient is here today by herself.  She is feeling better compared to last week.  Energy is coming back.  Her appetite has improved.  She is finishing up her oral potassium replacement tomorrow.    PAST MEDICAL HISTORY/SOCIAL HISTORY/FAMILY HISTORY: Reviewed by me and unchanged from my documentation done on 12/04/19.    Review of Systems   Constitutional: Positive for fatigue. Negative for activity change, appetite change, chills, fever and unexpected weight change.   HENT: Negative for hearing loss, mouth sores, nosebleeds, sore throat and trouble swallowing.    Eyes: Negative for visual disturbance.   Respiratory: Negative for cough, chest tightness, shortness of breath and wheezing.    Cardiovascular: Negative for chest pain, palpitations and leg swelling.   Gastrointestinal: Positive for nausea. Negative for abdominal distention, abdominal pain, blood in stool, constipation, diarrhea, rectal pain and vomiting.   Endocrine: Negative for cold intolerance and heat intolerance.   Genitourinary: Negative for difficulty urinating, dysuria, frequency and urgency.   Musculoskeletal: Positive for arthralgias. Negative for back pain, gait problem, joint swelling and myalgias.   Skin: Negative for rash.             Neurological: Negative for dizziness, tremors, syncope, weakness,  Recent PHQ 2/9 Score    PHQ 2:  Date Peds PHQ 2 Score Adult PHQ 2 Score Adult PHQ 2 Interpretation   10/19/2021 - 1 No further screening needed       PHQ 9:  Date Peds PHQ 9 Score Adult PHQ 9 Score Peds PHQ 9 Interpretation Adult PHQ 9 Interpretation   10/19/2021 - 5 - Mild Depression      light-headedness, numbness and headaches.   Hematological: Negative for adenopathy. Does not bruise/bleed easily.   Psychiatric/Behavioral: Negative for confusion, sleep disturbance and suicidal ideas. The patient is not nervous/anxious.          Current Outpatient Medications:   •  acyclovir (ZOVIRAX) 400 MG tablet, Take 1 tablet by mouth 3 (Three) Times a Day. Take no more than 5 doses a day., Disp: 21 tablet, Rfl: 0  •  albuterol (VENTOLIN HFA) 108 (90 Base) MCG/ACT inhaler, Inhale 2 puffs Every 4 (Four) Hours As Needed for Wheezing or Shortness of Air., Disp: 1 inhaler, Rfl: 5  •  amLODIPine (NORVASC) 5 MG tablet, Take 5 mg by mouth Daily., Disp: , Rfl: 0  •  ANORO ELLIPTA 62.5-25 MCG/INH aerosol powder  inhaler, INHALE 1 PUFF BY MOUTH DAILY, Disp: 60 each, Rfl: 11  •  aspirin 81 MG EC tablet, Take 81 mg by mouth Daily., Disp: , Rfl:   •  azelastine (ASTELIN) 0.1 % nasal spray, 1 spray into the nostril(s) as directed by provider 2 (Two) Times a Day As Needed for Rhinitis or Allergies. Use in each nostril as directed, Disp: 1 each, Rfl: 5  •  benzonatate (TESSALON PERLES) 100 MG capsule, Take 1-2 capsules by mouth 3 (Three) Times a Day As Needed for Cough., Disp: 120 capsule, Rfl: 5  •  Cholecalciferol (VITAMIN D3) 18333 units capsule, TK 1 C PO Q WK, Disp: , Rfl: 3  •  guaifenesin-codeine (GUAIFENESIN AC) 100-10 MG/5ML liquid, Take 5-10 mL by mouth 3 (Three) Times a Day As Needed for Cough., Disp: 480 mL, Rfl: 2  •  HAVRIX 1440 EL U/ML vaccine, ADM 1ML IM UTD, Disp: , Rfl: 0  •  hydrochlorothiazide (MICROZIDE) 12.5 MG capsule, Take 12.5 mg by mouth Every Morning. Pt reports that she takes this when swelled as needed., Disp: , Rfl: 0  •  hydrocortisone 2.5 % cream, Apply  topically to the appropriate area as directed 2 (Two) Times a Day. (Patient taking differently: Apply  topically to the appropriate area as directed 2 (Two) Times a Day. Rash improved so pt no longer needed to use it.), Disp: 20 g, Rfl: 0  •   "lidocaine-prilocaine (EMLA) 2.5-2.5 % cream, Apply  topically to the appropriate area as directed As Needed (45-60 minutes prior to port access.  Cover with saran/plastic wrap.)., Disp: 30 g, Rfl: 3  •  losartan (COZAAR) 100 MG tablet, , Disp: , Rfl: 4  •  losartan-hydrochlorothiazide (HYZAAR) 100-12.5 MG per tablet, Take 1 tablet by mouth Daily., Disp: , Rfl: 0  •  metFORMIN (GLUCOPHAGE) 500 MG tablet, Take 500 mg by mouth 2 (Two) Times a Day With Meals., Disp: , Rfl: 0  •  Nystatin (MAGIC MOUTHWASH), Swish and spit 10 mL Every 4 (Four) Hours As Needed (sore mouth)., Disp: 240 mL, Rfl: 1  •  ondansetron (ZOFRAN) 8 MG tablet, Take 1 tablet by mouth 3 (Three) Times a Day As Needed for Nausea or Vomiting., Disp: 30 tablet, Rfl: 5  •  ondansetron (ZOFRAN) 8 MG tablet, Take 1 tablet by mouth 3 (Three) Times a Day As Needed for Nausea or Vomiting., Disp: 30 tablet, Rfl: 5  •  pantoprazole (PROTONIX) 40 MG EC tablet, Take 1 tablet by mouth Every 12 (Twelve) Hours., Disp: 60 tablet, Rfl: 0  •  potassium chloride (K-DUR) 10 MEQ CR tablet, Take 1 tablet by mouth Daily for 14 days. Two tablets, Disp: 14 tablet, Rfl: 0  •  tamoxifen (NOLVADEX) 20 MG chemo tablet, Take 1 tablet by mouth Daily., Disp: 30 tablet, Rfl: 0  •  triamcinolone (KENALOG) 0.1 % ointment, Apply  topically to the appropriate area as directed 2 (Two) Times a Day., Disp: 30 g, Rfl: 3  •  umeclidinium-vilanterol (ANORO ELLIPTA) 62.5-25 MCG/INH aerosol powder  inhaler, Inhale 1 puff Daily., Disp: 1 each, Rfl: 11    PHYSICAL EXAMINATION:   /74   Pulse 81   Temp 97.1 °F (36.2 °C)   Ht 157.5 cm (62\")   Wt 89.8 kg (198 lb)   SpO2 96%   Breastfeeding? No   BMI 36.21 kg/m²    ECOG Performance Status: 1 - Symptomatic but completely ambulatory  General Appearance:  alert, cooperative, no apparent distress and appears stated age   Neurologic/Psychiatric: A&O x 3, gait steady, appropriate affect, strength 5/5 in all muscle groups   HEENT:  Normocephalic, " without obvious abnormality, mucous membranes moist   Neck: Supple, symmetrical, trachea midline, no adenopathy;  No thyromegaly, masses, or tenderness   Lungs:   Clear to auscultation bilaterally; respirations regular, even, and unlabored bilaterally   Heart:  Regular rate and rhythm, no murmurs appreciated   Abdomen:   Soft, non-tender, non-distended and no organomegaly   Lymph nodes: No cervical, supraclavicular, inguinal or axillary adenopathy noted   Extremities: Normal, atraumatic; no clubbing, cyanosis, or edema    Skin: No rashes, ulcers, or suspicious lesions noted     No visits with results within 2 Week(s) from this visit.   Latest known visit with results is:   Infusion on 11/20/2019   Component Date Value Ref Range Status   • Glucose 11/20/2019 135* 65 - 99 mg/dL Final   • BUN 11/20/2019 21  8 - 23 mg/dL Final   • Creatinine 11/20/2019 1.34* 0.57 - 1.00 mg/dL Final   • Sodium 11/20/2019 139  136 - 145 mmol/L Final   • Potassium 11/20/2019 3.1* 3.5 - 5.2 mmol/L Final   • Chloride 11/20/2019 99  98 - 107 mmol/L Final   • CO2 11/20/2019 27.1  22.0 - 29.0 mmol/L Final   • Calcium 11/20/2019 10.2  8.6 - 10.5 mg/dL Final   • Total Protein 11/20/2019 8.0  6.0 - 8.5 g/dL Final   • Albumin 11/20/2019 4.00  3.50 - 5.20 g/dL Final   • ALT (SGPT) 11/20/2019 67* 1 - 33 U/L Final   • AST (SGOT) 11/20/2019 73* 1 - 32 U/L Final   • Alkaline Phosphatase 11/20/2019 170* 39 - 117 U/L Final   • Total Bilirubin 11/20/2019 1.2  0.2 - 1.2 mg/dL Final   • eGFR Non African Amer 11/20/2019 40* >60 mL/min/1.73 Final   • Globulin 11/20/2019 4.0  gm/dL Final   • A/G Ratio 11/20/2019 1.0  g/dL Final   • BUN/Creatinine Ratio 11/20/2019 15.7  7.0 - 25.0 Final   • Anion Gap 11/20/2019 12.9  5.0 - 15.0 mmol/L Final   • WBC 11/20/2019 2.55* 3.40 - 10.80 10*3/mm3 Final   • RBC 11/20/2019 3.37* 3.77 - 5.28 10*6/mm3 Final   • Hemoglobin 11/20/2019 11.1* 12.0 - 15.9 g/dL Final   • Hematocrit 11/20/2019 32.2* 34.0 - 46.6 % Final   • MCV  11/20/2019 95.5  79.0 - 97.0 fL Final   • MCH 11/20/2019 32.9  26.6 - 33.0 pg Final   • MCHC 11/20/2019 34.5  31.5 - 35.7 g/dL Final   • RDW 11/20/2019 12.6  12.3 - 15.4 % Final   • RDW-SD 11/20/2019 43.8  37.0 - 54.0 fl Final   • MPV 11/20/2019 9.6  6.0 - 12.0 fL Final   • Platelets 11/20/2019 93* 140 - 450 10*3/mm3 Final   • Scan Slide 11/20/2019    Final    See Manual Differential Results   • Neutrophil % 11/20/2019 62.0  42.7 - 76.0 % Final   • Lymphocyte % 11/20/2019 17.0* 19.6 - 45.3 % Final   • Monocyte % 11/20/2019 9.0  5.0 - 12.0 % Final   • Eosinophil % 11/20/2019 4.0  0.3 - 6.2 % Final   • Bands %  11/20/2019 2.0  0.0 - 5.0 % Final   • Atypical Lymphocyte % 11/20/2019 6.0* 0.0 - 5.0 % Final   • Neutrophils Absolute 11/20/2019 1.63* 1.70 - 7.00 10*3/mm3 Final   • Lymphocytes Absolute 11/20/2019 0.43* 0.70 - 3.10 10*3/mm3 Final   • Monocytes Absolute 11/20/2019 0.23  0.10 - 0.90 10*3/mm3 Final   • Eosinophils Absolute 11/20/2019 0.10  0.00 - 0.40 10*3/mm3 Final   • RBC Morphology 11/20/2019 Normal  Normal Final   • WBC Morphology 11/20/2019 Normal  Normal Final   • Platelet Estimate 11/20/2019 Adequate  Normal Final        Mammo Screening Modified With Tomosynthesis Right With Cad    Result Date: 11/20/2019  Narrative: MAMMO SCREENING MODIFIED WITH TOMOSYNTHESIS RIGHT WITH CAD-  CLINICAL INDICATION:  Screening/only has right breast; Z12.31-Encounter for screening mammogram for malignant neoplasm of breast. History of left breast cancer status post mastectomy.  TECHNIQUE: Right CC and MLO views were obtained with both 2-D and 3-D acquisitions. The study was read with the assistance of CAD.  COMPARISON: 11/12/2018.  FINDINGS:  There are scattered fibroglandular densities. Nodular asymmetry along the posterior glandular margin in the central CC projection appears similar compared to numerous previous studies dating back to 2014. Minimal punctate benign-appearing calcifications are also similar to prior.  Visualized right axillary lymph nodes are also unchanged from prior studies. There are no suspicious masses, areas of architectural distortion or clustered microcalcifications.      Impression: BI-RADS CATEGORY: 2 , BENIGN FINDING(S).  RECOMMENDED FOLLOW-UP: 12M  FOLLOW UP SCREENING MAMMOGRAM IN ONE YEAR.      NOTES: Mammography does not detect approximately 10-15% of breast cancers. Physical examination of the breasts by a physician and regular monthly breast self examinations are integral parts of breast cancer screening.  A normal mammogram does not exclude breast cancer if there is an abnormal finding on physical examination. When clinically indicated, a biopsy should not be postponed because of a normal mammogram report.  [place]. [id number]  NOTE: If a biopsy is performed on this patient, a copy of the pathology report would be appreciated.  This report was finalized on 11/20/2019 9:52 AM by Nathan Irby MD.    Chemotherapy Regimen:   Treatment Plans     Name Type Plan dates Plan Provider         Active    OP lung nivolumab/Ipilimumab ONCOLOGY TREATMENT  Present Dr. Josh Son                    TOPICS EDUCATION PROVIDED COMMENTS   ANEMIA:  role of RBC, cause, s/s, ways to manage, role of transfusion [x]    THROMBOCYTOPENIA:  role of platelet, cause, s/s, ways to prevent bleeding, things to avoid, when to seek help [x]    NEUTROPENIA:  role of WBC, cause, infection precautions, s/s of infection, when to call MD [x]    NUTRITION & APPETITE CHANGES:  importance of maintaining healthy diet & weight, ways to manage to improve intake, dietary consult, exercise regimen [x]    DIARRHEA:  causes, s/s of dehydration, ways to manage, dietary changes, when to call MD [x]    CONSTIPATION:  causes, ways to manage, dietary changes, when to call MD [x]    NAUSEA & VOMITING:  cause, use of antiemetics, dietary changes, when to call MD [x]    MOUTH SORES:  causes, oral care,  ways to manage [x]    ALOPECIA:  cause, ways to manage, resources [x]    INFERTILITY & SEXUALITY:  causes, fertility preservation options, sexuality changes, ways to manage, importance of birth control [x]    NERVOUS SYSTEM CHANGES:  causes, s/s, neuropathies, cognitive changes, ways to manage [x]    PAIN:  causes, ways to manage [x] ????   SKIN & NAIL CHANGES:  cause, s/s, ways to manage [x]    ORGAN TOXICITIES:  cause, s/s, need for diagnostic tests, labs, when to notify MD [x]    SURVIVORSHIP:  distress, distress assessment, secondary malignancies, early/late effects, follow-up, social issues, social support [x]    HOME CARE:  use of spill kits, storing of PO chemo, how to manage bodily fluids []    MISCELLANEOUS:  drug interactions, administration, vesicant, et [x]        ASSESSMENT: The patient is a very pleasant 64 y.o. female  with small cell lung cancer    PROBLEM LIST:  1. Small cell lung cancer from the left upper lobe.  Limited stage disease, recurrent in a mediastinal node  A.  Treated  initially with etoposide and carboplatin starting 9/13/27.  She responded to the single modality that was started because of symptoms including airway compromise.  B.  Completed combined modality chemoradiation with etoposide and carboplatin . (Chemotherapy completed 12/7/2017).  C.  Recurrence in left paratracheal node.  This was seen on CT scan from 6/19/18 and hypermetabolic on PET scan to confirm.  She's been treated with weekly taxol starting 7/17/2018 along with involved field radiation.  D. Left pleural effusion seen on CT of 11/23/18, loculated and inaccessible for thoracentesis.  E.  Progressive disease documented on scans done April 22, 2019 with increase in size of left paratracheal lymph node and right hepatic lobe mass.  F. Started carboplatin and etoposide with Tecentriq May 1, 2019, status post 8 cycles.     G.  Started Opdivo plus Yervoy November 13, 2019, status post 1 cycle  2.  Anemia.  This appears to  be predominantly from chemoradiation.  3.  History of left breast cancer.  A.  Left mastectomy 7/22/2008 for stage IIIA, T3 N1 M0 breast cancer  B.  ER weakly positive, [place] negative and HER-2 positive  C.  Adjuvant chemotherapy with Herceptin, Taxotere and carboplatin  D.  Postmastectomy radiation done at [facility]  E.  Adjuvant tamoxifen started 12/12/2008 and continued as extended adjuvant therapy  4.  COPD.  5. Diabetes  6. HTN  7.  Cough  8.  Treatment induced itching 1    PLAN:  1. I will proceed with treatment as scheduled today with  Pavithra Bowser #2.   2. The patient will follow up in 3 weeks with cycle #3.   3.  I will continue port maintenance.   4.  I will continue to monitor the patient blood work while she is on treatment including blood counts kidney function liver function and electrolytes.  5. We discussed potential side effects of immunotherapy including but not limited to immune mediated reactions with thyroiditis, pneumonitis, hepatitis, colitis, rash, and electrolytes abnormalities, fatigue, multiorgan failure, and possibly death.  6. She has completed 10 years of Tamoxifen at this point.   7. Will continue inhalers as needed for COPD  8. We will continue Metformin for type 2 DM.    9. Will continue Hyzaar for HTN.  We may have to adjust her antihypertensive agent while she is in treatment.  10.  I will be the patient scans after cycle #4.  11.  I will continue the patient on Robitussin with codeine as needed for cough.  12.  I will continue the patient on Benadryl as needed for itching.  13. If Hgb is less than 8 we will plan on giving 2 units of PRBC.   14.  We will continue potassium oral placement.  We will follow-up on potassium level today.  Josh Son MD  12/4/2019

## 2022-12-14 NOTE — PROGRESS NOTES
PROBLEM LIST:  1.  Small cell lung cancer from the left upper lobe.  Limited stage disease, recurrent in a mediastinal node  A.  Treated  initially with etoposide and carboplatin starting 9/13/27, responded to single modality. started because of symptoms and airway compromise  B.  Completed combined modality chemoradiation. (Chemotherapy completed 12/7/2017).  C.  Recurrence in left paratracheal node on CT scan from 6/19/18 and hypermetabolic on PET scan  D. Began weekly taxol for recurrence  7/17/2018  2.  Anemia.  This appears to be predominantly from chemoradiation.  3.  History of left breast cancer.  A.  Left mastectomy 7/22/2008 for stage IIIA, T3 N1 M0 breast cancer  B.  ER weakly positive, [place] negative and HER-2 positive  C.  Adjuvant chemotherapy with Herceptin, Taxotere and carboplatin  D.  Postmastectomy radiation done at [facility]  E.  Adjuvant tamoxifen started 12/12/2008 and continued as extended adjuvant therapy  4.  Diabetes    Chief Complaint: follow up for recurrent lung cancer management.         HISTORY OF PRESENT ILLNESS:   Mrs. Horton is here for follow-up evaluation for recurrent lung cancer management.  She began weekly Taxol for recurrence on 7/17/2018.  She is tolerating the treatments relatively well.  She has noted some mild mouth sores that does improve with Magic mouthwash.  She's also had some mild nausea that improves with Zofran.   She has a small area of rash on her upper arm by her PICC line that appears to be related to the dressing on her PICC line.  It is pruritic. The rash has not improved with OTC creams.  She denies any fevers or chills.  She has a fair appetite.  She has mild fatigue.   She denies any emesis.  No abdominal pain or distention.  She denies any peripheral neuropathy.  No nose or gum bleeding no melena or hematochezia.  She is scheduled for CyberKnife x 5 treatments to begin August 13, 2018.      Past Medical History, Past Surgical History,  "Social History, Family History have been reviewed and are without significant changes except as mentioned.    Review of Systems   A comprehensive 14 point review of systems was performed and was negative except as mentioned.    Medications:  The current medication list was reviewed in the EMR    ALLERGIES:  Allergies not on file           /65   Pulse 77   Temp 97.2 °F (36.2 °C) (Temporal Artery )   Resp 15   Ht 157.5 cm (62\")   Wt 93 kg (205 lb)   BMI 37.49 kg/m²      Performance Status: ECOG 0    General: well appearing, in no acute distress  HEENT: sclera anicteric, oropharynx clear  Lymphatics: no cervical, supraclavicular, or axillary adenopathy  Cardiovascular: regular rate and rhythm, no murmurs  Lungs: clear to auscultation bilaterally  Abdomen: soft, nontender, nondistended.  No palpable organomegaly  Extremeties: no lower extremity edema  Skin: no lesions, bruising, or petechiae. Mild erythematous papular rash located on the right upper arm close to the PICC line      RECENT LABS:   WBC   Date Value Ref Range Status   07/31/2018 3.31 (L) 4.80 - 10.80 10*3/mm3 Final     Hemoglobin   Date Value Ref Range Status   07/31/2018 10.7 (L) 12.0 - 16.0 g/dL Final     Hematocrit   Date Value Ref Range Status   07/31/2018 31.9 (L) 37.0 - 47.0 % Final     MCV   Date Value Ref Range Status   07/31/2018 94.9 81.0 - 99.0 fL Final     RDW   Date Value Ref Range Status   07/31/2018 13.7 11.5 - 14.5 % Final     MPV   Date Value Ref Range Status   07/31/2018 9.3 6.0 - 12.0 fL Final     Platelets   Date Value Ref Range Status   07/31/2018 153 130 - 400 10*3/mm3 Final     Immature Grans %   Date Value Ref Range Status   07/31/2018 0.6 0.0 - 0.6 % Final     Neutrophils, Absolute   Date Value Ref Range Status   07/31/2018 1.92 (L) 2.00 - 6.90 10*3/mm3 Final     Lymphocytes, Absolute   Date Value Ref Range Status   07/31/2018 1.02 0.60 - 3.40 10*3/mm3 Final     Monocytes, Absolute   Date Value Ref Range Status "   07/31/2018 0.23 0.00 - 0.90 10*3/mm3 Final     Eosinophils, Absolute   Date Value Ref Range Status   07/31/2018 0.09 0.00 - 0.70 10*3/mm3 Final     Basophils, Absolute   Date Value Ref Range Status   07/31/2018 0.03 0.00 - 0.20 10*3/mm3 Final     Immature Grans, Absolute   Date Value Ref Range Status   07/31/2018 0.02 0.00 - 0.06 10*3/mm3 Final     nRBC   Date Value Ref Range Status   07/31/2018 0.0 0.0 - 0.0 /100 WBC Final       Glucose   Date Value Ref Range Status   07/31/2018 114 (H) 74 - 98 mg/dL Final     Sodium   Date Value Ref Range Status   07/31/2018 140 137 - 145 mmol/L Final     Potassium   Date Value Ref Range Status   07/31/2018 3.8 3.5 - 5.1 mmol/L Final     CO2   Date Value Ref Range Status   07/31/2018 29.0 26.0 - 30.0 mmol/L Final     Chloride   Date Value Ref Range Status   07/31/2018 106 98 - 107 mmol/L Final     Anion Gap   Date Value Ref Range Status   07/31/2018 8.8 (L) 10.0 - 20.0 mmol/L Final     Creatinine   Date Value Ref Range Status   07/31/2018 0.90 0.60 - 1.30 mg/dL Final     BUN   Date Value Ref Range Status   07/31/2018 17 7 - 20 mg/dL Final     BUN/Creatinine Ratio   Date Value Ref Range Status   07/31/2018 18.9 7.1 - 23.5 Final     Calcium   Date Value Ref Range Status   07/31/2018 10.8 (H) 8.4 - 10.2 mg/dL Final     eGFR Non  Amer   Date Value Ref Range Status   07/31/2018 64 >60 mL/min/1.73 Final     Alkaline Phosphatase   Date Value Ref Range Status   07/31/2018 113 38 - 126 U/L Final     Total Protein   Date Value Ref Range Status   07/31/2018 7.3 6.3 - 8.2 g/dL Final     ALT (SGPT)   Date Value Ref Range Status   07/31/2018 53 13 - 69 U/L Final     AST (SGOT)   Date Value Ref Range Status   07/31/2018 40 15 - 46 U/L Final     Total Bilirubin   Date Value Ref Range Status   07/31/2018 0.6 0.2 - 1.3 mg/dL Final     Albumin   Date Value Ref Range Status   07/31/2018 3.90 3.50 - 5.00 g/dL Final     Globulin   Date Value Ref Range Status   07/31/2018 3.4 gm/dL Final        LDH   Date Value Ref Range Status   08/16/2017 216 120 - 246 U/L Final       No results found for: MSPIKE, KAPPALAMB, IGLFLC, FREEKAPPAL              Impression:   1.  Recurrent small cell lung cancer.  This appears to be in the upper mediastinum just above the radiation field.  This lymph node area is increased in size on CT and markedly hypermetabolic on PET.  She has some other mild abnormalities on PET that have been stable on CT and I suspect do not represent recurrent luis cancer.  She began weekly Taxol for recurrent lung cancer 7/17/2018.  She is scheduled for CyberKnife ×5 treatments to begin 8/13/2018.  After consultation with Dr. Rader we will hold her Taxol the week that she received CyberKnife.  2.  History of breast cancer.  She is still on adjuvant tamoxifen is extended adjuvant therapy.  3. Stomatitis we will refill magic mouthwash.   4. Nausea, continue zofran prn.   5. Rash right upper arm. Apply Hydrocortisone cream 2.5 % apply to affected area BID for rash.     Plan:   1. Continue weekly Taxol. We will hold the Taxol the week of 8/13/2018 while she is receiving CyberKnife. Resume weekly taxol the week of  8/20/2018.   2. Labs today including CBC and CMP.   3. Refill Magic mouth wash for stomatitis.   4. Hydrocortisone cream 2.5 % apply to affected area BID for rash.   5. We will see her back in 1 month for follow up evaluation. She has been instructed to contact us in the interm if any new symptoms arise.                        AQUILES Gant    [facility]ology [facility]    08/07/18           CC:    [Mild] : mild diffused ankle swelling [NL 30)] : inversion 30 degrees [NL (40)] : MTP joint DF 40 degrees [NL (20)] : MTP joint PF 20 degrees [5___] : [facility] 5[unfilled]/5 [2+] : posterior tibialis pulse: 2+ [Normal] : saphenous nerve sensation normal [Left] : left foot [There are no fractures, subluxations or dislocations. No significant abnormalities are seen] : There are no fractures, subluxations or dislocations. No significant abnormalities are seen [4___] : plantar flexion 4[unfilled]/5 [] : not mildly antalgic [FreeTextEntry3] : LARGE L SHAPED HEALED LACERATION ANTERIOR ANKLE AND SHIN.  HYPERPIGMENTATION OF AREA NOTED.  [de-identified] : MILD SCAR TIGHTNESS WHEN LEG IS LENGTHENED.

## 2024-11-26 NOTE — TELEPHONE ENCOUNTER
Patient is needing port placement-pat is scheduled to start chemo on 05/01/2019.  
Port placement  04/29/19   
Patient

## (undated) DEVICE — DECANT BG O JET

## (undated) DEVICE — ADAPT SWVL FIBROPTIC BRONCH

## (undated) DEVICE — RICH MAJOR PROCEDURE: Brand: MEDLINE INDUSTRIES, INC.

## (undated) DEVICE — 3M™ STERI-STRIP™ REINFORCED ADHESIVE SKIN CLOSURES, R1547, 1/2 IN X 4 IN (12 MM X 100 MM), 6 STRIPS/ENVELOPE: Brand: 3M™ STERI-STRIP™

## (undated) DEVICE — SYR LUERLOK 20CC

## (undated) DEVICE — SYR SLP TP 10ML DISP

## (undated) DEVICE — 3-WAY STOPCOCK: Brand: MAXGUARD

## (undated) DEVICE — DRSNG SURESITE WNDW 4X4.5

## (undated) DEVICE — CUFF SCD HEMOFORCE SEQ CALF STD MD

## (undated) DEVICE — PRECISOR BRONCHO BIOPSY ALLIGATOR CUP FORCEPS/CHANNEL-GUARD COATED SHAFT, 1.8 MM X 115 CM: Brand: PRECISOR BRONCHO

## (undated) DEVICE — SINGLE USE ASPIRATION NEEDLE: Brand: SINGLE USE ASPIRATION NEEDLE

## (undated) DEVICE — SYR LUERLOK 5CC

## (undated) DEVICE — BRUSH CYTO ENDO CELLEBRITY 1X2MM 140CM

## (undated) DEVICE — 1860S HEALTH CARE RESPIRATOR N95 120EA/C: Brand: 3M™

## (undated) DEVICE — GLV SURG SENSICARE W/ALOE PF LF 7.5 STRL

## (undated) DEVICE — NDL HYPO ECLPS SFTY 25G 1 1/2IN

## (undated) DEVICE — Device

## (undated) DEVICE — STRIP,CLOSURE,WOUND,MEDI-STRIP,1/2X4: Brand: MEDLINE

## (undated) DEVICE — SINGLE USE SUCTION VALVE MAJ-209: Brand: SINGLE USE SUCTION VALVE (STERILE)

## (undated) DEVICE — TRAP,MUCUS SPECIMEN,40CC: Brand: MEDLINE

## (undated) DEVICE — TUBING, SUCTION, 1/4" X 12', STRAIGHT: Brand: MEDLINE

## (undated) DEVICE — CLAVICLE STRAP: Brand: DEROYAL

## (undated) DEVICE — UNDYED BRAIDED (POLYGLACTIN 910), SYNTHETIC ABSORBABLE SUTURE: Brand: COATED VICRYL

## (undated) DEVICE — INTENDED FOR TISSUE SEPARATION, AND OTHER PROCEDURES THAT REQUIRE A SHARP SURGICAL BLADE TO PUNCTURE OR CUT.: Brand: BARD-PARKER ® CARBON RIB-BACK BLADES

## (undated) DEVICE — Device: Brand: MEDEX

## (undated) DEVICE — Device: Brand: BALLOON

## (undated) DEVICE — DRSNG WND BORDR/ADHS NONADHR/GZ LF 4X4IN STRL

## (undated) DEVICE — SYR LL TP 10ML STRL

## (undated) DEVICE — NDL HYPO ECLPS SFTY 18G 1 1/2IN

## (undated) DEVICE — SYR SLPTP 30CC

## (undated) DEVICE — SUT VIC 3/0 SH 27IN J416H

## (undated) DEVICE — SOL NACL 0.9PCT 1000ML

## (undated) DEVICE — SPNG GZ STRL 2S 4X4 12PLY

## (undated) DEVICE — MAJ-1414 SINGLE USE ADPATER BIOPSY VALV: Brand: SINGLE USE ADAPTOR BIOPSY VALVE

## (undated) DEVICE — SINGLE USE BIOPSY VALVE MAJ-210: Brand: SINGLE USE BIOPSY VALVE (STERILE)